# Patient Record
Sex: FEMALE | Race: BLACK OR AFRICAN AMERICAN | Employment: UNEMPLOYED | URBAN - METROPOLITAN AREA
[De-identification: names, ages, dates, MRNs, and addresses within clinical notes are randomized per-mention and may not be internally consistent; named-entity substitution may affect disease eponyms.]

---

## 2017-02-13 ENCOUNTER — HOSPITAL ENCOUNTER (OUTPATIENT)
Dept: PAIN MANAGEMENT | Age: 82
Discharge: HOME | End: 2017-02-13
Attending: NURSE PRACTITIONER | Admitting: NURSE PRACTITIONER
Payer: MEDICARE

## 2017-02-13 DIAGNOSIS — M25.552 PAIN IN JOINT, PELVIC REGION AND THIGH, LEFT: ICD-10-CM

## 2017-02-13 DIAGNOSIS — M25.559 PAIN IN JOINT, PELVIC REGION AND THIGH, UNSPECIFIED LATERALITY: ICD-10-CM

## 2017-02-13 DIAGNOSIS — M51.36 LUMBAR DEGENERATIVE DISC DISEASE: ICD-10-CM

## 2017-02-13 DIAGNOSIS — E66.9 GENERALIZED OBESITY: ICD-10-CM

## 2017-02-13 DIAGNOSIS — G89.29 HIP PAIN, CHRONIC, LEFT: ICD-10-CM

## 2017-02-13 DIAGNOSIS — M48.061 SPINAL STENOSIS, LUMBAR REGION, WITHOUT NEUROGENIC CLAUDICATION: ICD-10-CM

## 2017-02-13 DIAGNOSIS — G89.29 HIP PAIN, CHRONIC, UNSPECIFIED LATERALITY: ICD-10-CM

## 2017-02-13 DIAGNOSIS — Z79.899 ENCOUNTER FOR MEDICATION MANAGEMENT: ICD-10-CM

## 2017-02-13 DIAGNOSIS — G89.29 HIP PAIN, CHRONIC, RIGHT: ICD-10-CM

## 2017-02-13 DIAGNOSIS — M25.552 HIP PAIN, CHRONIC, LEFT: ICD-10-CM

## 2017-02-13 DIAGNOSIS — M17.11 ARTHRITIS OF KNEE, RIGHT: Primary | ICD-10-CM

## 2017-02-13 DIAGNOSIS — M25.559 HIP PAIN, CHRONIC, UNSPECIFIED LATERALITY: ICD-10-CM

## 2017-02-13 DIAGNOSIS — M25.551 HIP PAIN, CHRONIC, RIGHT: ICD-10-CM

## 2017-02-13 DIAGNOSIS — M51.37 DEGENERATION OF LUMBAR OR LUMBOSACRAL INTERVERTEBRAL DISC: ICD-10-CM

## 2017-02-13 DIAGNOSIS — M25.551 PAIN IN JOINT, PELVIC REGION AND THIGH, RIGHT: ICD-10-CM

## 2017-02-13 PROCEDURE — 99213 OFFICE O/P EST LOW 20 MIN: CPT | Performed by: NURSE PRACTITIONER

## 2017-02-13 PROCEDURE — 99213 OFFICE O/P EST LOW 20 MIN: CPT

## 2017-02-13 RX ORDER — OXYCODONE HYDROCHLORIDE AND ACETAMINOPHEN 5; 325 MG/1; MG/1
1 TABLET ORAL EVERY 6 HOURS PRN
Qty: 130 TABLET | Refills: 0 | Status: SHIPPED | OUTPATIENT
Start: 2017-02-13 | End: 2017-03-13 | Stop reason: SDUPTHER

## 2017-03-13 RX ORDER — OXYCODONE HYDROCHLORIDE AND ACETAMINOPHEN 5; 325 MG/1; MG/1
1 TABLET ORAL EVERY 6 HOURS PRN
Qty: 130 TABLET | Refills: 0 | Status: SHIPPED | OUTPATIENT
Start: 2017-03-15 | End: 2017-04-03 | Stop reason: SDUPTHER

## 2017-03-14 RX ORDER — LIDOCAINE 50 MG/G
OINTMENT TOPICAL
Qty: 1 TUBE | Refills: 2 | Status: SHIPPED | OUTPATIENT
Start: 2017-03-14 | End: 2017-06-14 | Stop reason: SDUPTHER

## 2017-03-21 ENCOUNTER — HOSPITAL ENCOUNTER (OUTPATIENT)
Dept: PAIN MANAGEMENT | Age: 82
Discharge: HOME | End: 2017-03-21
Payer: MEDICARE

## 2017-03-23 ENCOUNTER — TELEPHONE (OUTPATIENT)
Dept: PAIN MANAGEMENT | Age: 82
End: 2017-03-23

## 2017-03-30 ENCOUNTER — TELEPHONE (OUTPATIENT)
Dept: PAIN MANAGEMENT | Age: 82
End: 2017-03-30

## 2017-04-03 RX ORDER — OXYCODONE HYDROCHLORIDE AND ACETAMINOPHEN 5; 325 MG/1; MG/1
1 TABLET ORAL EVERY 6 HOURS PRN
Qty: 130 TABLET | Refills: 0 | Status: SHIPPED | OUTPATIENT
Start: 2017-04-14 | End: 2017-04-25 | Stop reason: SDUPTHER

## 2017-04-25 ENCOUNTER — HOSPITAL ENCOUNTER (OUTPATIENT)
Dept: PAIN MANAGEMENT | Age: 82
Discharge: HOME | End: 2017-04-25
Payer: MEDICARE

## 2017-04-25 DIAGNOSIS — Z79.899 ENCOUNTER FOR MEDICATION MANAGEMENT: ICD-10-CM

## 2017-04-25 DIAGNOSIS — M51.36 LUMBAR DEGENERATIVE DISC DISEASE: ICD-10-CM

## 2017-04-25 DIAGNOSIS — M25.559 HIP PAIN, CHRONIC, UNSPECIFIED LATERALITY: ICD-10-CM

## 2017-04-25 DIAGNOSIS — M48.061 SPINAL STENOSIS, LUMBAR REGION, WITHOUT NEUROGENIC CLAUDICATION: ICD-10-CM

## 2017-04-25 DIAGNOSIS — E66.9 GENERALIZED OBESITY: ICD-10-CM

## 2017-04-25 DIAGNOSIS — M25.551 PAIN IN JOINT, PELVIC REGION AND THIGH, RIGHT: ICD-10-CM

## 2017-04-25 DIAGNOSIS — M17.11 ARTHRITIS OF KNEE, RIGHT: Primary | ICD-10-CM

## 2017-04-25 DIAGNOSIS — M25.552 HIP PAIN, CHRONIC, LEFT: ICD-10-CM

## 2017-04-25 DIAGNOSIS — M25.551 HIP PAIN, CHRONIC, RIGHT: ICD-10-CM

## 2017-04-25 DIAGNOSIS — G89.29 HIP PAIN, CHRONIC, RIGHT: ICD-10-CM

## 2017-04-25 DIAGNOSIS — G89.29 HIP PAIN, CHRONIC, UNSPECIFIED LATERALITY: ICD-10-CM

## 2017-04-25 DIAGNOSIS — G89.29 HIP PAIN, CHRONIC, LEFT: ICD-10-CM

## 2017-04-25 DIAGNOSIS — M25.552 PAIN IN JOINT, PELVIC REGION AND THIGH, LEFT: ICD-10-CM

## 2017-04-25 PROCEDURE — 99213 OFFICE O/P EST LOW 20 MIN: CPT | Performed by: NURSE PRACTITIONER

## 2017-04-25 PROCEDURE — 99213 OFFICE O/P EST LOW 20 MIN: CPT

## 2017-04-25 RX ORDER — OXYCODONE HYDROCHLORIDE AND ACETAMINOPHEN 5; 325 MG/1; MG/1
1 TABLET ORAL EVERY 6 HOURS PRN
Qty: 130 TABLET | Refills: 0 | Status: SHIPPED | OUTPATIENT
Start: 2017-05-15 | End: 2017-06-14 | Stop reason: SDUPTHER

## 2017-06-14 ENCOUNTER — HOSPITAL ENCOUNTER (OUTPATIENT)
Dept: PAIN MANAGEMENT | Age: 82
Discharge: HOME | End: 2017-06-14
Payer: MEDICARE

## 2017-06-14 DIAGNOSIS — G89.29 HIP PAIN, CHRONIC, LEFT: ICD-10-CM

## 2017-06-14 DIAGNOSIS — M17.11 ARTHRITIS OF KNEE, RIGHT: Primary | ICD-10-CM

## 2017-06-14 DIAGNOSIS — M25.551 HIP PAIN, CHRONIC, RIGHT: ICD-10-CM

## 2017-06-14 DIAGNOSIS — M25.559 HIP PAIN, CHRONIC, UNSPECIFIED LATERALITY: ICD-10-CM

## 2017-06-14 DIAGNOSIS — M25.552 PAIN IN JOINT, PELVIC REGION AND THIGH, LEFT: ICD-10-CM

## 2017-06-14 DIAGNOSIS — G89.29 HIP PAIN, CHRONIC, UNSPECIFIED LATERALITY: ICD-10-CM

## 2017-06-14 DIAGNOSIS — M48.061 SPINAL STENOSIS, LUMBAR REGION, WITHOUT NEUROGENIC CLAUDICATION: ICD-10-CM

## 2017-06-14 DIAGNOSIS — G89.29 HIP PAIN, CHRONIC, RIGHT: ICD-10-CM

## 2017-06-14 DIAGNOSIS — M25.551 PAIN IN JOINT, PELVIC REGION AND THIGH, RIGHT: ICD-10-CM

## 2017-06-14 DIAGNOSIS — M51.36 LUMBAR DEGENERATIVE DISC DISEASE: ICD-10-CM

## 2017-06-14 DIAGNOSIS — E66.9 GENERALIZED OBESITY: ICD-10-CM

## 2017-06-14 DIAGNOSIS — Z79.899 ENCOUNTER FOR MEDICATION MANAGEMENT: ICD-10-CM

## 2017-06-14 DIAGNOSIS — M25.552 HIP PAIN, CHRONIC, LEFT: ICD-10-CM

## 2017-06-14 PROCEDURE — 99213 OFFICE O/P EST LOW 20 MIN: CPT

## 2017-06-14 PROCEDURE — G0463 HOSPITAL OUTPT CLINIC VISIT: HCPCS

## 2017-06-14 PROCEDURE — 99213 OFFICE O/P EST LOW 20 MIN: CPT | Performed by: NURSE PRACTITIONER

## 2017-06-14 RX ORDER — ESCITALOPRAM OXALATE 20 MG/1
TABLET ORAL
COMMUNITY
Start: 2017-06-08 | End: 2018-04-27 | Stop reason: ALTCHOICE

## 2017-06-14 RX ORDER — OXYCODONE HYDROCHLORIDE AND ACETAMINOPHEN 5; 325 MG/1; MG/1
1 TABLET ORAL EVERY 6 HOURS PRN
Qty: 130 TABLET | Refills: 0 | Status: SHIPPED | OUTPATIENT
Start: 2017-06-14 | End: 2017-07-13 | Stop reason: SDUPTHER

## 2017-06-14 RX ORDER — LIDOCAINE 50 MG/G
OINTMENT TOPICAL
Qty: 1 TUBE | Refills: 0 | Status: SHIPPED | OUTPATIENT
Start: 2017-06-14 | End: 2017-09-12 | Stop reason: RX

## 2017-07-13 ENCOUNTER — HOSPITAL ENCOUNTER (OUTPATIENT)
Dept: PAIN MANAGEMENT | Age: 82
Discharge: HOME | End: 2017-07-13
Payer: MEDICARE

## 2017-07-13 VITALS
RESPIRATION RATE: 16 BRPM | OXYGEN SATURATION: 98 % | HEIGHT: 68 IN | HEART RATE: 62 BPM | WEIGHT: 250 LBS | BODY MASS INDEX: 37.89 KG/M2 | DIASTOLIC BLOOD PRESSURE: 57 MMHG | TEMPERATURE: 98.8 F | SYSTOLIC BLOOD PRESSURE: 114 MMHG

## 2017-07-13 DIAGNOSIS — M51.36 LUMBAR DEGENERATIVE DISC DISEASE: ICD-10-CM

## 2017-07-13 DIAGNOSIS — M25.551 HIP PAIN, CHRONIC, RIGHT: ICD-10-CM

## 2017-07-13 DIAGNOSIS — M25.552 HIP PAIN, CHRONIC, LEFT: ICD-10-CM

## 2017-07-13 DIAGNOSIS — M25.559 HIP PAIN, CHRONIC, UNSPECIFIED LATERALITY: ICD-10-CM

## 2017-07-13 DIAGNOSIS — M17.11 ARTHRITIS OF KNEE, RIGHT: Primary | ICD-10-CM

## 2017-07-13 DIAGNOSIS — E66.9 GENERALIZED OBESITY: ICD-10-CM

## 2017-07-13 DIAGNOSIS — M25.552 PAIN IN JOINT, PELVIC REGION AND THIGH, LEFT: ICD-10-CM

## 2017-07-13 DIAGNOSIS — Z79.899 ENCOUNTER FOR MEDICATION MANAGEMENT: ICD-10-CM

## 2017-07-13 DIAGNOSIS — G89.29 HIP PAIN, CHRONIC, LEFT: ICD-10-CM

## 2017-07-13 DIAGNOSIS — M48.061 SPINAL STENOSIS, LUMBAR REGION, WITHOUT NEUROGENIC CLAUDICATION: ICD-10-CM

## 2017-07-13 DIAGNOSIS — G89.29 HIP PAIN, CHRONIC, RIGHT: ICD-10-CM

## 2017-07-13 DIAGNOSIS — G89.29 HIP PAIN, CHRONIC, UNSPECIFIED LATERALITY: ICD-10-CM

## 2017-07-13 DIAGNOSIS — M25.551 PAIN IN JOINT, PELVIC REGION AND THIGH, RIGHT: ICD-10-CM

## 2017-07-13 PROCEDURE — 99205 OFFICE O/P NEW HI 60 MIN: CPT

## 2017-07-13 PROCEDURE — G0463 HOSPITAL OUTPT CLINIC VISIT: HCPCS

## 2017-07-13 PROCEDURE — 99214 OFFICE O/P EST MOD 30 MIN: CPT | Performed by: PAIN MEDICINE

## 2017-07-13 PROCEDURE — 80307 DRUG TEST PRSMV CHEM ANLYZR: CPT

## 2017-07-13 PROCEDURE — 99213 OFFICE O/P EST LOW 20 MIN: CPT

## 2017-07-13 RX ORDER — OXYCODONE HYDROCHLORIDE AND ACETAMINOPHEN 5; 325 MG/1; MG/1
1 TABLET ORAL EVERY 6 HOURS PRN
Qty: 130 TABLET | Refills: 0 | Status: SHIPPED | OUTPATIENT
Start: 2017-07-15 | End: 2017-08-14 | Stop reason: SDUPTHER

## 2017-07-13 ASSESSMENT — ENCOUNTER SYMPTOMS
CONSTIPATION: 0
SHORTNESS OF BREATH: 1
BLURRED VISION: 0
COUGH: 1
VOMITING: 0
PHOTOPHOBIA: 0
NAUSEA: 0
GASTROINTESTINAL NEGATIVE: 1
EYES NEGATIVE: 1
HEARTBURN: 0
BACK PAIN: 1
ABDOMINAL PAIN: 0

## 2017-07-13 ASSESSMENT — PAIN DESCRIPTION - PROGRESSION: CLINICAL_PROGRESSION: NOT CHANGED

## 2017-07-13 ASSESSMENT — PAIN DESCRIPTION - ORIENTATION: ORIENTATION: RIGHT;LEFT;LOWER;UPPER

## 2017-07-13 ASSESSMENT — PAIN DESCRIPTION - DIRECTION: RADIATING_TOWARDS: BILATERAL HIPS AND LEGS

## 2017-07-13 ASSESSMENT — PAIN DESCRIPTION - FREQUENCY: FREQUENCY: CONTINUOUS

## 2017-07-13 ASSESSMENT — PAIN SCALES - GENERAL: PAINLEVEL_OUTOF10: 6

## 2017-07-13 ASSESSMENT — PAIN DESCRIPTION - PAIN TYPE: TYPE: CHRONIC PAIN

## 2017-07-13 ASSESSMENT — PAIN DESCRIPTION - ONSET: ONSET: ON-GOING

## 2017-07-14 ENCOUNTER — HOSPITAL ENCOUNTER (OUTPATIENT)
Age: 82
Setting detail: SPECIMEN
Discharge: HOME | End: 2017-07-14
Payer: MEDICARE

## 2017-07-14 LAB
ALBUMIN SERPL-MCNC: 4.1 G/DL (ref 3.5–5.2)
ALBUMIN/GLOBULIN RATIO: 1.2 (ref 1–2.5)
ALP BLD-CCNC: 81 U/L (ref 35–104)
ALT SERPL-CCNC: 7 U/L (ref 5–33)
ANION GAP SERPL CALCULATED.3IONS-SCNC: 19 MMOL/L (ref 9–17)
AST SERPL-CCNC: 13 U/L
BILIRUB SERPL-MCNC: 0.58 MG/DL (ref 0.3–1.2)
BILIRUBIN DIRECT: 0.14 MG/DL
BILIRUBIN, INDIRECT: 0.44 MG/DL (ref 0–1)
BUN BLDV-MCNC: 15 MG/DL (ref 8–23)
CALCIUM SERPL-MCNC: 9 MG/DL (ref 8.6–10.4)
CHLORIDE BLD-SCNC: 97 MMOL/L (ref 98–107)
CHOLESTEROL/HDL RATIO: 2
CHOLESTEROL: 144 MG/DL
CO2: 26 MMOL/L (ref 20–31)
CREAT SERPL-MCNC: 0.77 MG/DL (ref 0.5–0.9)
GFR AFRICAN AMERICAN: >60 ML/MIN
GFR NON-AFRICAN AMERICAN: >60 ML/MIN
GFR SERPL CREATININE-BSD FRML MDRD: NORMAL ML/MIN/{1.73_M2}
GFR SERPL CREATININE-BSD FRML MDRD: NORMAL ML/MIN/{1.73_M2}
GLOBULIN: NORMAL G/DL (ref 1.5–3.8)
GLUCOSE BLD-MCNC: 96 MG/DL (ref 70–99)
HCT VFR BLD CALC: 33.7 % (ref 36–46)
HDLC SERPL-MCNC: 73 MG/DL
HEMOGLOBIN: 10.6 G/DL (ref 12–16)
IRON: 64 UG/DL (ref 37–145)
LDL CHOLESTEROL: 55 MG/DL (ref 0–130)
MCH RBC QN AUTO: 26.2 PG (ref 26–34)
MCHC RBC AUTO-ENTMCNC: 31.4 G/DL (ref 31–37)
MCV RBC AUTO: 83.5 FL (ref 80–100)
PDW BLD-RTO: 16.9 % (ref 12.5–15.4)
PLATELET # BLD: 273 K/UL (ref 140–450)
PMV BLD AUTO: 9.9 FL (ref 6–12)
POTASSIUM SERPL-SCNC: 4.6 MMOL/L (ref 3.7–5.3)
RBC # BLD: 4.03 M/UL (ref 4–5.2)
SODIUM BLD-SCNC: 142 MMOL/L (ref 135–144)
T3 FREE: 2.36 PG/ML (ref 2.02–4.43)
THYROXINE, FREE: 1.38 NG/DL (ref 0.93–1.7)
TOTAL PROTEIN: 7.4 G/DL (ref 6.4–8.3)
TRIGL SERPL-MCNC: 79 MG/DL
TSH SERPL DL<=0.05 MIU/L-ACNC: 1.72 MIU/L (ref 0.3–5)
VITAMIN B-12: 404 PG/ML (ref 211–946)
VITAMIN D 25-HYDROXY: 9.7 NG/ML (ref 30–100)
VLDLC SERPL CALC-MCNC: NORMAL MG/DL (ref 1–30)
WBC # BLD: 8.1 K/UL (ref 3.5–11)

## 2017-07-16 LAB
6-ACETYLMORPHINE, UR: NOT DETECTED
7-AMINOCLONAZEPAM, URINE: NOT DETECTED
ALPHA-OH-ALPRAZ, URINE: NOT DETECTED
ALPRAZOLAM, URINE: NOT DETECTED
AMPHETAMINES, URINE: NOT DETECTED
BARBITURATES, URINE: NOT DETECTED
BENZOYLECGONINE, UR: NOT DETECTED
BUPRENORPHINE URINE: NOT DETECTED
CARISOPRODOL, UR: NOT DETECTED
CLONAZEPAM, URINE: NOT DETECTED
CODEINE, URINE: NOT DETECTED
CREATININE URINE: 184.6 MG/DL (ref 20–400)
DIAZEPAM, URINE: NOT DETECTED
DRUGS EXPECTED, UR: NORMAL
EER HI RES INTERP UR: NORMAL
ESTIMATED AVERAGE GLUCOSE: 128 MG/DL
ETHYL GLUCURONIDE UR: NOT DETECTED
FENTANYL URINE: NOT DETECTED
HBA1C MFR BLD: 6.1 % (ref 4–6)
HYDROCODONE, URINE: NOT DETECTED
HYDROMORPHONE, URINE: NOT DETECTED
LORAZEPAM, URINE: NOT DETECTED
MARIJUANA METAB, UR: NOT DETECTED
MDA, UR: NOT DETECTED
MDEA, EVE, UR: NOT DETECTED
MDMA URINE: NOT DETECTED
MEPERIDINE METAB, UR: NOT DETECTED
METHADONE, URINE: NOT DETECTED
METHAMPHETAMINE, URINE: NOT DETECTED
METHYLPHENIDATE: NOT DETECTED
MIDAZOLAM, URINE: NOT DETECTED
MORPHINE URINE: NOT DETECTED
NORBUPRENORPHINE, URINE: NOT DETECTED
NORDIAZEPAM, URINE: NOT DETECTED
NORFENTANYL, URINE: NOT DETECTED
NORHYDROCODONE, URINE: NOT DETECTED
NOROXYCODONE, URINE: NOT DETECTED
NOROXYMORPHONE, URINE: NOT DETECTED
OXAZEPAM, URINE: NOT DETECTED
OXYCODONE URINE: NOT DETECTED
OXYMORPHONE, URINE: NOT DETECTED
PAIN MANAGEMENT DRUG PANEL INTERP, URINE: NORMAL
PAIN MGT DRUG PANEL, HI RES, UR: NORMAL
PCP,URINE: NOT DETECTED
PHENTERMINE, UR: NOT DETECTED
PROPOXYPHENE, URINE: NOT DETECTED
TAPENTADOL, URINE: NOT DETECTED
TAPENTADOL-O-SULFATE, URINE: NOT DETECTED
TEMAZEPAM, URINE: NOT DETECTED
TRAMADOL, URINE: NOT DETECTED
ZOLPIDEM, URINE: NOT DETECTED

## 2017-08-14 ENCOUNTER — HOSPITAL ENCOUNTER (OUTPATIENT)
Dept: PAIN MANAGEMENT | Age: 82
Discharge: HOME | End: 2017-08-14
Payer: MEDICARE

## 2017-08-14 VITALS
OXYGEN SATURATION: 97 % | BODY MASS INDEX: 37.89 KG/M2 | DIASTOLIC BLOOD PRESSURE: 78 MMHG | HEART RATE: 62 BPM | HEIGHT: 68 IN | SYSTOLIC BLOOD PRESSURE: 152 MMHG | RESPIRATION RATE: 16 BRPM | TEMPERATURE: 97.6 F | WEIGHT: 250 LBS

## 2017-08-14 DIAGNOSIS — M17.11 ARTHRITIS OF KNEE, RIGHT: Primary | ICD-10-CM

## 2017-08-14 DIAGNOSIS — M48.061 SPINAL STENOSIS, LUMBAR REGION, WITHOUT NEUROGENIC CLAUDICATION: ICD-10-CM

## 2017-08-14 DIAGNOSIS — M25.559 PAIN IN JOINT, PELVIC REGION AND THIGH, UNSPECIFIED LATERALITY: ICD-10-CM

## 2017-08-14 DIAGNOSIS — M25.552 PAIN IN JOINT, PELVIC REGION AND THIGH, LEFT: ICD-10-CM

## 2017-08-14 DIAGNOSIS — Z79.899 ENCOUNTER FOR MEDICATION MANAGEMENT: ICD-10-CM

## 2017-08-14 DIAGNOSIS — M25.559 HIP PAIN, CHRONIC, UNSPECIFIED LATERALITY: ICD-10-CM

## 2017-08-14 DIAGNOSIS — M25.551 PAIN IN JOINT, PELVIC REGION AND THIGH, RIGHT: ICD-10-CM

## 2017-08-14 DIAGNOSIS — G89.29 HIP PAIN, CHRONIC, UNSPECIFIED LATERALITY: ICD-10-CM

## 2017-08-14 DIAGNOSIS — E66.9 GENERALIZED OBESITY: ICD-10-CM

## 2017-08-14 PROCEDURE — 99213 OFFICE O/P EST LOW 20 MIN: CPT

## 2017-08-14 PROCEDURE — 99213 OFFICE O/P EST LOW 20 MIN: CPT | Performed by: NURSE PRACTITIONER

## 2017-08-14 RX ORDER — OXYCODONE HYDROCHLORIDE AND ACETAMINOPHEN 5; 325 MG/1; MG/1
1 TABLET ORAL EVERY 6 HOURS PRN
Qty: 130 TABLET | Refills: 0 | Status: SHIPPED | OUTPATIENT
Start: 2017-08-14 | End: 2017-09-12 | Stop reason: SDUPTHER

## 2017-08-14 ASSESSMENT — ENCOUNTER SYMPTOMS
GASTROINTESTINAL NEGATIVE: 1
EYES NEGATIVE: 1
BACK PAIN: 1
SHORTNESS OF BREATH: 1

## 2017-09-12 ENCOUNTER — HOSPITAL ENCOUNTER (OUTPATIENT)
Dept: PAIN MANAGEMENT | Age: 82
Discharge: HOME | End: 2017-09-12
Payer: MEDICARE

## 2017-09-12 VITALS
DIASTOLIC BLOOD PRESSURE: 58 MMHG | HEART RATE: 61 BPM | SYSTOLIC BLOOD PRESSURE: 131 MMHG | BODY MASS INDEX: 37.89 KG/M2 | RESPIRATION RATE: 18 BRPM | WEIGHT: 250 LBS | HEIGHT: 68 IN

## 2017-09-12 DIAGNOSIS — M51.36 LUMBAR DEGENERATIVE DISC DISEASE: ICD-10-CM

## 2017-09-12 DIAGNOSIS — Z79.899 ENCOUNTER FOR MEDICATION MANAGEMENT: ICD-10-CM

## 2017-09-12 DIAGNOSIS — M48.061 SPINAL STENOSIS, LUMBAR REGION, WITHOUT NEUROGENIC CLAUDICATION: Primary | ICD-10-CM

## 2017-09-12 PROCEDURE — 99213 OFFICE O/P EST LOW 20 MIN: CPT

## 2017-09-12 PROCEDURE — 99213 OFFICE O/P EST LOW 20 MIN: CPT | Performed by: NURSE PRACTITIONER

## 2017-09-12 RX ORDER — OXYCODONE HYDROCHLORIDE AND ACETAMINOPHEN 5; 325 MG/1; MG/1
1 TABLET ORAL EVERY 6 HOURS PRN
Qty: 130 TABLET | Refills: 0 | Status: SHIPPED | OUTPATIENT
Start: 2017-09-13 | End: 2017-10-11 | Stop reason: SDUPTHER

## 2017-09-12 RX ORDER — MULTIVIT-MIN/IRON/FOLIC ACID/K 18-600-40
CAPSULE ORAL
COMMUNITY
End: 2020-10-09

## 2017-09-12 ASSESSMENT — PAIN SCALES - GENERAL: PAINLEVEL_OUTOF10: 7

## 2017-09-12 ASSESSMENT — ENCOUNTER SYMPTOMS
COUGH: 0
BACK PAIN: 1
CONSTIPATION: 0
SHORTNESS OF BREATH: 0

## 2017-09-29 ENCOUNTER — TELEPHONE (OUTPATIENT)
Dept: PAIN MANAGEMENT | Age: 82
End: 2017-09-29

## 2017-10-11 RX ORDER — OXYCODONE HYDROCHLORIDE AND ACETAMINOPHEN 5; 325 MG/1; MG/1
1 TABLET ORAL EVERY 6 HOURS PRN
Qty: 130 TABLET | Refills: 0 | Status: SHIPPED | OUTPATIENT
Start: 2017-10-13 | End: 2017-10-16 | Stop reason: SDUPTHER

## 2017-10-16 ENCOUNTER — HOSPITAL ENCOUNTER (OUTPATIENT)
Dept: PAIN MANAGEMENT | Age: 82
Discharge: HOME | End: 2017-10-16
Payer: MEDICARE

## 2017-10-16 DIAGNOSIS — M51.36 LUMBAR DEGENERATIVE DISC DISEASE: ICD-10-CM

## 2017-10-16 DIAGNOSIS — M48.061 SPINAL STENOSIS, LUMBAR REGION, WITHOUT NEUROGENIC CLAUDICATION: Primary | ICD-10-CM

## 2017-10-16 PROCEDURE — 99214 OFFICE O/P EST MOD 30 MIN: CPT | Performed by: NURSE PRACTITIONER

## 2017-10-16 PROCEDURE — 99213 OFFICE O/P EST LOW 20 MIN: CPT

## 2017-10-16 PROCEDURE — 80307 DRUG TEST PRSMV CHEM ANLYZR: CPT

## 2017-10-16 RX ORDER — OXYCODONE HYDROCHLORIDE AND ACETAMINOPHEN 5; 325 MG/1; MG/1
1 TABLET ORAL EVERY 6 HOURS PRN
Qty: 130 TABLET | Refills: 0 | Status: SHIPPED | OUTPATIENT
Start: 2017-10-16 | End: 2017-11-14 | Stop reason: SDUPTHER

## 2017-10-16 ASSESSMENT — ENCOUNTER SYMPTOMS: BACK PAIN: 1

## 2017-10-16 NOTE — PROGRESS NOTES
obstructive pulmonary disease) ([facility]     Diabetes mellitus ([facility]     Hyperlipidemia     Hypertension     Mitral regurgitation     Myalgia     Pulmonary hypertension     Sleep apnea        Past Surgical History:   Procedure Laterality Date    ABDOMEN SURGERY       SECTION      COLECTOMY      COLONOSCOPY      HIP ARTHROPLASTY      3 on the left and 1 on the right    HYSTERECTOMY      JOINT REPLACEMENT Right     TOTAL KNEE    JOINT REPLACEMENT Bilateral     right x2, left x3 hips    KNEE SURGERY         No Known Allergies      Current Outpatient Prescriptions:     oxyCODONE-acetaminophen (PERCOCET) 5-325 MG per tablet, Take 1 tablet by mouth every 6 hours as needed for Pain  30 day supply May occasionally take an extra tablet for severe pain. Earliest Fill Date: 10/13/17, Disp: 130 tablet, Rfl: 0    Cholecalciferol (VITAMIN D) 2000 units CAPS capsule, Take by mouth Pt taking 1 time a week, unsure of dose, Disp: , Rfl:     ASPERCREME LIDOCAINE EX, Apply topically, Disp: , Rfl:     Menthol, Topical Analgesic, (PERFORM PAIN RELIEVING ROLL-ON EX), Apply topically, Disp: , Rfl:     VENTOLIN  (90 Base) MCG/ACT inhaler, , Disp: , Rfl:     escitalopram (LEXAPRO) 20 MG tablet, , Disp: , Rfl:     omeprazole (PRILOSEC) 20 MG delayed release capsule, , Disp: , Rfl:     amLODIPine (NORVASC) 5 MG tablet, TAKE ONE TABLET BY MOUTH DAILY, Disp: 30 tablet, Rfl: 9    losartan (COZAAR) 50 MG tablet, TAKE ONE TABLET BY MOUTH DAILY, Disp: 30 tablet, Rfl: 2    hydrocortisone 2.5 % cream, Apply topically 2 times daily. , Disp: 30 g, Rfl: 2    levothyroxine (LEVOTHROID) 50 MCG tablet, Take 1 tablet by mouth daily, Disp: 30 tablet, Rfl: 3    glucose monitoring kit (FREESTYLE) monitoring kit, 1 kit by Does not apply route daily as needed, Disp: 1 kit, Rfl: 0    glucose blood VI test strips (EXACTECH TEST) strip, 1 each by In Vitro route daily Type 2 diabetes qd testing, Disp: 100 each, Rfl: 3   Accu-Chek Multiclix Lancets MISC, Test qd;type 2 diabetes, Disp: 100 each, Rfl: 3    Scooter MISC, by Does not apply route Use daily dx arthritis obesity pulmonary htn,copd, Disp: 1 each, Rfl: 0    metFORMIN (GLUCOPHAGE) 500 MG tablet, TAKE ONE TABLET BY MOUTH TWICE A DAY, Disp: 180 tablet, Rfl: 2    simvastatin (ZOCOR) 40 MG tablet, Take 1 tablet by mouth nightly, Disp: 90 tablet, Rfl: 3    metoprolol (LOPRESSOR) 25 MG tablet, Take 1 tablet by mouth 2 times daily, Disp: 60 tablet, Rfl: 6    saline nasal gel (AYR) GEL, by Nasal route as needed for Congestion. , Disp: , Rfl:     aspirin 325 MG tablet, Take 325 mg by mouth daily. , Disp: , Rfl:     Family History   Problem Relation Age of Onset    Cancer Mother     Hypertension Maternal Aunt     Cancer Daughter        Social History     Social History    Marital status: Single     Spouse name: N/A    Number of children: 3    Years of education: N/A     Occupational History    RETIRED      Social History Main Topics    Smoking status: Former Smoker     Years: 5.00     Quit date: 11/23/1985    Smokeless tobacco: Never Used    Alcohol use No    Drug use: No    Sexual activity: Not on file     Other Topics Concern    Not on file     Social History Narrative    No narrative on file       Review of Systems:  Review of Systems   Constitution: Negative for chills and fever. Cardiovascular: Negative for chest pain and irregular heartbeat. Respiratory: Negative for cough and shortness of breath. Musculoskeletal: Positive for back pain. Gastrointestinal: Negative for constipation. Neurological: Negative for disturbances in coordination and loss of balance. Physical Exam:  T 97.4  /69  R 18  P 61  SpO2 97    Physical Exam   Constitutional: She is oriented to person, place, and time and well-developed, well-nourished, and in no distress. HENT:   Head: Normocephalic. Eyes: EOM are normal.   Neck: Normal range of motion.

## 2017-10-17 ASSESSMENT — ENCOUNTER SYMPTOMS
CONSTIPATION: 0
COUGH: 0
SHORTNESS OF BREATH: 0

## 2017-10-21 LAB
6-ACETYLMORPHINE, UR: NOT DETECTED
7-AMINOCLONAZEPAM, URINE: NOT DETECTED
ALPHA-OH-ALPRAZ, URINE: NOT DETECTED
ALPRAZOLAM, URINE: NOT DETECTED
AMPHETAMINES, URINE: NOT DETECTED
BARBITURATES, URINE: NOT DETECTED
BENZOYLECGONINE, UR: NOT DETECTED
BUPRENORPHINE URINE: NOT DETECTED
CARISOPRODOL, UR: NOT DETECTED
CLONAZEPAM, URINE: NOT DETECTED
CODEINE, URINE: NOT DETECTED
CREATININE URINE: >400 MG/DL (ref 20–400)
DIAZEPAM, URINE: NOT DETECTED
DRUGS EXPECTED, UR: ABNORMAL
EER HI RES INTERP UR: ABNORMAL
ETHYL GLUCURONIDE UR: NOT DETECTED
FENTANYL URINE: NOT DETECTED
HYDROCODONE, URINE: NOT DETECTED
HYDROMORPHONE, URINE: NOT DETECTED
LORAZEPAM, URINE: NOT DETECTED
MARIJUANA METAB, UR: NOT DETECTED
MDA, UR: NOT DETECTED
MDEA, EVE, UR: NOT DETECTED
MDMA URINE: NOT DETECTED
MEPERIDINE METAB, UR: NOT DETECTED
METHADONE, URINE: NOT DETECTED
METHAMPHETAMINE, URINE: NOT DETECTED
METHYLPHENIDATE: NOT DETECTED
MIDAZOLAM, URINE: NOT DETECTED
MORPHINE URINE: NOT DETECTED
NORBUPRENORPHINE, URINE: NOT DETECTED
NORDIAZEPAM, URINE: NOT DETECTED
NORFENTANYL, URINE: NOT DETECTED
NORHYDROCODONE, URINE: NOT DETECTED
NOROXYCODONE, URINE: PRESENT
NOROXYMORPHONE, URINE: PRESENT
OXAZEPAM, URINE: NOT DETECTED
OXYCODONE URINE: PRESENT
OXYMORPHONE, URINE: PRESENT
PAIN MANAGEMENT DRUG PANEL INTERP, URINE: ABNORMAL
PAIN MGT DRUG PANEL, HI RES, UR: ABNORMAL
PCP,URINE: NOT DETECTED
PHENTERMINE, UR: NOT DETECTED
PROPOXYPHENE, URINE: NOT DETECTED
TAPENTADOL, URINE: NOT DETECTED
TAPENTADOL-O-SULFATE, URINE: NOT DETECTED
TEMAZEPAM, URINE: NOT DETECTED
TRAMADOL, URINE: NOT DETECTED
ZOLPIDEM, URINE: NOT DETECTED

## 2017-11-14 ENCOUNTER — HOSPITAL ENCOUNTER (OUTPATIENT)
Dept: PAIN MANAGEMENT | Age: 82
Discharge: HOME | End: 2017-11-14
Payer: MEDICARE

## 2017-11-14 VITALS
HEIGHT: 64 IN | RESPIRATION RATE: 16 BRPM | OXYGEN SATURATION: 98 % | TEMPERATURE: 98.2 F | HEART RATE: 78 BPM | BODY MASS INDEX: 42.68 KG/M2 | WEIGHT: 250 LBS

## 2017-11-14 DIAGNOSIS — M17.11 ARTHRITIS OF KNEE, RIGHT: ICD-10-CM

## 2017-11-14 DIAGNOSIS — E66.9 GENERALIZED OBESITY: ICD-10-CM

## 2017-11-14 DIAGNOSIS — G89.29 HIP PAIN, CHRONIC, UNSPECIFIED LATERALITY: ICD-10-CM

## 2017-11-14 DIAGNOSIS — M25.559 HIP PAIN, CHRONIC, UNSPECIFIED LATERALITY: ICD-10-CM

## 2017-11-14 DIAGNOSIS — M25.552 PAIN IN JOINT, PELVIC REGION AND THIGH, LEFT: ICD-10-CM

## 2017-11-14 DIAGNOSIS — Z79.899 ENCOUNTER FOR MEDICATION MANAGEMENT: ICD-10-CM

## 2017-11-14 DIAGNOSIS — G89.29 HIP PAIN, CHRONIC, RIGHT: ICD-10-CM

## 2017-11-14 DIAGNOSIS — M48.061 SPINAL STENOSIS, LUMBAR REGION, WITHOUT NEUROGENIC CLAUDICATION: ICD-10-CM

## 2017-11-14 DIAGNOSIS — M25.551 HIP PAIN, CHRONIC, RIGHT: ICD-10-CM

## 2017-11-14 DIAGNOSIS — M25.551 PAIN IN JOINT, PELVIC REGION AND THIGH, RIGHT: Primary | ICD-10-CM

## 2017-11-14 DIAGNOSIS — M51.36 LUMBAR DEGENERATIVE DISC DISEASE: ICD-10-CM

## 2017-11-14 DIAGNOSIS — M25.552 HIP PAIN, CHRONIC, LEFT: ICD-10-CM

## 2017-11-14 DIAGNOSIS — G89.29 HIP PAIN, CHRONIC, LEFT: ICD-10-CM

## 2017-11-14 PROCEDURE — 99213 OFFICE O/P EST LOW 20 MIN: CPT | Performed by: NURSE PRACTITIONER

## 2017-11-14 PROCEDURE — 99213 OFFICE O/P EST LOW 20 MIN: CPT

## 2017-11-14 RX ORDER — OXYCODONE HYDROCHLORIDE AND ACETAMINOPHEN 5; 325 MG/1; MG/1
1 TABLET ORAL EVERY 6 HOURS PRN
Qty: 130 TABLET | Refills: 0 | Status: SHIPPED | OUTPATIENT
Start: 2017-11-15 | End: 2017-12-13 | Stop reason: SDUPTHER

## 2017-11-14 ASSESSMENT — ENCOUNTER SYMPTOMS
SHORTNESS OF BREATH: 1
BACK PAIN: 1
CONSTIPATION: 1

## 2017-11-14 NOTE — PROGRESS NOTES
[facility]  Progress  Note    Patient is here today to review medication contract. Chief Complaint: back pain          HPI: C/O low back pain, which has progressed. The pain radiates down in to hips. No history of lumbar surgery, Has done aquatics which helped back pain. No Ed visits, sleeps good once she gets too sleep. She has cough and upper respiratory symptoms. Back Pain   This is a chronic problem. The current episode started more than 1 year ago. The problem occurs constantly. The problem has been gradually worsening since onset. The pain is present in the lumbar spine. Quality: throbbing. Radiates to: down hips. The pain is at a severity of 10/10. The pain is severe. The pain is the same all the time. The symptoms are aggravated by standing (walking). Stiffness is present all day. Associated symptoms include numbness. (Legs) Risk factors include sedentary lifestyle, menopause and obesity. She has tried analgesics for the symptoms. The treatment provided mild relief. *Possible side effects, risk of tolerance and or dependence and alternative treatments discussed    Obtaining appropriate analgesic effect of treatment   No signs of potential drug abuse or diversion identified    Treatment goals:  Functional status: stop the pain      Aberrancy  None   Analgesia pain 10,   Adverse  Effects :  Constipation, magnesium citrate, BM every other day  ADL;s :  Not as active, has been sick, stretching    Patient denies any new neurological symptoms. No bowel or bladder incontinence, no weakness, and no falling. Pill count: appropriate  Controlled Substances Monitoring:     Attestation: The Prescription Monitoring Report for this patient was reviewed today. [facility], APRN)  Documentation: Obtaining appropriate analgesic effect of treatment., No signs of potential drug abuse or diversion identified., Existing medication contract.  [facility], APRN)  :  Review of OARRS does not Positive for constipation. Genitourinary: Positive for nocturia. Neurological: Positive for loss of balance and numbness. Uses a walker   Psychiatric/Behavioral: The patient is nervous/anxious. Managing         Physical Exam:  Pulse 78   Temp 98.2 °F (36.8 °C) (Oral)   Resp 16   Ht 5' 4\" (1.626 m)   Wt 250 lb (113.4 kg)   SpO2 98%   BMI 42.91 kg/m²     Physical Exam   Constitutional: She is oriented to person, place, and time. obese   HENT:   Head: Normocephalic. Neck: Normal range of motion. Neck supple. Pulmonary/Chest: Effort normal.   Coughing,    Musculoskeletal:        Right shoulder: She exhibits decreased range of motion and tenderness. Left shoulder: She exhibits decreased range of motion and tenderness. Right knee: She exhibits decreased range of motion. Tenderness found. Lumbar back: She exhibits decreased range of motion. Neurological: She is alert and oriented to person, place, and time. Gait abnormal.   Reflex Scores:       Patellar reflexes are 0 on the right side and 0 on the left side. Achilles reflexes are 1+ on the right side and 1+ on the left side. Skin: Skin is warm, dry and intact.    Psychiatric: Affect and judgment normal.       Record/Diagnostics Review:    As above, I did review the imaging  10/21/2017 11:02 AM - Radha Grant Incoming Lab Results From [facility]     Component Results     Component Value Ref Range & Units Status Collected Lab   Pain Management Drug Panel Interp, Urine Consistent   Final 10/16/2017 11:45 AM [facility]   (NOTE)   ________________________________________________________________   DRUGS EXPECTED:   PERCOCET (OXYCODONE) [10/16/17]   ________________________________________________________________   CONSISTENT with medications provided:   PERCOCET (OXYCODONE) : based on oxycodone, noroxycodone,   noroxymorphone, oxymorphone   ________________________________________________________________   Drugs Not Included in this Assay:   Acetaminophen   ________________________________________________________________   INTERPRETIVE INFORMATION: Pain Mgt Neil, [place] Spec/EMIT, Ur,                            Interp   Interpretation depends on accuracy and completeness of patient   medication information submitted by client. 6-Acetylmorphine, Ur Not Detected   Final 10/16/2017 11:45 AM [facility]   7-Aminoclonazepam, Urine Not Detected   Final 10/16/2017 11:45 AM [facility]   Alpha-OH-Alpraz, Urine Not Detected   Final 10/16/2017 11:45 AM [facility]   Alprazolam, Urine Not Detected   Final 10/16/2017 11:45 AM [facility]   Amphetamines, urine Not Detected   Final 10/16/2017 11:45 AM [facility]   Barbiturates, Ur Not Detected   Final 10/16/2017 11:45 AM [facility]   Benzoylecgonine, Ur Not Detected   Final 10/16/2017 11:45 AM [facility]   Buprenorphine Urine Not Detected   Final 10/16/2017 11:45 AM [facility]   Carisoprodol, Ur Not Detected   Final 10/16/2017 11:45 AM [facility]   (NOTE)   The carisoprodol immunoassay has cross-reactivity to carisoprodol   and meprobamate.     Clonazepam, Urine Not Detected   Final 10/16/2017 11:45 AM [facility]   Codeine, Urine Not Detected   Final 10/16/2017 11:45 AM [facility]   MDA, Ur Not Detected   Final 10/16/2017 11:45 AM [facility]   Diazepam, Urine Not Detected   Final 10/16/2017 11:45 AM [facility]   Ethyl Glucuronide Ur Not Detected   Final 10/16/2017 11:45 AM [facility]   Fentanyl, Ur Not Detected   Final 10/16/2017 11:45 AM [facility]   Hydrocodone, Urine Not Detected   Final 10/16/2017 11:45 AM [facility]   Hydromorphone, Urine Not Detected   Final 10/16/2017 11:45 AM [facility]   Lorazepam, Urine Not Detected   Final 10/16/2017 11:45 AM [facility]   Marijuana Metab, Ur Not Detected   Final 10/16/2017 11:45 AM [facility]   MDEA, JYOTHI, Ur Not Detected   Final 10/16/2017 11:45 AM [facility]   MDMA URINE Not Detected   Final 10/16/2017 11:45 AM [facility]   Meperidine Metab, Ur Not Detected   Final 10/16/2017 11:45 AM [facility]   Methadone, Urine Not Detected   Final 10/16/2017 11:45 AM [facility]   Methamphetamine, Urine Not administration, poor drug absorption,   diluted/adulterated urine, or limitations of testing. The   concentration must be greater than or equal to the cutoff to be   reported as present.  If specific drug concentrations are   required, contact the laboratory within two weeks of specimen   collection to request quantification by a second analytical   technique. Interpretive questions should be directed to the   laboratory. Results based on immunoassay detection that do not match clinical   expectations should be   interpreted with caution. Confirmatory testing by mass   spectrometry for immunoassay-based results is available, if   ordered within two weeks of specimen collection. Additional   charges apply. For medical purposes only; not valid for forensic use. This test was developed and its performance characteristics   determined by [facility]. The U.S. Food and Drug   Administration has not approved or cleared this test; however, FDA   clearance or approval is not currently required for clinical use. The results are not intended to be used as the sole means for   clinical diagnosis or patient management decisions. EER Hi Res Interp Ur See Note   Final 10/16/2017 11:45 AM [facility]   (NOTE)   Access [facility] Enhanced Report using either link below:   -Direct access: [email or web address]   -Enter Username, Password: [email or web address]   Username: [email or web address]   Password: [email or web address]   Performed by Kiko Long Jr. , [id number] [facility] [phone]        Assessment:  Problem List Items Addressed This Visit     Pain in joint, pelvic region and thigh, left    Spinal stenosis, lumbar region, without neurogenic claudication    Generalized obesity    Arthritis of knee, right    Relevant Medications    oxyCODONE-acetaminophen (PERCOCET) 5-325 MG per tablet (Start on 11/15/2017)    Hip pain, chronic, unspecified laterality    Relevant Medications    oxyCODONE-acetaminophen

## 2017-12-13 RX ORDER — OXYCODONE HYDROCHLORIDE AND ACETAMINOPHEN 5; 325 MG/1; MG/1
1 TABLET ORAL EVERY 6 HOURS PRN
Qty: 130 TABLET | Refills: 0 | Status: SHIPPED | OUTPATIENT
Start: 2017-12-15 | End: 2018-01-22 | Stop reason: SDUPTHER

## 2017-12-15 ENCOUNTER — HOSPITAL ENCOUNTER (OUTPATIENT)
Dept: PAIN MANAGEMENT | Age: 82
Discharge: HOME | End: 2017-12-15
Payer: MEDICARE

## 2017-12-15 VITALS
HEIGHT: 64 IN | OXYGEN SATURATION: 98 % | WEIGHT: 260 LBS | TEMPERATURE: 97.8 F | RESPIRATION RATE: 18 BRPM | SYSTOLIC BLOOD PRESSURE: 114 MMHG | HEART RATE: 59 BPM | DIASTOLIC BLOOD PRESSURE: 56 MMHG | BODY MASS INDEX: 44.39 KG/M2

## 2017-12-15 DIAGNOSIS — M54.42 CHRONIC BILATERAL LOW BACK PAIN WITH BILATERAL SCIATICA: Primary | Chronic | ICD-10-CM

## 2017-12-15 DIAGNOSIS — E11.42 DIABETIC POLYNEUROPATHY ASSOCIATED WITH TYPE 2 DIABETES MELLITUS (HCC): ICD-10-CM

## 2017-12-15 DIAGNOSIS — G89.29 CHRONIC BILATERAL LOW BACK PAIN WITH BILATERAL SCIATICA: Primary | Chronic | ICD-10-CM

## 2017-12-15 DIAGNOSIS — M54.41 CHRONIC BILATERAL LOW BACK PAIN WITH BILATERAL SCIATICA: Primary | Chronic | ICD-10-CM

## 2017-12-15 PROCEDURE — 99214 OFFICE O/P EST MOD 30 MIN: CPT | Performed by: ANESTHESIOLOGY

## 2017-12-15 PROCEDURE — 99213 OFFICE O/P EST LOW 20 MIN: CPT

## 2017-12-15 RX ORDER — OXYCODONE HYDROCHLORIDE AND ACETAMINOPHEN 5; 325 MG/1; MG/1
1 TABLET ORAL EVERY 6 HOURS PRN
Qty: 130 TABLET | Refills: 0 | Status: CANCELLED | OUTPATIENT
Start: 2017-12-17

## 2017-12-15 ASSESSMENT — PAIN DESCRIPTION - FREQUENCY: FREQUENCY: CONTINUOUS

## 2017-12-15 ASSESSMENT — ENCOUNTER SYMPTOMS
BLURRED VISION: 1
SORE THROAT: 1
CONSTIPATION: 0
GASTROINTESTINAL NEGATIVE: 1
SHORTNESS OF BREATH: 1
COUGH: 1
BACK PAIN: 1
SPUTUM PRODUCTION: 1
WHEEZING: 1
NAUSEA: 0
HEARTBURN: 0
VOMITING: 0

## 2017-12-15 ASSESSMENT — PAIN DESCRIPTION - DESCRIPTORS: DESCRIPTORS: CONSTANT;ACHING;THROBBING

## 2017-12-15 ASSESSMENT — PAIN DESCRIPTION - DIRECTION: RADIATING_TOWARDS: BILAT. HIPS

## 2017-12-15 ASSESSMENT — PAIN DESCRIPTION - PROGRESSION: CLINICAL_PROGRESSION: NOT CHANGED

## 2017-12-15 ASSESSMENT — PAIN DESCRIPTION - ONSET: ONSET: ON-GOING

## 2017-12-15 ASSESSMENT — PAIN DESCRIPTION - ORIENTATION: ORIENTATION: RIGHT;LEFT;LOWER

## 2017-12-15 ASSESSMENT — PAIN SCALES - GENERAL: PAINLEVEL_OUTOF10: 9

## 2017-12-15 ASSESSMENT — PAIN DESCRIPTION - PAIN TYPE: TYPE: CHRONIC PAIN

## 2017-12-15 NOTE — PROGRESS NOTES
[facility] Pain [facility]  Patient Pain Assessment  Consultation - No att. providers found    Primary Care Physician: Henok Magana PA-C    Chief complaint:   Chief Complaint   Patient presents with    Back Pain     hips   . HISTORY OF PRESENT ILLNESS:  Sarah Beth Roman is [de-identified] y.o. female with    HPI  This is a pleasant 53-year-old woman with a history of chronic lower back pain. Her pain radiates down from back into both legs and in the groin area. She also had long-standing history of diabetes. She is complaining of bilateral foot numbness and tingling. Her back pain has been progressively worsening over last several years. No diagnostic workup or imaging is available for review. She describes this pain as a constant aching throbbing pain sensation that aggravates with daily physical activity. She is able to ambulate for short distance     His prescribed Percocet 5 mg 4 times a day no sign symptoms of aberrancy she finds the medication helpful and denies any side effect   OARRS compliant?  yes  Concern for prescription abuse?no    Current Pain Assessment  Pain Assessment  Pain Assessment: 0-10  Pain Level: 9  Pain Type: Chronic pain  Pain Location: Back, Groin, Hip, Neck, Shoulder  Pain Orientation: Right, Left, Lower  Pain Radiating Towards: bilat. hips  Pain Descriptors: Constant, Aching, Throbbing  Pain Frequency: Continuous  Pain Onset: On-going  Clinical Progression: Not changed  Effect of Pain on Daily Activities: Can't walk more than a few feet w/walker only a few feet  Patient's Stated Pain Goal: 2 (To be able to walk more w/less pain, & less anxiety)  Pain Intervention(s): Medication (see eMar), Rest, Repositioned, Elevation                    ADVERSE MEDICATION EFFECTS:   Constipation: no  Bowel Regimen: Yes  Diet: low carbs, low salt  Appetite:  ok  Sedation:  no  Urinary Retention: no    FOCUSED PAIN SCALE:  Highest : 10  Lowest :5  Average: Range-7  When and What  was your last procedure:    n/a  Was your procedure effective:  not applicable    ACTIVITY/SOCIAL/EMOTIONAL:  Sleep Pattern: 8 hours per night. nightime awakenings  Energy Level:  Tired/Fatigued  Currently attending Physical Therapy:  No  Home Exercises: daily arm wts  Mobility: walking increases the pain, walks w/walker   Currently seeing a Psychiatrist or Psychologist:  No  Emotional Issues: anxiety/ nervousness   Mood: frustrated     ABERRANT BEHAVIORS SINCE LAST VISIT:  Have you ever been treated in another Pain Clinic no  Refills for prescriptions appropriate: yes  Lost rx/pills: no  Taking more medication than prescribed:  no  Are you receiving PAIN medications from  other doctors: no  Last Urine/Serum Drug Screen :10/16  Was Serum/UDS as anticipated? yes  Brought pill bottles in :yes   Was Pill count appropriate? :yes   Are currently pregnant? not applicable  Recent ER visits: Yes, x2 for anxiety [facility],xray chest             Past Medical History      Diagnosis Date    Anxiety     Arthritis     Bronchitis     CAD (coronary artery disease)     [facility]otid [facility] [address] [facility]     COPD (chronic obstructive pulmonary disease) ([facility] [facility]     Diabetes mellitus ([facility] [facility]     Hyperlipidemia     Hypertension     Mitral regurgitation     Myalgia     Pulmonary hypertension     Sleep apnea        Surgical History  Past Surgical History:   Procedure Laterality Date    ABDOMEN SURGERY       SECTION      COLECTOMY      COLONOSCOPY      HIP ARTHROPLASTY      3 on the left and 1 on the right    HYSTERECTOMY      JOINT REPLACEMENT Right     TOTAL KNEE    JOINT REPLACEMENT Bilateral     right x2, left x3 hips    KNEE SURGERY         Medications  Current Outpatient Prescriptions   Medication Sig Dispense Refill    oxyCODONE-acetaminophen (PERCOCET) 5-325 MG per tablet Take 1 tablet by mouth every 6 hours as needed for Pain  30 day supply  May occasionally take an extra tablet for severe pain.  Earliest Fill Date: reflexes, normal muscle tone and normal coordination. Pupils:  Pupils are equal, round, and reactive to light. Skin:  Warm, dry and pale. No rash, ecchymosis, cyanosis or ulceration. Musculoskeletal exam: Sitting in a wheelchair but able to ambulate, moves all 4 extremity, diminished range of motion in lumbar spine  Neurological exam reveals alert, oriented, normal speech, no focal findings or movement disorder noted, cranial nerves II through XII intact, motor and sensory grossly normal bilaterally    Assessment & Plan   Worsening back pain with radiation down both legs over last several years. There has not been any diagnostic workup for last several years also complaining of worsening bilateral foot numbness and tingling. I will start with a CT scan of lumbar spine as she is unable to light and MRI for longer period of time. We'll obtain an EMG nerve conduction testing for evaluation of diabetic neuropathy or radiculopathy    1. Chronic bilateral low back pain with bilateral sciatica    2. Diabetic polyneuropathy associated with type 2 diabetes mellitus ([facility]      Orders Placed This Encounter   Procedures    CT Lumbar Spine WO Contrast     Standing Status:   Future     Standing Expiration Date:   12/16/2018     Order Specific Question:   Reason for exam:     Answer:   Back and leg pain    EMG     Standing Status:   Future     Standing Expiration Date:   2/13/2018     Order Specific Question:   Which body part? Answer:   both legs     Orders Placed This Encounter   Medications    oxyCODONE-acetaminophen (PERCOCET) 5-325 MG per tablet     Sig: Take 1 tablet by mouth every 6 hours as needed for Pain  30 day supply  May occasionally take an extra tablet for severe pain. Earliest Fill Date: 12/15/17     Dispense:  130 tablet     Refill:  0       Controlled Substances Monitoring:     Attestation: The Prescription Monitoring Report for this patient was reviewed today.  (Rj Simmons, MD)  Documentation: No signs of potential drug abuse or diversion identified., Existing medication contract. Earlene Barr MD)  Chronic Pain: Functional status reviewed - continues with improved or maintaining ADL's. Earlene Barr MD)    This note was created using voice recognition software. There may be inaccuracies of transcription  that are inadvertently overlooked prior to the signature. There is any questions about the transcription please contact me.     Electronically signed by Earlene Barr MD on 12/15/2017 at 11:54 AM

## 2018-01-09 ENCOUNTER — HOSPITAL ENCOUNTER (OUTPATIENT)
Age: 83
Discharge: HOME | End: 2018-01-09
Payer: MEDICARE

## 2018-01-09 DIAGNOSIS — R06.02 SOB (SHORTNESS OF BREATH): ICD-10-CM

## 2018-01-09 DIAGNOSIS — I10 ESSENTIAL HYPERTENSION: ICD-10-CM

## 2018-01-09 DIAGNOSIS — R60.9 EDEMA, UNSPECIFIED TYPE: ICD-10-CM

## 2018-01-09 PROBLEM — I35.0 NONRHEUMATIC AORTIC VALVE STENOSIS: Status: ACTIVE | Noted: 2018-01-09

## 2018-01-09 PROBLEM — R93.1 ABNORMAL ECHOCARDIOGRAM: Status: ACTIVE | Noted: 2018-01-09

## 2018-01-09 PROBLEM — I65.23 BILATERAL CAROTID ARTERY STENOSIS: Status: ACTIVE | Noted: 2018-01-09

## 2018-01-09 LAB
ANION GAP SERPL CALCULATED.3IONS-SCNC: 15 MMOL/L (ref 9–17)
BNP INTERPRETATION: ABNORMAL
BUN BLDV-MCNC: 17 MG/DL (ref 8–23)
BUN/CREAT BLD: ABNORMAL (ref 9–20)
CALCIUM SERPL-MCNC: 9 MG/DL (ref 8.6–10.4)
CHLORIDE BLD-SCNC: 102 MMOL/L (ref 98–107)
CO2: 26 MMOL/L (ref 20–31)
CREAT SERPL-MCNC: 1.02 MG/DL (ref 0.5–0.9)
GFR AFRICAN AMERICAN: >60 ML/MIN
GFR NON-AFRICAN AMERICAN: 52 ML/MIN
GFR SERPL CREATININE-BSD FRML MDRD: ABNORMAL ML/MIN/{1.73_M2}
GFR SERPL CREATININE-BSD FRML MDRD: ABNORMAL ML/MIN/{1.73_M2}
GLUCOSE BLD-MCNC: 100 MG/DL (ref 70–99)
POTASSIUM SERPL-SCNC: 4.4 MMOL/L (ref 3.7–5.3)
PRO-BNP: 399 PG/ML
SODIUM BLD-SCNC: 143 MMOL/L (ref 135–144)

## 2018-01-09 PROCEDURE — 83880 ASSAY OF NATRIURETIC PEPTIDE: CPT

## 2018-01-09 PROCEDURE — 36415 COLL VENOUS BLD VENIPUNCTURE: CPT

## 2018-01-09 PROCEDURE — 80048 BASIC METABOLIC PNL TOTAL CA: CPT

## 2018-01-22 ENCOUNTER — HOSPITAL ENCOUNTER (OUTPATIENT)
Dept: PAIN MANAGEMENT | Age: 83
Discharge: HOME | End: 2018-01-22
Payer: MEDICARE

## 2018-01-22 VITALS
HEIGHT: 64 IN | SYSTOLIC BLOOD PRESSURE: 132 MMHG | OXYGEN SATURATION: 95 % | WEIGHT: 257 LBS | BODY MASS INDEX: 43.87 KG/M2 | DIASTOLIC BLOOD PRESSURE: 65 MMHG | HEART RATE: 61 BPM | RESPIRATION RATE: 16 BRPM

## 2018-01-22 DIAGNOSIS — M25.552 PAIN IN JOINT, PELVIC REGION AND THIGH, LEFT: ICD-10-CM

## 2018-01-22 DIAGNOSIS — M25.559 HIP PAIN, CHRONIC, UNSPECIFIED LATERALITY: ICD-10-CM

## 2018-01-22 DIAGNOSIS — M17.11 ARTHRITIS OF KNEE, RIGHT: ICD-10-CM

## 2018-01-22 DIAGNOSIS — M54.41 CHRONIC BILATERAL LOW BACK PAIN WITH BILATERAL SCIATICA: ICD-10-CM

## 2018-01-22 DIAGNOSIS — M25.559 PAIN IN JOINT, PELVIC REGION AND THIGH, UNSPECIFIED LATERALITY: ICD-10-CM

## 2018-01-22 DIAGNOSIS — M25.551 HIP PAIN, CHRONIC, RIGHT: ICD-10-CM

## 2018-01-22 DIAGNOSIS — I35.0 NONRHEUMATIC AORTIC VALVE STENOSIS: ICD-10-CM

## 2018-01-22 DIAGNOSIS — M25.552 HIP PAIN, CHRONIC, LEFT: ICD-10-CM

## 2018-01-22 DIAGNOSIS — M51.36 LUMBAR DEGENERATIVE DISC DISEASE: ICD-10-CM

## 2018-01-22 DIAGNOSIS — I65.23 BILATERAL CAROTID ARTERY STENOSIS: ICD-10-CM

## 2018-01-22 DIAGNOSIS — M54.42 CHRONIC BILATERAL LOW BACK PAIN WITH BILATERAL SCIATICA: ICD-10-CM

## 2018-01-22 DIAGNOSIS — G89.29 HIP PAIN, CHRONIC, LEFT: ICD-10-CM

## 2018-01-22 DIAGNOSIS — M51.36 DDD (DEGENERATIVE DISC DISEASE), LUMBAR: Primary | ICD-10-CM

## 2018-01-22 DIAGNOSIS — M48.061 SPINAL STENOSIS OF LUMBAR REGION WITHOUT NEUROGENIC CLAUDICATION: ICD-10-CM

## 2018-01-22 DIAGNOSIS — M48.061 SPINAL STENOSIS, LUMBAR REGION, WITHOUT NEUROGENIC CLAUDICATION: ICD-10-CM

## 2018-01-22 DIAGNOSIS — Z79.899 ENCOUNTER FOR MEDICATION MANAGEMENT: ICD-10-CM

## 2018-01-22 DIAGNOSIS — E66.9 GENERALIZED OBESITY: ICD-10-CM

## 2018-01-22 DIAGNOSIS — G89.29 HIP PAIN, CHRONIC, UNSPECIFIED LATERALITY: ICD-10-CM

## 2018-01-22 DIAGNOSIS — G89.29 HIP PAIN, CHRONIC, RIGHT: ICD-10-CM

## 2018-01-22 DIAGNOSIS — G89.29 CHRONIC BILATERAL LOW BACK PAIN WITH BILATERAL SCIATICA: ICD-10-CM

## 2018-01-22 DIAGNOSIS — E11.42 DIABETIC POLYNEUROPATHY ASSOCIATED WITH TYPE 2 DIABETES MELLITUS (HCC): ICD-10-CM

## 2018-01-22 DIAGNOSIS — M25.551 PAIN IN JOINT, PELVIC REGION AND THIGH, RIGHT: ICD-10-CM

## 2018-01-22 PROCEDURE — 99213 OFFICE O/P EST LOW 20 MIN: CPT | Performed by: NURSE PRACTITIONER

## 2018-01-22 PROCEDURE — 99213 OFFICE O/P EST LOW 20 MIN: CPT

## 2018-01-22 RX ORDER — OXYCODONE HYDROCHLORIDE AND ACETAMINOPHEN 5; 325 MG/1; MG/1
1 TABLET ORAL EVERY 6 HOURS PRN
Qty: 130 TABLET | Refills: 0 | Status: SHIPPED | OUTPATIENT
Start: 2018-01-22 | End: 2018-02-21 | Stop reason: SDUPTHER

## 2018-01-22 ASSESSMENT — ENCOUNTER SYMPTOMS
BACK PAIN: 1
GASTROINTESTINAL NEGATIVE: 1
SHORTNESS OF BREATH: 1
COUGH: 1

## 2018-01-22 NOTE — PROGRESS NOTES
APRN)  Morphine equivalent dose as reported on OARRS:  32.50. Review of OARRS does not show any aberrant prescription behavior. Medication is helping the patient stay active. Patient denies any side effects and reports adequate analgesia. No sign of misuse/abuse.     Past Medical History:   Diagnosis Date    Anxiety     Arthritis     Bronchitis     CAD (coronary artery disease)     Carotid arterial disease ([address]     COPD (chronic obstructive pulmonary disease) ([address]     Diabetes mellitus ([address]     Hyperlipidemia     Hypertension     Mitral regurgitation     Myalgia     Pulmonary hypertension     Sleep apnea        Past Surgical History:   Procedure Laterality Date    ABDOMEN SURGERY       SECTION      COLECTOMY      COLONOSCOPY      HIP ARTHROPLASTY      3 on the left and 1 on the right    HYSTERECTOMY      JOINT REPLACEMENT Right     TOTAL KNEE    JOINT REPLACEMENT Bilateral     right x2, left x3 hips    KNEE SURGERY         No Known Allergies      Current Outpatient Prescriptions:     oxyCODONE-acetaminophen (PERCOCET) 5-325 MG per tablet, Take 1 tablet by mouth every 6 hours as needed for Pain for up to 30 days 30 day supply May occasionally take an extra tablet for severe pain., Disp: 130 tablet, Rfl: 0    amLODIPine (NORVASC) 5 MG tablet, TAKE ONE TABLET BY MOUTH DAILY, Disp: 30 tablet, Rfl: 8    Cholecalciferol (VITAMIN D) 2000 units CAPS capsule, Take by mouth Pt taking 1 time a week, unsure of dose, Disp: , Rfl:     ASPERCREME LIDOCAINE EX, Apply topically, Disp: , Rfl:     Menthol, Topical Analgesic, (PERFORM PAIN RELIEVING ROLL-ON EX), Apply topically, Disp: , Rfl:     VENTOLIN  (90 Base) MCG/ACT inhaler, , Disp: , Rfl:     escitalopram (LEXAPRO) 20 MG tablet, , Disp: , Rfl:     omeprazole (PRILOSEC) 20 MG delayed release capsule, , Disp: , Rfl:     losartan (COZAAR) 50 MG tablet, TAKE ONE TABLET BY MOUTH DAILY, Disp: 30 tablet, Rfl: 2    levothyroxine Negative. Eyes: Positive for visual disturbance. Glasses   Respiratory: Positive for cough and shortness of breath. Skin: Negative. Musculoskeletal: Positive for back pain, joint pain and neck pain. Gastrointestinal: Negative. Genitourinary: Positive for nocturia. Neurological: Positive for dizziness. Uses a walker   Psychiatric/Behavioral: The patient is nervous/anxious. Physical Exam:  /65   Pulse 61   Resp 16   Ht 5' 4\" (1.626 m)   Wt 257 lb (116.6 kg)   SpO2 95%   BMI 44.11 kg/m²     Physical Exam  Physical Exam   Constitutional: She is oriented to person, place, and time. obese   HENT:   Head: Normocephalic. Neck: Normal range of motion. Neck supple. Pulmonary/Chest: Effort normal.   Coughing,    Musculoskeletal:        Right shoulder: She exhibits decreased range of motion and tenderness. Left shoulder: She exhibits decreased range of motion and tenderness. Left knee: She exhibits decreased range of motion. Tenderness found. Lumbar back: She exhibits decreased range of motion and tenderness  Neurological: She is alert and oriented to person, place, and time. Gait abnormal.   Reflex Scores:       Patellar reflexes are 0 on the right side and 0 on the left side. Achilles reflexes are 1+ on the right side and 1+ on the left side. Skin: Skin is warm, dry and intact.    Psychiatric: Affect and judgment normal.      Record/Diagnostics Review:    As above, I did review the imaging    Ass10/21/2017 11:02 AM - Radha Grant Incoming Lab Results From [facility]     Component Results     Component Value Ref Range & Units Status Collected Lab   Pain Management Drug Panel Interp, Urine Consistent   Final 10/16/2017 11:45 AM [facility]   (NOTE)   ________________________________________________________________   DRUGS EXPECTED:   PERCOCET (OXYCODONE) [10/16/17]   ________________________________________________________________   CONSISTENT with medications provided:   PERCOCET (OXYCODONE) : based on oxycodone, noroxycodone,   noroxymorphone, oxymorphone   ________________________________________________________________   Drugs Not Included in this Assay:   Acetaminophen   ________________________________________________________________   INTERPRETIVE INFORMATION: Pain Mgt Neil, Mass Spec/EMIT, Ur,                            Interp   Interpretation depends on accuracy and completeness of patient   medication information submitted by client. 6-Acetylmorphine, Ur Not Detected   Final 10/16/2017 11:45 AM [facility]   7-Aminoclonazepam, Urine Not Detected   Final 10/16/2017 11:45 AM [facility]   Alpha-OH-Alpraz, Urine Not Detected   Final 10/16/2017 11:45 AM [facility]   Alprazolam, Urine Not Detected   Final 10/16/2017 11:45 AM [facility]   Amphetamines, urine Not Detected   Final 10/16/2017 11:45 AM [facility]   Barbiturates, Ur Not Detected   Final 10/16/2017 11:45 AM [facility]   Benzoylecgonine, Ur Not Detected   Final 10/16/2017 11:45 AM [facility]   Buprenorphine Urine Not Detected   Final 10/16/2017 11:45 AM [facility]   Carisoprodol, Ur Not Detected   Final 10/16/2017 11:45 AM [facility]   (NOTE)   The carisoprodol immunoassay has cross-reactivity to carisoprodol   and meprobamate.     Clonazepam, Urine Not Detected   Final 10/16/2017 11:45 AM [facility]   Codeine, Urine Not Detected   Final 10/16/2017 11:45 AM [facility]   MDA, Ur Not Detected   Final 10/16/2017 11:45 AM [facility]   Diazepam, Urine Not Detected   Final 10/16/2017 11:45 AM [facility]   Ethyl Glucuronide Ur Not Detected   Final 10/16/2017 11:45 AM [facility]   Fentanyl, Ur Not Detected   Final 10/16/2017 11:45 AM [facility]   Hydrocodone, Urine Not Detected   Final 10/16/2017 11:45 AM [facility]   Hydromorphone, Urine Not Detected   Final 10/16/2017 11:45 AM [facility]   Lorazepam, Urine Not Detected   Final 10/16/2017 11:45 AM [facility]   Marijuana Metab, Ur Not Detected   Final 10/16/2017 11:45 AM [facility]   MDEA, JYOTHI, Ur Not Detected   Final 10/16/2017 11:45 AM [facility]   MDMA URINE Not

## 2018-02-21 ENCOUNTER — HOSPITAL ENCOUNTER (OUTPATIENT)
Dept: PAIN MANAGEMENT | Age: 83
Discharge: HOME | End: 2018-02-21
Payer: MEDICARE

## 2018-02-21 VITALS
OXYGEN SATURATION: 97 % | DIASTOLIC BLOOD PRESSURE: 51 MMHG | RESPIRATION RATE: 20 BRPM | TEMPERATURE: 98.1 F | SYSTOLIC BLOOD PRESSURE: 112 MMHG | BODY MASS INDEX: 43.36 KG/M2 | HEIGHT: 64 IN | WEIGHT: 254 LBS | HEART RATE: 55 BPM

## 2018-02-21 DIAGNOSIS — G89.29 CHRONIC BILATERAL LOW BACK PAIN WITH BILATERAL SCIATICA: ICD-10-CM

## 2018-02-21 DIAGNOSIS — E66.9 GENERALIZED OBESITY: ICD-10-CM

## 2018-02-21 DIAGNOSIS — M25.551 PAIN IN JOINT, PELVIC REGION AND THIGH, RIGHT: ICD-10-CM

## 2018-02-21 DIAGNOSIS — M25.552 HIP PAIN, CHRONIC, LEFT: ICD-10-CM

## 2018-02-21 DIAGNOSIS — M51.36 LUMBAR DEGENERATIVE DISC DISEASE: ICD-10-CM

## 2018-02-21 DIAGNOSIS — M51.36 DDD (DEGENERATIVE DISC DISEASE), LUMBAR: ICD-10-CM

## 2018-02-21 DIAGNOSIS — I65.23 BILATERAL CAROTID ARTERY STENOSIS: ICD-10-CM

## 2018-02-21 DIAGNOSIS — M25.551 HIP PAIN, CHRONIC, RIGHT: ICD-10-CM

## 2018-02-21 DIAGNOSIS — M54.42 CHRONIC BILATERAL LOW BACK PAIN WITH BILATERAL SCIATICA: ICD-10-CM

## 2018-02-21 DIAGNOSIS — M25.552 PAIN IN JOINT, PELVIC REGION AND THIGH, LEFT: ICD-10-CM

## 2018-02-21 DIAGNOSIS — G89.29 HIP PAIN, CHRONIC, LEFT: ICD-10-CM

## 2018-02-21 DIAGNOSIS — G89.29 HIP PAIN, CHRONIC, RIGHT: ICD-10-CM

## 2018-02-21 DIAGNOSIS — M48.061 SPINAL STENOSIS, LUMBAR REGION, WITHOUT NEUROGENIC CLAUDICATION: ICD-10-CM

## 2018-02-21 DIAGNOSIS — M25.559 HIP PAIN, CHRONIC, UNSPECIFIED LATERALITY: ICD-10-CM

## 2018-02-21 DIAGNOSIS — M54.41 CHRONIC BILATERAL LOW BACK PAIN WITH BILATERAL SCIATICA: ICD-10-CM

## 2018-02-21 DIAGNOSIS — G89.29 HIP PAIN, CHRONIC, UNSPECIFIED LATERALITY: ICD-10-CM

## 2018-02-21 DIAGNOSIS — M48.061 SPINAL STENOSIS OF LUMBAR REGION WITHOUT NEUROGENIC CLAUDICATION: ICD-10-CM

## 2018-02-21 DIAGNOSIS — E11.42 DIABETIC POLYNEUROPATHY ASSOCIATED WITH TYPE 2 DIABETES MELLITUS (HCC): ICD-10-CM

## 2018-02-21 DIAGNOSIS — Z79.899 ENCOUNTER FOR MEDICATION MANAGEMENT: Primary | ICD-10-CM

## 2018-02-21 PROCEDURE — 99213 OFFICE O/P EST LOW 20 MIN: CPT | Performed by: NURSE PRACTITIONER

## 2018-02-21 PROCEDURE — 80307 DRUG TEST PRSMV CHEM ANLYZR: CPT

## 2018-02-21 PROCEDURE — 99213 OFFICE O/P EST LOW 20 MIN: CPT

## 2018-02-21 RX ORDER — PREDNISONE 10 MG/1
TABLET ORAL
COMMUNITY
Start: 2018-02-02 | End: 2018-02-21 | Stop reason: ALTCHOICE

## 2018-02-21 RX ORDER — OXYCODONE HYDROCHLORIDE AND ACETAMINOPHEN 5; 325 MG/1; MG/1
1 TABLET ORAL EVERY 6 HOURS PRN
Qty: 130 TABLET | Refills: 0 | Status: SHIPPED | OUTPATIENT
Start: 2018-02-21 | End: 2018-03-22 | Stop reason: SDUPTHER

## 2018-02-21 ASSESSMENT — ENCOUNTER SYMPTOMS
COUGH: 1
BACK PAIN: 1
GASTROINTESTINAL NEGATIVE: 1

## 2018-02-21 NOTE — PROGRESS NOTES
10  Adverse  Effects :  BM QOD  ADL;s :  ADL's, dishes, cooks, stretches    Patient denies any new neurological symptoms. No bowel or bladder incontinence, no weakness, and no falling. Pill count: appropriate     Controlled Substances Monitoring: The Prescription Monitoring Report for this patient was reviewed today. (STACIA Denny)    Obtaining appropriate analgesic effect of treatment., No signs of potential drug abuse or diversion identified. STACIA Denny)              Existing medication contract. STACIA Denny)    The Prescription Monitoring Report for this patient was reviewed today. (STACIA Denny)    Obtaining appropriate analgesic effect of treatment., No signs of potential drug abuse or diversion identified. STACIA Denny)              Existing medication contract. (STACIA Denny)    Morphine equivalent dose as reported on OARRS: 32.50  Review of OARRS does not show any aberrant prescription behavior. Medication is helping the patient stay active. Patient denies any side effects and reports adequate analgesia. No sign of misuse/abuse.     Past Medical History:   Diagnosis Date    Anxiety     Arthritis     Bronchitis     CAD (coronary artery disease)     Carotid arterial disease ([address]     COPD (chronic obstructive pulmonary disease) ([address]     Diabetes mellitus ([address]     Hyperlipidemia     Hypertension     Mitral regurgitation     Myalgia     Pulmonary hypertension     Sleep apnea        Past Surgical History:   Procedure Laterality Date    ABDOMEN SURGERY       SECTION      COLECTOMY      COLONOSCOPY      HIP ARTHROPLASTY      3 on the left and 1 on the right    HYSTERECTOMY      JOINT REPLACEMENT Right     TOTAL KNEE    JOINT REPLACEMENT Bilateral     right x2, left x3 hips    KNEE SURGERY         No Known Allergies      Current Outpatient Prescriptions:     oxyCODONE-acetaminophen (PERCOCET) 5-325 MG per tablet, Take 1 tablet by mouth use of pain medications unless patient cannot get through daily activities due to pain.   Follow up appointment made for 4 weeks    *

## 2018-03-21 ENCOUNTER — HOSPITAL ENCOUNTER (OUTPATIENT)
Dept: PAIN MANAGEMENT | Age: 83
Discharge: HOME | End: 2018-03-21
Payer: MEDICARE

## 2018-03-21 VITALS
BODY MASS INDEX: 36.37 KG/M2 | RESPIRATION RATE: 16 BRPM | HEART RATE: 50 BPM | WEIGHT: 240 LBS | OXYGEN SATURATION: 96 % | TEMPERATURE: 97.5 F | HEIGHT: 68 IN

## 2018-03-21 DIAGNOSIS — M51.36 DDD (DEGENERATIVE DISC DISEASE), LUMBAR: Primary | ICD-10-CM

## 2018-03-21 DIAGNOSIS — G89.29 HIP PAIN, CHRONIC, UNSPECIFIED LATERALITY: ICD-10-CM

## 2018-03-21 DIAGNOSIS — G89.29 HIP PAIN, CHRONIC, LEFT: ICD-10-CM

## 2018-03-21 DIAGNOSIS — G89.29 HIP PAIN, CHRONIC, RIGHT: ICD-10-CM

## 2018-03-21 DIAGNOSIS — M54.42 CHRONIC BILATERAL LOW BACK PAIN WITH BILATERAL SCIATICA: ICD-10-CM

## 2018-03-21 DIAGNOSIS — I65.23 BILATERAL CAROTID ARTERY STENOSIS: ICD-10-CM

## 2018-03-21 DIAGNOSIS — M51.36 LUMBAR DEGENERATIVE DISC DISEASE: ICD-10-CM

## 2018-03-21 DIAGNOSIS — M17.11 ARTHRITIS OF KNEE, RIGHT: ICD-10-CM

## 2018-03-21 DIAGNOSIS — M25.559 HIP PAIN, CHRONIC, UNSPECIFIED LATERALITY: ICD-10-CM

## 2018-03-21 DIAGNOSIS — M25.551 HIP PAIN, CHRONIC, RIGHT: ICD-10-CM

## 2018-03-21 DIAGNOSIS — E66.9 GENERALIZED OBESITY: ICD-10-CM

## 2018-03-21 DIAGNOSIS — M48.061 SPINAL STENOSIS OF LUMBAR REGION WITHOUT NEUROGENIC CLAUDICATION: ICD-10-CM

## 2018-03-21 DIAGNOSIS — M54.41 CHRONIC BILATERAL LOW BACK PAIN WITH BILATERAL SCIATICA: ICD-10-CM

## 2018-03-21 DIAGNOSIS — Z79.899 ENCOUNTER FOR MEDICATION MANAGEMENT: ICD-10-CM

## 2018-03-21 DIAGNOSIS — G89.29 CHRONIC BILATERAL LOW BACK PAIN WITH BILATERAL SCIATICA: ICD-10-CM

## 2018-03-21 DIAGNOSIS — E11.42 DIABETIC POLYNEUROPATHY ASSOCIATED WITH TYPE 2 DIABETES MELLITUS (HCC): ICD-10-CM

## 2018-03-21 DIAGNOSIS — M25.552 HIP PAIN, CHRONIC, LEFT: ICD-10-CM

## 2018-03-21 DIAGNOSIS — M25.551 PAIN IN JOINT, PELVIC REGION AND THIGH, RIGHT: ICD-10-CM

## 2018-03-21 DIAGNOSIS — M48.061 SPINAL STENOSIS, LUMBAR REGION, WITHOUT NEUROGENIC CLAUDICATION: ICD-10-CM

## 2018-03-21 DIAGNOSIS — M25.552 PAIN IN JOINT, PELVIC REGION AND THIGH, LEFT: ICD-10-CM

## 2018-03-21 PROCEDURE — 99213 OFFICE O/P EST LOW 20 MIN: CPT

## 2018-03-21 PROCEDURE — 99213 OFFICE O/P EST LOW 20 MIN: CPT | Performed by: NURSE PRACTITIONER

## 2018-03-21 ASSESSMENT — ENCOUNTER SYMPTOMS
BACK PAIN: 1
COUGH: 1
CONSTIPATION: 1
SHORTNESS OF BREATH: 1

## 2018-03-21 NOTE — PROGRESS NOTES
[facility] PROGRESS NOTE      Patient here today to review MEDICATION CONTRACT    Chief Complaint: low back pain    HPI: She c/o low back pain which has increased. No history of lumbar surgery. She did PT which helped for a short time. She has not had CT lumbar and EMG done yet. No ed visits. Sleep is not good, due to pain. She still has cough and will be seeing her pulmonologist. She c/o left hip pain. History of left hip replacement.     Back Pain   This is a chronic problem. The current episode started more than 1 year ago. The problem occurs constantly. The quality of the pain is described as aching. The pain does not radiate. The pain is at a severity of 810. The pain is severe. The pain is worse during the  day. . The symptoms are aggravated by  Sitting,Stiffness is present all day. Risk factors include obesity, menopause and lack of exercise. She has tried analgesics and bed rest for the symptoms. The treatment provided mild relief.        Possible side effects, risk of tolerance and or dependence and alternative treatments discussed    Obtaining appropriate analgesic effect of treatment   No signs of potential drug abuse or diversion identified    Treatment goals:  Functional status: decrease pain to a 4      Aberrancy  None   Analgesia   Pain 8  Adverse  Effects :constipation, BM QOD, uses milk of magnesia prn  ADL;s :fold clothes, stretching, weights for arms,     Patient denies any new neurological symptoms. No bowel or bladder incontinence, no weakness, and no falling. Pill count: appropriate forgot pill bottle    Morphine equivalent dose as reported on OARRS: 32.5  Attestation: The Prescription Monitoring Report for this patient was reviewed today. STACIA Lees)  Documentation: Obtaining appropriate analgesic effect of treatment., No signs of potential drug abuse or diversion identified. STACIA Lees)  Medication Contracts: Existing medication contract.  Chantell Dior AM [facility]   Tapentadol, Urine Not Detected   Final 02/21/2018 11:25 AM [facility]   Temazepam, Urine Not Detected   Final 02/21/2018 11:25 AM [facility]   Tramadol, Urine Not Detected   Final 02/21/2018 11:25 AM [facility]   Zolpidem, Urine Not Detected   Final 02/21/2018 11:25 AM [facility]   Drugs Expected, Ur   Final 02/21/2018 11:25  [address] [facility]   OXYCODONE ON 2/20/18 AT 2100    Creatinine, Ur >400.0   20.0 - 400.0 mg/dL Final 02/21/2018 11:25 AM [facility]   Pain Mgt Drug Panel, Hi Res, Ur See Below   Final 02/21/2018 11:25 AM [facility]   (NOTE)   Methodology: Qualitative Enzyme Immunoassay and Qualitative Liquid   Chromatography-Time of Flight-Mass Spectrometry or Tandem Mass   Spectrometry, Quantitative Spectrophotometry   The absence of expected drug(s) and/or drug metabolite(s) may   indicate non-compliance, inappropriate timing of specimen   collection relative to drug administration, poor drug absorption,   diluted/adulterated urine, or limitations of testing. The   concentration must be greater than or equal to the cutoff to be   reported as present.  If specific drug concentrations are   required, contact the laboratory within two weeks of specimen   collection to request quantification by a second analytical   technique. Interpretive questions should be directed to the   laboratory. Results based on immunoassay detection that do not match clinical   expectations should be   interpreted with caution. Confirmatory testing by mass   spectrometry for immunoassay-based results is available, if   ordered within two weeks of specimen collection. Additional   charges apply. For medical purposes only; not valid for forensic use. This test was developed and its performance characteristics   determined by [employer]. The U.[place]. Food and Drug   Administration has not approved or cleared this test; however, FDA   clearance or approval is not currently required for clinical use.    The results are not intended to be used as therapy. Script written for  Patient is stable on current regimen of meds and medication is effectively managing pain, we will continue current medications without changes. As always, we encourage daily stretching and strengthening exercises, and recommend minimizing use of pain medications unless patient cannot get through daily activities due to pain.   Follow up appointment made for 4 weeks

## 2018-03-22 DIAGNOSIS — M51.36 DDD (DEGENERATIVE DISC DISEASE), LUMBAR: ICD-10-CM

## 2018-03-22 DIAGNOSIS — M48.061 SPINAL STENOSIS OF LUMBAR REGION WITHOUT NEUROGENIC CLAUDICATION: ICD-10-CM

## 2018-03-22 RX ORDER — OXYCODONE HYDROCHLORIDE AND ACETAMINOPHEN 5; 325 MG/1; MG/1
1 TABLET ORAL EVERY 6 HOURS PRN
Qty: 130 TABLET | Refills: 0 | Status: SHIPPED | OUTPATIENT
Start: 2018-03-23 | End: 2018-04-19 | Stop reason: SDUPTHER

## 2018-04-19 ENCOUNTER — HOSPITAL ENCOUNTER (OUTPATIENT)
Dept: PAIN MANAGEMENT | Age: 83
Discharge: HOME | End: 2018-04-19
Payer: MEDICARE

## 2018-04-19 VITALS
WEIGHT: 252 LBS | OXYGEN SATURATION: 100 % | HEIGHT: 68 IN | HEART RATE: 56 BPM | BODY MASS INDEX: 38.19 KG/M2 | SYSTOLIC BLOOD PRESSURE: 130 MMHG | TEMPERATURE: 97.8 F | DIASTOLIC BLOOD PRESSURE: 71 MMHG | RESPIRATION RATE: 16 BRPM

## 2018-04-19 DIAGNOSIS — M48.061 SPINAL STENOSIS, LUMBAR REGION, WITHOUT NEUROGENIC CLAUDICATION: ICD-10-CM

## 2018-04-19 DIAGNOSIS — M51.36 DDD (DEGENERATIVE DISC DISEASE), LUMBAR: Primary | ICD-10-CM

## 2018-04-19 DIAGNOSIS — E66.9 GENERALIZED OBESITY: ICD-10-CM

## 2018-04-19 DIAGNOSIS — M54.41 CHRONIC BILATERAL LOW BACK PAIN WITH BILATERAL SCIATICA: ICD-10-CM

## 2018-04-19 DIAGNOSIS — G89.29 HIP PAIN, CHRONIC, LEFT: ICD-10-CM

## 2018-04-19 DIAGNOSIS — M48.061 SPINAL STENOSIS OF LUMBAR REGION WITHOUT NEUROGENIC CLAUDICATION: ICD-10-CM

## 2018-04-19 DIAGNOSIS — Z79.899 ENCOUNTER FOR MEDICATION MANAGEMENT: ICD-10-CM

## 2018-04-19 DIAGNOSIS — M25.559 PAIN IN JOINT, PELVIC REGION AND THIGH, UNSPECIFIED LATERALITY: ICD-10-CM

## 2018-04-19 DIAGNOSIS — M25.552 PAIN IN JOINT, PELVIC REGION AND THIGH, LEFT: ICD-10-CM

## 2018-04-19 DIAGNOSIS — M25.552 HIP PAIN, CHRONIC, LEFT: ICD-10-CM

## 2018-04-19 DIAGNOSIS — G89.29 HIP PAIN, CHRONIC, RIGHT: ICD-10-CM

## 2018-04-19 DIAGNOSIS — M17.11 ARTHRITIS OF KNEE, RIGHT: ICD-10-CM

## 2018-04-19 DIAGNOSIS — M51.36 LUMBAR DEGENERATIVE DISC DISEASE: ICD-10-CM

## 2018-04-19 DIAGNOSIS — I65.23 BILATERAL CAROTID ARTERY STENOSIS: ICD-10-CM

## 2018-04-19 DIAGNOSIS — M25.551 PAIN IN JOINT, PELVIC REGION AND THIGH, RIGHT: ICD-10-CM

## 2018-04-19 DIAGNOSIS — M25.551 HIP PAIN, CHRONIC, RIGHT: ICD-10-CM

## 2018-04-19 DIAGNOSIS — M25.559 HIP PAIN, CHRONIC, UNSPECIFIED LATERALITY: ICD-10-CM

## 2018-04-19 DIAGNOSIS — E11.42 DIABETIC POLYNEUROPATHY ASSOCIATED WITH TYPE 2 DIABETES MELLITUS (HCC): ICD-10-CM

## 2018-04-19 DIAGNOSIS — M54.42 CHRONIC BILATERAL LOW BACK PAIN WITH BILATERAL SCIATICA: ICD-10-CM

## 2018-04-19 DIAGNOSIS — G89.29 HIP PAIN, CHRONIC, UNSPECIFIED LATERALITY: ICD-10-CM

## 2018-04-19 DIAGNOSIS — G89.29 CHRONIC BILATERAL LOW BACK PAIN WITH BILATERAL SCIATICA: ICD-10-CM

## 2018-04-19 PROCEDURE — 99213 OFFICE O/P EST LOW 20 MIN: CPT | Performed by: NURSE PRACTITIONER

## 2018-04-19 PROCEDURE — 99213 OFFICE O/P EST LOW 20 MIN: CPT

## 2018-04-19 RX ORDER — OXYCODONE HYDROCHLORIDE AND ACETAMINOPHEN 5; 325 MG/1; MG/1
1 TABLET ORAL EVERY 6 HOURS PRN
Qty: 130 TABLET | Refills: 0 | Status: SHIPPED | OUTPATIENT
Start: 2018-04-22 | End: 2018-05-17 | Stop reason: SDUPTHER

## 2018-04-19 ASSESSMENT — ENCOUNTER SYMPTOMS
BACK PAIN: 1
RESPIRATORY NEGATIVE: 1
GASTROINTESTINAL NEGATIVE: 1

## 2018-04-26 ENCOUNTER — HOSPITAL ENCOUNTER (OUTPATIENT)
Age: 83
Setting detail: SPECIMEN
Discharge: HOME | End: 2018-04-26
Payer: MEDICARE

## 2018-04-26 LAB
ANION GAP SERPL CALCULATED.3IONS-SCNC: 15 MMOL/L (ref 9–17)
BUN BLDV-MCNC: 18 MG/DL (ref 8–23)
CALCIUM SERPL-MCNC: 9.3 MG/DL (ref 8.6–10.4)
CHLORIDE BLD-SCNC: 101 MMOL/L (ref 98–107)
CHOLESTEROL/HDL RATIO: 1.9
CHOLESTEROL: 142 MG/DL
CO2: 29 MMOL/L (ref 20–31)
CREAT SERPL-MCNC: 0.94 MG/DL (ref 0.5–0.9)
ESTIMATED AVERAGE GLUCOSE: 128 MG/DL
GFR AFRICAN AMERICAN: >60 ML/MIN
GFR NON-AFRICAN AMERICAN: 57 ML/MIN
GFR SERPL CREATININE-BSD FRML MDRD: ABNORMAL ML/MIN/{1.73_M2}
GFR SERPL CREATININE-BSD FRML MDRD: ABNORMAL ML/MIN/{1.73_M2}
GLUCOSE BLD-MCNC: 115 MG/DL (ref 70–99)
HBA1C MFR BLD: 6.1 % (ref 4–6)
HCT VFR BLD CALC: 33 % (ref 36.3–47.1)
HDLC SERPL-MCNC: 75 MG/DL
HEMOGLOBIN: 9.9 G/DL (ref 11.9–15.1)
IRON: 66 UG/DL (ref 37–145)
LDL CHOLESTEROL: 51 MG/DL (ref 0–130)
MAGNESIUM: 1.5 MG/DL (ref 1.6–2.6)
MCH RBC QN AUTO: 26 PG (ref 25.2–33.5)
MCHC RBC AUTO-ENTMCNC: 30 G/DL (ref 28.4–34.8)
MCV RBC AUTO: 86.6 FL (ref 82.6–102.9)
NRBC AUTOMATED: 0 PER 100 WBC
PDW BLD-RTO: 15.8 % (ref 11.8–14.4)
PLATELET # BLD: 264 K/UL (ref 138–453)
PMV BLD AUTO: 11.9 FL (ref 8.1–13.5)
POTASSIUM SERPL-SCNC: 4.3 MMOL/L (ref 3.7–5.3)
RBC # BLD: 3.81 M/UL (ref 3.95–5.11)
SODIUM BLD-SCNC: 145 MMOL/L (ref 135–144)
T3 FREE: 2.13 PG/ML (ref 2.02–4.43)
THYROXINE, FREE: 1.4 NG/DL (ref 0.93–1.7)
TRIGL SERPL-MCNC: 80 MG/DL
TSH SERPL DL<=0.05 MIU/L-ACNC: 3.56 MIU/L (ref 0.3–5)
VITAMIN B-12: 370 PG/ML (ref 232–1245)
VLDLC SERPL CALC-MCNC: NORMAL MG/DL (ref 1–30)
WBC # BLD: 9.2 K/UL (ref 3.5–11.3)

## 2018-04-27 PROBLEM — I51.7 ATRIAL ENLARGEMENT, BILATERAL: Status: ACTIVE | Noted: 2018-04-27

## 2018-04-27 PROBLEM — R53.83 FATIGUE: Status: ACTIVE | Noted: 2018-04-27

## 2018-04-27 PROBLEM — R06.02 SOB (SHORTNESS OF BREATH): Status: ACTIVE | Noted: 2018-04-27

## 2018-04-27 PROBLEM — I45.10 RBBB: Status: ACTIVE | Noted: 2018-04-27

## 2018-04-27 PROBLEM — R55 SYNCOPE: Status: ACTIVE | Noted: 2018-04-27

## 2018-05-17 ENCOUNTER — HOSPITAL ENCOUNTER (OUTPATIENT)
Dept: PAIN MANAGEMENT | Age: 83
Discharge: HOME | End: 2018-05-17
Payer: MEDICARE

## 2018-05-17 VITALS
OXYGEN SATURATION: 96 % | BODY MASS INDEX: 43.19 KG/M2 | HEART RATE: 55 BPM | SYSTOLIC BLOOD PRESSURE: 115 MMHG | DIASTOLIC BLOOD PRESSURE: 57 MMHG | RESPIRATION RATE: 16 BRPM | TEMPERATURE: 97.4 F | WEIGHT: 253 LBS | HEIGHT: 64 IN

## 2018-05-17 DIAGNOSIS — M48.061 SPINAL STENOSIS OF LUMBAR REGION WITHOUT NEUROGENIC CLAUDICATION: ICD-10-CM

## 2018-05-17 DIAGNOSIS — M25.551 PAIN IN JOINT, PELVIC REGION AND THIGH, RIGHT: ICD-10-CM

## 2018-05-17 DIAGNOSIS — Z79.899 ENCOUNTER FOR MEDICATION MANAGEMENT: ICD-10-CM

## 2018-05-17 DIAGNOSIS — G89.29 HIP PAIN, CHRONIC, RIGHT: ICD-10-CM

## 2018-05-17 DIAGNOSIS — M17.11 ARTHRITIS OF KNEE, RIGHT: ICD-10-CM

## 2018-05-17 DIAGNOSIS — M54.41 CHRONIC BILATERAL LOW BACK PAIN WITH BILATERAL SCIATICA: ICD-10-CM

## 2018-05-17 DIAGNOSIS — M51.36 DDD (DEGENERATIVE DISC DISEASE), LUMBAR: Primary | ICD-10-CM

## 2018-05-17 DIAGNOSIS — G89.29 CHRONIC BILATERAL LOW BACK PAIN WITH BILATERAL SCIATICA: ICD-10-CM

## 2018-05-17 DIAGNOSIS — M54.42 CHRONIC BILATERAL LOW BACK PAIN WITH BILATERAL SCIATICA: ICD-10-CM

## 2018-05-17 DIAGNOSIS — M51.36 LUMBAR DEGENERATIVE DISC DISEASE: ICD-10-CM

## 2018-05-17 DIAGNOSIS — M25.551 HIP PAIN, CHRONIC, RIGHT: ICD-10-CM

## 2018-05-17 DIAGNOSIS — E11.42 DIABETIC POLYNEUROPATHY ASSOCIATED WITH TYPE 2 DIABETES MELLITUS (HCC): ICD-10-CM

## 2018-05-17 DIAGNOSIS — M25.559 PAIN IN JOINT, PELVIC REGION AND THIGH, UNSPECIFIED LATERALITY: ICD-10-CM

## 2018-05-17 PROCEDURE — 99213 OFFICE O/P EST LOW 20 MIN: CPT | Performed by: NURSE PRACTITIONER

## 2018-05-17 PROCEDURE — 99213 OFFICE O/P EST LOW 20 MIN: CPT

## 2018-05-17 RX ORDER — OXYCODONE HYDROCHLORIDE AND ACETAMINOPHEN 5; 325 MG/1; MG/1
1 TABLET ORAL SEE ADMIN INSTRUCTIONS
Qty: 130 TABLET | Refills: 0 | Status: SHIPPED | OUTPATIENT
Start: 2018-05-22 | End: 2018-06-19 | Stop reason: SDUPTHER

## 2018-05-17 ASSESSMENT — ENCOUNTER SYMPTOMS
BACK PAIN: 1
SHORTNESS OF BREATH: 1
GASTROINTESTINAL NEGATIVE: 1

## 2018-06-19 ENCOUNTER — HOSPITAL ENCOUNTER (OUTPATIENT)
Dept: PAIN MANAGEMENT | Age: 83
Discharge: HOME | End: 2018-06-19
Payer: MEDICARE

## 2018-06-19 VITALS
HEART RATE: 60 BPM | OXYGEN SATURATION: 97 % | RESPIRATION RATE: 20 BRPM | HEIGHT: 64 IN | SYSTOLIC BLOOD PRESSURE: 147 MMHG | DIASTOLIC BLOOD PRESSURE: 66 MMHG | BODY MASS INDEX: 43.19 KG/M2 | WEIGHT: 253 LBS

## 2018-06-19 DIAGNOSIS — M48.061 SPINAL STENOSIS, LUMBAR REGION, WITHOUT NEUROGENIC CLAUDICATION: ICD-10-CM

## 2018-06-19 DIAGNOSIS — M48.061 SPINAL STENOSIS OF LUMBAR REGION WITHOUT NEUROGENIC CLAUDICATION: ICD-10-CM

## 2018-06-19 DIAGNOSIS — M25.551 HIP PAIN, CHRONIC, RIGHT: ICD-10-CM

## 2018-06-19 DIAGNOSIS — M51.36 LUMBAR DEGENERATIVE DISC DISEASE: ICD-10-CM

## 2018-06-19 DIAGNOSIS — M51.36 DDD (DEGENERATIVE DISC DISEASE), LUMBAR: Primary | ICD-10-CM

## 2018-06-19 DIAGNOSIS — M25.559 HIP PAIN, CHRONIC, UNSPECIFIED LATERALITY: ICD-10-CM

## 2018-06-19 DIAGNOSIS — M54.41 CHRONIC BILATERAL LOW BACK PAIN WITH BILATERAL SCIATICA: ICD-10-CM

## 2018-06-19 DIAGNOSIS — M54.42 CHRONIC BILATERAL LOW BACK PAIN WITH BILATERAL SCIATICA: ICD-10-CM

## 2018-06-19 DIAGNOSIS — G89.29 CHRONIC BILATERAL LOW BACK PAIN WITH BILATERAL SCIATICA: ICD-10-CM

## 2018-06-19 DIAGNOSIS — M25.559 PAIN IN JOINT, PELVIC REGION AND THIGH, UNSPECIFIED LATERALITY: ICD-10-CM

## 2018-06-19 DIAGNOSIS — Z79.899 ENCOUNTER FOR MEDICATION MANAGEMENT: ICD-10-CM

## 2018-06-19 DIAGNOSIS — G89.29 HIP PAIN, CHRONIC, UNSPECIFIED LATERALITY: ICD-10-CM

## 2018-06-19 DIAGNOSIS — M25.551 PAIN IN JOINT, PELVIC REGION AND THIGH, RIGHT: ICD-10-CM

## 2018-06-19 DIAGNOSIS — G89.29 HIP PAIN, CHRONIC, RIGHT: ICD-10-CM

## 2018-06-19 DIAGNOSIS — M17.11 ARTHRITIS OF KNEE, RIGHT: ICD-10-CM

## 2018-06-19 DIAGNOSIS — E11.42 DIABETIC POLYNEUROPATHY ASSOCIATED WITH TYPE 2 DIABETES MELLITUS (HCC): ICD-10-CM

## 2018-06-19 PROCEDURE — 99213 OFFICE O/P EST LOW 20 MIN: CPT | Performed by: NURSE PRACTITIONER

## 2018-06-19 PROCEDURE — 99213 OFFICE O/P EST LOW 20 MIN: CPT

## 2018-06-19 RX ORDER — OXYCODONE HYDROCHLORIDE AND ACETAMINOPHEN 5; 325 MG/1; MG/1
1 TABLET ORAL SEE ADMIN INSTRUCTIONS
Qty: 130 TABLET | Refills: 0 | Status: SHIPPED | OUTPATIENT
Start: 2018-06-21 | End: 2018-07-19 | Stop reason: SDUPTHER

## 2018-06-19 RX ORDER — ESCITALOPRAM OXALATE 20 MG/1
TABLET ORAL
COMMUNITY
Start: 2018-06-15

## 2018-06-19 ASSESSMENT — ENCOUNTER SYMPTOMS
GASTROINTESTINAL NEGATIVE: 1
SHORTNESS OF BREATH: 1
BACK PAIN: 1

## 2018-06-20 ENCOUNTER — TELEPHONE (OUTPATIENT)
Dept: PAIN MANAGEMENT | Age: 83
End: 2018-06-20

## 2018-07-19 ENCOUNTER — HOSPITAL ENCOUNTER (OUTPATIENT)
Dept: PAIN MANAGEMENT | Age: 83
Discharge: HOME | End: 2018-07-19
Payer: MEDICARE

## 2018-07-19 VITALS
TEMPERATURE: 98 F | HEART RATE: 58 BPM | BODY MASS INDEX: 43.19 KG/M2 | HEIGHT: 64 IN | SYSTOLIC BLOOD PRESSURE: 124 MMHG | DIASTOLIC BLOOD PRESSURE: 62 MMHG | RESPIRATION RATE: 16 BRPM | OXYGEN SATURATION: 98 % | WEIGHT: 253 LBS

## 2018-07-19 DIAGNOSIS — M25.559 HIP PAIN, CHRONIC, UNSPECIFIED LATERALITY: ICD-10-CM

## 2018-07-19 DIAGNOSIS — E11.42 DIABETIC POLYNEUROPATHY ASSOCIATED WITH TYPE 2 DIABETES MELLITUS (HCC): ICD-10-CM

## 2018-07-19 DIAGNOSIS — Z79.899 ENCOUNTER FOR MEDICATION MANAGEMENT: ICD-10-CM

## 2018-07-19 DIAGNOSIS — M51.36 DDD (DEGENERATIVE DISC DISEASE), LUMBAR: ICD-10-CM

## 2018-07-19 DIAGNOSIS — M17.11 ARTHRITIS OF KNEE, RIGHT: ICD-10-CM

## 2018-07-19 DIAGNOSIS — M51.36 LUMBAR DEGENERATIVE DISC DISEASE: Primary | ICD-10-CM

## 2018-07-19 DIAGNOSIS — M48.061 SPINAL STENOSIS OF LUMBAR REGION WITHOUT NEUROGENIC CLAUDICATION: ICD-10-CM

## 2018-07-19 DIAGNOSIS — M48.061 SPINAL STENOSIS, LUMBAR REGION, WITHOUT NEUROGENIC CLAUDICATION: ICD-10-CM

## 2018-07-19 DIAGNOSIS — E66.9 GENERALIZED OBESITY: ICD-10-CM

## 2018-07-19 DIAGNOSIS — G89.29 HIP PAIN, CHRONIC, UNSPECIFIED LATERALITY: ICD-10-CM

## 2018-07-19 PROCEDURE — 99214 OFFICE O/P EST MOD 30 MIN: CPT | Performed by: PAIN MEDICINE

## 2018-07-19 PROCEDURE — 99213 OFFICE O/P EST LOW 20 MIN: CPT

## 2018-07-19 RX ORDER — OXYCODONE HYDROCHLORIDE AND ACETAMINOPHEN 5; 325 MG/1; MG/1
1 TABLET ORAL SEE ADMIN INSTRUCTIONS
Qty: 130 TABLET | Refills: 0 | Status: SHIPPED | OUTPATIENT
Start: 2018-07-23 | End: 2018-08-23 | Stop reason: SDUPTHER

## 2018-07-19 ASSESSMENT — ENCOUNTER SYMPTOMS
BOWEL INCONTINENCE: 0
DIARRHEA: 0
CONSTIPATION: 0
BLURRED VISION: 1
SHORTNESS OF BREATH: 1
NAUSEA: 0
GASTROINTESTINAL NEGATIVE: 1
BACK PAIN: 1

## 2018-07-19 ASSESSMENT — PAIN DESCRIPTION - PAIN TYPE: TYPE: CHRONIC PAIN

## 2018-07-19 ASSESSMENT — PAIN DESCRIPTION - PROGRESSION: CLINICAL_PROGRESSION: NOT CHANGED

## 2018-07-19 ASSESSMENT — PAIN DESCRIPTION - DIRECTION: RADIATING_TOWARDS: BILATERAL HIPS

## 2018-07-19 ASSESSMENT — PAIN DESCRIPTION - DESCRIPTORS: DESCRIPTORS: ACHING;CONSTANT;THROBBING

## 2018-07-19 ASSESSMENT — PAIN SCALES - GENERAL: PAINLEVEL_OUTOF10: 7

## 2018-07-19 ASSESSMENT — PAIN DESCRIPTION - ONSET: ONSET: ON-GOING

## 2018-07-19 ASSESSMENT — PAIN DESCRIPTION - FREQUENCY: FREQUENCY: CONTINUOUS

## 2018-07-19 ASSESSMENT — PAIN DESCRIPTION - LOCATION: LOCATION: BACK;HIP;NECK;SHOULDER

## 2018-07-19 ASSESSMENT — PAIN DESCRIPTION - ORIENTATION: ORIENTATION: RIGHT;LEFT;LOWER

## 2018-07-19 NOTE — PROGRESS NOTES
[facility] Pain Management  Patient Pain Assessment  RECHECK - Dr. Shawn Preston    Primary Care Physician: Sylvester Escalona PA-C    Chief complaint:   Chief Complaint   Patient presents with    Lower Back Pain   . HISTORY OF PRESENT ILLNESS:  Myra Pinto is [de-identified] y.o. female with chronic low back pain. The patient reports no change in her pain since her last visit. She has low back pain, achy 5/10 in intensity that radiates into the gluteal region and hips bilaterally. She has had R total knee replacement and bilateral hip replacement. The patient uses cane or walker. She has numbness in her feet bilaterally due to diabetes. Home PT 3x per week, helping but blood pressure has been too low recently, with light headedness occasionally. Following up with cardiology next week. Back Pain   This is a chronic problem. The current episode started more than 1 year ago. The problem occurs constantly. The problem is unchanged. The pain is present in the gluteal and lumbar spine. The quality of the pain is described as aching. The pain radiates to the right thigh and left thigh. The pain is at a severity of 5/10. The pain is moderate. The pain is worse during the night. The symptoms are aggravated by bending and stress (walking). Stiffness is present in the morning. Associated symptoms include leg pain, numbness and weakness. Pertinent negatives include no bowel incontinence, chest pain or fever. Risk factors include obesity and menopause. She has tried heat, analgesics, home exercises and ice for the symptoms. The treatment provided moderate relief. OARRS compliant?  yes  Concern for prescription abuse?no    Current Pain Assessment  Pain Assessment  Pain Assessment: 0-10  Pain Level: 7  Pain Type: Chronic pain  Pain Location: Back, Hip, Neck, Shoulder  Pain Orientation: Right, Left, Lower  Pain Radiating Towards: bilateral hips  Pain Descriptors: Aching, Constant, Throbbing  Pain us.  At each of patient's future visits we will try to taper pain medications, while adjusting the adjunct medications, and re-evaluating for Physical Therapy to improve spinal and joint strength. We will continue to have discussions to decrease pain medications as tolerated. I also discussed with the patient regarding the dangers of combining narcotic pain medication with tranquilizers, alcohol or illegal drugs or taking the medication any other than prescribed. The dangers including the respiratory depression and death. Patient was told to tell  to all  physicians regarding the medications he is getting from pain clinic. Patient is warned not to take any unprescribed medications over-the-counter medications that can depress breathing . Patient is advised to talk to the pharmacist or physicians if planning to take any over-the-counter medications before  takeing them. Patient is strongly advised to avoid tranquilizers or  Relaxants for any medications that can depress breathing or recreational drugs. Patient is also advised to tell us if there is any changes in his medications from other physicians. We discussed the same at today's visit and have been to implement it, as the patient's pain is under control with current medications. Decision Making Process : Patient's health history and referral records thoroughly reviewed before focused physical examination and discussion with patient. Over 50% of today's visit is spent on examining the patient and counseling. Level of complexity of date to be reviewed is Moderate. The chart date reviewed include the following: Imaging Reports. Summary of Care. Time spent reviewing with patient the below reports:   Medication safety, Treatment options.     Level of diagnosis and management options of this case is multiple: involving the following management options: Interventions as needed, medication management as appropriate, future visits, activity modification, heat/ice as needed, Urine drug screen as required. [x]The patient's questions were answered to the best of my abilities. This note was created using voice recognition software. There may be inaccuracies of transcription  that are inadvertently overlooked prior to the signature. There is any questions about the transcription please contact me. Return in  4 weeks  with  Simon Harrison CNP  for further plan of treatment. Electronically signed by Joce Pearson MD on 7/19/2018 at 3:31 PM    NAME:  Maira Burnett  MRN: [id number]   YOB: 1935   Date: 7/19/2018   Age: [de-identified] y.o. Gender: female       Maira Burnett is [de-identified] y.o. ,female, referred because of Lower Back Pain        I Performed a history and physical examination of the patient and discussed management with the resident/ Physician Assistant/ Nurse Practitioner. I reviewed the Resident/ Physician Assistant/ Nurse Practitioner note and agree with the documented findings and plan of care. Any areas of disagreement are noted on the chart. I was present for any key portions of any procedure. I have documented in the chart those procedures where I was not present during key Portions. I agree with the chief complaint, past medical history, past surgical history, Social & family history, Allergies, medications as documented unless noted below. I have personally evaluated this patient and have completed at least one if not all key elements of the (History, physical exam and plan of care). Additional findings are added to the note above    Diagnosis:   1. Lumbar degenerative disc disease    2. DDD (degenerative disc disease), lumbar    3. Spinal stenosis of lumbar region without neurogenic claudication    4. Spinal stenosis, lumbar region, without neurogenic claudication    5. Diabetic polyneuropathy associated with type 2 diabetes mellitus ([facility]    6. Encounter for medication management    7. Arthritis of knee, right    8. Generalized obesity    9. Hip pain, chronic, unspecified laterality      Plan of Care:   As indicated above. I did discuss with her the possible due to lumbar epidural steroid injection. Patient would like to try conservative measures initially including home exercises and then depending on the response we will decide whether to proceed with the injections are not. Sally Cárdenas MD on 7/19/2018 at 4:37 PM

## 2018-08-17 ENCOUNTER — HOSPITAL ENCOUNTER (OUTPATIENT)
Dept: PAIN MANAGEMENT | Age: 83
Discharge: HOME | End: 2018-08-17
Payer: MEDICARE

## 2018-08-23 ENCOUNTER — HOSPITAL ENCOUNTER (OUTPATIENT)
Dept: PAIN MANAGEMENT | Age: 83
Discharge: HOME | End: 2018-08-23
Payer: MEDICARE

## 2018-08-23 VITALS
BODY MASS INDEX: 42.17 KG/M2 | OXYGEN SATURATION: 96 % | HEIGHT: 64 IN | WEIGHT: 247 LBS | RESPIRATION RATE: 16 BRPM | HEART RATE: 88 BPM | SYSTOLIC BLOOD PRESSURE: 137 MMHG | TEMPERATURE: 98.6 F | DIASTOLIC BLOOD PRESSURE: 54 MMHG

## 2018-08-23 DIAGNOSIS — M48.061 SPINAL STENOSIS OF LUMBAR REGION WITHOUT NEUROGENIC CLAUDICATION: ICD-10-CM

## 2018-08-23 DIAGNOSIS — M25.552 PAIN OF BOTH HIP JOINTS: ICD-10-CM

## 2018-08-23 DIAGNOSIS — G89.28 CHRONIC PAIN FOLLOWING SURGERY OR PROCEDURE: Primary | ICD-10-CM

## 2018-08-23 DIAGNOSIS — G89.29 CHRONIC PAIN OF BOTH KNEES: ICD-10-CM

## 2018-08-23 DIAGNOSIS — M54.42 CHRONIC BILATERAL LOW BACK PAIN WITH BILATERAL SCIATICA: ICD-10-CM

## 2018-08-23 DIAGNOSIS — G89.29 CHRONIC BILATERAL LOW BACK PAIN WITH BILATERAL SCIATICA: ICD-10-CM

## 2018-08-23 DIAGNOSIS — M51.36 LUMBAR DEGENERATIVE DISC DISEASE: ICD-10-CM

## 2018-08-23 DIAGNOSIS — M51.36 DDD (DEGENERATIVE DISC DISEASE), LUMBAR: ICD-10-CM

## 2018-08-23 DIAGNOSIS — M25.551 PAIN OF BOTH HIP JOINTS: ICD-10-CM

## 2018-08-23 DIAGNOSIS — M25.561 CHRONIC PAIN OF BOTH KNEES: ICD-10-CM

## 2018-08-23 DIAGNOSIS — M54.41 CHRONIC BILATERAL LOW BACK PAIN WITH BILATERAL SCIATICA: ICD-10-CM

## 2018-08-23 DIAGNOSIS — M25.562 CHRONIC PAIN OF BOTH KNEES: ICD-10-CM

## 2018-08-23 PROCEDURE — 99214 OFFICE O/P EST MOD 30 MIN: CPT | Performed by: ANESTHESIOLOGY

## 2018-08-23 PROCEDURE — 99213 OFFICE O/P EST LOW 20 MIN: CPT

## 2018-08-23 RX ORDER — OXYCODONE HYDROCHLORIDE AND ACETAMINOPHEN 5; 325 MG/1; MG/1
1 TABLET ORAL SEE ADMIN INSTRUCTIONS
Qty: 130 TABLET | Refills: 0 | Status: SHIPPED | OUTPATIENT
Start: 2018-08-24 | End: 2018-09-21 | Stop reason: SDUPTHER

## 2018-08-23 RX ORDER — OXYCODONE HYDROCHLORIDE AND ACETAMINOPHEN 5; 325 MG/1; MG/1
1 TABLET ORAL EVERY 6 HOURS PRN
Qty: 130 TABLET | Refills: 0 | Status: SHIPPED | OUTPATIENT
Start: 2018-08-24 | End: 2018-10-09 | Stop reason: SDUPTHER

## 2018-08-23 ASSESSMENT — ENCOUNTER SYMPTOMS
SORE THROAT: 0
SHORTNESS OF BREATH: 0
GASTROINTESTINAL NEGATIVE: 1
EYES NEGATIVE: 1
BACK PAIN: 1

## 2018-08-23 ASSESSMENT — PAIN DESCRIPTION - LOCATION: LOCATION: BACK

## 2018-08-23 ASSESSMENT — PAIN SCALES - GENERAL: PAINLEVEL_OUTOF10: 7

## 2018-08-23 ASSESSMENT — PAIN DESCRIPTION - FREQUENCY: FREQUENCY: CONTINUOUS

## 2018-08-23 ASSESSMENT — PAIN DESCRIPTION - DIRECTION: RADIATING_TOWARDS: BILATERAL HIP

## 2018-08-23 ASSESSMENT — PAIN DESCRIPTION - DESCRIPTORS: DESCRIPTORS: ACHING;THROBBING

## 2018-08-23 ASSESSMENT — ACTIVITIES OF DAILY LIVING (ADL): EFFECT OF PAIN ON DAILY ACTIVITIES: DIFFICULTY WALKING

## 2018-08-23 ASSESSMENT — PAIN DESCRIPTION - PAIN TYPE: TYPE: CHRONIC PAIN

## 2018-08-23 ASSESSMENT — PAIN DESCRIPTION - ONSET: ONSET: ON-GOING

## 2018-08-23 ASSESSMENT — PAIN DESCRIPTION - PROGRESSION: CLINICAL_PROGRESSION: NOT CHANGED

## 2018-08-23 ASSESSMENT — PAIN DESCRIPTION - ORIENTATION: ORIENTATION: LOWER;RIGHT;LEFT

## 2018-08-23 NOTE — PROGRESS NOTES
[facility]  Patient Pain Assessment  RECHECK - Dr. Ozzie Boas    Primary Care Physician: Micaela Bhagat PA-C    Chief complaint:   Chief Complaint   Patient presents with    Lower Back Pain   . HISTORY OF PRESENT ILLNESS:  Bre Corbett is [de-identified] y.o. female with multisite pain, and she is here for a follow up. HPI   Patient returns to us for a routine prescription refill visit. The patient is on the following opiates along with appropriate adjuvants: percocet 5-32 mg po q6h prn pain, for refractory hip and knee pain bilaterally. She has had bilateral hip replacements and has persistent pain. Current pain is stable and overall improved since establishing care at this clinic. There are no new pain symptoms or worsening of pain. Patient reports that the medications continue to provide good pain relief and allow for increased functionality. Denies side effects including constipation, nausea, vomiting, sedation, loss of appetite. Patient denies any aberrant behavior and reports taking medications appropriately. Patient is not seeking dose escalation at this time. No complaints, and express satisfaction with current multimodal analgesic plan. Pain: 3-9/10 depending on day, activity and medications. Pain is in hips, knees s/p surgery as well as gluteal region and back which is now referred. Function: wheelchair bound, does not ambulate much   Mood: stable, normal. Her daughter is suffering from stroke and is admitted so she is a little sad today. Sleep:  Normal.     She was supposed to obtain an L spine CT scan as well as an EMG but has not yet. OARRS compliant?  YES  Concern for prescription abuse?no    Current Pain Assessment  Pain Assessment  Pain Assessment: 0-10  Pain Level: 7  Pain Type: Chronic pain  Pain Location: Back  Pain Orientation: Lower, Right, Left  Pain Radiating Towards: bilateral hip  Pain Descriptors: Aching, Throbbing  Pain Frequency: History  Past Surgical History:   Procedure Laterality Date    ABDOMEN SURGERY       SECTION      COLECTOMY      COLONOSCOPY      HIP ARTHROPLASTY      3 on the left and 1 on the right    HYSTERECTOMY      INSERTABLE CARDIAC MONITOR  2018    Medtronic    JOINT REPLACEMENT Right     TOTAL KNEE    JOINT REPLACEMENT Bilateral     right x2, left x3 hips    KNEE SURGERY         Medications  Current Outpatient Prescriptions   Medication Sig Dispense Refill    oxyCODONE-acetaminophen (PERCOCET) 5-325 MG per tablet Take 1 tablet by mouth See Admin Instructions for 30 days. MAY TAKE AN EXTRA 1 ON OCCASION FOR SEVERE PAIN. Earliest Fill Date: 18 130 tablet 0    metoprolol tartrate (LOPRESSOR) 25 MG tablet       escitalopram (LEXAPRO) 20 MG tablet       amLODIPine (NORVASC) 5 MG tablet TAKE ONE TABLET BY MOUTH DAILY 90 tablet 3    furosemide (LASIX) 20 MG tablet Take 1 tablet by mouth daily as needed (for 2 lb weight gain/increased swelling/increased shortness of breath) 60 tablet 3    Cholecalciferol (VITAMIN D) 2000 units CAPS capsule Take by mouth Pt taking 1 time a week, unsure of dose      ASPERCREME LIDOCAINE EX Apply topically      VENTOLIN  (90 Base) MCG/ACT inhaler       omeprazole (PRILOSEC) 20 MG delayed release capsule       losartan (COZAAR) 50 MG tablet TAKE ONE TABLET BY MOUTH DAILY 30 tablet 2    levothyroxine (LEVOTHROID) 50 MCG tablet Take 1 tablet by mouth daily 30 tablet 3    metFORMIN (GLUCOPHAGE) 500 MG tablet TAKE ONE TABLET BY MOUTH TWICE A  tablet 2    simvastatin (ZOCOR) 40 MG tablet Take 1 tablet by mouth nightly 90 tablet 3    aspirin 325 MG tablet Take 325 mg by mouth daily.  Menthol, Topical Analgesic, (PERFORM PAIN RELIEVING ROLL-ON EX) Apply topically      hydrocortisone 2.5 % cream Apply topically 2 times daily.  30 g 2    glucose monitoring kit (FREESTYLE) monitoring kit 1 kit by Does not apply route daily as needed 1 kit 0    glucose blood VI test strips (EXACTECH TEST) strip 1 each by In Vitro route daily Type 2 diabetes qd testing 100 each 3    Accu-Chek Multiclix Lancets MISC Test qd;type 2 diabetes 100 each 3    Scooter MISC by Does not apply route Use daily dx arthritis obesity pulmonary htn,copd 1 each 0     No current facility-administered medications for this encounter. Allergies  Patient has no known allergies. Family History  family history includes Cancer in her daughter and mother; Hypertension in her maternal aunt. Social History  Social History     Social History    Marital status: Single     Spouse name: N/A    Number of children: 3    Years of education: N/A     Occupational History    RETIRED      Social History Main Topics    Smoking status: Former Smoker     Years: 5.00     Quit date: 11/23/1985    Smokeless tobacco: Never Used    Alcohol use No    Drug use: No    Sexual activity: Not Asked     Other Topics Concern    None     Social History Narrative    None      reports that she does not use drugs. REVIEW OF SYSTEMS:  Review of Systems   Constitutional: Negative for fever. HENT: Negative for ear pain and sore throat. Eyes: Negative. Respiratory: Negative for shortness of breath. Cardiovascular: Negative for chest pain. Gastrointestinal: Negative. Genitourinary: Negative for dysuria, frequency and hematuria. Incontinence intermittent- PCP aware   Musculoskeletal: Positive for back pain. Negative for falls. Skin: Negative for rash. Neurological: Negative for tremors, sensory change, speech change, focal weakness and seizures. Endo/Heme/Allergies: Does not bruise/bleed easily. Psychiatric/Behavioral: Positive for depression. Negative for suicidal ideas. The patient is nervous/anxious.         Vitals:    08/23/18 1512   BP: (!) 137/54   Pulse: 88   Resp: 16   Temp: 98.6 °F (37 °C)   SpO2: 96%     Objective:  General Appearance:  Comfortable and in no acute

## 2018-09-20 ENCOUNTER — HOSPITAL ENCOUNTER (OUTPATIENT)
Dept: PAIN MANAGEMENT | Age: 83
Discharge: HOME | End: 2018-09-20
Payer: MEDICARE

## 2018-09-21 DIAGNOSIS — M51.36 DDD (DEGENERATIVE DISC DISEASE), LUMBAR: ICD-10-CM

## 2018-09-21 DIAGNOSIS — M48.061 SPINAL STENOSIS OF LUMBAR REGION WITHOUT NEUROGENIC CLAUDICATION: ICD-10-CM

## 2018-09-21 RX ORDER — OXYCODONE HYDROCHLORIDE AND ACETAMINOPHEN 5; 325 MG/1; MG/1
1 TABLET ORAL SEE ADMIN INSTRUCTIONS
Qty: 130 TABLET | Refills: 0 | Status: SHIPPED | OUTPATIENT
Start: 2018-09-25 | End: 2018-10-09 | Stop reason: SDUPTHER

## 2018-10-09 ENCOUNTER — HOSPITAL ENCOUNTER (OUTPATIENT)
Dept: PAIN MANAGEMENT | Age: 83
Discharge: HOME | End: 2018-10-09
Payer: MEDICARE

## 2018-10-09 VITALS
SYSTOLIC BLOOD PRESSURE: 152 MMHG | WEIGHT: 247 LBS | TEMPERATURE: 98.2 F | RESPIRATION RATE: 16 BRPM | DIASTOLIC BLOOD PRESSURE: 79 MMHG | HEART RATE: 75 BPM | OXYGEN SATURATION: 98 % | BODY MASS INDEX: 42.17 KG/M2 | HEIGHT: 64 IN

## 2018-10-09 DIAGNOSIS — M25.559 HIP PAIN, CHRONIC, UNSPECIFIED LATERALITY: ICD-10-CM

## 2018-10-09 DIAGNOSIS — M48.061 SPINAL STENOSIS, LUMBAR REGION, WITHOUT NEUROGENIC CLAUDICATION: ICD-10-CM

## 2018-10-09 DIAGNOSIS — M25.559 PAIN IN JOINT, PELVIC REGION AND THIGH, UNSPECIFIED LATERALITY: ICD-10-CM

## 2018-10-09 DIAGNOSIS — M17.11 ARTHRITIS OF KNEE, RIGHT: ICD-10-CM

## 2018-10-09 DIAGNOSIS — M51.36 DDD (DEGENERATIVE DISC DISEASE), LUMBAR: Primary | ICD-10-CM

## 2018-10-09 DIAGNOSIS — G89.29 HIP PAIN, CHRONIC, UNSPECIFIED LATERALITY: ICD-10-CM

## 2018-10-09 DIAGNOSIS — G89.29 CHRONIC PAIN OF BOTH KNEES: ICD-10-CM

## 2018-10-09 DIAGNOSIS — M54.42 CHRONIC BILATERAL LOW BACK PAIN WITH BILATERAL SCIATICA: ICD-10-CM

## 2018-10-09 DIAGNOSIS — M25.561 CHRONIC PAIN OF BOTH KNEES: ICD-10-CM

## 2018-10-09 DIAGNOSIS — G89.29 CHRONIC BILATERAL LOW BACK PAIN WITH BILATERAL SCIATICA: ICD-10-CM

## 2018-10-09 DIAGNOSIS — M25.562 CHRONIC PAIN OF BOTH KNEES: ICD-10-CM

## 2018-10-09 DIAGNOSIS — M51.36 LUMBAR DEGENERATIVE DISC DISEASE: ICD-10-CM

## 2018-10-09 DIAGNOSIS — Z79.899 ENCOUNTER FOR MEDICATION MANAGEMENT: ICD-10-CM

## 2018-10-09 DIAGNOSIS — M48.061 SPINAL STENOSIS OF LUMBAR REGION WITHOUT NEUROGENIC CLAUDICATION: ICD-10-CM

## 2018-10-09 DIAGNOSIS — M25.552 PAIN OF BOTH HIP JOINTS: ICD-10-CM

## 2018-10-09 DIAGNOSIS — M25.551 PAIN OF BOTH HIP JOINTS: ICD-10-CM

## 2018-10-09 DIAGNOSIS — E11.42 DIABETIC POLYNEUROPATHY ASSOCIATED WITH TYPE 2 DIABETES MELLITUS (HCC): ICD-10-CM

## 2018-10-09 DIAGNOSIS — M54.41 CHRONIC BILATERAL LOW BACK PAIN WITH BILATERAL SCIATICA: ICD-10-CM

## 2018-10-09 DIAGNOSIS — G89.28 CHRONIC PAIN FOLLOWING SURGERY OR PROCEDURE: ICD-10-CM

## 2018-10-09 PROCEDURE — 99214 OFFICE O/P EST MOD 30 MIN: CPT

## 2018-10-09 PROCEDURE — 99213 OFFICE O/P EST LOW 20 MIN: CPT | Performed by: NURSE PRACTITIONER

## 2018-10-09 PROCEDURE — 99213 OFFICE O/P EST LOW 20 MIN: CPT

## 2018-10-09 RX ORDER — OXYCODONE HYDROCHLORIDE AND ACETAMINOPHEN 5; 325 MG/1; MG/1
1 TABLET ORAL SEE ADMIN INSTRUCTIONS
Qty: 130 TABLET | Refills: 0 | Status: SHIPPED | OUTPATIENT
Start: 2018-10-25 | End: 2018-11-16 | Stop reason: SDUPTHER

## 2018-10-09 RX ORDER — ACYCLOVIR 200 MG/1
200 CAPSULE ORAL 2 TIMES DAILY
COMMUNITY
End: 2020-08-10

## 2018-10-09 ASSESSMENT — ENCOUNTER SYMPTOMS
GASTROINTESTINAL NEGATIVE: 1
RESPIRATORY NEGATIVE: 1

## 2018-10-09 NOTE — PROGRESS NOTES
[facility] Hi Res Interp Ur See Note   Final 2018 11:25 AM [facility]   (NOTE)   Access [facility] Enhanced Report using either link below:   -Direct access: [email or web address]   -Enter Username, Password: [email or web address]   Username: [email or web address]   Password: [email or web address]   Performed by [address] [phone]   www. Leander Ramos MD, Lab. Director   Performed at [address] [place] [address] Dr Lorena Cerna[address] [phone]    Testing Performed By           Past Medical History:   Diagnosis Date    Anxiety     Arthritis     Bronchitis     CAD (coronary artery disease)     Carotid arterial disease (HCC)     [facility]     Diabetes mellitus [facility]     Hyperlipidemia     Hypertension     Mitral regurgitation     Myalgia     Pulmonary hypertension ([facility]     Shingles 2018    left facial area and involved eye    Sleep apnea     Syncope and collapse        Past Surgical History:   Procedure Laterality Date    ABDOMEN SURGERY       SECTION      COLECTOMY      COLONOSCOPY      HIP ARTHROPLASTY      3 on the left and 1 on the right    HYSTERECTOMY      INSERTABLE CARDIAC MONITOR  2018    [employer]tr[employer]    JOINT REPLACEMENT Right     TOTAL KNEE    JOINT REPLACEMENT Bilateral     right x2, left x3 hips    KNEE SURGERY         No Known Allergies      Current Outpatient Prescriptions:     acyclovir (ZOVIRAX) 200 MG capsule, Take 200 mg by mouth 2 times daily, Disp: , Rfl:     [START ON 10/25/2018] oxyCODONE-acetaminophen (PERCOCET) 5-325 MG per tablet, Take 1 tablet by mouth See Admin Instructions for 30 days.  MAY TAKE AN EXTRA 1 ON OCCASION FOR SEVERE PAIN., Disp: 130 tablet, Rfl: 0    metoprolol tartrate (LOPRESSOR) 25 MG tablet, , Disp: , Rfl:     escitalopram (LEXAPRO) 20 MG tablet, , Disp: , Rfl:     amLODIPine (NORVASC) 5 MG tablet, TAKE ONE TABLET BY MOUTH DAILY, Disp: 90 tablet, Rfl: 3    Cholecalciferol (VITAMIN D) 2000 units CAPS capsule, Take by mouth Pt taking 1 time a week, unsure of dose, Disp: , Rfl:     ASPERCREME LIDOCAINE EX, Apply topically, Disp: , Rfl:     omeprazole (PRILOSEC) 20 MG delayed release capsule, , Disp: , Rfl:     losartan (COZAAR) 50 MG tablet, TAKE ONE TABLET BY MOUTH DAILY, Disp: 30 tablet, Rfl: 2    levothyroxine (LEVOTHROID) 50 MCG tablet, Take 1 tablet by mouth daily, Disp: 30 tablet, Rfl: 3    metFORMIN (GLUCOPHAGE) 500 MG tablet, TAKE ONE TABLET BY MOUTH TWICE A DAY, Disp: 180 tablet, Rfl: 2    simvastatin (ZOCOR) 40 MG tablet, Take 1 tablet by mouth nightly, Disp: 90 tablet, Rfl: 3    aspirin 325 MG tablet, Take 325 mg by mouth daily. , Disp: , Rfl:     furosemide (LASIX) 20 MG tablet, Take 1 tablet by mouth daily as needed (for 2 lb weight gain/increased swelling/increased shortness of breath), Disp: 60 tablet, Rfl: 3    VENTOLIN  (90 Base) MCG/ACT inhaler, , Disp: , Rfl:     hydrocortisone 2.5 % cream, Apply topically 2 times daily. , Disp: 30 g, Rfl: 2    glucose monitoring kit (FREESTYLE) monitoring kit, 1 kit by Does not apply route daily as needed, Disp: 1 kit, Rfl: 0    glucose blood VI test strips (EXACTECH TEST) strip, 1 each by In Vitro route daily Type 2 diabetes qd testing, Disp: 100 each, Rfl: 3    Accu-Chek Multiclix Lancets MISC, Test qd;type 2 diabetes, Disp: 100 each, Rfl: 3    Scooter MISC, by Does not apply route Use daily dx arthritis obesity pulmonary htn,copd, Disp: 1 each, Rfl: 0    Family History   Problem Relation Age of Onset    Cancer Mother     Hypertension Maternal Aunt     Cancer Daughter        Social History     Social History    Marital status: Single     Spouse name: N/A    Number of children: 3    Years of education: N/A     Occupational History    RETIRED      Social History Main Topics    Smoking status: Former Smoker     Years: 5.00     Quit date: 11/23/1985    Smokeless tobacco: Never Used    Alcohol use No    Drug use: No    Sexual activity: Not on file     Other Topics Concern    Not on file     Social History Narrative    No narrative on file       Review of Systems:  Review of Systems   Constitution: Negative. HENT: Negative. Eyes:        Glasses   Cardiovascular: Negative. Respiratory: Negative. Endocrine:        Blood sugars under control   Hematologic/Lymphatic: Negative. Skin:        Healed lesions right side of face and scabs   Musculoskeletal: Positive for joint pain. Gastrointestinal: Negative. Genitourinary: Positive for nocturia. Neurological: Positive for loss of balance. Psychiatric/Behavioral: Positive for depression. Managing         Physical Exam:  BP (!) 152/79   Pulse 75   Temp 98.2 °F (36.8 °C) (Oral)   Resp 16   Ht 5' 4\" (1.626 m)   Wt 247 lb (112 kg)   SpO2 98%   BMI 42.40 kg/m²     Physical Exam   Constitutional: She is oriented to person, place, and time and well-developed, well-nourished, and in no distress. overweight   HENT:   Head: Normocephalic. Healed lesions from shingles. Neck: Normal range of motion. Neck supple. Pulmonary/Chest: Effort normal.   Musculoskeletal:        Left knee: She exhibits decreased range of motion and deformity. Tenderness found. Medial joint line and lateral joint line tenderness noted. Lumbar back: She exhibits decreased range of motion and tenderness. Neurological: She is alert and oriented to person, place, and time. Gait abnormal.   Reflex Scores:       Patellar reflexes are 0 on the right side and 1+ on the left side. Achilles reflexes are 1+ on the right side and 1+ on the left side. In wheelchair   Skin: Skin is dry and intact. Lesion noted.    Healed lesions right forehead   Psychiatric: Affect and judgment normal.         Assessment:    Problem List Items Addressed This Visit     Chronic bilateral low back pain with bilateral sciatica taking opioids along with alcohol,  were discussed with patient. I had asked the patient to minimize medication use and utilize pain medications only for uncontrolled rest pain or pain with exertional activities. I advised patient not to self-escalate pain medications without consulting with us. At each of patient's future visits we will try to taper pain medications, while adjusting the adjunct medications, and re-evaluating for Physical Therapy to improve spinal and joint strength. We will continue to have discussions to decrease pain medications as tolerated. Counseled patient on effects their pain medication and /or their medical condition may have on their  ability to drive or operate machinery. Instructed not to drive or operate machinery if drowsy     I also discussed with the patient regarding the dangers of combining narcotic pain medication with tranquilizers, alcohol or illegal drugs or taking the medication any way other than prescribed. The dangers were discussed  including respiratory depression and death. Patient was told to tell  all  physicians regarding the medications he is getting from pain clinic. Patient is warned not to take any unprescribed medications over-the-counter medications that can depress breathing . Patient is advised to talk to the pharmacist or physicians if planning to take any over-the-counter medications before  takeing them. Patient is strongly advised to avoid tranquilizers or  relaxants, illegal drugs  or any medications that can depress breathing  Patient is also advised to tell us if there is any changes in their medications from other physicians.     TREATMENT OPTIONS:     Return in 4 weeks  Medication Agreement Requirements Met  Continue Opioid therapy  Script written for percocet  Follow up appointment made

## 2018-11-14 ENCOUNTER — HOSPITAL ENCOUNTER (OUTPATIENT)
Age: 83
Setting detail: SPECIMEN
Discharge: HOME | End: 2018-11-14
Payer: MEDICARE

## 2018-11-14 LAB
ALBUMIN SERPL-MCNC: 3.8 G/DL (ref 3.5–5.2)
ALBUMIN/GLOBULIN RATIO: 1.2 (ref 1–2.5)
ALP BLD-CCNC: 73 U/L (ref 35–104)
ALT SERPL-CCNC: 6 U/L (ref 5–33)
ANION GAP SERPL CALCULATED.3IONS-SCNC: 10 MMOL/L (ref 9–17)
AST SERPL-CCNC: 13 U/L
BILIRUB SERPL-MCNC: 0.51 MG/DL (ref 0.3–1.2)
BILIRUBIN DIRECT: 0.13 MG/DL
BILIRUBIN, INDIRECT: 0.38 MG/DL (ref 0–1)
BUN BLDV-MCNC: 21 MG/DL (ref 8–23)
CALCIUM SERPL-MCNC: 9 MG/DL (ref 8.6–10.4)
CHLORIDE BLD-SCNC: 100 MMOL/L (ref 98–107)
CHOLESTEROL/HDL RATIO: 2.3
CHOLESTEROL: 157 MG/DL
CO2: 32 MMOL/L (ref 20–31)
CREAT SERPL-MCNC: 1.02 MG/DL (ref 0.5–0.9)
ESTIMATED AVERAGE GLUCOSE: 117 MG/DL
GFR AFRICAN AMERICAN: >60 ML/MIN
GFR NON-AFRICAN AMERICAN: 52 ML/MIN
GFR SERPL CREATININE-BSD FRML MDRD: ABNORMAL ML/MIN/{1.73_M2}
GFR SERPL CREATININE-BSD FRML MDRD: ABNORMAL ML/MIN/{1.73_M2}
GLOBULIN: NORMAL G/DL (ref 1.5–3.8)
GLUCOSE BLD-MCNC: 106 MG/DL (ref 70–99)
HBA1C MFR BLD: 5.7 % (ref 4–6)
HCT VFR BLD CALC: 33.2 % (ref 36.3–47.1)
HDLC SERPL-MCNC: 68 MG/DL
HEMOGLOBIN: 9.8 G/DL (ref 11.9–15.1)
LDL CHOLESTEROL: 76 MG/DL (ref 0–130)
MCH RBC QN AUTO: 26.8 PG (ref 25.2–33.5)
MCHC RBC AUTO-ENTMCNC: 29.5 G/DL (ref 28.4–34.8)
MCV RBC AUTO: 91 FL (ref 82.6–102.9)
NRBC AUTOMATED: 0 PER 100 WBC
PDW BLD-RTO: 16 % (ref 11.8–14.4)
PLATELET # BLD: 264 K/UL (ref 138–453)
PMV BLD AUTO: 11.8 FL (ref 8.1–13.5)
POTASSIUM SERPL-SCNC: 4.3 MMOL/L (ref 3.7–5.3)
RBC # BLD: 3.65 M/UL (ref 3.95–5.11)
SODIUM BLD-SCNC: 142 MMOL/L (ref 135–144)
T3 FREE: 2.14 PG/ML (ref 2.02–4.43)
THYROXINE, FREE: 1.53 NG/DL (ref 0.93–1.7)
TOTAL PROTEIN: 7.1 G/DL (ref 6.4–8.3)
TRIGL SERPL-MCNC: 67 MG/DL
TSH SERPL DL<=0.05 MIU/L-ACNC: 2.23 MIU/L (ref 0.3–5)
VITAMIN B-12: 385 PG/ML (ref 232–1245)
VITAMIN D 25-HYDROXY: 22.9 NG/ML (ref 30–100)
VLDLC SERPL CALC-MCNC: NORMAL MG/DL (ref 1–30)
WBC # BLD: 7.3 K/UL (ref 3.5–11.3)

## 2018-11-15 LAB — IRON: 50 UG/DL (ref 37–145)

## 2018-11-16 ENCOUNTER — HOSPITAL ENCOUNTER (OUTPATIENT)
Dept: PAIN MANAGEMENT | Age: 83
Discharge: HOME | End: 2018-11-16
Payer: MEDICARE

## 2018-11-16 VITALS
HEART RATE: 64 BPM | DIASTOLIC BLOOD PRESSURE: 69 MMHG | TEMPERATURE: 97.9 F | RESPIRATION RATE: 16 BRPM | WEIGHT: 247 LBS | SYSTOLIC BLOOD PRESSURE: 136 MMHG | HEIGHT: 64 IN | BODY MASS INDEX: 42.17 KG/M2

## 2018-11-16 DIAGNOSIS — M51.36 DDD (DEGENERATIVE DISC DISEASE), LUMBAR: ICD-10-CM

## 2018-11-16 DIAGNOSIS — M54.41 CHRONIC BILATERAL LOW BACK PAIN WITH BILATERAL SCIATICA: ICD-10-CM

## 2018-11-16 DIAGNOSIS — G89.29 CHRONIC PAIN OF BOTH KNEES: ICD-10-CM

## 2018-11-16 DIAGNOSIS — Z79.899 ENCOUNTER FOR MEDICATION MANAGEMENT: ICD-10-CM

## 2018-11-16 DIAGNOSIS — M25.562 CHRONIC PAIN OF BOTH KNEES: ICD-10-CM

## 2018-11-16 DIAGNOSIS — G89.29 HIP PAIN, CHRONIC, RIGHT: ICD-10-CM

## 2018-11-16 DIAGNOSIS — M48.061 SPINAL STENOSIS OF LUMBAR REGION WITHOUT NEUROGENIC CLAUDICATION: Primary | ICD-10-CM

## 2018-11-16 DIAGNOSIS — M25.561 CHRONIC PAIN OF BOTH KNEES: ICD-10-CM

## 2018-11-16 DIAGNOSIS — E66.9 GENERALIZED OBESITY: ICD-10-CM

## 2018-11-16 DIAGNOSIS — M17.11 ARTHRITIS OF KNEE, RIGHT: ICD-10-CM

## 2018-11-16 DIAGNOSIS — M25.552 PAIN OF BOTH HIP JOINTS: ICD-10-CM

## 2018-11-16 DIAGNOSIS — E11.42 DIABETIC POLYNEUROPATHY ASSOCIATED WITH TYPE 2 DIABETES MELLITUS (HCC): ICD-10-CM

## 2018-11-16 DIAGNOSIS — G89.28 CHRONIC PAIN FOLLOWING SURGERY OR PROCEDURE: ICD-10-CM

## 2018-11-16 DIAGNOSIS — M25.559 PAIN IN JOINT, PELVIC REGION AND THIGH, UNSPECIFIED LATERALITY: ICD-10-CM

## 2018-11-16 DIAGNOSIS — M25.551 PAIN OF BOTH HIP JOINTS: ICD-10-CM

## 2018-11-16 DIAGNOSIS — M51.36 LUMBAR DEGENERATIVE DISC DISEASE: ICD-10-CM

## 2018-11-16 DIAGNOSIS — M25.551 HIP PAIN, CHRONIC, RIGHT: ICD-10-CM

## 2018-11-16 DIAGNOSIS — G89.29 CHRONIC BILATERAL LOW BACK PAIN WITH BILATERAL SCIATICA: ICD-10-CM

## 2018-11-16 DIAGNOSIS — M54.42 CHRONIC BILATERAL LOW BACK PAIN WITH BILATERAL SCIATICA: ICD-10-CM

## 2018-11-16 PROCEDURE — 99213 OFFICE O/P EST LOW 20 MIN: CPT

## 2018-11-16 PROCEDURE — 99213 OFFICE O/P EST LOW 20 MIN: CPT | Performed by: NURSE PRACTITIONER

## 2018-11-16 RX ORDER — OXYCODONE HYDROCHLORIDE AND ACETAMINOPHEN 5; 325 MG/1; MG/1
1 TABLET ORAL EVERY 6 HOURS PRN
Qty: 130 TABLET | Refills: 0 | Status: SHIPPED | OUTPATIENT
Start: 2018-11-24 | End: 2019-01-11 | Stop reason: SDUPTHER

## 2018-11-16 ASSESSMENT — ENCOUNTER SYMPTOMS
NAUSEA: 1
SHORTNESS OF BREATH: 1
COUGH: 1
BACK PAIN: 1

## 2018-11-16 NOTE — PROGRESS NOTES
Alpha-OH-Alpraz, Urine Not Detected   Final 02/21/2018 11:25 AM [facility]   Alprazolam, Urine Not Detected   Final 02/21/2018 11:25 AM [facility]   Amphetamines, urine Not Detected   Final 02/21/2018 11:25 AM [facility]   Barbiturates, Ur Not Detected   Final 02/21/2018 11:25 AM [facility]   Benzoylecgonine, Ur Not Detected   Final 02/21/2018 11:25 AM [facility]   Buprenorphine Urine Not Detected   Final 02/21/2018 11:25 AM [facility]   Carisoprodol, Ur Not Detected   Final 02/21/2018 11:25 AM [facility]   (NOTE)   The carisoprodol immunoassay has cross-reactivity to carisoprodol   and meprobamate.     Clonazepam, Urine Not Detected   Final 02/21/2018 11:25 AM [facility]   Codeine, Urine Not Detected   Final 02/21/2018 11:25 AM [facility]   MDA, Ur Not Detected   Final 02/21/2018 11:25 AM [facility]   Diazepam, Urine Not Detected   Final 02/21/2018 11:25 AM [facility]   Ethyl Glucuronide Ur Not Detected   Final 02/21/2018 11:25 AM [facility]   Fentanyl, Ur Not Detected   Final 02/21/2018 11:25 AM [facility]   Hydrocodone, Urine Not Detected   Final 02/21/2018 11:25 AM [facility]   Hydromorphone, Urine Not Detected   Final 02/21/2018 11:25 AM [facility]   Lorazepam, Urine Not Detected   Final 02/21/2018 11:25 AM [facility]   Marijuana Metab, Ur Not Detected   Final 02/21/2018 11:25 AM [facility]   MDEA, JYOTHI, Ur Not Detected   Final 02/21/2018 11:25 AM [facility]   MDMA, Urine Not Detected   Final 02/21/2018 11:25 AM [facility]   Meperidine Metab, Ur Not Detected   Final 02/21/2018 11:25 AM [facility]   Methadone, Urine Not Detected   Final 02/21/2018 11:25 AM [facility]   Methamphetamine, Urine Not Detected   Final 02/21/2018 11:25 AM [facility]   Methylphenidate Not Detected   Final 02/21/2018 11:25 AM [facility]   Midazolam, Urine Not Detected   Final 02/21/2018 11:25 AM [facility]   Morphine Urine Not Detected   Final 02/21/2018 11:25 AM [facility]   Norbuprenorphine, Urine Not Detected   Final 02/21/2018 11:25 AM [facility]   Nordiazepam, Urine Not Detected   Final 02/21/2018 11:25 AM [facility]   Norfentanyl, Urine Not Detected   Final 02/21/2018 11:25 AM [facility] 05/2018    Medtronic    JOINT REPLACEMENT Right     TOTAL KNEE    JOINT REPLACEMENT Bilateral     right x2, left x3 hips    KNEE SURGERY         No Known Allergies      Current Outpatient Prescriptions:     [START ON 11/24/2018] oxyCODONE-acetaminophen (PERCOCET) 5-325 MG per tablet, Take 1 tablet by mouth every 6 hours as needed for Pain for up to 30 days. MAY TAKE AN EXTRA 1 ON OCCASION FOR SEVERE PAIN., Disp: 130 tablet, Rfl: 0    acyclovir (ZOVIRAX) 200 MG capsule, Take 200 mg by mouth 2 times daily, Disp: , Rfl:     metoprolol tartrate (LOPRESSOR) 25 MG tablet, , Disp: , Rfl:     escitalopram (LEXAPRO) 20 MG tablet, , Disp: , Rfl:     amLODIPine (NORVASC) 5 MG tablet, TAKE ONE TABLET BY MOUTH DAILY, Disp: 90 tablet, Rfl: 3    Cholecalciferol (VITAMIN D) 2000 units CAPS capsule, Take by mouth Pt taking 1 time a week, unsure of dose, Disp: , Rfl:     ASPERCREME LIDOCAINE EX, Apply topically, Disp: , Rfl:     omeprazole (PRILOSEC) 20 MG delayed release capsule, , Disp: , Rfl:     losartan (COZAAR) 50 MG tablet, TAKE ONE TABLET BY MOUTH DAILY, Disp: 30 tablet, Rfl: 2    levothyroxine (LEVOTHROID) 50 MCG tablet, Take 1 tablet by mouth daily, Disp: 30 tablet, Rfl: 3    metFORMIN (GLUCOPHAGE) 500 MG tablet, TAKE ONE TABLET BY MOUTH TWICE A DAY, Disp: 180 tablet, Rfl: 2    simvastatin (ZOCOR) 40 MG tablet, Take 1 tablet by mouth nightly, Disp: 90 tablet, Rfl: 3    aspirin 325 MG tablet, Take 325 mg by mouth daily. , Disp: , Rfl:     furosemide (LASIX) 20 MG tablet, Take 1 tablet by mouth daily as needed (for 2 lb weight gain/increased swelling/increased shortness of breath), Disp: 60 tablet, Rfl: 3    VENTOLIN  (90 Base) MCG/ACT inhaler, , Disp: , Rfl:     hydrocortisone 2.5 % cream, Apply topically 2 times daily. , Disp: 30 g, Rfl: 2    glucose monitoring kit (FREESTYLE) monitoring kit, 1 kit by Does not apply route daily as needed, Disp: 1 kit, Rfl: 0    glucose blood VI test strips shoulder: She exhibits tenderness. Left knee: She exhibits decreased range of motion and deformity. Tenderness found. Medial joint line and lateral joint line tenderness noted. Cervical back: She exhibits tenderness. Lumbar back: She exhibits decreased range of motion and tenderness. Neurological: She is alert. Gait abnormal.   Reflex Scores:       Patellar reflexes are 0 on the right side and 1+ on the left side. Achilles reflexes are 1+ on the right side and 1+ on the left side. Skin: Skin is warm, dry and intact. Psychiatric: Her speech is normal and behavior is normal. Judgment and thought content normal. Cognition and memory are normal. She exhibits a depressed mood.          Assessment:      Problem List Items Addressed This Visit     Chronic bilateral low back pain with bilateral sciatica (Chronic)    Relevant Medications    oxyCODONE-acetaminophen (PERCOCET) 5-325 MG per tablet (Start on 11/24/2018)    Pain in joint, pelvic region and thigh, unspecified laterality    Spinal stenosis of lumbar region without neurogenic claudication - Primary    Relevant Medications    oxyCODONE-acetaminophen (PERCOCET) 5-325 MG per tablet (Start on 11/24/2018)    Generalized obesity    Arthritis of knee, right    Relevant Medications    oxyCODONE-acetaminophen (PERCOCET) 5-325 MG per tablet (Start on 11/24/2018)    Hip pain, chronic, right    Relevant Medications    oxyCODONE-acetaminophen (PERCOCET) 5-325 MG per tablet (Start on 11/24/2018)    Encounter for medication management    DDD (degenerative disc disease), lumbar    Relevant Medications    oxyCODONE-acetaminophen (PERCOCET) 5-325 MG per tablet (Start on 11/24/2018)    Diabetic polyneuropathy associated with type 2 diabetes mellitus ([facility] [address]    Lumbar degenerative disc disease    Relevant Medications    oxyCODONE-acetaminophen (PERCOCET) 5-325 MG per tablet (Start on 11/24/2018)    Chronic pain following surgery or procedure

## 2018-12-21 ENCOUNTER — HOSPITAL ENCOUNTER (OUTPATIENT)
Dept: PAIN MANAGEMENT | Age: 83
Discharge: HOME | End: 2018-12-21
Payer: MEDICARE

## 2019-01-11 ENCOUNTER — HOSPITAL ENCOUNTER (OUTPATIENT)
Dept: PAIN MANAGEMENT | Age: 84
Discharge: HOME | End: 2019-01-11
Payer: MEDICARE

## 2019-01-11 VITALS
HEART RATE: 70 BPM | DIASTOLIC BLOOD PRESSURE: 80 MMHG | BODY MASS INDEX: 42.17 KG/M2 | WEIGHT: 247 LBS | HEIGHT: 64 IN | SYSTOLIC BLOOD PRESSURE: 167 MMHG | RESPIRATION RATE: 16 BRPM

## 2019-01-11 DIAGNOSIS — M25.551 PAIN OF BOTH HIP JOINTS: ICD-10-CM

## 2019-01-11 DIAGNOSIS — M51.36 DDD (DEGENERATIVE DISC DISEASE), LUMBAR: ICD-10-CM

## 2019-01-11 DIAGNOSIS — M25.552 PAIN OF BOTH HIP JOINTS: ICD-10-CM

## 2019-01-11 DIAGNOSIS — M25.561 CHRONIC PAIN OF BOTH KNEES: ICD-10-CM

## 2019-01-11 DIAGNOSIS — G89.29 CHRONIC PAIN OF BOTH KNEES: ICD-10-CM

## 2019-01-11 DIAGNOSIS — M25.562 CHRONIC PAIN OF BOTH KNEES: ICD-10-CM

## 2019-01-11 DIAGNOSIS — M54.41 CHRONIC BILATERAL LOW BACK PAIN WITH BILATERAL SCIATICA: ICD-10-CM

## 2019-01-11 DIAGNOSIS — M54.42 CHRONIC BILATERAL LOW BACK PAIN WITH BILATERAL SCIATICA: ICD-10-CM

## 2019-01-11 DIAGNOSIS — Z79.899 ENCOUNTER FOR MEDICATION MANAGEMENT: Primary | ICD-10-CM

## 2019-01-11 DIAGNOSIS — M48.061 SPINAL STENOSIS OF LUMBAR REGION WITHOUT NEUROGENIC CLAUDICATION: ICD-10-CM

## 2019-01-11 DIAGNOSIS — G89.29 CHRONIC BILATERAL LOW BACK PAIN WITH BILATERAL SCIATICA: ICD-10-CM

## 2019-01-11 PROCEDURE — 99213 OFFICE O/P EST LOW 20 MIN: CPT | Performed by: NURSE PRACTITIONER

## 2019-01-11 PROCEDURE — 99213 OFFICE O/P EST LOW 20 MIN: CPT

## 2019-01-11 RX ORDER — OXYCODONE HYDROCHLORIDE AND ACETAMINOPHEN 5; 325 MG/1; MG/1
1 TABLET ORAL EVERY 6 HOURS PRN
Qty: 130 TABLET | Refills: 0 | Status: SHIPPED | OUTPATIENT
Start: 2019-01-11 | End: 2019-02-08 | Stop reason: SDUPTHER

## 2019-01-11 ASSESSMENT — ENCOUNTER SYMPTOMS
SHORTNESS OF BREATH: 0
COUGH: 0
CONSTIPATION: 0
BACK PAIN: 1

## 2019-02-08 ENCOUNTER — HOSPITAL ENCOUNTER (OUTPATIENT)
Dept: PAIN MANAGEMENT | Age: 84
Discharge: HOME | End: 2019-02-08
Payer: MEDICARE

## 2019-02-08 VITALS
HEIGHT: 68 IN | WEIGHT: 247 LBS | DIASTOLIC BLOOD PRESSURE: 83 MMHG | BODY MASS INDEX: 37.44 KG/M2 | HEART RATE: 73 BPM | TEMPERATURE: 98 F | OXYGEN SATURATION: 99 % | SYSTOLIC BLOOD PRESSURE: 155 MMHG | RESPIRATION RATE: 17 BRPM

## 2019-02-08 DIAGNOSIS — M54.42 CHRONIC BILATERAL LOW BACK PAIN WITH BILATERAL SCIATICA: ICD-10-CM

## 2019-02-08 DIAGNOSIS — M48.061 SPINAL STENOSIS, LUMBAR REGION, WITHOUT NEUROGENIC CLAUDICATION: ICD-10-CM

## 2019-02-08 DIAGNOSIS — M51.36 LUMBAR DEGENERATIVE DISC DISEASE: ICD-10-CM

## 2019-02-08 DIAGNOSIS — Z79.899 ENCOUNTER FOR MEDICATION MANAGEMENT: ICD-10-CM

## 2019-02-08 DIAGNOSIS — M25.551 PAIN OF BOTH HIP JOINTS: Primary | ICD-10-CM

## 2019-02-08 DIAGNOSIS — M25.552 PAIN OF BOTH HIP JOINTS: Primary | ICD-10-CM

## 2019-02-08 DIAGNOSIS — M54.41 CHRONIC BILATERAL LOW BACK PAIN WITH BILATERAL SCIATICA: ICD-10-CM

## 2019-02-08 DIAGNOSIS — M51.36 DDD (DEGENERATIVE DISC DISEASE), LUMBAR: ICD-10-CM

## 2019-02-08 DIAGNOSIS — E11.42 DIABETIC POLYNEUROPATHY ASSOCIATED WITH TYPE 2 DIABETES MELLITUS (HCC): ICD-10-CM

## 2019-02-08 DIAGNOSIS — M48.061 SPINAL STENOSIS OF LUMBAR REGION WITHOUT NEUROGENIC CLAUDICATION: ICD-10-CM

## 2019-02-08 DIAGNOSIS — G89.29 CHRONIC BILATERAL LOW BACK PAIN WITH BILATERAL SCIATICA: ICD-10-CM

## 2019-02-08 DIAGNOSIS — E66.9 GENERALIZED OBESITY: ICD-10-CM

## 2019-02-08 PROCEDURE — 99214 OFFICE O/P EST MOD 30 MIN: CPT | Performed by: PAIN MEDICINE

## 2019-02-08 PROCEDURE — 80307 DRUG TEST PRSMV CHEM ANLYZR: CPT

## 2019-02-08 PROCEDURE — 99213 OFFICE O/P EST LOW 20 MIN: CPT

## 2019-02-08 RX ORDER — ACYCLOVIR 800 MG/1
TABLET ORAL
COMMUNITY
Start: 2018-12-26 | End: 2019-03-01 | Stop reason: ALTCHOICE

## 2019-02-08 RX ORDER — OXYCODONE HYDROCHLORIDE AND ACETAMINOPHEN 5; 325 MG/1; MG/1
1 TABLET ORAL EVERY 6 HOURS PRN
Qty: 130 TABLET | Refills: 0 | Status: SHIPPED | OUTPATIENT
Start: 2019-02-10 | End: 2019-03-01 | Stop reason: SDUPTHER

## 2019-02-08 RX ORDER — SIMVASTATIN 40 MG
TABLET ORAL
COMMUNITY
Start: 2019-01-06 | End: 2021-11-22

## 2019-02-08 ASSESSMENT — ENCOUNTER SYMPTOMS
SINUS PAIN: 0
DIARRHEA: 1
EYE PAIN: 1
BACK PAIN: 1
ABDOMINAL PAIN: 0
TROUBLE SWALLOWING: 0
SHORTNESS OF BREATH: 1

## 2019-02-08 ASSESSMENT — PAIN DESCRIPTION - FREQUENCY: FREQUENCY: CONTINUOUS

## 2019-02-08 ASSESSMENT — PAIN DESCRIPTION - DESCRIPTORS: DESCRIPTORS: CONSTANT;ACHING;THROBBING

## 2019-02-08 ASSESSMENT — ACTIVITIES OF DAILY LIVING (ADL): EFFECT OF PAIN ON DAILY ACTIVITIES: DIFFICULTY WALKING

## 2019-02-08 ASSESSMENT — PAIN SCALES - GENERAL: PAINLEVEL_OUTOF10: 8

## 2019-02-08 ASSESSMENT — PAIN DESCRIPTION - LOCATION: LOCATION: BACK;GROIN

## 2019-02-08 ASSESSMENT — PAIN DESCRIPTION - PROGRESSION: CLINICAL_PROGRESSION: NOT CHANGED

## 2019-02-08 ASSESSMENT — PAIN - FUNCTIONAL ASSESSMENT: PAIN_FUNCTIONAL_ASSESSMENT: PREVENTS OR INTERFERES WITH ALL ACTIVE AND SOME PASSIVE ACTIVITIES

## 2019-02-08 ASSESSMENT — PAIN DESCRIPTION - ORIENTATION: ORIENTATION: RIGHT;LEFT;LOWER

## 2019-02-10 ASSESSMENT — ENCOUNTER SYMPTOMS
COUGH: 0
PHOTOPHOBIA: 0

## 2019-02-13 LAB
6-ACETYLMORPHINE, UR: NOT DETECTED
7-AMINOCLONAZEPAM, URINE: NOT DETECTED
ALPHA-OH-ALPRAZ, URINE: NOT DETECTED
ALPRAZOLAM, URINE: NOT DETECTED
AMPHETAMINES, URINE: NOT DETECTED
BARBITURATES, URINE: NOT DETECTED
BENZOYLECGONINE, UR: NOT DETECTED
BUPRENORPHINE URINE: NOT DETECTED
CARISOPRODOL, UR: NOT DETECTED
CLONAZEPAM, URINE: NOT DETECTED
CODEINE, URINE: NOT DETECTED
CREATININE URINE: 97.3 MG/DL (ref 20–400)
DIAZEPAM, URINE: NOT DETECTED
DRUGS EXPECTED, UR: NORMAL
EER HI RES INTERP UR: NORMAL
ETHYL GLUCURONIDE UR: NOT DETECTED
FENTANYL URINE: NOT DETECTED
HYDROCODONE, URINE: NOT DETECTED
HYDROMORPHONE, URINE: NOT DETECTED
LORAZEPAM, URINE: NOT DETECTED
MARIJUANA METAB, UR: NOT DETECTED
MDA, UR: NOT DETECTED
MDEA, EVE, UR: NOT DETECTED
MDMA URINE: NOT DETECTED
MEPERIDINE METAB, UR: NOT DETECTED
METHADONE, URINE: NOT DETECTED
METHAMPHETAMINE, URINE: NOT DETECTED
METHYLPHENIDATE: NOT DETECTED
MIDAZOLAM, URINE: NOT DETECTED
MORPHINE URINE: NOT DETECTED
NORBUPRENORPHINE, URINE: NOT DETECTED
NORDIAZEPAM, URINE: NOT DETECTED
NORFENTANYL, URINE: NOT DETECTED
NORHYDROCODONE, URINE: NOT DETECTED
NOROXYCODONE, URINE: NOT DETECTED
NOROXYMORPHONE, URINE: NOT DETECTED
OXAZEPAM, URINE: NOT DETECTED
OXYCODONE URINE: NOT DETECTED
OXYMORPHONE, URINE: NOT DETECTED
PAIN MANAGEMENT DRUG PANEL INTERP, URINE: NORMAL
PAIN MGT DRUG PANEL, HI RES, UR: NORMAL
PCP,URINE: NOT DETECTED
PHENTERMINE, UR: NOT DETECTED
PROPOXYPHENE, URINE: NOT DETECTED
TAPENTADOL, URINE: NOT DETECTED
TAPENTADOL-O-SULFATE, URINE: NOT DETECTED
TEMAZEPAM, URINE: NOT DETECTED
TRAMADOL, URINE: NOT DETECTED
ZOLPIDEM, URINE: NOT DETECTED

## 2019-03-01 ENCOUNTER — HOSPITAL ENCOUNTER (OUTPATIENT)
Dept: PAIN MANAGEMENT | Age: 84
Discharge: HOME | End: 2019-03-01
Payer: MEDICARE

## 2019-03-01 VITALS
WEIGHT: 247 LBS | SYSTOLIC BLOOD PRESSURE: 161 MMHG | OXYGEN SATURATION: 99 % | BODY MASS INDEX: 37.44 KG/M2 | RESPIRATION RATE: 16 BRPM | TEMPERATURE: 97.5 F | HEART RATE: 74 BPM | HEIGHT: 68 IN | DIASTOLIC BLOOD PRESSURE: 87 MMHG

## 2019-03-01 DIAGNOSIS — G89.29 HIP PAIN, CHRONIC, RIGHT: ICD-10-CM

## 2019-03-01 DIAGNOSIS — M54.42 CHRONIC BILATERAL LOW BACK PAIN WITH BILATERAL SCIATICA: ICD-10-CM

## 2019-03-01 DIAGNOSIS — M51.36 LUMBAR DEGENERATIVE DISC DISEASE: ICD-10-CM

## 2019-03-01 DIAGNOSIS — M17.11 ARTHRITIS OF KNEE, RIGHT: ICD-10-CM

## 2019-03-01 DIAGNOSIS — M48.061 SPINAL STENOSIS, LUMBAR REGION, WITHOUT NEUROGENIC CLAUDICATION: ICD-10-CM

## 2019-03-01 DIAGNOSIS — G89.28 CHRONIC PAIN FOLLOWING SURGERY OR PROCEDURE: ICD-10-CM

## 2019-03-01 DIAGNOSIS — Z79.899 ENCOUNTER FOR MEDICATION MANAGEMENT: ICD-10-CM

## 2019-03-01 DIAGNOSIS — M51.36 DDD (DEGENERATIVE DISC DISEASE), LUMBAR: Primary | ICD-10-CM

## 2019-03-01 DIAGNOSIS — M54.41 CHRONIC BILATERAL LOW BACK PAIN WITH BILATERAL SCIATICA: ICD-10-CM

## 2019-03-01 DIAGNOSIS — M25.551 PAIN OF BOTH HIP JOINTS: ICD-10-CM

## 2019-03-01 DIAGNOSIS — M25.552 PAIN OF BOTH HIP JOINTS: ICD-10-CM

## 2019-03-01 DIAGNOSIS — M25.551 HIP PAIN, CHRONIC, RIGHT: ICD-10-CM

## 2019-03-01 DIAGNOSIS — M48.061 SPINAL STENOSIS OF LUMBAR REGION WITHOUT NEUROGENIC CLAUDICATION: ICD-10-CM

## 2019-03-01 DIAGNOSIS — G89.29 CHRONIC BILATERAL LOW BACK PAIN WITH BILATERAL SCIATICA: ICD-10-CM

## 2019-03-01 DIAGNOSIS — E11.42 DIABETIC POLYNEUROPATHY ASSOCIATED WITH TYPE 2 DIABETES MELLITUS (HCC): ICD-10-CM

## 2019-03-01 PROCEDURE — 99213 OFFICE O/P EST LOW 20 MIN: CPT | Performed by: NURSE PRACTITIONER

## 2019-03-01 PROCEDURE — 99213 OFFICE O/P EST LOW 20 MIN: CPT

## 2019-03-01 RX ORDER — OXYCODONE HYDROCHLORIDE AND ACETAMINOPHEN 5; 325 MG/1; MG/1
1 TABLET ORAL EVERY 6 HOURS PRN
Qty: 130 TABLET | Refills: 0 | Status: SHIPPED | OUTPATIENT
Start: 2019-03-12 | End: 2019-04-08 | Stop reason: SDUPTHER

## 2019-03-01 ASSESSMENT — ENCOUNTER SYMPTOMS
BACK PAIN: 1
SHORTNESS OF BREATH: 1
NAUSEA: 1

## 2019-04-05 ENCOUNTER — HOSPITAL ENCOUNTER (OUTPATIENT)
Dept: PAIN MANAGEMENT | Age: 84
Discharge: HOME | End: 2019-04-05
Payer: MEDICARE

## 2019-04-05 VITALS
HEIGHT: 68 IN | OXYGEN SATURATION: 97 % | BODY MASS INDEX: 37.44 KG/M2 | HEART RATE: 62 BPM | WEIGHT: 247 LBS | RESPIRATION RATE: 16 BRPM | DIASTOLIC BLOOD PRESSURE: 69 MMHG | SYSTOLIC BLOOD PRESSURE: 139 MMHG

## 2019-04-05 DIAGNOSIS — G89.29 CHRONIC PAIN OF BOTH KNEES: ICD-10-CM

## 2019-04-05 DIAGNOSIS — M17.11 ARTHRITIS OF KNEE, RIGHT: ICD-10-CM

## 2019-04-05 DIAGNOSIS — M51.36 LUMBAR DEGENERATIVE DISC DISEASE: ICD-10-CM

## 2019-04-05 DIAGNOSIS — M54.41 CHRONIC BILATERAL LOW BACK PAIN WITH BILATERAL SCIATICA: ICD-10-CM

## 2019-04-05 DIAGNOSIS — M25.551 HIP PAIN, CHRONIC, RIGHT: ICD-10-CM

## 2019-04-05 DIAGNOSIS — G89.28 CHRONIC PAIN FOLLOWING SURGERY OR PROCEDURE: ICD-10-CM

## 2019-04-05 DIAGNOSIS — M25.562 CHRONIC PAIN OF BOTH KNEES: ICD-10-CM

## 2019-04-05 DIAGNOSIS — E11.42 DIABETIC POLYNEUROPATHY ASSOCIATED WITH TYPE 2 DIABETES MELLITUS (HCC): ICD-10-CM

## 2019-04-05 DIAGNOSIS — Z79.899 ENCOUNTER FOR MEDICATION MANAGEMENT: ICD-10-CM

## 2019-04-05 DIAGNOSIS — M25.551 PAIN OF BOTH HIP JOINTS: ICD-10-CM

## 2019-04-05 DIAGNOSIS — G89.29 HIP PAIN, CHRONIC, RIGHT: ICD-10-CM

## 2019-04-05 DIAGNOSIS — G89.29 OTHER CHRONIC PAIN: ICD-10-CM

## 2019-04-05 DIAGNOSIS — M48.061 SPINAL STENOSIS, LUMBAR REGION, WITHOUT NEUROGENIC CLAUDICATION: Primary | ICD-10-CM

## 2019-04-05 DIAGNOSIS — M25.559 PAIN IN JOINT, PELVIC REGION AND THIGH, UNSPECIFIED LATERALITY: ICD-10-CM

## 2019-04-05 DIAGNOSIS — M25.552 PAIN OF BOTH HIP JOINTS: ICD-10-CM

## 2019-04-05 DIAGNOSIS — M48.061 SPINAL STENOSIS OF LUMBAR REGION WITHOUT NEUROGENIC CLAUDICATION: ICD-10-CM

## 2019-04-05 DIAGNOSIS — G89.29 CHRONIC BILATERAL LOW BACK PAIN WITH BILATERAL SCIATICA: ICD-10-CM

## 2019-04-05 DIAGNOSIS — M25.561 CHRONIC PAIN OF BOTH KNEES: ICD-10-CM

## 2019-04-05 DIAGNOSIS — M54.42 CHRONIC BILATERAL LOW BACK PAIN WITH BILATERAL SCIATICA: ICD-10-CM

## 2019-04-05 DIAGNOSIS — M51.36 DDD (DEGENERATIVE DISC DISEASE), LUMBAR: ICD-10-CM

## 2019-04-05 PROCEDURE — 99213 OFFICE O/P EST LOW 20 MIN: CPT

## 2019-04-05 PROCEDURE — 99213 OFFICE O/P EST LOW 20 MIN: CPT | Performed by: NURSE PRACTITIONER

## 2019-04-05 RX ORDER — HYDROXYZINE HYDROCHLORIDE 25 MG/1
25 TABLET, FILM COATED ORAL EVERY 8 HOURS PRN
COMMUNITY

## 2019-04-05 ASSESSMENT — ENCOUNTER SYMPTOMS
SHORTNESS OF BREATH: 1
CONSTIPATION: 1
BACK PAIN: 1

## 2019-04-05 NOTE — PROGRESS NOTES
Leonardo Mims PROGRESS NOTE      Patient here today to review Medication Agreement    Chief Complaint:  Back and joint pain      HPI: She c/o pain in low back and multiple joints. No history of lumbar surgery. Her pain has increased. She has pain in multiple joints. She is getting home PT 2 times a week,  She states is not sleeping well. She sleeps about 10 hours. She has a lot of stiffness in her joints. She had both hips and right knee replaced. She has had no ED visits. She states was seen in ED and was admitted for anxiety and she fell. Her grandson caught her before she fell to the floor. She was at [facility], she was admitted overnight, and had diagnostics done and will see her PCP. Back Pain   This is a chronic problem. The current episode started more than 1 year ago. The problem occurs constantly. The problem has been gradually worsening since onset. The pain is present in the lumbar spine. The quality of the pain is described as aching. Radiates to: down legs. The pain is at a severity of 9/10. The pain is severe. The symptoms are aggravated by standing (walking). Stiffness is present all day. Associated symptoms include numbness and weakness. Risk factors include menopause, obesity and sedentary lifestyle. She has tried analgesics (topical ointment ) for the symptoms. The treatment provided mild relief. Patient denies any new neurological symptoms. No bowel or bladder incontinence, no weakness, and no falling. Treatment goals:  Functional status: reduce pain 5      Aberrancy:   Any alcoholic beverages  no     Any illegal drugs   no      Analgesia: pain 5      Adverse  Effects : BM qod     ADL;s :  Home PT, cooking        Pill count: NOT appropriate short #11 percocet    Morphine equivalent dose as reported on OARRS:32.50  Attestation: The Prescription Monitoring Report for this patient was reviewed today.  Fabiola Song, STACIA - CNP)  Chronic Pain Routine Monitoring: No signs of potential drug abuse or diversion identified: otherwise, see note documentation, Obtaining appropriate analgesic effect of treatment. Xavier Molina, APRN - CNP)  Review ofOARRS does not show any aberrant prescription behavior. Medication is helping the patient stay active. Patient denies any side effects and reports adequate analgesia. No sign of misuse/abuse. When was thelast UDS:     2-8-19        Was the UDS appropriate:DRUGS EXPECTED:   PERCOCET (OXYCODONE) [2/6/19]   ________________________________________________________________   CONSISTENT with medications provided:   PERCOCET (OXYCODONE) : based on the absence of oxycodone and   metabolites   ___________________        Record/Diagnostics Review:       As above, I did review the imaging /13/2019  7:23 PM - Radha Grant Incoming Lab Results From [facility]      Component Results      Component Value Ref Range & Units Status Collected Lab   Pain Management Drug Panel Interp, Urine Consistent    Final 02/08/2019  2:45 PM [facility]   (NOTE)   ________________________________________________________________   DRUGS EXPECTED:   PERCOCET (OXYCODONE) [2/6/19]   ________________________________________________________________   CONSISTENT with medications provided:   PERCOCET (OXYCODONE) : based on the absence of oxycodone and   metabolites   ________________________________________________________________   Drugs Not Included in this Assay:   Acetaminophen   ________________________________________________________________   INTERPRETIVE INFORMATION: Pain Mgt Neil, Mass Spec/EMIT, Ur,                            Interp   Interpretation depends on accuracy and completeness of patient   medication information submitted by client.     6-Acetylmorphine, Ur Not Detected    Final 02/08/2019  2:45 PM [facility]   7-Aminoclonazepam, Urine Not Detected    Final 02/08/2019  2:45 PM [facility]   Alpha-OH-Alpraz, Urine Not Detected    Final 02/08/2019  2:45 PM [facility]   Alprazolam, Urine Not Detected    Final 02/08/2019  2:45 PM [facility]   Amphetamines, urine Not Detected    Final 02/08/2019  2:45 PM [facility]   Barbiturates, Ur Not Detected    Final 02/08/2019  2:45 PM [facility]   Benzoylecgonine, Ur Not Detected    Final 02/08/2019  2:45 PM [facility]   Buprenorphine Urine Not Detected    Final 02/08/2019  2:45 PM [facility]   Carisoprodol, Ur Not Detected    Final 02/08/2019  2:45 PM [facility]   (NOTE)   The carisoprodol immunoassay has cross-reactivity to carisoprodol   and meprobamate.     Clonazepam, Urine Not Detected    Final 02/08/2019  2:45 PM [facility]   Codeine, Urine Not Detected    Final 02/08/2019  2:45 PM [facility]   MDA, Ur Not Detected    Final 02/08/2019  2:45 PM [facility]   Diazepam, Urine Not Detected    Final 02/08/2019  2:45 PM [facility]   Ethyl Glucuronide Ur Not Detected    Final 02/08/2019  2:45 PM [facility]   Fentanyl, Ur Not Detected    Final 02/08/2019  2:45 PM [facility]   Hydrocodone, Urine Not Detected    Final 02/08/2019  2:45 PM [facility]   Hydromorphone, Urine Not Detected    Final 02/08/2019  2:45 PM [facility]   Lorazepam, Urine Not Detected    Final 02/08/2019  2:45 PM [facility]   Marijuana Metab, Ur Not Detected    Final 02/08/2019  2:45 PM [facility]   MDEA, JYOTHI, Ur Not Detected    Final 02/08/2019  2:45 PM [facility]   MDMA, Urine Not Detected    Final 02/08/2019  2:45 PM [facility]   Meperidine Metab, Ur Not Detected    Final 02/08/2019  2:45 PM [facility]   Methadone, Urine Not Detected    Final 02/08/2019  2:45 PM [facility]   Methamphetamine, Urine Not Detected    Final 02/08/2019  2:45 PM [facility]   Methylphenidate Not Detected    Final 02/08/2019  2:45 PM [facility]   Midazolam, Urine Not Detected    Final 02/08/2019  2:45 PM [facility]   Morphine Urine Not Detected    Final 02/08/2019  2:45 PM [facility]   Norbuprenorphine, Urine Not Detected    Final 02/08/2019  2:45 PM [facility]   Nordiazepam, Urine Not Detected    Final 02/08/2019  2:45 PM [facility]   Norfentanyl, Urine Not Detected    Final 02/08/2019  2:45 PM [facility]   NORHYDROCODONE, URINE Not Detected    Final 02/08/2019  2:45 PM [facility]   Noroxycodone, Urine Not Detected    Final 02/08/2019  2:45 PM [facility]   NOROXYMORPHONE, URINE Not Detected    Final 02/08/2019  2:45 PM [facility]   Oxazepam, Urine Not Detected    Final 02/08/2019  2:45 PM [facility]   Oxycodone Urine Not Detected    Final 02/08/2019  2:45 PM [facility]   Oxymorphone, Urine Not Detected    Final 02/08/2019  2:45 PM [facility]   PCP, Urine Not Detected    Final 02/08/2019  2:45 PM [facility]   Phentermine, Ur Not Detected    Final 02/08/2019  2:45 PM [facility]   Propoxyphene, Urine Not Detected    Final 02/08/2019  2:45 PM [facility]   Tapentadol-O-Sulfate, Urine Not Detected    Final 02/08/2019  2:45 PM [facility]   Tapentadol, Urine Not Detected    Final 02/08/2019  2:45 PM [facility]   Temazepam, Urine Not Detected    Final 02/08/2019  2:45 PM [facility]   Tramadol, Urine Not Detected    Final 02/08/2019  2:45 PM [facility]   Zolpidem, Urine Not Detected    Final 02/08/2019  2:45 PM [facility]   Drugs Expected, Ur PERCOCET ON 2/6/19    Final 02/08/2019  2:45  [address] [facility]   Creatinine, Ur 97.3  20.0 - 400.0 mg/dL Final 02/08/2019  2:45 PM [facility]   Pain Mgt Drug Panel, Hi Res, Ur See Below    Final 02/08/2019  2:45 PM [facility]   (NOTE)   Methodology: Qualitative Enzyme Immunoassay and Qualitative Liquid   Chromatography-Time of Flight-Mass Spectrometry or Tandem Mass   Spectrometry, Quantitative Spectrophotometry   The absence of expected drug(s) and/or drug metabolite(s) may   indicate non-compliance, inappropriate timing of specimen   collection relative to drug administration, poor drug absorption,   diluted/adulterated urine, or limitations of testing. The   concentration must be greater than or equal to the cutoff to be   reported as present.  If specific drug concentrations are   required, contact the laboratory within two weeks of specimen   collection to request quantification by a second analytical   technique. Interpretive questions should be directed to the   laboratory.    Results based on immunoassay detection that do not match clinical   expectations should be   interpreted with caution. Confirmatory testing by mass   spectrometry for immunoassay-based results is available, if   ordered within two weeks of specimen collection. Additional   charges apply. For medical purposes only; not valid for forensic use. This test was developed and its performance characteristics   determined by [facility]. The U.S. Food and Drug   Administration has not approved or cleared this test; however, FDA   clearance or approval is not currently required for clinical use. The results are not intended to be used as the sole means for   clinical diagnosis or patient management decisions. EER Hi Res Interp Ur See Note    Final 2019  2:45 PM [facility]   (NOTE)   Access [facility] Enhanced Report using either link below:   -Direct access: [email or web address]   -Enter Username, Password: [email or web address]   Username: [email or web address]   Password: [email or web address]   Performed by [id number] [place] [address] [phone]   www. Sherlean Brittle, MD, Lab.  Director                    Past Medical History:   Diagnosis Date    Anxiety     Arthritis     Bronchitis     CAD (coronary artery disease)     [facility]     COPD (chronic obstructive pulmonary disease) ([address]     Diabetes mellitus ([address]     Hyperlipidemia     Hypertension     Mitral regurgitation     Myalgia     Pulmonary hypertension ([address]     Shingles 2018    left facial area and involved eye    Sleep apnea     Syncope and collapse        Past Surgical History:   Procedure Laterality Date    ABDOMEN SURGERY      CATARACT REMOVAL WITH IMPLANT Right 1-209     SECTION      COLECTOMY      COLONOSCOPY      HIP ARTHROPLASTY      3 on the left and 1 on the right    HYSTERECTOMY      INSERTABLE CARDIAC MONITOR  2018    [employer]    JOINT REPLACEMENT Right     TOTAL KNEE    JOINT REPLACEMENT Bilateral right x2, left x3 hips    KNEE SURGERY         No Known Allergies      Current Outpatient Medications:     hydrOXYzine (ATARAX) 25 MG tablet, Take 25 mg by mouth every 8 hours as needed for Anxiety, Disp: , Rfl:     oxyCODONE-acetaminophen (PERCOCET) 5-325 MG per tablet, Take 1 tablet by mouth every 6 hours as needed for Pain for up to 30 days. MAY TAKE AN EXTRA 1 ON OCCASION FOR SEVERE PAIN., Disp: 130 tablet, Rfl: 0    simvastatin (ZOCOR) 40 MG tablet, , Disp: , Rfl:     acyclovir (ZOVIRAX) 200 MG capsule, Take 200 mg by mouth 2 times daily, Disp: , Rfl:     metoprolol tartrate (LOPRESSOR) 25 MG tablet, , Disp: , Rfl:     escitalopram (LEXAPRO) 20 MG tablet, , Disp: , Rfl:     amLODIPine (NORVASC) 5 MG tablet, TAKE ONE TABLET BY MOUTH DAILY, Disp: 90 tablet, Rfl: 3    Cholecalciferol (VITAMIN D) 2000 units CAPS capsule, Take by mouth Pt taking 1 time a week, unsure of dose, Disp: , Rfl:     ASPERCREME LIDOCAINE EX, Apply topically, Disp: , Rfl:     omeprazole (PRILOSEC) 20 MG delayed release capsule, , Disp: , Rfl:     levothyroxine (LEVOTHROID) 50 MCG tablet, Take 1 tablet by mouth daily, Disp: 30 tablet, Rfl: 3    metFORMIN (GLUCOPHAGE) 500 MG tablet, TAKE ONE TABLET BY MOUTH TWICE A DAY, Disp: 180 tablet, Rfl: 2    aspirin 325 MG tablet, Take 325 mg by mouth daily. , Disp: , Rfl:     furosemide (LASIX) 20 MG tablet, Take 1 tablet by mouth daily as needed (for 2 lb weight gain/increased swelling/increased shortness of breath), Disp: 60 tablet, Rfl: 3    VENTOLIN  (90 Base) MCG/ACT inhaler, , Disp: , Rfl:     hydrocortisone 2.5 % cream, Apply topically 2 times daily. , Disp: 30 g, Rfl: 2    glucose monitoring kit (FREESTYLE) monitoring kit, 1 kit by Does not apply route daily as needed, Disp: 1 kit, Rfl: 0    glucose blood VI test strips (EXACTECH TEST) strip, 1 each by In Vitro route daily Type 2 diabetes qd testing, Disp: 100 each, Rfl: 3    Accu-Chek Multiclix Lancets MISC, Test breath. Endocrine:        Blood sugars under control   Hematologic/Lymphatic: Negative. Skin: Negative. Scalp lesions from shingles   Musculoskeletal: Positive for back pain, falls and joint pain. Gastrointestinal: Positive for constipation. Genitourinary: Positive for nocturia. Neurological: Positive for dizziness, loss of balance, numbness and weakness. Uses cane   Psychiatric/Behavioral: The patient is nervous/anxious. On hydroxyzine         Physical Exam:  /69   Pulse 62   Resp 16   Ht 5' 8\" (1.727 m)   Wt 247 lb (112 kg)   SpO2 97%   BMI 37.56 kg/m²     Physical Exam   Constitutional: She appears well-developed. obese   Neck: Normal range of motion. Neck supple. Pulmonary/Chest: Effort normal.   Musculoskeletal:        Right shoulder: She exhibits decreased range of motion and tenderness. Left shoulder: She exhibits decreased range of motion and tenderness. Right hip: She exhibits tenderness. Left hip: She exhibits tenderness. Right knee: She exhibits decreased range of motion. Tenderness found. Left knee: She exhibits decreased range of motion. Tenderness found. Lumbar back: She exhibits decreased range of motion and tenderness. Neurological: She is alert. Gait abnormal.   Reflex Scores:       Patellar reflexes are 0 on the right side and 0 on the left side. Achilles reflexes are 1+ on the right side. Skin: Skin is warm, dry and intact. Psychiatric: Her speech is normal and behavior is normal. Judgment and thought content normal. Her mood appears anxious.  Cognition and memory are normal.         Assessment:      Problem List Items Addressed This Visit     Spinal stenosis, lumbar region, without neurogenic claudication - Primary    Pain of both hip joints    Pain in joint, pelvic region and thigh, unspecified laterality    Other chronic pain    Lumbar degenerative disc disease    Hip pain, chronic, right Encounter for medication management    Diabetic polyneuropathy associated with type 2 diabetes mellitus (HCC)    Relevant Medications    hydrOXYzine (ATARAX) 25 MG tablet    DDD (degenerative disc disease), lumbar    Chronic pain of both knees    Chronic pain following surgery or procedure    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 Possible side effects, risk of tolerance and or dependence and alternative treatments discussed    Obtaining appropriate analgesic effect of treatment   No signs of potential drug abuse or diversion identified    [x] Ill effects of being on chronic pain medications such as sleep disturbances, respiratory depression, hormonal changes, withdrawal symptoms, chronic opioid dependence and tolerance as well as risk of taking opioids with Benzodiazepines and taking opioids along with alcohol,  werediscussed with patient. I had asked the patient to minimize medication use and utilize pain medications only for uncontrolled rest pain or pain with exertional activities. I advised patient not to self-escalate painmedications without consulting with us. At each of patient's future visits we will try to taper pain medications, while adjusting the adjunct medications, and re-evaluating for Physical Therapy to improve spinal andjoint strength. We will continue to have discussions to decrease pain medications as tolerated. Counseled patient on effects their pain medication and /or their medical condition mayhave on their  ability to drive or operate machinery.  Instructed not to drive or operate machinery if drowsy     I also discussed with the patient regarding the dangers of combining narcotic pain medication with tranquilizers, alcohol or illegal drugs or taking the medication any way other than prescribed. The dangers were discussed  including respiratory depression and death. Patient was told to tell  all  physicians regarding the medications he is getting from pain clinic. Patient is warned not to take any unprescribed medications over-the-countermedications that can depress breathing . Patient is advised to talk to the pharmacist or physicians if planning to take any over-the-counter medications before  takeing them. Patient is strongly advised to avoid tranquilizers or  relaxants, illegal drugs  or any medications that can depress breathing  Patient is also advised to tell us if there is any changes in their medications from other physicians.     Patient was given a verbal warning not to self escalate pain medications, risks of respiratory script not dispensed, instructed to check at home and come in Monday, April 8, with her pills, she is short #11 percocet, was short last visitdepression or death discussed, another issue may result in discharge from the pain clinic    Med agreeement updated, copy to patient    TREATMENT OPTIONS:     Return in 4 weeks  Medication Agreement Requirements Met  Continue Opioid therapy  Follow up appointment made

## 2019-04-08 DIAGNOSIS — M48.061 SPINAL STENOSIS OF LUMBAR REGION WITHOUT NEUROGENIC CLAUDICATION: ICD-10-CM

## 2019-04-08 DIAGNOSIS — M51.36 DDD (DEGENERATIVE DISC DISEASE), LUMBAR: ICD-10-CM

## 2019-04-08 RX ORDER — OXYCODONE HYDROCHLORIDE AND ACETAMINOPHEN 5; 325 MG/1; MG/1
1 TABLET ORAL EVERY 6 HOURS PRN
Qty: 130 TABLET | Refills: 0 | Status: SHIPPED | OUTPATIENT
Start: 2019-04-13 | End: 2019-05-06 | Stop reason: SDUPTHER

## 2019-04-15 PROBLEM — Z98.890 HISTORY OF LOOP RECORDER: Status: ACTIVE | Noted: 2019-04-15

## 2019-04-15 PROBLEM — R60.0 BILATERAL LEG EDEMA: Status: ACTIVE | Noted: 2019-04-15

## 2019-05-06 ENCOUNTER — HOSPITAL ENCOUNTER (OUTPATIENT)
Dept: PAIN MANAGEMENT | Age: 84
Discharge: HOME | End: 2019-05-06
Payer: MEDICARE

## 2019-05-06 VITALS
WEIGHT: 247 LBS | HEIGHT: 68 IN | HEART RATE: 80 BPM | TEMPERATURE: 98.1 F | BODY MASS INDEX: 37.44 KG/M2 | OXYGEN SATURATION: 95 % | RESPIRATION RATE: 16 BRPM

## 2019-05-06 DIAGNOSIS — G89.29 HIP PAIN, CHRONIC, RIGHT: ICD-10-CM

## 2019-05-06 DIAGNOSIS — G89.28 CHRONIC PAIN FOLLOWING SURGERY OR PROCEDURE: ICD-10-CM

## 2019-05-06 DIAGNOSIS — Z79.899 ENCOUNTER FOR MEDICATION MANAGEMENT: ICD-10-CM

## 2019-05-06 DIAGNOSIS — M54.42 CHRONIC BILATERAL LOW BACK PAIN WITH BILATERAL SCIATICA: ICD-10-CM

## 2019-05-06 DIAGNOSIS — G89.29 CHRONIC PAIN OF BOTH KNEES: ICD-10-CM

## 2019-05-06 DIAGNOSIS — M25.559 PAIN IN JOINT, PELVIC REGION AND THIGH, UNSPECIFIED LATERALITY: ICD-10-CM

## 2019-05-06 DIAGNOSIS — M25.551 PAIN IN JOINT, PELVIC REGION AND THIGH, RIGHT: ICD-10-CM

## 2019-05-06 DIAGNOSIS — G89.29 OTHER CHRONIC PAIN: ICD-10-CM

## 2019-05-06 DIAGNOSIS — M25.559 HIP PAIN, CHRONIC, UNSPECIFIED LATERALITY: ICD-10-CM

## 2019-05-06 DIAGNOSIS — M25.562 CHRONIC PAIN OF BOTH KNEES: ICD-10-CM

## 2019-05-06 DIAGNOSIS — M51.36 DDD (DEGENERATIVE DISC DISEASE), LUMBAR: ICD-10-CM

## 2019-05-06 DIAGNOSIS — M25.561 CHRONIC PAIN OF BOTH KNEES: ICD-10-CM

## 2019-05-06 DIAGNOSIS — M25.551 HIP PAIN, CHRONIC, RIGHT: ICD-10-CM

## 2019-05-06 DIAGNOSIS — M17.11 ARTHRITIS OF KNEE, RIGHT: ICD-10-CM

## 2019-05-06 DIAGNOSIS — M25.552 PAIN OF BOTH HIP JOINTS: ICD-10-CM

## 2019-05-06 DIAGNOSIS — G89.29 CHRONIC BILATERAL LOW BACK PAIN WITH BILATERAL SCIATICA: ICD-10-CM

## 2019-05-06 DIAGNOSIS — M54.41 CHRONIC BILATERAL LOW BACK PAIN WITH BILATERAL SCIATICA: ICD-10-CM

## 2019-05-06 DIAGNOSIS — E11.42 DIABETIC POLYNEUROPATHY ASSOCIATED WITH TYPE 2 DIABETES MELLITUS (HCC): ICD-10-CM

## 2019-05-06 DIAGNOSIS — M25.551 PAIN OF BOTH HIP JOINTS: ICD-10-CM

## 2019-05-06 DIAGNOSIS — M48.061 SPINAL STENOSIS, LUMBAR REGION, WITHOUT NEUROGENIC CLAUDICATION: ICD-10-CM

## 2019-05-06 DIAGNOSIS — M48.061 SPINAL STENOSIS OF LUMBAR REGION WITHOUT NEUROGENIC CLAUDICATION: Primary | ICD-10-CM

## 2019-05-06 DIAGNOSIS — G89.29 HIP PAIN, CHRONIC, UNSPECIFIED LATERALITY: ICD-10-CM

## 2019-05-06 DIAGNOSIS — E66.9 GENERALIZED OBESITY: ICD-10-CM

## 2019-05-06 DIAGNOSIS — M51.36 LUMBAR DEGENERATIVE DISC DISEASE: ICD-10-CM

## 2019-05-06 PROCEDURE — 99213 OFFICE O/P EST LOW 20 MIN: CPT

## 2019-05-06 PROCEDURE — 99213 OFFICE O/P EST LOW 20 MIN: CPT | Performed by: NURSE PRACTITIONER

## 2019-05-06 RX ORDER — OXYCODONE HYDROCHLORIDE AND ACETAMINOPHEN 5; 325 MG/1; MG/1
1 TABLET ORAL EVERY 6 HOURS PRN
Qty: 130 TABLET | Refills: 0 | Status: SHIPPED | OUTPATIENT
Start: 2019-05-14 | End: 2019-06-10 | Stop reason: SDUPTHER

## 2019-05-06 ASSESSMENT — ENCOUNTER SYMPTOMS
GASTROINTESTINAL NEGATIVE: 1
BACK PAIN: 1
SHORTNESS OF BREATH: 1

## 2019-05-06 NOTE — PROGRESS NOTES
Leonardo Mims PROGRESS NOTE      Patient here today to review Medication Agreement    Chief Complaint:  Joint pain      HPI: She c/o joint pain all over. Pain has increased. She has history of bilateral hip and right knee replacement. She was getting home PT but it was stopped as it was not helping. Sleep is up and down. She has had no ED visits. She gets anxiety attacks on occasion and takes atarax. She had 1 fall in the bathroom.       Back Pain   This is a chronic problem. The current episode started more than 1 year ago. The problem occurs constantly. The problem has been gradually worsening since onset. The pain is present in the lumbar spine. The quality of the pain is described as aching. Radiates to: down legs. The pain is at a severity of 10/10. The pain is severe. The symptoms are aggravated by standing (walking). Stiffness is present all day. Associated symptoms include numbness and weakness. Risk factors include menopause, obesity and sedentary lifestyle. She has tried analgesics (topical ointment ) , massaging area for the symptoms. The treatment provided mild relief. Joint Pain:    The pain is constant in multiple joints. The pain has increased. She has stiffness in her joints. Pain is a 10. The pain is achey. Cold weather increased the pain. Heat helps some. Patient denies any new neurological symptoms. No bowel or bladder incontinence, no weakness, and no falling. Treatment goals:  Functional status: reduce pain 5      Aberrancy:   Any alcoholic beverages no      Any illegal drugs   no      Analgesia:pain 10      Adverse  Effects :  bm daily    ADL;s :able to wash a few dishes, chair exercises        Pill count: appropriate  Daughter states her mother picked script up for percocet on 4-14-19 and pill bottle label is 4-14-19    Morphine equivalent dose as reported on [place]: 32.50  Attestation: The Prescription Monitoring Report for this patient was reviewed today. STACIA Knight CNP)  Chronic Pain Routine Monitoring: No signs of potential drug abuse or diversion identified: otherwise, see note documentation, Obtaining appropriate analgesic effect of treatment. (STACIA Knight CNP)  Chronic Pain > 80 MEDD: Obtained or confirmed a written medication contract was on file. STACIA Knight CNP)  Review of[place] does not show any aberrant prescription behavior. Medication is helping the patient stay active. Patient denies any side effects and reports adequate analgesia. No sign of misuse/abuse.             When was thelast UDS:     2-8-19        Was the UDS appropriate:DRUGS EXPECTED:   PERCOCET (OXYCODONE) [2/6/19]   ________________________________________________________________   CONSISTENT with medications provided:   PERCOCET (OXYCODONE) : based on the absence of oxycodone and   metabolites   ___________________        Record/Diagnostics Review:       As above, I did review the imaging /13/2019  7:23 PM - Rudy, Radha Incoming Lab Results From [facility]      Component Results      Component Value Ref Range & Units Status Collected Lab   Pain Management Drug Panel Interp, Urine Consistent    Final 02/08/2019  2:45 PM [facility]   (NOTE)   ________________________________________________________________   DRUGS EXPECTED:   PERCOCET (OXYCODONE) [2/6/19]   ________________________________________________________________   CONSISTENT with medications provided:   PERCOCET (OXYCODONE) : based on the absence of oxycodone and   metabolites   ________________________________________________________________   Drugs Not Included in this Assay:   Acetaminophen   ________________________________________________________________   INTERPRETIVE INFORMATION: Pain Mgt Neil, Mass Spec/EMIT, Ur,                            Interp   Interpretation depends on accuracy and completeness of patient   medication information submitted by client.     6-Acetylmorphine, Ur Not Detected    Final 02/08/2019  2:45 PM [facility]   7-Aminoclonazepam, Urine Not Detected    Final 02/08/2019  2:45 PM [facility]   Alpha-OH-Alpraz, Urine Not Detected    Final 02/08/2019  2:45 PM [facility]   Alprazolam, Urine Not Detected    Final 02/08/2019  2:45 PM [facility]   Amphetamines, urine Not Detected    Final 02/08/2019  2:45 PM [facility]   Barbiturates, Ur Not Detected    Final 02/08/2019  2:45 PM [facility]   Benzoylecgonine, Ur Not Detected    Final 02/08/2019  2:45 PM [facility]   Buprenorphine Urine Not Detected    Final 02/08/2019  2:45 PM [facility]   Carisoprodol, Ur Not Detected    Final 02/08/2019  2:45 PM [facility]   (NOTE)   The carisoprodol immunoassay has cross-reactivity to carisoprodol   and meprobamate.     Clonazepam, Urine Not Detected    Final 02/08/2019  2:45 PM [facility]   Codeine, Urine Not Detected    Final 02/08/2019  2:45 PM [facility]   MDA, Ur Not Detected    Final 02/08/2019  2:45 PM [facility]   Diazepam, Urine Not Detected    Final 02/08/2019  2:45 PM [facility]   Ethyl Glucuronide Ur Not Detected    Final 02/08/2019  2:45 PM [facility]   Fentanyl, Ur Not Detected    Final 02/08/2019  2:45 PM [facility]   Hydrocodone, Urine Not Detected    Final 02/08/2019  2:45 PM [facility]   Hydromorphone, Urine Not Detected    Final 02/08/2019  2:45 PM [facility]   Lorazepam, Urine Not Detected    Final 02/08/2019  2:45 PM [facility]   Marijuana Metab, Ur Not Detected    Final 02/08/2019  2:45 PM [facility]   MDEA, JYOTHI, Ur Not Detected    Final 02/08/2019  2:45 PM [facility]   MDMA, Urine Not Detected    Final 02/08/2019  2:45 PM [facility]   Meperidine Metab, Ur Not Detected    Final 02/08/2019  2:45 PM [facility]   Methadone, Urine Not Detected    Final 02/08/2019  2:45 PM [facility]   Methamphetamine, Urine Not Detected    Final 02/08/2019  2:45 PM [facility]   Methylphenidate Not Detected    Final 02/08/2019  2:45 PM [facility]   Midazolam, Urine Not Detected    Final 02/08/2019  2:45 PM [facility]   Morphine Urine Not Detected    Final 02/08/2019  2:45 PM [facility]   Norbuprenorphine, Urine Not Detected    Final 02/08/2019  2:45 PM [facility]   Nordiazepam, Urine Not Detected    Final 02/08/2019  2:45 PM [facility]   Norfentanyl, Urine Not Detected    Final 02/08/2019  2:45 PM [facility]   NORHYDROCODONE, URINE Not Detected    Final 02/08/2019  2:45 PM [facility]   Noroxycodone, Urine Not Detected    Final 02/08/2019  2:45 PM [facility]   NOROXYMORPHONE, URINE Not Detected    Final 02/08/2019  2:45 PM [facility]   Oxazepam, Urine Not Detected    Final 02/08/2019  2:45 PM [facility]   Oxycodone Urine Not Detected    Final 02/08/2019  2:45 PM [facility]   Oxymorphone, Urine Not Detected    Final 02/08/2019  2:45 PM [facility]   PCP, Urine Not Detected    Final 02/08/2019  2:45 PM [facility]   Phentermine, Ur Not Detected    Final 02/08/2019  2:45 PM [facility]   Propoxyphene, Urine Not Detected    Final 02/08/2019  2:45 PM [facility]   Tapentadol-O-Sulfate, Urine Not Detected    Final 02/08/2019  2:45 PM [facility]   Tapentadol, Urine Not Detected    Final 02/08/2019  2:45 PM [facility]   Temazepam, Urine Not Detected    Final 02/08/2019  2:45 PM [facility]   Tramadol, Urine Not Detected    Final 02/08/2019  2:45 PM [facility]   Zolpidem, Urine Not Detected    Final 02/08/2019  2:45 PM [facility]   Drugs Expected, Ur PERCOCET ON 2/6/19    Final 02/08/2019  2:45  [address] [facility]   Creatinine, Ur 97.3  20.0 - 400.0 mg/dL Final 02/08/2019  2:45 PM [facility]   Pain Mgt Drug Panel, Hi Res, Ur See Below    Final 02/08/2019  2:45 PM [facility]   (NOTE)   Methodology: Qualitative Enzyme Immunoassay and Qualitative Liquid   Chromatography-Time of Flight-Mass Spectrometry or Tandem Mass   Spectrometry, Quantitative Spectrophotometry   The absence of expected drug(s) and/or drug metabolite(s) may   indicate non-compliance, inappropriate timing of specimen   collection relative to drug administration, poor drug absorption,   diluted/adulterated urine, or limitations of testing.  The   concentration must be greater than or equal to the cutoff to be   reported as present.  If specific drug concentrations are   required, contact the laboratory within two weeks of specimen collection to request quantification by a second analytical   technique. Interpretive questions should be directed to the   laboratory. Results based on immunoassay detection that do not match clinical   expectations should be   interpreted with caution. Confirmatory testing by mass   spectrometry for immunoassay-based results is available, if   ordered within two weeks of specimen collection. Additional   charges apply. For medical purposes only; not valid for forensic use. This test was developed and its performance characteristics   determined by [facility]. The U.S. Food and Drug   Administration has not approved or cleared this test; however, FDA   clearance or approval is not currently required for clinical use. The results are not intended to be used as the sole means for   clinical diagnosis or patient management decisions. EER Hi Res Interp Ur See Note    Final 2019  2:45 PM [facility]   (NOTE)   Access [facility] Enhanced Report using either link below:   -Direct access: [email or web address]   -Enter Username, Password: [email or web address]   Username: [email or web address]   Password: [email or web address]   Performed by [place] [address], [id number] [facility] [phone]   www. Caden Gamble MD, Lab.  Director                 Past Medical History:   Diagnosis Date    Anxiety     Arthritis     Bronchitis     CAD (coronary artery disease)     [facility]     COPD (chronic obstructive pulmonary disease) (HCC)     Diabetes mellitus ([facility]     Hyperlipidemia     Hypertension     Mitral regurgitation     Myalgia     Pulmonary hypertension ([facility]     Shingles 2018    left facial area and involved eye    Sleep apnea     Syncope and collapse        Past Surgical History:   Procedure Laterality Date    ABDOMEN SURGERY      CATARACT REMOVAL WITH IMPLANT Right      SECTION      COLECTOMY      COLONOSCOPY      HIP ARTHROPLASTY      3 on the left and 1 on the right    HYSTERECTOMY      INSERTABLE CARDIAC MONITOR  05/2018    [employer]    JOINT REPLACEMENT Right     TOTAL KNEE    JOINT REPLACEMENT Bilateral     right x2, left x3 hips    KNEE SURGERY         No Known Allergies      Current Outpatient Medications:     [START ON 5/14/2019] oxyCODONE-acetaminophen (PERCOCET) 5-325 MG per tablet, Take 1 tablet by mouth every 6 hours as needed for Pain for up to 30 days. MAY TAKE AN EXTRA 1 ON OCCASION FOR SEVERE PAIN, Disp: 130 tablet, Rfl: 0    simvastatin (ZOCOR) 40 MG tablet, , Disp: , Rfl:     acyclovir (ZOVIRAX) 200 MG capsule, Take 200 mg by mouth 2 times daily, Disp: , Rfl:     escitalopram (LEXAPRO) 20 MG tablet, , Disp: , Rfl:     amLODIPine (NORVASC) 5 MG tablet, TAKE ONE TABLET BY MOUTH DAILY, Disp: 90 tablet, Rfl: 3    Cholecalciferol (VITAMIN D) 2000 units CAPS capsule, Take by mouth Pt taking 1 time a week, unsure of dose, Disp: , Rfl:     ASPERCREME LIDOCAINE EX, Apply topically, Disp: , Rfl:     omeprazole (PRILOSEC) 20 MG delayed release capsule, , Disp: , Rfl:     levothyroxine (LEVOTHROID) 50 MCG tablet, Take 1 tablet by mouth daily, Disp: 30 tablet, Rfl: 3    metFORMIN (GLUCOPHAGE) 500 MG tablet, TAKE ONE TABLET BY MOUTH TWICE A DAY, Disp: 180 tablet, Rfl: 2    aspirin 325 MG tablet, Take 325 mg by mouth daily. , Disp: , Rfl:     hydrOXYzine (ATARAX) 25 MG tablet, Take 25 mg by mouth every 8 hours as needed for Anxiety, Disp: , Rfl:     metoprolol tartrate (LOPRESSOR) 25 MG tablet, , Disp: , Rfl:     furosemide (LASIX) 20 MG tablet, Take 1 tablet by mouth daily as needed (for 2 lb weight gain/increased swelling/increased shortness of breath), Disp: 60 tablet, Rfl: 3    VENTOLIN  (90 Base) MCG/ACT inhaler, , Disp: , Rfl:     hydrocortisone 2.5 % cream, Apply topically 2 times daily. , Disp: 30 g, Rfl: 2    glucose monitoring kit (FREESTYLE) monitoring kit, 1 kit by Does not apply route daily as needed, Disp: 1 kit, Rfl: 0    glucose blood VI test strips (EXACTECH TEST) strip, 1 each by In Vitro route daily Type 2 diabetes qd testing, Disp: 100 each, Rfl: 3    Accu-Chek Multiclix Lancets MISC, Test qd;type 2 diabetes, Disp: 100 each, Rfl: 3    Scooter MISC, by Does not apply route Use daily dx arthritis obesity pulmonary htn,copd, Disp: 1 each, Rfl: 0    Family History   Problem Relation Age of Onset    Cancer Mother     Hypertension Maternal Aunt     Cancer Daughter        Social History     Socioeconomic History    Marital status: Single     Spouse name: Not on file    Number of children: 3    Years of education: Not on file    Highest education level: Not on file   Occupational History    Occupation: RETIRED   Social Needs    Financial resource strain: Not on file    Food insecurity:     Worry: Not on file     Inability: Not on file    Transportation needs:     Medical: Not on file     Non-medical: Not on file   Tobacco Use    Smoking status: Former Smoker     Years: 5.00     Last attempt to quit: 1985     Years since quittin.4    Smokeless tobacco: Never Used   Substance and Sexual Activity    Alcohol use: No     Alcohol/week: 0.0 oz    Drug use: No    Sexual activity: Not on file   Lifestyle    Physical activity:     Days per week: Not on file     Minutes per session: Not on file    Stress: Not on file   Relationships    Social connections:     Talks on phone: Not on file     Gets together: Not on file     Attends Druze service: Not on file     Active member of club or organization: Not on file     Attends meetings of clubs or organizations: Not on file     Relationship status: Not on file    Intimate partner violence:     Fear of current or ex partner: Not on file     Emotionally abused: Not on file     Physically abused: Not on file     Forced sexual activity: Not on file   Other Topics Concern    Not on file   Social History Narrative    Not on file Review of Systems:  Review of Systems   Constitution: Positive for malaise/fatigue. HENT: Negative. Eyes: Positive for visual disturbance. Glasses   Cardiovascular: Positive for leg swelling. Respiratory: Positive for shortness of breath. Endocrine:        Blood sugars under control   Hematologic/Lymphatic: Negative. Skin: Negative. Musculoskeletal: Positive for back pain, falls and joint pain. Gastrointestinal: Negative. Genitourinary: Positive for nocturia. Neurological: Positive for headaches and loss of balance. Walker   Psychiatric/Behavioral: The patient is nervous/anxious. Physical Exam:  Pulse 80   Temp 98.1 °F (36.7 °C) (Oral)   Resp 16   Ht 5' 8\" (1.727 m)   Wt 247 lb (112 kg)   SpO2 95%   BMI 37.56 kg/m²     Physical Exam   Constitutional: She appears well-developed. HENT:   Head:       Neck: Neck supple. Pulmonary/Chest: Effort normal.   Musculoskeletal:        Right shoulder: She exhibits tenderness. Left shoulder: She exhibits tenderness. Left knee: Tenderness found. Cervical back: She exhibits tenderness. Lumbar back: She exhibits tenderness. Neurological: She is alert. Reflex Scores:       Patellar reflexes are 0 on the right side and 1+ on the left side. Achilles reflexes are 1+ on the right side. Skin: Skin is warm, dry and intact. Psychiatric: Her speech is normal and behavior is normal. Judgment normal. Her mood appears anxious.  Cognition and memory are normal.         Assessment:    Problem List Items Addressed This Visit     Spinal stenosis, lumbar region, without neurogenic claudication - Primary    Relevant Medications    oxyCODONE-acetaminophen (PERCOCET) 5-325 MG per tablet (Start on 5/14/2019)    Pain of both hip joints    Pain in joint, pelvic region and thigh, right    Other chronic pain    Relevant Medications    oxyCODONE-acetaminophen (PERCOCET) 5-325 MG per tablet (Start on with alcohol,  werediscussed with patient. I had asked the patient to minimize medication use and utilize pain medications only for uncontrolled rest pain or pain with exertional activities. I advised patient not to self-escalate painmedications without consulting with us. At each of patient's future visits we will try to taper pain medications, while adjusting the adjunct medications, and re-evaluating for Physical Therapy to improve spinal andjoint strength. We will continue to have discussions to decrease pain medications as tolerated. Counseled patient on effects their pain medication and /or their medical condition mayhave on their  ability to drive or operate machinery. Instructed not to drive or operate machinery if drowsy     I also discussed with the patient regarding the dangers of combining narcotic pain medication with tranquilizers, alcohol or illegal drugs or taking the medication any way other than prescribed. The dangers were discussed  including respiratory depression and death. Patient was told to tell  all  physicians regarding the medications he is getting from pain clinic. Patient is warned not to take any unprescribed medications over-the-countermedications that can depress breathing . Patient is advised to talk to the pharmacist or physicians if planning to take any over-the-counter medications before  takeing them. Patient is strongly advised to avoid tranquilizers or  relaxants, illegal drugs  or any medications that can depress breathing  Patient is also advised to tell us if there is any changes in their medications from other physicians.         TREATMENT OPTIONS:     Return in 4 weeks  Medication Agreement Requirements Met  Continue Opioid therapy  Script written for percocet  Follow up appointment made

## 2019-06-10 ENCOUNTER — HOSPITAL ENCOUNTER (OUTPATIENT)
Dept: PAIN MANAGEMENT | Age: 84
Discharge: HOME | End: 2019-06-10
Payer: MEDICARE

## 2019-06-10 VITALS
SYSTOLIC BLOOD PRESSURE: 148 MMHG | HEART RATE: 72 BPM | DIASTOLIC BLOOD PRESSURE: 92 MMHG | OXYGEN SATURATION: 96 % | RESPIRATION RATE: 16 BRPM

## 2019-06-10 DIAGNOSIS — M54.41 CHRONIC BILATERAL LOW BACK PAIN WITH BILATERAL SCIATICA: ICD-10-CM

## 2019-06-10 DIAGNOSIS — M54.42 CHRONIC BILATERAL LOW BACK PAIN WITH BILATERAL SCIATICA: ICD-10-CM

## 2019-06-10 DIAGNOSIS — M25.562 CHRONIC PAIN OF BOTH KNEES: ICD-10-CM

## 2019-06-10 DIAGNOSIS — M25.551 PAIN IN JOINT, PELVIC REGION AND THIGH, RIGHT: Primary | ICD-10-CM

## 2019-06-10 DIAGNOSIS — M25.561 CHRONIC PAIN OF BOTH KNEES: ICD-10-CM

## 2019-06-10 DIAGNOSIS — E66.9 GENERALIZED OBESITY: ICD-10-CM

## 2019-06-10 DIAGNOSIS — M48.061 SPINAL STENOSIS OF LUMBAR REGION WITHOUT NEUROGENIC CLAUDICATION: ICD-10-CM

## 2019-06-10 DIAGNOSIS — G89.29 CHRONIC PAIN OF BOTH KNEES: ICD-10-CM

## 2019-06-10 DIAGNOSIS — G89.29 CHRONIC BILATERAL LOW BACK PAIN WITH BILATERAL SCIATICA: ICD-10-CM

## 2019-06-10 DIAGNOSIS — M51.36 DDD (DEGENERATIVE DISC DISEASE), LUMBAR: ICD-10-CM

## 2019-06-10 DIAGNOSIS — M51.36 LUMBAR DEGENERATIVE DISC DISEASE: ICD-10-CM

## 2019-06-10 DIAGNOSIS — G89.28 CHRONIC PAIN FOLLOWING SURGERY OR PROCEDURE: ICD-10-CM

## 2019-06-10 DIAGNOSIS — Z79.899 ENCOUNTER FOR MEDICATION MANAGEMENT: ICD-10-CM

## 2019-06-10 DIAGNOSIS — M48.061 SPINAL STENOSIS, LUMBAR REGION, WITHOUT NEUROGENIC CLAUDICATION: ICD-10-CM

## 2019-06-10 DIAGNOSIS — G89.29 OTHER CHRONIC PAIN: ICD-10-CM

## 2019-06-10 PROCEDURE — 99213 OFFICE O/P EST LOW 20 MIN: CPT | Performed by: NURSE PRACTITIONER

## 2019-06-10 PROCEDURE — 99213 OFFICE O/P EST LOW 20 MIN: CPT

## 2019-06-10 RX ORDER — OXYCODONE HYDROCHLORIDE AND ACETAMINOPHEN 5; 325 MG/1; MG/1
1 TABLET ORAL EVERY 6 HOURS PRN
Qty: 130 TABLET | Refills: 0 | Status: SHIPPED | OUTPATIENT
Start: 2019-06-15 | End: 2019-07-12 | Stop reason: SDUPTHER

## 2019-06-10 ASSESSMENT — ENCOUNTER SYMPTOMS
BACK PAIN: 1
COUGH: 0
SHORTNESS OF BREATH: 0
CONSTIPATION: 0

## 2019-06-10 ASSESSMENT — PAIN SCALES - GENERAL: PAINLEVEL_OUTOF10: 8

## 2019-06-10 NOTE — PROGRESS NOTES
Patient is here today to review medication contract. Chief Complaint: back pain    PMH: Pt reports low back pain for many years. The pain does radiate down her legs at times with numbness to left hip thigh area. She uses a cane for ambulation due to occasional weakness in legs. She has not had surgery to the area. She has had aquatic PT in the past that has helped but not interested in starting right now. She lives at home and is able to care for self with the help of her family.        Back Pain   This is a chronic problem. The current episode started more than 1 year ago. The problem occurs constantly. The problem is unchanged. The pain is present in the lumbar spine. The quality of the pain is described as aching. Radiates to: down both legs to knees. The pain is at a severity of 8/10. The pain is moderate. The symptoms are aggravated by position and standing. Pertinent negatives include no chest pain or fever. She has tried analgesics, bed rest and heat for the symptoms. The treatment provided mild relief. Knee Pain    The incident occurred more than 1 week ago. The pain is present in the left knee. The quality of the pain is described as aching. The pain is at a severity of 9/10. The pain is moderate. The pain has been constant since onset. The symptoms are aggravated by movement and weight bearing. She has tried heat, non-weight bearing and rest for the symptoms. The treatment provided mild relief. Patient denies any new neurological symptoms. No bowel or bladder incontinence, no weakness, and no falling. Pill count: appropriate    Morphine equivalent: 32.5    Periodic Controlled Substance Monitoring: Possible medication side effects, risk of tolerance/dependence & alternative treatments discussed., No signs of potential drug abuse or diversion identified. , Assessed functional status., Obtaining appropriate analgesic effect of treatment.  Ayden Badillo, APRN - CNP)      Past Medical History:   Diagnosis Date    Anxiety     Arthritis     Bronchitis     CAD (coronary artery disease)     Carotid arterial disease (HCC)     COPD (chronic obstructive pulmonary disease) (HCC)     Diabetes mellitus ([facility] [address]     Hyperlipidemia     Hypertension     Mitral regurgitation     Myalgia     Pulmonary hypertension ([facility] [address]     Shingles 2018    left facial area and involved eye    Sleep apnea     Syncope and collapse        Past Surgical History:   Procedure Laterality Date    ABDOMEN SURGERY      CATARACT REMOVAL WITH IMPLANT Right      SECTION      COLECTOMY      COLONOSCOPY      HIP ARTHROPLASTY      3 on the left and 1 on the right    HYSTERECTOMY      INSERTABLE CARDIAC MONITOR  2018    [employer]    JOINT REPLACEMENT Right     TOTAL KNEE    JOINT REPLACEMENT Bilateral     right x2, left x3 hips    KNEE SURGERY         No Known Allergies      Current Outpatient Medications:     oxyCODONE-acetaminophen (PERCOCET) 5-325 MG per tablet, Take 1 tablet by mouth every 6 hours as needed for Pain for up to 30 days.  MAY TAKE AN EXTRA 1 ON OCCASION FOR SEVERE PAIN, Disp: 130 tablet, Rfl: 0    hydrOXYzine (ATARAX) 25 MG tablet, Take 25 mg by mouth every 8 hours as needed for Anxiety, Disp: , Rfl:     simvastatin (ZOCOR) 40 MG tablet, , Disp: , Rfl:     acyclovir (ZOVIRAX) 200 MG capsule, Take 200 mg by mouth 2 times daily, Disp: , Rfl:     metoprolol tartrate (LOPRESSOR) 25 MG tablet, , Disp: , Rfl:     escitalopram (LEXAPRO) 20 MG tablet, , Disp: , Rfl:     amLODIPine (NORVASC) 5 MG tablet, TAKE ONE TABLET BY MOUTH DAILY, Disp: 90 tablet, Rfl: 3    furosemide (LASIX) 20 MG tablet, Take 1 tablet by mouth daily as needed (for 2 lb weight gain/increased swelling/increased shortness of breath), Disp: 60 tablet, Rfl: 3    Cholecalciferol (VITAMIN D) 2000 units CAPS capsule, Take by mouth Pt taking 1 time a week, unsure of dose, Disp: , Rfl:     ASPERCREME LIDOCAINE EX, Apply topically, Disp: , Rfl:     VENTOLIN  (90 Base) MCG/ACT inhaler, , Disp: , Rfl:     omeprazole (PRILOSEC) 20 MG delayed release capsule, , Disp: , Rfl:     hydrocortisone 2.5 % cream, Apply topically 2 times daily. , Disp: 30 g, Rfl: 2    levothyroxine (LEVOTHROID) 50 MCG tablet, Take 1 tablet by mouth daily, Disp: 30 tablet, Rfl: 3    glucose monitoring kit (FREESTYLE) monitoring kit, 1 kit by Does not apply route daily as needed, Disp: 1 kit, Rfl: 0    glucose blood VI test strips (EXACTECH TEST) strip, 1 each by In Vitro route daily Type 2 diabetes qd testing, Disp: 100 each, Rfl: 3    Accu-Chek Multiclix Lancets MISC, Test qd;type 2 diabetes, Disp: 100 each, Rfl: 3    Scooter MISC, by Does not apply route Use daily dx arthritis obesity pulmonary htn,copd, Disp: 1 each, Rfl: 0    metFORMIN (GLUCOPHAGE) 500 MG tablet, TAKE ONE TABLET BY MOUTH TWICE A DAY, Disp: 180 tablet, Rfl: 2    aspirin 325 MG tablet, Take 325 mg by mouth daily. , Disp: , Rfl:     Family History   Problem Relation Age of Onset    Cancer Mother     Hypertension Maternal Aunt     Cancer Daughter        Social History     Socioeconomic History    Marital status: Single     Spouse name: Not on file    Number of children: 3    Years of education: Not on file    Highest education level: Not on file   Occupational History    Occupation: RETIRED   Social Needs    Financial resource strain: Not on file    Food insecurity:     Worry: Not on file     Inability: Not on file    Transportation needs:     Medical: Not on file     Non-medical: Not on file   Tobacco Use    Smoking status: Former Smoker     Years: 5.00     Last attempt to quit: 1985     Years since quittin.5    Smokeless tobacco: Never Used   Substance and Sexual Activity    Alcohol use: No     Alcohol/week: 0.0 oz    Drug use: No    Sexual activity: Not on file   Lifestyle    Physical activity:     Days per week: Not on file     Minutes per session: Not on file    Stress: Not on file   Relationships    Social connections:     Talks on phone: Not on file     Gets together: Not on file     Attends Holiness service: Not on file     Active member of club or organization: Not on file     Attends meetings of clubs or organizations: Not on file     Relationship status: Not on file    Intimate partner violence:     Fear of current or ex partner: Not on file     Emotionally abused: Not on file     Physically abused: Not on file     Forced sexual activity: Not on file   Other Topics Concern    Not on file   Social History Narrative    Not on file       Review of Systems:  Review of Systems   Constitution: Negative for chills and fever. Cardiovascular: Negative for chest pain and irregular heartbeat. Respiratory: Negative for cough and shortness of breath. Musculoskeletal: Positive for back pain and joint pain. Gastrointestinal: Negative for constipation. Neurological: Negative for disturbances in coordination and loss of balance. Physical Exam:  BP (!) 148/92   Pulse 72   Resp 16   SpO2 96%     Physical Exam   Constitutional: She is oriented to person, place, and time. HENT:   Head: Normocephalic. Eyes: EOM are normal.   Neck: Normal range of motion. Pulmonary/Chest: Effort normal.   Musculoskeletal: Normal range of motion. Left knee: Tenderness found. Lumbar back: She exhibits tenderness and pain. Neurological: She is alert and oriented to person, place, and time. Skin: Skin is warm and dry.        Record/Diagnostics Review:    Last melva 2/2019  and was appropriate     Assessment:  Problem List Items Addressed This Visit     Chronic bilateral low back pain with bilateral sciatica (Chronic)    Pain in joint, pelvic region and thigh, right - Primary    Spinal stenosis, lumbar region, without neurogenic claudication    Generalized obesity    Other chronic pain    Encounter for medication management    DDD (degenerative disc disease), lumbar    Lumbar degenerative disc disease    Chronic pain following surgery or procedure    Chronic pain of both knees             Treatment Plan:  Patient relates current medications are helping the pain. Patient reports taking pain medications as prescribed, denies obtaining medications from different sources and denies use of illegal drugs. Patient denies side effects from medications like nausea, vomiting, constipation or drowsiness. Patient reports current activities of daily living are possible due to medications and would like to continue them. As always, we encourage daily stretching and strengthening exercises, and recommend minimizing use of pain medications unless patient cannot get through daily activities due to pain. Contract requirements met. Continue opioid therapy.  Script written for percocet  Follow up appointment made for 4 weeks

## 2019-06-21 ENCOUNTER — HOSPITAL ENCOUNTER (OUTPATIENT)
Age: 84
Setting detail: SPECIMEN
Discharge: HOME | End: 2019-06-21
Payer: MEDICARE

## 2019-06-21 ENCOUNTER — HOSPITAL ENCOUNTER (OUTPATIENT)
Dept: GENERAL RADIOLOGY | Facility: CLINIC | Age: 84
Discharge: HOME | End: 2019-06-23
Payer: MEDICARE

## 2019-06-21 ENCOUNTER — HOSPITAL ENCOUNTER (OUTPATIENT)
Facility: CLINIC | Age: 84
Discharge: HOME | End: 2019-06-23
Payer: MEDICARE

## 2019-06-21 DIAGNOSIS — M79.672 LEFT FOOT PAIN: ICD-10-CM

## 2019-06-21 LAB
ALBUMIN SERPL-MCNC: 4.1 G/DL (ref 3.5–5.2)
ALBUMIN/GLOBULIN RATIO: 1.2 (ref 1–2.5)
ALP BLD-CCNC: 74 U/L (ref 35–104)
ALT SERPL-CCNC: 7 U/L (ref 5–33)
ANION GAP SERPL CALCULATED.3IONS-SCNC: 15 MMOL/L (ref 9–17)
AST SERPL-CCNC: 14 U/L
BILIRUB SERPL-MCNC: 0.68 MG/DL (ref 0.3–1.2)
BILIRUBIN DIRECT: 0.19 MG/DL
BILIRUBIN, INDIRECT: 0.49 MG/DL (ref 0–1)
BUN BLDV-MCNC: 15 MG/DL (ref 8–23)
CALCIUM SERPL-MCNC: 9.5 MG/DL (ref 8.6–10.4)
CHLORIDE BLD-SCNC: 103 MMOL/L (ref 98–107)
CHOLESTEROL/HDL RATIO: 2.1
CHOLESTEROL: 153 MG/DL
CO2: 27 MMOL/L (ref 20–31)
CREAT SERPL-MCNC: 0.9 MG/DL (ref 0.5–0.9)
CREATININE URINE: 276.9 MG/DL (ref 28–217)
GFR AFRICAN AMERICAN: >60 ML/MIN
GFR NON-AFRICAN AMERICAN: >60 ML/MIN
GFR SERPL CREATININE-BSD FRML MDRD: NORMAL ML/MIN/{1.73_M2}
GFR SERPL CREATININE-BSD FRML MDRD: NORMAL ML/MIN/{1.73_M2}
GLOBULIN: NORMAL G/DL (ref 1.5–3.8)
GLUCOSE BLD-MCNC: 88 MG/DL (ref 70–99)
HCT VFR BLD CALC: 34.8 % (ref 36.3–47.1)
HDLC SERPL-MCNC: 72 MG/DL
HEMOGLOBIN: 10.2 G/DL (ref 11.9–15.1)
IRON: 58 UG/DL (ref 37–145)
LDL CHOLESTEROL: 70 MG/DL (ref 0–130)
MCH RBC QN AUTO: 27.1 PG (ref 25.2–33.5)
MCHC RBC AUTO-ENTMCNC: 29.3 G/DL (ref 28.4–34.8)
MCV RBC AUTO: 92.3 FL (ref 82.6–102.9)
MICROALBUMIN/CREAT 24H UR: 18 MG/L
MICROALBUMIN/CREAT UR-RTO: 7 MCG/MG CREAT
NRBC AUTOMATED: 0 PER 100 WBC
PDW BLD-RTO: 17 % (ref 11.8–14.4)
PLATELET # BLD: 302 K/UL (ref 138–453)
PMV BLD AUTO: 11.3 FL (ref 8.1–13.5)
POTASSIUM SERPL-SCNC: 4.6 MMOL/L (ref 3.7–5.3)
RBC # BLD: 3.77 M/UL (ref 3.95–5.11)
SODIUM BLD-SCNC: 145 MMOL/L (ref 135–144)
TOTAL PROTEIN: 7.6 G/DL (ref 6.4–8.3)
TRIGL SERPL-MCNC: 54 MG/DL
VITAMIN B-12: 440 PG/ML (ref 232–1245)
VITAMIN D 25-HYDROXY: 23.3 NG/ML (ref 30–100)
VLDLC SERPL CALC-MCNC: NORMAL MG/DL (ref 1–30)
WBC # BLD: 7.7 K/UL (ref 3.5–11.3)

## 2019-06-21 PROCEDURE — 83036 HEMOGLOBIN GLYCOSYLATED A1C: CPT

## 2019-06-21 PROCEDURE — 82565 ASSAY OF CREATININE: CPT

## 2019-06-21 PROCEDURE — 73630 X-RAY EXAM OF FOOT: CPT

## 2019-06-21 PROCEDURE — 82570 ASSAY OF URINE CREATININE: CPT

## 2019-06-21 PROCEDURE — 83540 ASSAY OF IRON: CPT

## 2019-06-21 PROCEDURE — 80061 LIPID PANEL: CPT

## 2019-06-21 PROCEDURE — 82306 VITAMIN D 25 HYDROXY: CPT

## 2019-06-21 PROCEDURE — 82607 VITAMIN B-12: CPT

## 2019-06-21 PROCEDURE — 80051 ELECTROLYTE PANEL: CPT

## 2019-06-21 PROCEDURE — 85027 COMPLETE CBC AUTOMATED: CPT

## 2019-06-21 PROCEDURE — 36415 COLL VENOUS BLD VENIPUNCTURE: CPT

## 2019-06-21 PROCEDURE — 82947 ASSAY GLUCOSE BLOOD QUANT: CPT

## 2019-06-21 PROCEDURE — 84520 ASSAY OF UREA NITROGEN: CPT

## 2019-06-21 PROCEDURE — 80076 HEPATIC FUNCTION PANEL: CPT

## 2019-06-21 PROCEDURE — 82310 ASSAY OF CALCIUM: CPT

## 2019-06-21 PROCEDURE — 82043 UR ALBUMIN QUANTITATIVE: CPT

## 2019-06-23 LAB
ESTIMATED AVERAGE GLUCOSE: 126 MG/DL
HBA1C MFR BLD: 6 % (ref 4–6)

## 2019-07-11 ENCOUNTER — HOSPITAL ENCOUNTER (OUTPATIENT)
Dept: PAIN MANAGEMENT | Age: 84
Discharge: HOME | End: 2019-07-11
Payer: MEDICARE

## 2019-07-11 DIAGNOSIS — M51.36 DDD (DEGENERATIVE DISC DISEASE), LUMBAR: ICD-10-CM

## 2019-07-11 DIAGNOSIS — M48.061 SPINAL STENOSIS OF LUMBAR REGION WITHOUT NEUROGENIC CLAUDICATION: ICD-10-CM

## 2019-07-12 DIAGNOSIS — M48.061 SPINAL STENOSIS OF LUMBAR REGION WITHOUT NEUROGENIC CLAUDICATION: ICD-10-CM

## 2019-07-12 DIAGNOSIS — M51.36 DDD (DEGENERATIVE DISC DISEASE), LUMBAR: ICD-10-CM

## 2019-07-12 RX ORDER — OXYCODONE HYDROCHLORIDE AND ACETAMINOPHEN 5; 325 MG/1; MG/1
1 TABLET ORAL EVERY 6 HOURS PRN
Qty: 130 TABLET | Refills: 0 | Status: SHIPPED | OUTPATIENT
Start: 2019-08-15 | End: 2019-07-15 | Stop reason: SDUPTHER

## 2019-07-15 DIAGNOSIS — M51.36 DDD (DEGENERATIVE DISC DISEASE), LUMBAR: ICD-10-CM

## 2019-07-15 DIAGNOSIS — M48.061 SPINAL STENOSIS OF LUMBAR REGION WITHOUT NEUROGENIC CLAUDICATION: ICD-10-CM

## 2019-07-15 RX ORDER — OXYCODONE HYDROCHLORIDE AND ACETAMINOPHEN 5; 325 MG/1; MG/1
1 TABLET ORAL EVERY 6 HOURS PRN
Qty: 130 TABLET | Refills: 0 | Status: SHIPPED | OUTPATIENT
Start: 2019-07-15 | End: 2019-08-13 | Stop reason: SDUPTHER

## 2019-08-13 ENCOUNTER — HOSPITAL ENCOUNTER (OUTPATIENT)
Dept: PAIN MANAGEMENT | Age: 84
Discharge: HOME | End: 2019-08-13
Payer: MEDICARE

## 2019-08-13 VITALS — DIASTOLIC BLOOD PRESSURE: 81 MMHG | HEART RATE: 72 BPM | SYSTOLIC BLOOD PRESSURE: 151 MMHG | RESPIRATION RATE: 16 BRPM

## 2019-08-13 DIAGNOSIS — M48.061 SPINAL STENOSIS OF LUMBAR REGION WITHOUT NEUROGENIC CLAUDICATION: ICD-10-CM

## 2019-08-13 DIAGNOSIS — Z79.899 ENCOUNTER FOR MEDICATION MANAGEMENT: ICD-10-CM

## 2019-08-13 DIAGNOSIS — M51.36 LUMBAR DEGENERATIVE DISC DISEASE: Primary | ICD-10-CM

## 2019-08-13 DIAGNOSIS — M51.36 DDD (DEGENERATIVE DISC DISEASE), LUMBAR: ICD-10-CM

## 2019-08-13 PROCEDURE — 99213 OFFICE O/P EST LOW 20 MIN: CPT | Performed by: NURSE PRACTITIONER

## 2019-08-13 PROCEDURE — 99213 OFFICE O/P EST LOW 20 MIN: CPT

## 2019-08-13 RX ORDER — OXYCODONE HYDROCHLORIDE AND ACETAMINOPHEN 5; 325 MG/1; MG/1
1 TABLET ORAL EVERY 6 HOURS PRN
Qty: 130 TABLET | Refills: 0 | Status: SHIPPED | OUTPATIENT
Start: 2019-08-16 | End: 2019-09-12 | Stop reason: SDUPTHER

## 2019-08-13 ASSESSMENT — ENCOUNTER SYMPTOMS
SHORTNESS OF BREATH: 0
CONSTIPATION: 0
BACK PAIN: 1
COUGH: 0

## 2019-08-13 NOTE — PROGRESS NOTES
Iveth Owen, APRN - CNP)  Acute Pain Prescriptions: Severe pain not adequately treated with lower dose. Iveth Owen, APRN - CNP)      Past Medical History:   Diagnosis Date    Anxiety     Arthritis     Bronchitis     CAD (coronary artery disease)     [facility]ot[facility] [address]     COPD (chronic obstructive pulmonary disease) ([facility] [address]     Diabetes mellitus ([facility] [address]     Hyperlipidemia     Hypertension     Mitral regurgitation     Myalgia     [facility]     Shingles 2018    left facial area and involved eye    Sleep apnea     Syncope and collapse        Past Surgical History:   Procedure Laterality Date    ABDOMEN SURGERY      CATARACT REMOVAL WITH IMPLANT Right      SECTION      COLECTOMY      COLONOSCOPY      HIP ARTHROPLASTY      3 on the left and 1 on the right    HYSTERECTOMY      INSERTABLE CARDIAC MONITOR  2018    [employer]    JOINT REPLACEMENT Right     TOTAL KNEE    JOINT REPLACEMENT Bilateral     right x2, left x3 hips    KNEE SURGERY         No Known Allergies      Current Outpatient Medications:     [START ON 2019] oxyCODONE-acetaminophen (PERCOCET) 5-325 MG per tablet, Take 1 tablet by mouth every 6 hours as needed for Pain for up to 30 days.  MAY TAKE AN EXTRA 1 ON OCCASION FOR SEVERE PAIN, Disp: 130 tablet, Rfl: 0    hydrOXYzine (ATARAX) 25 MG tablet, Take 25 mg by mouth every 8 hours as needed for Anxiety, Disp: , Rfl:     simvastatin (ZOCOR) 40 MG tablet, , Disp: , Rfl:     acyclovir (ZOVIRAX) 200 MG capsule, Take 200 mg by mouth 2 times daily, Disp: , Rfl:     metoprolol tartrate (LOPRESSOR) 25 MG tablet, , Disp: , Rfl:     escitalopram (LEXAPRO) 20 MG tablet, , Disp: , Rfl:     amLODIPine (NORVASC) 5 MG tablet, TAKE ONE TABLET BY MOUTH DAILY, Disp: 90 tablet, Rfl: 3    furosemide (LASIX) 20 MG tablet, Take 1 tablet by mouth daily as needed (for 2 lb weight gain/increased swelling/increased shortness of breath), Disp: 60 management    DDD (degenerative disc disease), lumbar    Relevant Medications    oxyCODONE-acetaminophen (PERCOCET) 5-325 MG per tablet (Start on 8/16/2019)    Lumbar degenerative disc disease - Primary    Relevant Medications    oxyCODONE-acetaminophen (PERCOCET) 5-325 MG per tablet (Start on 8/16/2019)             Treatment Plan:  Patient relates current medications are helping the pain. Patient reports taking pain medications as prescribed, denies obtaining medications from different sources and denies use of illegal drugs. Patient denies side effects from medications like nausea, vomiting, constipation or drowsiness. Patient reports current activities of daily living are possible due to medications and would like to continue them. As always, we encourage daily stretching and strengthening exercises, and recommend minimizing use of pain medications unless patient cannot get through daily activities due to pain. Contract requirements met. Continue opioid therapy.  Script written for percocet  Follow up appointment made for 4 weeks

## 2019-09-12 ENCOUNTER — HOSPITAL ENCOUNTER (OUTPATIENT)
Dept: PAIN MANAGEMENT | Age: 84
Discharge: HOME | End: 2019-09-12
Payer: MEDICARE

## 2019-09-12 VITALS
WEIGHT: 245 LBS | SYSTOLIC BLOOD PRESSURE: 132 MMHG | HEIGHT: 68 IN | RESPIRATION RATE: 16 BRPM | DIASTOLIC BLOOD PRESSURE: 65 MMHG | BODY MASS INDEX: 37.13 KG/M2 | OXYGEN SATURATION: 99 % | HEART RATE: 67 BPM | TEMPERATURE: 97.6 F

## 2019-09-12 DIAGNOSIS — G89.29 HIP PAIN, CHRONIC, RIGHT: ICD-10-CM

## 2019-09-12 DIAGNOSIS — M51.36 DDD (DEGENERATIVE DISC DISEASE), LUMBAR: ICD-10-CM

## 2019-09-12 DIAGNOSIS — E66.9 GENERALIZED OBESITY: ICD-10-CM

## 2019-09-12 DIAGNOSIS — M25.551 HIP PAIN, CHRONIC, RIGHT: ICD-10-CM

## 2019-09-12 DIAGNOSIS — E11.42 DIABETIC POLYNEUROPATHY ASSOCIATED WITH TYPE 2 DIABETES MELLITUS (HCC): ICD-10-CM

## 2019-09-12 DIAGNOSIS — Z79.899 ENCOUNTER FOR LONG-TERM (CURRENT) USE OF OTHER MEDICATIONS: ICD-10-CM

## 2019-09-12 DIAGNOSIS — M25.551 PAIN IN JOINT, PELVIC REGION AND THIGH, RIGHT: Primary | ICD-10-CM

## 2019-09-12 DIAGNOSIS — M17.11 ARTHRITIS OF KNEE, RIGHT: ICD-10-CM

## 2019-09-12 DIAGNOSIS — Z79.899 ENCOUNTER FOR MEDICATION MANAGEMENT: ICD-10-CM

## 2019-09-12 DIAGNOSIS — G89.29 CHRONIC BILATERAL LOW BACK PAIN WITH BILATERAL SCIATICA: ICD-10-CM

## 2019-09-12 DIAGNOSIS — M48.061 SPINAL STENOSIS OF LUMBAR REGION WITHOUT NEUROGENIC CLAUDICATION: ICD-10-CM

## 2019-09-12 DIAGNOSIS — G89.29 OTHER CHRONIC PAIN: ICD-10-CM

## 2019-09-12 DIAGNOSIS — M54.41 CHRONIC BILATERAL LOW BACK PAIN WITH BILATERAL SCIATICA: ICD-10-CM

## 2019-09-12 DIAGNOSIS — M54.42 CHRONIC BILATERAL LOW BACK PAIN WITH BILATERAL SCIATICA: ICD-10-CM

## 2019-09-12 PROCEDURE — 99213 OFFICE O/P EST LOW 20 MIN: CPT | Performed by: NURSE PRACTITIONER

## 2019-09-12 PROCEDURE — 99213 OFFICE O/P EST LOW 20 MIN: CPT

## 2019-09-12 RX ORDER — OXYCODONE HYDROCHLORIDE AND ACETAMINOPHEN 5; 325 MG/1; MG/1
1 TABLET ORAL EVERY 6 HOURS PRN
Qty: 120 TABLET | Refills: 0 | Status: SHIPPED | OUTPATIENT
Start: 2019-09-30 | End: 2019-10-30 | Stop reason: SDUPTHER

## 2019-09-12 RX ORDER — OXYCODONE HYDROCHLORIDE AND ACETAMINOPHEN 5; 325 MG/1; MG/1
1 TABLET ORAL EVERY 6 HOURS PRN
Qty: 60 TABLET | Refills: 0 | Status: SHIPPED | OUTPATIENT
Start: 2019-09-15 | End: 2019-09-12 | Stop reason: SDUPTHER

## 2019-09-12 ASSESSMENT — ENCOUNTER SYMPTOMS
GASTROINTESTINAL NEGATIVE: 1
BACK PAIN: 1
COUGH: 1
SHORTNESS OF BREATH: 1

## 2019-09-12 NOTE — DISCHARGE INSTR - COC
.
that she requires {Admit to Appropriate Level of Care:03633} for {GREATER/LESS:191095301} 30 days.      Update Admission H&P: {CHP DME Changes in [facility]:852762214}    PHYSICIAN SIGNATURE:  {Esignature:[id number]}

## 2019-09-12 NOTE — PROGRESS NOTES
[facility] PROGRESS NOTE      Patient here today to review Medication Agreement    Chief Complaint: pain all over, back pain      HPI: She c/o pain all over and multiple joints. She has had bilateral hip and right knee replacement surgery. She has back pain  Which has increased. She ambulates with a walker at home. She has a VNS come out once a week. She states aquatics helped when she did it. She states not sleeping well due to the pain. Back Pain   This is a chronic problem. The current episode started more than 1 year ago. The problem occurs constantly. The problem has been gradually worsening since onset. The pain is present in the lumbar spine. The quality of the pain is described as aching. Radiates to: down legs. The pain is at a severity of 8/10. The pain is the same all the time. Exacerbated by: walking. Associated symptoms include chest pain and weakness. Risk factors include menopause and sedentary lifestyle. She has tried analgesics and heat for the symptoms. The treatment provided mild relief. Patient denies any new neurological symptoms. No bowel or bladder incontinence, no weakness, and no falling. Treatment goals:  Functional status: walk more      Aberrancy:   Any alcoholic beverages    no   Any illegal drugs  no       Analgesia:pain 8      Adverse  Effects :none     ADL;s :walk around the house with walker, can fix lunch        Pill count: appropriate    Morphine equivalent dose as reported on [place]:30  Periodic Controlled Substance Monitoring: Possible medication side effects, risk of tolerance/dependence & alternative treatments discussed., No signs of potential drug abuse or diversion identified. , Assessed functional status., Obtaining appropriate analgesic effect of treatment. Aiyana De La Cruz, APRN - CNP)  Review of[place]RS does not show any aberrant prescription behavior. Medication is helping the patient stay active.  Patient denies any side effects and PM [facility]   MDA, Ur Not Detected   Final 02/08/2019  2:45 PM [facility]   Diazepam, Urine Not Detected   Final 02/08/2019  2:45 PM [facility]   Ethyl Glucuronide Ur Not Detected   Final 02/08/2019  2:45 PM [facility]   Fentanyl, Ur Not Detected   Final 02/08/2019  2:45 PM [facility]   Hydrocodone, Urine Not Detected   Final 02/08/2019  2:45 PM [facility]   Hydromorphone, Urine Not Detected   Final 02/08/2019  2:45 PM [facility]   Lorazepam, Urine Not Detected   Final 02/08/2019  2:45 PM [facility]   Marijuana Metab, Ur Not Detected   Final 02/08/2019  2:45 PM [facility]   MDEA, JYOTHI, Ur Not Detected   Final 02/08/2019  2:45 PM [facility]   MDMA, Urine Not Detected   Final 02/08/2019  2:45 PM [facility]   Meperidine Metab, Ur Not Detected   Final 02/08/2019  2:45 PM [facility]   Methadone, Urine Not Detected   Final 02/08/2019  2:45 PM [facility]   Methamphetamine, Urine Not Detected   Final 02/08/2019  2:45 PM [facility]   Methylphenidate Not Detected   Final 02/08/2019  2:45 PM [facility]   Midazolam, Urine Not Detected   Final 02/08/2019  2:45 PM [facility]   Morphine Urine Not Detected   Final 02/08/2019  2:45 PM [facility]   Norbuprenorphine, Urine Not Detected   Final 02/08/2019  2:45 PM [facility]   Nordiazepam, Urine Not Detected   Final 02/08/2019  2:45 PM [facility]   Norfentanyl, Urine Not Detected   Final 02/08/2019  2:45 PM [facility]   NORHYDROCODONE, URINE Not Detected   Final 02/08/2019  2:45 PM [facility]   Noroxycodone, Urine Not Detected   Final 02/08/2019  2:45 PM [facility]   NOROXYMORPHONE, URINE Not Detected   Final 02/08/2019  2:45 PM [facility]   Oxazepam, Urine Not Detected   Final 02/08/2019  2:45 PM [facility]   Oxycodone Urine Not Detected   Final 02/08/2019  2:45 PM [facility]   Oxymorphone, Urine Not Detected   Final 02/08/2019  2:45 PM [facility]   PCP, Urine Not Detected   Final 02/08/2019  2:45 PM [facility]   Phentermine, Ur Not Detected   Final 02/08/2019  2:45 PM [facility]   Propoxyphene, Urine Not Detected   Final 02/08/2019  2:45 PM [facility]   Tapentadol-O-Sulfate, Urine Not Detected   Final 02/08/2019  2:45 PM [facility]   Tapentadol, Urine Not Detected   Final 02/08/2019  2:45 PM [facility]   Temazepam, Urine Not Detected   Final 02/08/2019  2:45 PM [facility]   Tramadol, Urine Not Detected   Final 02/08/2019  2:45 PM [facility]   Zolpidem, Urine Not Detected   Final 02/08/2019  2:45 PM [facility]   Drugs Expected, Ur PERCOCET ON 2/6/19   Final 02/08/2019  2:45  [address] [facility]   Creatinine, Ur 97.3  20.0 - 400.0 mg/dL Final 02/08/2019  2:45 PM [facility]   Pain Mgt Drug Panel, Hi Res, Ur See Below   Final 02/08/2019  2:45 PM [facility]   (NOTE)   Methodology: Qualitative Enzyme Immunoassay and Qualitative Liquid   Chromatography-Time of Flight-Mass Spectrometry or Tandem Mass   Spectrometry, Quantitative Spectrophotometry   The absence of expected drug(s) and/or drug metabolite(s) may   indicate non-compliance, inappropriate timing of specimen   collection relative to drug administration, poor drug absorption,   diluted/adulterated urine, or limitations of testing. The   concentration must be greater than or equal to the cutoff to be   reported as present.  If specific drug concentrations are   required, contact the laboratory within two weeks of specimen   collection to request quantification by a second analytical   technique. Interpretive questions should be directed to the   laboratory. Results based on immunoassay detection that do not match clinical   expectations should be   interpreted with caution. Confirmatory testing by mass   spectrometry for immunoassay-based results is available, if   ordered within two weeks of specimen collection. Additional   charges apply. For medical purposes only; not valid for forensic use. This test was developed and its performance characteristics   determined by [facility] [employer]. The U.S. Food and Drug   Administration has not approved or cleared this test; however, FDA   clearance or approval is not currently required for clinical use.    The results are not intended to be used as the sole means for   clinical diagnosis or Disp: , Rfl:     acyclovir (ZOVIRAX) 200 MG capsule, Take 200 mg by mouth 2 times daily, Disp: , Rfl:     metoprolol tartrate (LOPRESSOR) 25 MG tablet, , Disp: , Rfl:     escitalopram (LEXAPRO) 20 MG tablet, , Disp: , Rfl:     amLODIPine (NORVASC) 5 MG tablet, TAKE ONE TABLET BY MOUTH DAILY, Disp: 90 tablet, Rfl: 3    Cholecalciferol (VITAMIN D) 2000 units CAPS capsule, Take by mouth Pt taking 1 time a week, unsure of dose, Disp: , Rfl:     ASPERCREME LIDOCAINE EX, Apply topically, Disp: , Rfl:     omeprazole (PRILOSEC) 20 MG delayed release capsule, , Disp: , Rfl:     levothyroxine (LEVOTHROID) 50 MCG tablet, Take 1 tablet by mouth daily, Disp: 30 tablet, Rfl: 3    metFORMIN (GLUCOPHAGE) 500 MG tablet, TAKE ONE TABLET BY MOUTH TWICE A DAY, Disp: 180 tablet, Rfl: 2    aspirin 325 MG tablet, Take 325 mg by mouth daily. , Disp: , Rfl:     hydrOXYzine (ATARAX) 25 MG tablet, Take 25 mg by mouth every 8 hours as needed for Anxiety, Disp: , Rfl:     furosemide (LASIX) 20 MG tablet, Take 1 tablet by mouth daily as needed (for 2 lb weight gain/increased swelling/increased shortness of breath), Disp: 60 tablet, Rfl: 3    VENTOLIN  (90 Base) MCG/ACT inhaler, , Disp: , Rfl:     hydrocortisone 2.5 % cream, Apply topically 2 times daily. , Disp: 30 g, Rfl: 2    glucose monitoring kit (FREESTYLE) monitoring kit, 1 kit by Does not apply route daily as needed, Disp: 1 kit, Rfl: 0    glucose blood VI test strips (EXACTECH TEST) strip, 1 each by In Vitro route daily Type 2 diabetes qd testing, Disp: 100 each, Rfl: 3    Accu-Chek Multiclix Lancets MISC, Test qd;type 2 diabetes, Disp: 100 each, Rfl: 3    Scooter MISC, by Does not apply route Use daily dx arthritis obesity pulmonary htn,copd, Disp: 1 each, Rfl: 0    Family History   Problem Relation Age of Onset    Cancer Mother     Hypertension Maternal Aunt     Cancer Daughter        Social History     Socioeconomic History    Marital status: Single Walker , occasional dizzy spells   Psychiatric/Behavioral: The patient is nervous/anxious. Physical Exam:  /65   Pulse 67   Temp 97.6 °F (36.4 °C) (Oral)   Resp 16   Ht 5' 8\" (1.727 m)   Wt 245 lb (111.1 kg)   SpO2 99%   BMI 37.25 kg/m²     Physical Exam   Constitutional: She appears well-developed. Neck: Normal range of motion. Neck supple. Pulmonary/Chest: Effort normal.   Musculoskeletal:        Right hip: She exhibits tenderness. Left hip: She exhibits tenderness. Right knee: Tenderness found. Medial joint line and lateral joint line tenderness noted. Left knee: Tenderness found. Medial joint line and lateral joint line tenderness noted. Cervical back: She exhibits decreased range of motion and tenderness. Lumbar back: She exhibits decreased range of motion and tenderness. Neurological: She is alert. Gait abnormal.   Skin: Skin is warm, dry and intact. Psychiatric: She has a normal mood and affect.  Her speech is normal and behavior is normal. Judgment and thought content normal. Cognition and memory are normal.         Assessment:      Problem List Items Addressed This Visit     Spinal stenosis of lumbar region without neurogenic claudication    Relevant Medications    oxyCODONE-acetaminophen (PERCOCET) 5-325 MG per tablet (Start on 9/30/2019)    Pain in joint, pelvic region and thigh, right - Primary    Other chronic pain    Relevant Medications    oxyCODONE-acetaminophen (PERCOCET) 5-325 MG per tablet (Start on 9/30/2019)    Hip pain, chronic, right    Relevant Medications    oxyCODONE-acetaminophen (PERCOCET) 5-325 MG per tablet (Start on 9/30/2019)    Generalized obesity    Encounter for medication management    Encounter for long-term (current) use of other medications    Diabetic polyneuropathy associated with type 2 diabetes mellitus ([facility] [address]    DDD (degenerative disc disease), lumbar    Relevant Medications    oxyCODONE-acetaminophen

## 2019-10-30 DIAGNOSIS — M51.36 DDD (DEGENERATIVE DISC DISEASE), LUMBAR: ICD-10-CM

## 2019-10-30 DIAGNOSIS — M48.061 SPINAL STENOSIS OF LUMBAR REGION WITHOUT NEUROGENIC CLAUDICATION: ICD-10-CM

## 2019-10-30 RX ORDER — OXYCODONE HYDROCHLORIDE AND ACETAMINOPHEN 5; 325 MG/1; MG/1
1 TABLET ORAL EVERY 6 HOURS PRN
Qty: 120 TABLET | Refills: 0 | Status: SHIPPED | OUTPATIENT
Start: 2019-10-30 | End: 2020-06-08 | Stop reason: SDUPTHER

## 2019-12-12 ENCOUNTER — HOSPITAL ENCOUNTER (OUTPATIENT)
Dept: PAIN MANAGEMENT | Age: 84
Discharge: HOME | End: 2019-12-12
Payer: MEDICARE

## 2019-12-12 DIAGNOSIS — M48.061 SPINAL STENOSIS OF LUMBAR REGION WITHOUT NEUROGENIC CLAUDICATION: ICD-10-CM

## 2019-12-12 DIAGNOSIS — Z79.899 ENCOUNTER FOR MEDICATION MANAGEMENT: ICD-10-CM

## 2019-12-12 DIAGNOSIS — M48.061 SPINAL STENOSIS, LUMBAR REGION, WITHOUT NEUROGENIC CLAUDICATION: ICD-10-CM

## 2019-12-12 DIAGNOSIS — M51.36 DDD (DEGENERATIVE DISC DISEASE), LUMBAR: ICD-10-CM

## 2019-12-12 DIAGNOSIS — E11.42 DIABETIC POLYNEUROPATHY ASSOCIATED WITH TYPE 2 DIABETES MELLITUS (HCC): ICD-10-CM

## 2019-12-12 DIAGNOSIS — M25.551 HIP PAIN, BILATERAL: Primary | ICD-10-CM

## 2019-12-12 DIAGNOSIS — M25.552 HIP PAIN, BILATERAL: Primary | ICD-10-CM

## 2019-12-12 DIAGNOSIS — M25.551 HIP PAIN, CHRONIC, RIGHT: ICD-10-CM

## 2019-12-12 DIAGNOSIS — G89.29 HIP PAIN, CHRONIC, RIGHT: ICD-10-CM

## 2019-12-12 DIAGNOSIS — M51.36 LUMBAR DEGENERATIVE DISC DISEASE: ICD-10-CM

## 2019-12-12 PROCEDURE — 99213 OFFICE O/P EST LOW 20 MIN: CPT

## 2019-12-12 PROCEDURE — 99214 OFFICE O/P EST MOD 30 MIN: CPT | Performed by: PAIN MEDICINE

## 2019-12-12 RX ORDER — OXYCODONE HYDROCHLORIDE AND ACETAMINOPHEN 5; 325 MG/1; MG/1
1 TABLET ORAL EVERY 6 HOURS PRN
Qty: 120 TABLET | Refills: 0 | Status: SHIPPED | OUTPATIENT
Start: 2019-12-12 | End: 2020-01-09 | Stop reason: SDUPTHER

## 2019-12-12 ASSESSMENT — ENCOUNTER SYMPTOMS
SORE THROAT: 1
BACK PAIN: 1
EYE PAIN: 0
DIARRHEA: 1
EYES NEGATIVE: 1
CONSTIPATION: 1
SHORTNESS OF BREATH: 1
VOMITING: 0
NAUSEA: 0
COUGH: 1
PHOTOPHOBIA: 0
SINUS PRESSURE: 1
ABDOMINAL PAIN: 0
BOWEL INCONTINENCE: 0
ALLERGIC/IMMUNOLOGIC NEGATIVE: 1

## 2020-01-09 ENCOUNTER — HOSPITAL ENCOUNTER (OUTPATIENT)
Dept: GENERAL RADIOLOGY | Facility: CLINIC | Age: 85
Discharge: HOME | End: 2020-01-11
Payer: MEDICARE

## 2020-01-09 ENCOUNTER — HOSPITAL ENCOUNTER (OUTPATIENT)
Facility: CLINIC | Age: 85
Discharge: HOME | End: 2020-01-11
Payer: MEDICARE

## 2020-01-09 ENCOUNTER — HOSPITAL ENCOUNTER (OUTPATIENT)
Age: 85
Setting detail: SPECIMEN
Discharge: HOME | End: 2020-01-09
Payer: MEDICARE

## 2020-01-09 ENCOUNTER — HOSPITAL ENCOUNTER (OUTPATIENT)
Dept: PAIN MANAGEMENT | Age: 85
Discharge: HOME | End: 2020-01-09
Payer: MEDICARE

## 2020-01-09 VITALS
DIASTOLIC BLOOD PRESSURE: 88 MMHG | RESPIRATION RATE: 16 BRPM | HEART RATE: 78 BPM | BODY MASS INDEX: 39.08 KG/M2 | HEIGHT: 67 IN | WEIGHT: 249 LBS | SYSTOLIC BLOOD PRESSURE: 177 MMHG

## 2020-01-09 LAB
-: NORMAL
ALBUMIN SERPL-MCNC: 4.1 G/DL (ref 3.5–5.2)
ALBUMIN/GLOBULIN RATIO: 1.1 (ref 1–2.5)
ALP BLD-CCNC: 89 U/L (ref 35–104)
ALT SERPL-CCNC: 10 U/L (ref 5–33)
ANION GAP SERPL CALCULATED.3IONS-SCNC: 13 MMOL/L (ref 9–17)
AST SERPL-CCNC: 18 U/L
BILIRUB SERPL-MCNC: 0.43 MG/DL (ref 0.3–1.2)
BILIRUBIN DIRECT: 0.09 MG/DL
BILIRUBIN, INDIRECT: 0.34 MG/DL (ref 0–1)
BUN BLDV-MCNC: 14 MG/DL (ref 8–23)
CALCIUM SERPL-MCNC: 9.5 MG/DL (ref 8.6–10.4)
CHLORIDE BLD-SCNC: 105 MMOL/L (ref 98–107)
CHOLESTEROL/HDL RATIO: 2.6
CHOLESTEROL: 203 MG/DL
CO2: 26 MMOL/L (ref 20–31)
CREAT SERPL-MCNC: 0.85 MG/DL (ref 0.5–0.9)
ESTIMATED AVERAGE GLUCOSE: 126 MG/DL
GFR AFRICAN AMERICAN: >60 ML/MIN
GFR NON-AFRICAN AMERICAN: >60 ML/MIN
GFR SERPL CREATININE-BSD FRML MDRD: NORMAL ML/MIN/{1.73_M2}
GFR SERPL CREATININE-BSD FRML MDRD: NORMAL ML/MIN/{1.73_M2}
GLOBULIN: NORMAL G/DL (ref 1.5–3.8)
GLUCOSE BLD-MCNC: 110 MG/DL (ref 70–99)
HBA1C MFR BLD: 6 % (ref 4–6)
HCT VFR BLD CALC: 35.2 % (ref 36.3–47.1)
HDLC SERPL-MCNC: 77 MG/DL
HEMOGLOBIN: 10.3 G/DL (ref 11.9–15.1)
IRON: 100 UG/DL (ref 37–145)
LDL CHOLESTEROL: 105 MG/DL (ref 0–130)
MCH RBC QN AUTO: 26.8 PG (ref 25.2–33.5)
MCHC RBC AUTO-ENTMCNC: 29.3 G/DL (ref 28.4–34.8)
MCV RBC AUTO: 91.7 FL (ref 82.6–102.9)
NRBC AUTOMATED: 0 PER 100 WBC
PDW BLD-RTO: 14.7 % (ref 11.8–14.4)
PLATELET # BLD: 243 K/UL (ref 138–453)
PMV BLD AUTO: 11.4 FL (ref 8.1–13.5)
POTASSIUM SERPL-SCNC: 4.3 MMOL/L (ref 3.7–5.3)
RBC # BLD: 3.84 M/UL (ref 3.95–5.11)
REASON FOR REJECTION: NORMAL
SODIUM BLD-SCNC: 144 MMOL/L (ref 135–144)
T. PALLIDUM, IGG: NONREACTIVE
T3 TOTAL: 111 NG/DL (ref 80–200)
THYROXINE, FREE: 1.27 NG/DL (ref 0.93–1.7)
TOTAL PROTEIN: 7.7 G/DL (ref 6.4–8.3)
TRIGL SERPL-MCNC: 103 MG/DL
TSH SERPL DL<=0.05 MIU/L-ACNC: 5.16 MIU/L (ref 0.3–5)
VITAMIN B-12: 521 PG/ML (ref 232–1245)
VITAMIN D 25-HYDROXY: 22.9 NG/ML (ref 30–100)
VLDLC SERPL CALC-MCNC: ABNORMAL MG/DL (ref 1–30)
WBC # BLD: 8.4 K/UL (ref 3.5–11.3)
ZZ NTE CLEAN UP: ORDERED TEST: NORMAL
ZZ NTE WITH NAME CLEAN UP: SPECIMEN SOURCE: NORMAL

## 2020-01-09 PROCEDURE — 71046 X-RAY EXAM CHEST 2 VIEWS: CPT

## 2020-01-09 PROCEDURE — 99213 OFFICE O/P EST LOW 20 MIN: CPT

## 2020-01-09 PROCEDURE — 99213 OFFICE O/P EST LOW 20 MIN: CPT | Performed by: NURSE PRACTITIONER

## 2020-01-09 RX ORDER — OXYCODONE HYDROCHLORIDE AND ACETAMINOPHEN 5; 325 MG/1; MG/1
1 TABLET ORAL EVERY 6 HOURS PRN
Qty: 120 TABLET | Refills: 0 | Status: SHIPPED | OUTPATIENT
Start: 2020-01-11 | End: 2020-02-06 | Stop reason: SDUPTHER

## 2020-01-09 RX ORDER — LOSARTAN POTASSIUM 25 MG/1
TABLET ORAL
COMMUNITY
Start: 2020-01-03 | End: 2020-06-08

## 2020-01-09 ASSESSMENT — ENCOUNTER SYMPTOMS
BACK PAIN: 1
SHORTNESS OF BREATH: 0
COUGH: 0
CONSTIPATION: 0

## 2020-01-09 NOTE — PROGRESS NOTES
Patient is here today to review medication contract. Chief Complaint:  Back pain    [facility]     Pt reports low back pain for many years. The pain does radiate down her legs at times with numbness to left hip thigh area. She uses a cane for ambulation due to occasional weakness in legs. She has not had surgery to the area. She has had aquatic PT in the past that has helped but not interested in starting right now. She lives at home with family and is able to care for self with the help of her family.      HPI:     Back Pain   This is a chronic problem. The current episode started more than 1 year ago. The problem occurs constantly. The problem is unchanged. The pain is present in the lumbar spine. The quality of the pain is described as aching. The pain does not radiate. The pain is at a severity of 6/10. The pain is moderate. The pain is worse during the day. Associated symptoms include leg pain, numbness (legs and feet) and paresthesias. Pertinent negatives include no chest pain or fever. Risk factors include lack of exercise, menopause, sedentary lifestyle and obesity. She has tried analgesics and bed rest for the symptoms. The treatment provided mild relief. Patient denies any new neurological symptoms. No bowel or bladder incontinence, no weakness, and no falling. Pill count: appropriate    Morphine equivalent: 37.5    Periodic Controlled Substance Monitoring: Possible medication side effects, risk of tolerance/dependence & alternative treatments discussed., No signs of potential drug abuse or diversion identified. , Assessed functional status., Obtaining appropriate analgesic effect of treatment.  Rajani Alva, APRN - CNP)      Past Medical History:   Diagnosis Date    Anxiety     Arthritis     Bronchitis     CAD (coronary artery disease)     [facility] [address]     COPD [facility]chronic obstructive pulmonary disease) [facility] [facility]     Diabetes mellitus ([facility] [address] [facility]     Hyperlipidemia     Hypertension  Mitral regurgitation     Myalgia     [facility] ([facility] [address]     Shingles 2018    left facial area and involved eye    Sleep apnea     Syncope and collapse        Past Surgical History:   Procedure Laterality Date    ABDOMEN SURGERY      CATARACT REMOVAL WITH IMPLANT Right 1     SECTION      COLECTOMY      COLONOSCOPY      HIP ARTHROPLASTY      3 on the left and 1 on the right    HYSTERECTOMY      INSERTABLE CARDIAC MONITOR  2018    [employer]    JOINT REPLACEMENT Right     TOTAL KNEE    JOINT REPLACEMENT Bilateral     right x2, left x3 hips    KNEE SURGERY         No Known Allergies      Current Outpatient Medications:     [START ON 2020] oxyCODONE-acetaminophen (PERCOCET) 5-325 MG per tablet, Take 1 tablet by mouth every 6 hours as needed for Pain for up to 30 days. MAY TAKE AN EXTRA 1 ON OCCASION FOR SEVERE PAIN, Disp: 120 tablet, Rfl: 0    losartan (COZAAR) 25 MG tablet, , Disp: , Rfl:     oxyCODONE-acetaminophen (PERCOCET) 5-325 MG per tablet, Take 1 tablet by mouth every 6 hours as needed for Pain for up to 30 days.  MAY TAKE AN EXTRA 1 ON OCCASION FOR SEVERE PAIN, Disp: 120 tablet, Rfl: 0    hydrOXYzine (ATARAX) 25 MG tablet, Take 25 mg by mouth every 8 hours as needed for Anxiety, Disp: , Rfl:     simvastatin (ZOCOR) 40 MG tablet, , Disp: , Rfl:     acyclovir (ZOVIRAX) 200 MG capsule, Take 200 mg by mouth 2 times daily, Disp: , Rfl:     metoprolol tartrate (LOPRESSOR) 25 MG tablet, , Disp: , Rfl:     escitalopram (LEXAPRO) 20 MG tablet, , Disp: , Rfl:     amLODIPine (NORVASC) 5 MG tablet, TAKE ONE TABLET BY MOUTH DAILY, Disp: 90 tablet, Rfl: 3    furosemide (LASIX) 20 MG tablet, Take 1 tablet by mouth daily as needed (for 2 lb weight gain/increased swelling/increased shortness of breath), Disp: 60 tablet, Rfl: 3    Cholecalciferol (VITAMIN D) 2000 units CAPS capsule, Take by mouth Pt taking 1 time a week, unsure of dose, Disp: , Rfl:     ASPERCREME LIDOCAINE EX, Apply topically, Disp: , Rfl:     VENTOLIN  (90 Base) MCG/ACT inhaler, , Disp: , Rfl:     omeprazole (PRILOSEC) 20 MG delayed release capsule, , Disp: , Rfl:     hydrocortisone 2.5 % cream, Apply topically 2 times daily. , Disp: 30 g, Rfl: 2    levothyroxine (LEVOTHROID) 50 MCG tablet, Take 1 tablet by mouth daily, Disp: 30 tablet, Rfl: 3    glucose monitoring kit (FREESTYLE) monitoring kit, 1 kit by Does not apply route daily as needed, Disp: 1 kit, Rfl: 0    glucose blood VI test strips (EXACTECH TEST) strip, 1 each by In Vitro route daily Type 2 diabetes qd testing, Disp: 100 each, Rfl: 3    Accu-Chek Multiclix Lancets MISC, Test qd;type 2 diabetes, Disp: 100 each, Rfl: 3    Scooter MISC, by Does not apply route Use daily dx arthritis obesity pulmonary htn,copd, Disp: 1 each, Rfl: 0    metFORMIN (GLUCOPHAGE) 500 MG tablet, TAKE ONE TABLET BY MOUTH TWICE A DAY, Disp: 180 tablet, Rfl: 2    aspirin 325 MG tablet, Take 325 mg by mouth daily. , Disp: , Rfl:     Family History   Problem Relation Age of Onset    Cancer Mother     Hypertension Maternal Aunt     Cancer Daughter        Social History     Socioeconomic History    Marital status: Single     Spouse name: Not on file    Number of children: 3    Years of education: Not on file    Highest education level: Not on file   Occupational History    Occupation: RETIRED   Social Needs    Financial resource strain: Not on file    Food insecurity:     Worry: Not on file     Inability: Not on file    Transportation needs:     Medical: Not on file     Non-medical: Not on file   Tobacco Use    Smoking status: Former Smoker     Years: 5.00     Last attempt to quit: 1985     Years since quittin.1    Smokeless tobacco: Never Used   Substance and Sexual Activity    Alcohol use: No     Alcohol/week: 0.0 standard drinks    Drug use: No    Sexual activity: Not on file   Lifestyle    Physical activity:     Days per week: Not on file     Minutes per session: Not on file    Stress: Not on file   Relationships    Social connections:     Talks on phone: Not on file     Gets together: Not on file     Attends Taoist service: Not on file     Active member of club or organization: Not on file     Attends meetings of clubs or organizations: Not on file     Relationship status: Not on file    Intimate partner violence:     Fear of current or ex partner: Not on file     Emotionally abused: Not on file     Physically abused: Not on file     Forced sexual activity: Not on file   Other Topics Concern    Not on file   Social History Narrative    Not on file       Review of Systems:  Review of Systems   Constitution: Negative for chills and fever. Cardiovascular: Negative for chest pain. Respiratory: Negative for cough and shortness of breath. Musculoskeletal: Positive for back pain. Gastrointestinal: Negative for constipation. Neurological: Positive for numbness (legs and feet) and paresthesias. Physical Exam:  BP (!) 177/88   Pulse 78   Resp 16   Ht 5' 7\" (1.702 m)   Wt 249 lb (112.9 kg)   BMI 39.00 kg/m²     Physical Exam  Constitutional:       Comments: Obese BMI 39   Cardiovascular:      Rate and Rhythm: Normal rate. Pulmonary:      Effort: Pulmonary effort is normal.   Musculoskeletal:      Lumbar back: She exhibits decreased range of motion. Comments: In w/c for visit   Skin:     General: Skin is warm and dry. Neurological:      Mental Status: She is alert and oriented to person, place, and time.          Record/Diagnostics Review:    Last dau Feb and was appropriate     Assessment:  Problem List Items Addressed This Visit     Chronic bilateral low back pain with bilateral sciatica - Primary (Chronic)    Relevant Medications    oxyCODONE-acetaminophen (PERCOCET) 5-325 MG per tablet (Start on 1/11/2020)    Spinal stenosis of lumbar region without neurogenic claudication    Relevant Medications oxyCODONE-acetaminophen (PERCOCET) 5-325 MG per tablet (Start on 1/11/2020)    Encounter for medication management    DDD (degenerative disc disease), lumbar    Relevant Medications    oxyCODONE-acetaminophen (PERCOCET) 5-325 MG per tablet (Start on 1/11/2020)             Treatment Plan:  Patient relates current medications are helping the pain. Patient reports taking pain medications as prescribed, denies obtaining medications from different sources and denies use of illegal drugs. Patient denies side effects from medications like nausea, vomiting, constipation or drowsiness. Patient reports current activities of daily living are possible due to medications and would like to continue them. As always, we encourage daily stretching and strengthening exercises, and recommend minimizing use of pain medications unless patient cannot get through daily activities due to pain. Contract requirements met. Continue opioid therapy.  Script written for percocet  Follow up appointment made for 4 weeks

## 2020-01-10 LAB
CULTURE: ABNORMAL
Lab: ABNORMAL
SPECIMEN DESCRIPTION: ABNORMAL

## 2020-02-06 ENCOUNTER — HOSPITAL ENCOUNTER (OUTPATIENT)
Dept: PAIN MANAGEMENT | Age: 85
Discharge: HOME | End: 2020-02-06
Payer: MEDICARE

## 2020-02-06 VITALS
HEART RATE: 77 BPM | HEIGHT: 67 IN | BODY MASS INDEX: 39.08 KG/M2 | RESPIRATION RATE: 18 BRPM | DIASTOLIC BLOOD PRESSURE: 72 MMHG | WEIGHT: 249 LBS | SYSTOLIC BLOOD PRESSURE: 148 MMHG

## 2020-02-06 PROCEDURE — 80307 DRUG TEST PRSMV CHEM ANLYZR: CPT

## 2020-02-06 PROCEDURE — 99213 OFFICE O/P EST LOW 20 MIN: CPT

## 2020-02-06 PROCEDURE — 99214 OFFICE O/P EST MOD 30 MIN: CPT | Performed by: NURSE PRACTITIONER

## 2020-02-06 RX ORDER — OXYCODONE HYDROCHLORIDE AND ACETAMINOPHEN 5; 325 MG/1; MG/1
1 TABLET ORAL EVERY 6 HOURS PRN
Qty: 120 TABLET | Refills: 0 | Status: SHIPPED | OUTPATIENT
Start: 2020-02-10 | End: 2020-03-06 | Stop reason: SDUPTHER

## 2020-02-06 ASSESSMENT — ENCOUNTER SYMPTOMS
CONSTIPATION: 0
COUGH: 0
SHORTNESS OF BREATH: 0
BACK PAIN: 1

## 2020-02-06 NOTE — PROGRESS NOTES
Patient is here today to review medication contract. Chief Complaint:  Back pain     [facility]     Pt reports low back pain for many years. The pain does radiate down her legs at times with numbness to left hip thigh area. She uses a cane for ambulation due to occasional weakness in legs. She has not had surgery to the area. She has had aquatic PT in the past that has helped but not interested in starting right now. She lives at home with family and is able to care for self with the help of her family. She is asking for increase in pain medication d/t poorly controlled pain          HPI:     Back Pain   This is a chronic problem. The current episode started more than 1 year ago. The problem occurs constantly. The pain is present in the lumbar spine. The quality of the pain is described as aching. The pain radiates to the left foot and right foot. The pain is at a severity of 8/10. The pain is moderate. The pain is the same all the time. The symptoms are aggravated by bending, sitting, standing, twisting and position. Associated symptoms include leg pain, numbness and paresthesias. Pertinent negatives include no chest pain or fever. Risk factors include lack of exercise, menopause, obesity and sedentary lifestyle. She has tried analgesics for the symptoms. The treatment provided mild relief. Patient denies any new neurological symptoms. No bowel or bladder incontinence, no weakness, and no falling. Pill count: appropriate     Morphine equivalent: 30    Periodic Controlled Substance Monitoring: Possible medication side effects, risk of tolerance/dependence & alternative treatments discussed., No signs of potential drug abuse or diversion identified. , Assessed functional status., Obtaining appropriate analgesic effect of treatment.  Norberto Rosales, APRN - CNP)      Past Medical History:   Diagnosis Date    Anxiety     Arthritis     Bronchitis     CAD (coronary artery disease)     [facility] [address]     COPD (chronic obstructive pulmonary disease) [facility] [address] [facility]     Diabetes mellitus ([facility] [address]     Hyperlipidemia     Hypertension     Mitral regurgitation     Myalgia     Pulmonary hypertension ([facility] [address]     Shingles 2018    left facial area and involved eye    Sleep apnea     Syncope and collapse        Past Surgical History:   Procedure Laterality Date    ABDOMEN SURGERY      CATARACT REMOVAL WITH IMPLANT Right      SECTION      COLECTOMY      COLONOSCOPY      HIP ARTHROPLASTY      3 on the left and 1 on the right    HYSTERECTOMY      INSERTABLE CARDIAC MONITOR  2018    [employer]    JOINT REPLACEMENT Right     TOTAL KNEE    JOINT REPLACEMENT Bilateral     right x2, left x3 hips    KNEE SURGERY         No Known Allergies      Current Outpatient Medications:     [START ON 2/10/2020] oxyCODONE-acetaminophen (PERCOCET) 5-325 MG per tablet, Take 1 tablet by mouth every 6 hours as needed for Pain for up to 30 days. MAY TAKE AN EXTRA 1 ON OCCASION FOR SEVERE PAIN, Disp: 120 tablet, Rfl: 0    losartan (COZAAR) 25 MG tablet, , Disp: , Rfl:     oxyCODONE-acetaminophen (PERCOCET) 5-325 MG per tablet, Take 1 tablet by mouth every 6 hours as needed for Pain for up to 30 days.  MAY TAKE AN EXTRA 1 ON OCCASION FOR SEVERE PAIN, Disp: 120 tablet, Rfl: 0    hydrOXYzine (ATARAX) 25 MG tablet, Take 25 mg by mouth every 8 hours as needed for Anxiety, Disp: , Rfl:     simvastatin (ZOCOR) 40 MG tablet, , Disp: , Rfl:     acyclovir (ZOVIRAX) 200 MG capsule, Take 200 mg by mouth 2 times daily, Disp: , Rfl:     metoprolol tartrate (LOPRESSOR) 25 MG tablet, , Disp: , Rfl:     escitalopram (LEXAPRO) 20 MG tablet, , Disp: , Rfl:     amLODIPine (NORVASC) 5 MG tablet, TAKE ONE TABLET BY MOUTH DAILY, Disp: 90 tablet, Rfl: 3    furosemide (LASIX) 20 MG tablet, Take 1 tablet by mouth daily as needed (for 2 lb weight gain/increased swelling/increased shortness of breath), Disp: 60 tablet, Rfl: 3   Alcohol/week: 0.0 standard drinks    Drug use: No    Sexual activity: Not on file   Lifestyle    Physical activity:     Days per week: Not on file     Minutes per session: Not on file    Stress: Not on file   Relationships    Social connections:     Talks on phone: Not on file     Gets together: Not on file     Attends Anabaptism service: Not on file     Active member of club or organization: Not on file     Attends meetings of clubs or organizations: Not on file     Relationship status: Not on file    Intimate partner violence:     Fear of current or ex partner: Not on file     Emotionally abused: Not on file     Physically abused: Not on file     Forced sexual activity: Not on file   Other Topics Concern    Not on file   Social History Narrative    Not on file       Review of Systems:  Review of Systems   Constitution: Negative for chills and fever. Cardiovascular: Negative for chest pain. Respiratory: Negative for cough and shortness of breath. Musculoskeletal: Positive for back pain. Gastrointestinal: Negative for constipation. Neurological: Positive for numbness and paresthesias. Physical Exam:  BP (!) 148/72   Pulse 77   Resp 18   Ht 5' 7\" (1.702 m)   Wt 249 lb (112.9 kg)   BMI 39.00 kg/m²     Physical Exam  Cardiovascular:      Rate and Rhythm: Normal rate. Pulmonary:      Effort: Pulmonary effort is normal.   Musculoskeletal:      Lumbar back: She exhibits decreased range of motion and tenderness. Comments: Stooped posture - using w/c   Skin:     General: Skin is warm and dry. Neurological:      Mental Status: She is alert and oriented to person, place, and time.          Record/Diagnostics Review:    Last melva 2/2019  and was appropriate     Assessment:  Problem List Items Addressed This Visit     Chronic bilateral low back pain with bilateral sciatica - Primary (Chronic)    Relevant Medications    oxyCODONE-acetaminophen (PERCOCET) 5-325 MG per tablet (Start on 2/10/2020)    Other Relevant Orders    DRUG SCREEN, PAIN    Spinal stenosis of lumbar region without neurogenic claudication    Relevant Medications    oxyCODONE-acetaminophen (PERCOCET) 5-325 MG per tablet (Start on 2/10/2020)    Other Relevant Orders    DRUG SCREEN, PAIN    Encounter for medication management    Relevant Orders    DRUG SCREEN, PAIN    DDD (degenerative disc disease), lumbar    Relevant Medications    oxyCODONE-acetaminophen (PERCOCET) 5-325 MG per tablet (Start on 2/10/2020)    Other Relevant Orders    DRUG SCREEN, PAIN             Treatment Plan:  Patient relates current medications are helping the pain. Patient reports taking pain medications as prescribed, denies obtaining medications from different sources and denies use of illegal drugs. Patient denies side effects from medications like nausea, vomiting, constipation or drowsiness. Patient reports current activities of daily living are possible due to medications and would like to continue them. As always, we encourage daily stretching and strengthening exercises, and recommend minimizing use of pain medications unless patient cannot get through daily activities due to pain. Contract requirements met. Continue opioid therapy. Script written for percocet  PURVI obtained today for monitoring purposes.  Last dose of medication was  today  Follow up appointment made for 4 weeks with MD per pt request

## 2020-02-10 LAB
6-ACETYLMORPHINE, UR: NOT DETECTED
7-AMINOCLONAZEPAM, URINE: NOT DETECTED
ALPHA-OH-ALPRAZ, URINE: NOT DETECTED
ALPRAZOLAM, URINE: NOT DETECTED
AMPHETAMINES, URINE: NOT DETECTED
BARBITURATES, URINE: NOT DETECTED
BENZOYLECGONINE, UR: NOT DETECTED
BUPRENORPHINE URINE: NOT DETECTED
CARISOPRODOL, UR: NOT DETECTED
CLONAZEPAM, URINE: NOT DETECTED
CODEINE, URINE: NOT DETECTED
CREATININE URINE: 227.5 MG/DL (ref 20–400)
DIAZEPAM, URINE: NOT DETECTED
DRUGS EXPECTED, UR: NORMAL
EER HI RES INTERP UR: NORMAL
ETHYL GLUCURONIDE UR: NOT DETECTED
FENTANYL URINE: NOT DETECTED
HYDROCODONE, URINE: NOT DETECTED
HYDROMORPHONE, URINE: NOT DETECTED
LORAZEPAM, URINE: NOT DETECTED
MARIJUANA METAB, UR: NOT DETECTED
MDA, UR: NOT DETECTED
MDEA, EVE, UR: NOT DETECTED
MDMA URINE: NOT DETECTED
MEPERIDINE METAB, UR: NOT DETECTED
METHADONE, URINE: NOT DETECTED
METHAMPHETAMINE, URINE: NOT DETECTED
METHYLPHENIDATE: NOT DETECTED
MIDAZOLAM, URINE: NOT DETECTED
MORPHINE URINE: NOT DETECTED
NORBUPRENORPHINE, URINE: NOT DETECTED
NORDIAZEPAM, URINE: NOT DETECTED
NORFENTANYL, URINE: NOT DETECTED
NORHYDROCODONE, URINE: NOT DETECTED
NOROXYCODONE, URINE: NOT DETECTED
NOROXYMORPHONE, URINE: NOT DETECTED
OXAZEPAM, URINE: NOT DETECTED
OXYCODONE URINE: NOT DETECTED
OXYMORPHONE, URINE: NOT DETECTED
PAIN MANAGEMENT DRUG PANEL INTERP, URINE: NORMAL
PAIN MGT DRUG PANEL, HI RES, UR: NORMAL
PCP,URINE: NOT DETECTED
PHENTERMINE, UR: NOT DETECTED
PROPOXYPHENE, URINE: NOT DETECTED
TAPENTADOL, URINE: NOT DETECTED
TAPENTADOL-O-SULFATE, URINE: NOT DETECTED
TEMAZEPAM, URINE: NOT DETECTED
TRAMADOL, URINE: NOT DETECTED
ZOLPIDEM, URINE: NOT DETECTED

## 2020-02-21 ENCOUNTER — HOSPITAL ENCOUNTER (OUTPATIENT)
Age: 85
Setting detail: SPECIMEN
Discharge: HOME | End: 2020-02-21
Payer: MEDICARE

## 2020-02-22 LAB
-: NORMAL
AMORPHOUS: NORMAL
BACTERIA: NORMAL
BILIRUBIN URINE: NEGATIVE
CASTS UA: NORMAL /LPF (ref 0–8)
COLOR: YELLOW
COMMENT UA: ABNORMAL
CRYSTALS, UA: NORMAL /HPF
EPITHELIAL CELLS UA: NORMAL /HPF (ref 0–5)
GLUCOSE URINE: NEGATIVE
KETONES, URINE: NEGATIVE
LEUKOCYTE ESTERASE, URINE: ABNORMAL
MUCUS: NORMAL
NITRITE, URINE: NEGATIVE
OTHER OBSERVATIONS UA: NORMAL
PH UA: 8 (ref 5–8)
PROTEIN UA: NEGATIVE
RBC UA: NORMAL /HPF (ref 0–4)
RENAL EPITHELIAL, UA: NORMAL /HPF
SPECIFIC GRAVITY UA: 1.01 (ref 1–1.03)
TRICHOMONAS: NORMAL
TURBIDITY: CLEAR
URINE HGB: NEGATIVE
UROBILINOGEN, URINE: NORMAL
WBC UA: NORMAL /HPF (ref 0–5)
YEAST: NORMAL

## 2020-02-23 LAB
CULTURE: ABNORMAL
Lab: ABNORMAL
SPECIMEN DESCRIPTION: ABNORMAL

## 2020-03-06 ENCOUNTER — HOSPITAL ENCOUNTER (OUTPATIENT)
Dept: PAIN MANAGEMENT | Age: 85
Discharge: HOME | End: 2020-03-06
Payer: MEDICARE

## 2020-03-06 VITALS
DIASTOLIC BLOOD PRESSURE: 66 MMHG | HEART RATE: 53 BPM | OXYGEN SATURATION: 99 % | SYSTOLIC BLOOD PRESSURE: 119 MMHG | RESPIRATION RATE: 16 BRPM

## 2020-03-06 PROCEDURE — 99213 OFFICE O/P EST LOW 20 MIN: CPT

## 2020-03-06 PROCEDURE — 99213 OFFICE O/P EST LOW 20 MIN: CPT | Performed by: NURSE PRACTITIONER

## 2020-03-06 RX ORDER — OXYCODONE HYDROCHLORIDE AND ACETAMINOPHEN 5; 325 MG/1; MG/1
1 TABLET ORAL EVERY 6 HOURS PRN
Qty: 120 TABLET | Refills: 0 | Status: SHIPPED | OUTPATIENT
Start: 2020-03-11 | End: 2020-04-04 | Stop reason: SDUPTHER

## 2020-03-06 ASSESSMENT — ENCOUNTER SYMPTOMS
COUGH: 0
SHORTNESS OF BREATH: 0
BACK PAIN: 1
CONSTIPATION: 0

## 2020-03-06 NOTE — PROGRESS NOTES
regurgitation     Myalgia     Pulmonary hypertension [facility] [address]     Shingles 2018    left facial area and involved eye    Sleep apnea     Syncope and collapse        Past Surgical History:   Procedure Laterality Date    ABDOMEN SURGERY      CATARACT REMOVAL WITH IMPLANT Right 1-209     SECTION      COLECTOMY      COLONOSCOPY      HIP ARTHROPLASTY      3 on the left and 1 on the right    HYSTERECTOMY      INSERTABLE CARDIAC MONITOR  2018    [employer]    JOINT REPLACEMENT Right     TOTAL KNEE    JOINT REPLACEMENT Bilateral     right x2, left x3 hips    KNEE SURGERY         No Known Allergies      Current Outpatient Medications:     oxyCODONE-acetaminophen (PERCOCET) 5-325 MG per tablet, Take 1 tablet by mouth every 6 hours as needed for Pain for up to 30 days. MAY TAKE AN EXTRA 1 ON OCCASION FOR SEVERE PAIN, Disp: 120 tablet, Rfl: 0    losartan (COZAAR) 25 MG tablet, , Disp: , Rfl:     oxyCODONE-acetaminophen (PERCOCET) 5-325 MG per tablet, Take 1 tablet by mouth every 6 hours as needed for Pain for up to 30 days.  MAY TAKE AN EXTRA 1 ON OCCASION FOR SEVERE PAIN, Disp: 120 tablet, Rfl: 0    hydrOXYzine (ATARAX) 25 MG tablet, Take 25 mg by mouth every 8 hours as needed for Anxiety, Disp: , Rfl:     simvastatin (ZOCOR) 40 MG tablet, , Disp: , Rfl:     acyclovir (ZOVIRAX) 200 MG capsule, Take 200 mg by mouth 2 times daily, Disp: , Rfl:     metoprolol tartrate (LOPRESSOR) 25 MG tablet, , Disp: , Rfl:     escitalopram (LEXAPRO) 20 MG tablet, , Disp: , Rfl:     amLODIPine (NORVASC) 5 MG tablet, TAKE ONE TABLET BY MOUTH DAILY, Disp: 90 tablet, Rfl: 3    furosemide (LASIX) 20 MG tablet, Take 1 tablet by mouth daily as needed (for 2 lb weight gain/increased swelling/increased shortness of breath), Disp: 60 tablet, Rfl: 3    Cholecalciferol (VITAMIN D) 2000 units CAPS capsule, Take by mouth Pt taking 1 time a week, unsure of dose, Disp: , Rfl:     ASPERCREME LIDOCAINE EX, Apply topically, Disp: , Rfl:     VENTOLIN  (90 Base) MCG/ACT inhaler, , Disp: , Rfl:     omeprazole (PRILOSEC) 20 MG delayed release capsule, , Disp: , Rfl:     hydrocortisone 2.5 % cream, Apply topically 2 times daily. , Disp: 30 g, Rfl: 2    levothyroxine (LEVOTHROID) 50 MCG tablet, Take 1 tablet by mouth daily, Disp: 30 tablet, Rfl: 3    glucose monitoring kit (FREESTYLE) monitoring kit, 1 kit by Does not apply route daily as needed, Disp: 1 kit, Rfl: 0    glucose blood VI test strips (EXACTECH TEST) strip, 1 each by In Vitro route daily Type 2 diabetes qd testing, Disp: 100 each, Rfl: 3    Accu-Chek Multiclix Lancets MISC, Test qd;type 2 diabetes, Disp: 100 each, Rfl: 3    Scooter MISC, by Does not apply route Use daily dx arthritis obesity pulmonary htn,copd, Disp: 1 each, Rfl: 0    metFORMIN (GLUCOPHAGE) 500 MG tablet, TAKE ONE TABLET BY MOUTH TWICE A DAY, Disp: 180 tablet, Rfl: 2    aspirin 325 MG tablet, Take 325 mg by mouth daily. , Disp: , Rfl:     Family History   Problem Relation Age of Onset    Cancer Mother     Hypertension Maternal Aunt     Cancer Daughter        Social History     Socioeconomic History    Marital status: Single     Spouse name: Not on file    Number of children: 3    Years of education: Not on file    Highest education level: Not on file   Occupational History    Occupation: RETIRED   Social Needs    Financial resource strain: Not on file    Food insecurity:     Worry: Not on file     Inability: Not on file    Transportation needs:     Medical: Not on file     Non-medical: Not on file   Tobacco Use    Smoking status: Former Smoker     Years: 5.00     Last attempt to quit: 1985     Years since quittin.3    Smokeless tobacco: Never Used   Substance and Sexual Activity    Alcohol use: No     Alcohol/week: 0.0 standard drinks    Drug use: No    Sexual activity: Not on file   Lifestyle    Physical activity:     Days per week: Not on file     Minutes per session: tablet (Start on 3/11/2020)    Encounter for medication management    DDD (degenerative disc disease), lumbar    Relevant Medications    oxyCODONE-acetaminophen (PERCOCET) 5-325 MG per tablet (Start on 3/11/2020)    Lumbar degenerative disc disease    Relevant Medications    oxyCODONE-acetaminophen (PERCOCET) 5-325 MG per tablet (Start on 3/11/2020)    Chronic pain of both knees    Relevant Medications    oxyCODONE-acetaminophen (PERCOCET) 5-325 MG per tablet (Start on 3/11/2020)    Pain of both hip joints    Hip pain, bilateral             Treatment Plan:  Patient relates current medications are helping the pain. Patient reports taking pain medications as prescribed, denies obtaining medications from different sources and denies use of illegal drugs. Patient denies side effects from medications like nausea, vomiting, constipation or drowsiness. Patient reports current activities of daily living are possible due to medications and would like to continue them. As always, we encourage daily stretching and strengthening exercises, and recommend minimizing use of pain medications unless patient cannot get through daily activities due to pain. Contract requirements met. Continue opioid therapy.  Script written for percocet  Follow up appointment made for 4 weeks with MD per pt request

## 2020-04-04 RX ORDER — OXYCODONE HYDROCHLORIDE AND ACETAMINOPHEN 5; 325 MG/1; MG/1
1 TABLET ORAL EVERY 6 HOURS PRN
Qty: 120 TABLET | Refills: 0 | Status: SHIPPED | OUTPATIENT
Start: 2020-04-10 | End: 2020-05-07 | Stop reason: SDUPTHER

## 2020-05-06 ASSESSMENT — ENCOUNTER SYMPTOMS
SHORTNESS OF BREATH: 1
ABDOMINAL PAIN: 0
BOWEL INCONTINENCE: 0
SINUS PRESSURE: 1
ALLERGIC/IMMUNOLOGIC NEGATIVE: 1
EYES NEGATIVE: 1
PHOTOPHOBIA: 0
COUGH: 1
EYE PAIN: 0
BACK PAIN: 1
VOMITING: 0
SORE THROAT: 1
CONSTIPATION: 1
NAUSEA: 0
DIARRHEA: 1

## 2020-05-07 ENCOUNTER — HOSPITAL ENCOUNTER (OUTPATIENT)
Dept: PAIN MANAGEMENT | Age: 85
Discharge: HOME | End: 2020-05-07
Payer: MEDICARE

## 2020-05-07 PROCEDURE — 99443 PR PHYS/QHP TELEPHONE EVALUATION 21-30 MIN: CPT | Performed by: PAIN MEDICINE

## 2020-05-07 PROCEDURE — 99213 OFFICE O/P EST LOW 20 MIN: CPT

## 2020-05-07 RX ORDER — OXYCODONE HYDROCHLORIDE AND ACETAMINOPHEN 5; 325 MG/1; MG/1
1 TABLET ORAL EVERY 6 HOURS PRN
Qty: 120 TABLET | Refills: 0 | Status: SHIPPED
Start: 2020-05-10 | End: 2020-06-08 | Stop reason: SDUPTHER

## 2020-05-07 NOTE — PROGRESS NOTES
lifestyle. Hip Pain    The incident occurred more than 1 week ago (6 yrs ago). Injury mechanism: had bilat. hip replacement. The pain is present in the right hip, left hip and right leg. The quality of the pain is described as aching, shooting and stabbing (sharp). The pain is at a severity of 10/10. The pain is severe. The pain has been constant since onset. Associated symptoms include an inability to bear weight and numbness. Pertinent negatives include no tingling. The symptoms are aggravated by movement and weight bearing. Alleviating factors:heat, lidocaine patch and meds   Lifestyle changes experienced with pain: Wakes from sleep, Prevents or limits ADLs, Increases w/prolonged sitting/standing/walking  Mood changes,anxious and depression  Patient currently unemployed. Physical therapy did not help the pain. Are you under psychological counseling at present: No  Goals for treatment include:  Decrease in pain  Enjoy daily and recreational activities, return to previous status. Patient relates current medications are helping the pain. Patient reports taking pain medications as prescribed, denies obtaining medications from different sources and denies use of illegal drugs. Patient denies side effects from medications like nausea, vomiting, constipation or drowsiness. Patient reports current activities of daily living ar possible due to medications and would like to continue them. ADVERSE MEDICATION EFFECTS:   Nausea and vomiting: no   Constipation: no-Undercontrol-: not applicable  Dizziness/drowsy/sleepy--no  Urinary Retention: no    ACTIVITY/SOCIAL/EMOTIONAL:  Sleep Pattern: 7 hours per night. nightime awakenings  Home Exercises: rarely none  Activity:unchanged  Emotional Issues: depression.    Currently seeing a Psychiatrist or Psychologist:  No      ABERRANT BEHAVIORS SINCE LAST VISIT  Lost rx/pills:------------------------------------------ no  Taking  medication as prescribed: ----------- Results From [facility]     Component Value Ref Range & Units Status Collected Lab   Pain Management Drug Panel Interp, Urine Consistent   Final 02/06/2020  4:30 PM [facility]   (NOTE)   ________________________________________________________________   DRUGS EXPECTED:   PERCOCET (OXYCODONE) [2/6/20]   ________________________________________________________________   CONSISTENT with medications provided:   PERCOCET (OXYCODONE) : based on oxycodone and metabolites detected   below cutoff   ________________________________________________________________   Drugs Not Included in this Assay:   Acetaminophen   ________________________________________________________________       X-Ray reports: There is no lumbar compression fracture or subluxation. Diffuse lumbar    spondylosis is present. Severe degenerative disc space narrowing at the    levels of L3-L4 and L4-L5 as well as L5-S1. Facet joint degenerative    changes at the levels of L3-S1. There is a minimal grade 1    anterolisthesis of L3 over L4. Pedicles are unremarkable. There is no    scoliosis. There are surgical clips in the right side of the lower    abdomen. Evidence of a left hip total arthroplasty.               IMPRESSION:    Diffuse lumbar spondylosis and multi-level degenerative    disease.       Report Electronically signed by Monique Garg M.D. on 8/20/2013        Clinical  impression:  1. Spinal stenosis of lumbar region without neurogenic claudication    2. DDD (degenerative disc disease), lumbar    3. Diabetic polyneuropathy associated with type 2 diabetes mellitus ([facility] [address]    4. Lumbar degenerative disc disease    5. Encounter for long-term (current) use of other medications    6. Encounter for medication management    7. 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   Orders Placed This Encounter   Medications    oxyCODONE-acetaminophen (PERCOCET) 5-325 MG per tablet     Sig: Take 1 tablet by mouth every 6 hours as needed for Pain for up to 30 days. MAY TAKE AN EXTRA 1 ON OCCASION FOR SEVERE PAIN     Dispense:  120 tablet     Refill:  0     Reduce doses taken as pain becomes manageable     Urine drug screens have been appropriate. No aberrant activity noted. Analgesia is achieved. Activities of daily living are possible because of medications. Safe use of medications explained to patient. PDMP Monitoring:    Last PDMP Cher Gray as Reviewed [facility]):  Review User Review Instant Review Result   Angelina Da Silva 5/6/2020 10:00 PM Reviewed PDMP [1]     Counselling/Preventive measures for pain  Control:    [x]  Spine strengthening exercises are discussed with patient in detail. [x] Ill effects of being on chronic pain medications such as sleep disturbances, hormonal changes, withdrawal symptoms,  chronic opioid dependence and tolerance were discussed with patient. I had asked the patient to minimize medication use and utilize pain medications only for uncontrolled rest pain or pain with exertional activities. I advised patient not to self escalate pain medications without consulting with us. At each of patient's future visits we will try to taper pain medications, while adjusting the adjunct medications, and re-evaluating for Physical Therapy to improve spinal and joint strength. We will continue to have discussions to decrease pain medications as tolerated. I also discussed with the patient regarding the dangers of combining narcotic pain medication with tranquilizers, alcohol or illegal drugs or taking the medication any other than prescribed. The dangers including the respiratory depression and death. Patient was told to tell  to all  physicians regarding the medications he is getting from pain clinic.  Patient is warned not to take any unprescribed medications over-the-counter medications that can depress breathing . Patient is advised to talk to the pharmacist or physicians if planning to take any over-the-counter medications before  takeing them. Patient is strongly advised to avoid tranquilizers or  Relaxants for any medications that can depress breathing or recreational drugs. Patient is also advised to tell us if there is any changes in his medications from other physicians. We discussed the same at today's visit and have not been to implement it, as the patient's pain is not under control with current medications. Patient is having increased pain in her hips. Will obtain an x-ray of the hip to evaluate them better. She may need surgical evaluation. She is advised to talk to her primary care physician regarding this. Orders Placed This Encounter   Procedures    XR HIP BILATERAL W AP PELVIS (2 VIEWS)     Standing Status:   Future     Standing Expiration Date:   5/7/2021       Decision Making Process : Patient's health history and referral records thoroughly reviewed before focused physical examination and discussion with patient. I have spent 25 mins. Over 50% of today's visit is spent on examining the patient and counseling and coordinating the care. Level of complexity of date to be reviewed is Moderate. The chart date reviewed include the following: Imaging Reports. Summary of Care. Time spent reviewing with patient the below reports:   Medication safety, Treatment options. Level of diagnosis and management options of this case is multiple: involving the following management options: Interventions as needed, medication management as appropriate, future visits, activity modification, heat/ice as needed, Urine drug screen as required. [x]The patient's questions were answered to the best of my abilities. This note was created using voice recognition software. There may be inaccuracies of transcription  that are inadvertently overlooked prior to the signature.   There is any questions about the transcription please contact me. Return in  4 weeks  with physician / CNP  for further plan of treatment. Due to the COVID-19 pandemic and the appropriate interventions by [facility], our non-urgent pain management patients will not be seen in the office at this time for their protection and the protection of our staff. To offer continuity of care, their prescriptions will be escribed this month after a careful chart review and review of their OARRS report  Pursuant to the emergency declaration under the [employer] [facility], 1135 waiver authority and the [employer] Act, this Virtual Visit was conducted, with patient's consent, to reduce the patient's risk of exposure to COVID-19 and provide continuity of care for an established patient. Services were provided through a video synchronous discussion virtually to substitute for in-person appointment. \"  Documentation:  I communicated with the patient and/or health care decision maker about plan of care  Details of this discussion including any medical advice provided: Total Time: minutes: 21-30 minutes    I affirm this is a Patient Initiated Episode with an Established Patient who has not had a related appointment within my department in the past 7 days or scheduled within the next 24 hours.     Electronically signed by Aidan Mzaariegos MD on 5/7/2020 at 10:59 AM

## 2020-05-13 ENCOUNTER — HOSPITAL ENCOUNTER (OUTPATIENT)
Age: 85
Setting detail: SPECIMEN
Discharge: HOME | End: 2020-05-13
Payer: MEDICARE

## 2020-05-14 LAB
CULTURE: ABNORMAL
Lab: ABNORMAL
SPECIMEN DESCRIPTION: ABNORMAL

## 2020-06-08 ENCOUNTER — HOSPITAL ENCOUNTER (OUTPATIENT)
Dept: PAIN MANAGEMENT | Age: 85
Discharge: HOME | End: 2020-06-08
Payer: MEDICARE

## 2020-06-08 PROCEDURE — 99442 PR PHYS/QHP TELEPHONE EVALUATION 11-20 MIN: CPT | Performed by: NURSE PRACTITIONER

## 2020-06-08 PROCEDURE — 99213 OFFICE O/P EST LOW 20 MIN: CPT

## 2020-06-08 RX ORDER — OXYCODONE HYDROCHLORIDE AND ACETAMINOPHEN 5; 325 MG/1; MG/1
1 TABLET ORAL EVERY 6 HOURS PRN
Qty: 120 TABLET | Refills: 0 | Status: SHIPPED | OUTPATIENT
Start: 2020-06-11 | End: 2020-07-07 | Stop reason: SDUPTHER

## 2020-06-08 RX ORDER — FERROUS SULFATE 325(65) MG
325 TABLET ORAL
COMMUNITY
End: 2020-08-10 | Stop reason: ALTCHOICE

## 2020-06-08 ASSESSMENT — ENCOUNTER SYMPTOMS
CONSTIPATION: 1
SHORTNESS OF BREATH: 1

## 2020-06-08 NOTE — PROGRESS NOTES
Disp: , Rfl:     escitalopram (LEXAPRO) 20 MG tablet, , Disp: , Rfl:     amLODIPine (NORVASC) 5 MG tablet, TAKE ONE TABLET BY MOUTH DAILY, Disp: 90 tablet, Rfl: 3    Cholecalciferol (VITAMIN D) 2000 units CAPS capsule, Take by mouth Pt taking 1 time a week, unsure of dose, Disp: , Rfl:     ASPERCREME LIDOCAINE EX, Apply topically, Disp: , Rfl:     omeprazole (PRILOSEC) 20 MG delayed release capsule, , Disp: , Rfl:     levothyroxine (LEVOTHROID) 50 MCG tablet, Take 1 tablet by mouth daily, Disp: 30 tablet, Rfl: 3    metFORMIN (GLUCOPHAGE) 500 MG tablet, TAKE ONE TABLET BY MOUTH TWICE A DAY, Disp: 180 tablet, Rfl: 2    aspirin 325 MG tablet, Take 325 mg by mouth daily. , Disp: , Rfl:     hydrOXYzine (ATARAX) 25 MG tablet, Take 25 mg by mouth every 8 hours as needed for Anxiety, Disp: , Rfl:     furosemide (LASIX) 20 MG tablet, Take 1 tablet by mouth daily as needed (for 2 lb weight gain/increased swelling/increased shortness of breath), Disp: 60 tablet, Rfl: 3    VENTOLIN  (90 Base) MCG/ACT inhaler, , Disp: , Rfl:     hydrocortisone 2.5 % cream, Apply topically 2 times daily. , Disp: 30 g, Rfl: 2    glucose monitoring kit (FREESTYLE) monitoring kit, 1 kit by Does not apply route daily as needed, Disp: 1 kit, Rfl: 0    glucose blood VI test strips (EXACTECH TEST) strip, 1 each by In Vitro route daily Type 2 diabetes qd testing, Disp: 100 each, Rfl: 3    Accu-Chek Multiclix Lancets MISC, Test qd;type 2 diabetes, Disp: 100 each, Rfl: 3    Scooter MISC, by Does not apply route Use daily dx arthritis obesity pulmonary htn,copd, Disp: 1 each, Rfl: 0    Family History   Problem Relation Age of Onset    Cancer Mother     Hypertension Maternal Aunt     Cancer Daughter        Social History     Socioeconomic History    Marital status: Single     Spouse name: Not on file    Number of children: 3    Years of education: Not on file    Highest education level: Not on file   Occupational History    Psychiatric:         Mood and Affect: Mood normal.         Thought Content: Thought content normal.           Assessment:    Problem List Items Addressed This Visit     Spinal stenosis of lumbar region without neurogenic claudication    Relevant Medications    oxyCODONE-acetaminophen (PERCOCET) 5-325 MG per tablet (Start on 6/11/2020)    Lumbar degenerative disc disease    Relevant Medications    oxyCODONE-acetaminophen (PERCOCET) 5-325 MG per tablet (Start on 6/11/2020)    Hip pain, chronic, right    Relevant Medications    oxyCODONE-acetaminophen (PERCOCET) 5-325 MG per tablet (Start on 6/11/2020)    Hip pain, bilateral - Primary    DDD (degenerative disc disease), lumbar    Relevant Medications    oxyCODONE-acetaminophen (PERCOCET) 5-325 MG per tablet (Start on 6/11/2020)    Chronic pain of both knees    Relevant Medications    oxyCODONE-acetaminophen (PERCOCET) 5-325 MG per tablet (Start on 6/11/2020)    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 Possible side effects, risk of tolerance and or dependence and alternative treatments discussed    Obtaining appropriate analgesic effect of treatment   No signs of potential drug abuse or diversion identified    [x] Ill effects of being on chronic pain medications such as sleep disturbances, respiratory depression, hormonal changes, withdrawal symptoms, chronic opioid dependence and tolerance as well as risk of taking opioids with Benzodiazepines and taking opioids along with alcohol,  werediscussed with patient. I had asked the patient to minimize medication use and utilize pain medications only for uncontrolled rest pain or pain with exertional activities.  I advised patient not to self-escalate painmedications without consulting with us. At each of patient's future visits we will try to taper pain medications, while adjusting the adjunct medications, and re-evaluating for Physical Therapy to improve spinal andjoint strength. We will continue to have discussions to decrease pain medications as tolerated. Counseled patient on effects their pain medication and /or their medical condition mayhave on their  ability to drive or operate machinery. Instructed not to drive or operate machinery if drowsy     I also discussed with the patient regarding the dangers of combining narcotic pain medication with tranquilizers, alcohol or illegal drugs or taking the medication any way other than prescribed. The dangers were discussed  including respiratory depression and death. Patient was told to tell  all  physicians regarding the medications he is getting from pain clinic. Patient is warned not to take any unprescribed medications over-the-countermedications that can depress breathing . Patient is advised to talk to the pharmacist or physicians if planning to take any over-the-counter medications before  takeing them. Patient is strongly advised to avoid tranquilizers or  relaxants, illegal drugs  or any medications that can depress breathing  Patient is also advised to tell us if there is any changes in their medications from other physicians.     Location:  patient  at home      , provider working from home  Phone call: 15 minutes    TREATMENT OPTIONS:       Medication Agreement Requirements Met  Continue Opioid therapy  Script written for percocet  Follow up appointment made

## 2020-07-07 ENCOUNTER — HOSPITAL ENCOUNTER (OUTPATIENT)
Dept: PAIN MANAGEMENT | Age: 85
Discharge: HOME | End: 2020-07-07
Payer: MEDICARE

## 2020-07-07 PROCEDURE — 99213 OFFICE O/P EST LOW 20 MIN: CPT

## 2020-07-07 PROCEDURE — 99442 PR PHYS/QHP TELEPHONE EVALUATION 11-20 MIN: CPT | Performed by: NURSE PRACTITIONER

## 2020-07-07 RX ORDER — OXYCODONE HYDROCHLORIDE AND ACETAMINOPHEN 5; 325 MG/1; MG/1
1 TABLET ORAL EVERY 6 HOURS PRN
Qty: 120 TABLET | Refills: 0 | Status: SHIPPED | OUTPATIENT
Start: 2020-07-07 | End: 2020-08-10 | Stop reason: SDUPTHER

## 2020-07-07 ASSESSMENT — ENCOUNTER SYMPTOMS
GASTROINTESTINAL NEGATIVE: 1
SHORTNESS OF BREATH: 1

## 2020-07-07 NOTE — PROGRESS NOTES
[facility] PROGRESS NOTE      Patient  Phone call to  review Medication Agreement    Chief Complaint:knee pain      HPI: She c/o knee pain, She has history of bilateral knee replacement surgery. It is harder for her to walk even with her walker. She is not sleeping well. She states is not very active. She lives with her daughter. No Ed visits. Knee Pain    The incident occurred more than 1 week ago. There was no injury mechanism. The pain is present in the left knee and right knee. Quality: sharp. The pain is at a severity of 9/10. The pain is severe. The pain has been worsening since onset. Associated symptoms include muscle weakness. Foreign body present: knee replacement. Exacerbated by: walking. She has tried heat and ice for the symptoms. The treatment provided mild relief. Patient denies any new neurological symptoms. No bowel or bladder incontinence, no weakness, and no falling. Any new diagnostic workup: [x] no  [] yes [] Xray [] CT scan [] MRI [] DEXA scan     [] Other                    Treatment goals:  Functional status: reduce pain to a 5      Aberrancy:   Any alcoholic beverages no    Any illegal drugs  no       Analgesia:9    Adverse  Effects :none  ADL;s :not active      Pill count: appropriate    Morphine equivalent dose as reported on OARRS:30  Periodic Controlled Substance Monitoring: Possible medication side effects, risk of tolerance/dependence & alternative treatments discussed., No signs of potential drug abuse or diversion identified., Obtaining appropriate analgesic effect of treatment. , Random urine drug screen sent today. Jesus De Anda, APRN - CNP)  Review ofOARRS does not show any aberrant prescription behavior. Medication is helping the patient stay active. Patient denies any side effects and reports adequate analgesia. No sign of misuse/abuse.         When was thelast UDS:  2-6-2020           Was the UDS appropriate:yes      Record/Diagnostics Review: As above, I did review the imaging    2/10/2020 12:39 PM - Rudy, Radha Incoming Lab Results From [facility]     Component Value Ref Range & Units Status Collected Lab   Pain Management Drug Panel Interp, Urine Consistent   Final 2020  4:30 PM [facility]   (NOTE)   ________________________________________________________________   DRUGS EXPECTED:   PERCOCET (OXYCODONE) [20]   ________________________________________________________________   CONSISTENT with medications provided:   PERCOCET (OXYCODONE) : based on oxycodone and metabolites detected   below cutoff   ________________________________________________________________   Drugs Not Included in this Assay:   Acetaminophen   ___________________________________           Past Medical History:   Diagnosis Date    Anxiety     Arthritis     Bronchitis     CAD (coronary artery disease)     Carotid arterial disease ([address]     [facility]ch[facility] [address]     Diabetes mellitus ([facility] [address]     Hyperlipidemia     Hypertension     Mitral regurgitation     Myalgia     Pulmonary hypertension ([facility] [address]     Shingles 2018    left facial area and involved eye    Sleep apnea     Syncope and collapse        Past Surgical History:   Procedure Laterality Date    ABDOMEN SURGERY      CATARACT REMOVAL WITH IMPLANT Right 1-     SECTION      COLECTOMY      COLONOSCOPY      HIP ARTHROPLASTY      3 on the left and 1 on the right    HYSTERECTOMY      INSERTABLE CARDIAC MONITOR  2018    [employer]    JOINT REPLACEMENT Right     TOTAL KNEE    JOINT REPLACEMENT Bilateral     right x2, left x3 hips    KNEE SURGERY         No Known Allergies      Current Outpatient Medications:     ferrous sulfate (IRON 325) 325 (65 Fe) MG tablet, Take 325 mg by mouth daily (with breakfast), Disp: , Rfl:     oxyCODONE-acetaminophen (PERCOCET) 5-325 MG per tablet, Take 1 tablet by mouth every 6 hours as needed for Pain for up to 30 days.  MAY TAKE AN  Cancer Daughter        Social History     Socioeconomic History    Marital status: Single     Spouse name: Not on file    Number of children: 3    Years of education: Not on file    Highest education level: Not on file   Occupational History    Occupation: RETIRED   Social Needs    Financial resource strain: Not on file    Food insecurity     Worry: Not on file     Inability: Not on file   [employer] needs     Medical: Not on file     Non-medical: Not on file   Tobacco Use    Smoking status: Former Smoker     Years: 5.00     Last attempt to quit: 1985     Years since quittin.6    Smokeless tobacco: Never Used   Substance and Sexual Activity    Alcohol use: No     Alcohol/week: 0.0 standard drinks    Drug use: No    Sexual activity: Not on file   Lifestyle    Physical activity     Days per week: Not on file     Minutes per session: Not on file    Stress: Not on file   Relationships    Social connections     Talks on phone: Not on file     Gets together: Not on file     Attends Orthodoxy service: Not on file     Active member of club or organization: Not on file     Attends meetings of clubs or organizations: Not on file     Relationship status: Not on file    Intimate partner violence     Fear of current or ex partner: Not on file     Emotionally abused: Not on file     Physically abused: Not on file     Forced sexual activity: Not on file   Other Topics Concern    Not on file   Social History Narrative    Not on file         Review of Systems:  Review of Systems   Constitution: Positive for malaise/fatigue. HENT: Positive for hearing loss. Eyes: Positive for visual disturbance. Wears glasses   Cardiovascular: Positive for leg swelling. Respiratory: Positive for shortness of breath. Endocrine:        Diabetic   Hematologic/Lymphatic: Negative. Skin: Negative. Musculoskeletal: Positive for arthritis and joint pain. Gastrointestinal: Negative. Genitourinary: Positive for nocturia. Neurological:        Uses a walker   Psychiatric/Behavioral: Negative. Physical Exam:    Physical Exam  Skin:         Neurological:      Mental Status: She is alert. Psychiatric:         Mood and Affect: Mood normal.         Thought Content: Thought content normal.           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 Possible side effects, risk of tolerance and or dependence and alternative treatments discussed    Obtaining appropriate analgesic effect of treatment   No signs of potential drug abuse or diversion identified    [x] Ill effects of being on chronic pain medications such as sleep disturbances, respiratory depression, hormonal changes, withdrawal symptoms, chronic opioid dependence and tolerance as well as risk of taking opioids with Benzodiazepines and taking opioids along with alcohol,  werediscussed with patient. I had asked the patient to minimize medication use and utilize pain medications only for uncontrolled rest pain or pain with exertional activities. I advised patient not to self-escalate painmedications without consulting with us. At each of patient's future visits we will try to taper pain medications, while adjusting the adjunct medications, and re-evaluating for Physical Therapy to improve spinal andjoint strength. We will continue to have discussions to decrease pain medications as tolerated.       Counseled patient on effects their pain medication and /or their medical condition mayhave on their  ability to drive or operate machinery. Instructed not to drive or operate machinery if drowsy     I also discussed with the patient regarding the dangers of combining narcotic pain medication with tranquilizers, alcohol or illegal drugs or taking the medication any way other than prescribed. The dangers were discussed  including respiratory depression and death. Patient was told to tell  all  physicians regarding the medications he is getting from pain clinic. Patient is warned not to take any unprescribed medications over-the-countermedications that can depress breathing . Patient is advised to talk to the pharmacist or physicians if planning to take any over-the-counter medications before  takeing them. Patient is strongly advised to avoid tranquilizers or  relaxants, illegal drugs  or any medications that can depress breathing  Patient is also advised to tell us if there is any changes in their medications from other physicians.     Location:  patient  at    home   , provider working from home  Phone call:  15 minutes    TREATMENT OPTIONS:       Medication Agreement Requirements Met  Continue Opioid therapy  Script written for  percocet  Follow up appointment made

## 2020-08-10 ENCOUNTER — HOSPITAL ENCOUNTER (OUTPATIENT)
Dept: PAIN MANAGEMENT | Age: 85
Discharge: HOME | End: 2020-08-10
Payer: MEDICARE

## 2020-08-10 PROCEDURE — 99213 OFFICE O/P EST LOW 20 MIN: CPT

## 2020-08-10 PROCEDURE — 99441 PR PHYS/QHP TELEPHONE EVALUATION 5-10 MIN: CPT | Performed by: NURSE PRACTITIONER

## 2020-08-10 RX ORDER — OXYCODONE HYDROCHLORIDE AND ACETAMINOPHEN 5; 325 MG/1; MG/1
1 TABLET ORAL EVERY 6 HOURS PRN
Qty: 120 TABLET | Refills: 0 | Status: SHIPPED | OUTPATIENT
Start: 2020-08-13 | End: 2020-09-10

## 2020-08-10 RX ORDER — PREDNISOLONE ACETATE 10 MG/ML
SUSPENSION/ DROPS OPHTHALMIC
COMMUNITY
Start: 2020-06-25 | End: 2020-08-10

## 2020-08-10 RX ORDER — ACYCLOVIR 800 MG/1
TABLET ORAL
COMMUNITY
Start: 2020-06-25 | End: 2020-08-10

## 2020-08-10 ASSESSMENT — ENCOUNTER SYMPTOMS
RESPIRATORY NEGATIVE: 1
GASTROINTESTINAL NEGATIVE: 1

## 2020-08-10 NOTE — PROGRESS NOTES
Leonardo 89 PROGRESS NOTE      Patient  Phone call to  review Medication Agreement    Chief Complaint:joint pain  She c/o multiple joint pain hips and knees. She had bilateral hip replacement and right knee replacement surgery, She also has pain in her shoulder, The pain has increased, She uses aspercreme ointment. She reports PT helped in the past.She does not sleep well. She reports she has a lot of trouble walking, she uses a walker. She has had no ED visits. She is leaving for [place] next week,    Generalized Body Aches   This is a chronic problem. The problem occurs constantly. The problem has been gradually worsening. Associated symptoms comments: Pain is achey,Pain is an 8. Exacerbated by: weather changes, walking. She has tried lying down, oral narcotics and heat for the symptoms. Patient denies any new neurological symptoms. No bowel or bladder incontinence, no weakness, and no falling. Any new diagnostic workup: [x] no  [] yes [] Xray [] CT scan [] MRI [] DEXA scan     [] Other                    Treatment goals:  Functional status: reduce pain to  a 5      Aberrancy:   Any alcoholic beverages     no  Any illegal drugs     no    Analgesia:8      Adverse  Effects :no    ADL;s :  Not active          Pill count: appropriate  Has 12 percocet tabs 3 days worth so fill datee will be 8-  Morphine equivalent dose as reported on OARRS:30  Periodic Controlled Substance Monitoring: Possible medication side effects, risk of tolerance/dependence & alternative treatments discussed., No signs of potential drug abuse or diversion identified. , Assessed functional status., Obtaining appropriate analgesic effect of treatment. Kadi Leon, APRN - CNP)  Review ofOARRS does not show any aberrant prescription behavior. Medication is helping the patient stay active. Patient denies any side effects and reports adequate analgesia. No sign of misuse/abuse.         When was thelast UDS:2020             Was the UDS appropriate:      Record/Diagnostics Review:      As above, I did review the imaging    2/10/2020 12:39 PM - Rudy, Radha Incoming Lab Results From [employer]     Component  Value  Ref Range & Units  Status  Collected  Lab    Pain Management Drug Panel Interp, Urine  Consistent   Final  2020  4:30 PM  [facility]    (NOTE)   ________________________________________________________________   DRUGS EXPECTED:   PERCOCET (OXYCODONE) [20]   ________________________________________________________________   CONSISTENT with medications provided:   PERCOCET (OXYCODONE) : based on oxycodone and metabolites detected   below cutoff   ________________________________________________________________   Drugs Not Included in this Assay:   Acetaminophen   ________________________________________________________________   INTERPRETIVE INFORMATION: Pain Mgt Neil, Mass Spec/EMIT, Ur,                            Interp   Interpretation depends on accuracy and completeness of patient   medication information submitted by client.           Past Medical History:   Diagnosis Date    Anxiety     Arthritis     Bronchitis     CAD (coronary artery disease)     Carotid arterial disease (HCC)     COPD (chronic obstructive pulmonary disease) (HCC)     Diabetes mellitus ([address]     Hyperlipidemia     Hypertension     Mitral regurgitation     Myalgia     Pulmonary hypertension ([address]     Shingles 2018    left facial area and involved eye    Sleep apnea     Syncope and collapse        Past Surgical History:   Procedure Laterality Date    ABDOMEN SURGERY      CATARACT REMOVAL WITH IMPLANT Right      SECTION      COLECTOMY      COLONOSCOPY      HIP ARTHROPLASTY      3 on the left and 1 on the right    HYSTERECTOMY      INSERTABLE CARDIAC MONITOR  2018    [employer]    JOINT REPLACEMENT Right     TOTAL KNEE    JOINT REPLACEMENT Bilateral     right x2, left x3 hips    KNEE SURGERY No Known Allergies      Current Outpatient Medications:     oxyCODONE-acetaminophen (PERCOCET) 5-325 MG per tablet, Take 1 tablet by mouth every 6 hours as needed for Pain for up to 30 days. Fill 7-, Disp: 120 tablet, Rfl: 0    simvastatin (ZOCOR) 40 MG tablet, , Disp: , Rfl:     metoprolol tartrate (LOPRESSOR) 25 MG tablet, , Disp: , Rfl:     escitalopram (LEXAPRO) 20 MG tablet, , Disp: , Rfl:     amLODIPine (NORVASC) 5 MG tablet, TAKE ONE TABLET BY MOUTH DAILY, Disp: 90 tablet, Rfl: 3    ASPERCREME LIDOCAINE EX, Apply topically, Disp: , Rfl:     omeprazole (PRILOSEC) 20 MG delayed release capsule, , Disp: , Rfl:     levothyroxine (LEVOTHROID) 50 MCG tablet, Take 1 tablet by mouth daily, Disp: 30 tablet, Rfl: 3    metFORMIN (GLUCOPHAGE) 500 MG tablet, TAKE ONE TABLET BY MOUTH TWICE A DAY, Disp: 180 tablet, Rfl: 2    aspirin 325 MG tablet, Take 325 mg by mouth daily. , Disp: , Rfl:     hydrOXYzine (ATARAX) 25 MG tablet, Take 25 mg by mouth every 8 hours as needed for Anxiety, Disp: , Rfl:     furosemide (LASIX) 20 MG tablet, Take 1 tablet by mouth daily as needed (for 2 lb weight gain/increased swelling/increased shortness of breath), Disp: 60 tablet, Rfl: 3    Cholecalciferol (VITAMIN D) 2000 units CAPS capsule, Take by mouth Pt taking 1 time a week, unsure of dose, Disp: , Rfl:     VENTOLIN  (90 Base) MCG/ACT inhaler, , Disp: , Rfl:     hydrocortisone 2.5 % cream, Apply topically 2 times daily. , Disp: 30 g, Rfl: 2    glucose monitoring kit (FREESTYLE) monitoring kit, 1 kit by Does not apply route daily as needed, Disp: 1 kit, Rfl: 0    glucose blood VI test strips (EXACTECH TEST) strip, 1 each by In Vitro route daily Type 2 diabetes qd testing, Disp: 100 each, Rfl: 3    Accu-Chek Multiclix Lancets MISC, Test qd;type 2 diabetes, Disp: 100 each, Rfl: 3    Scooter MISC, by Does not apply route Use daily dx arthritis obesity pulmonary htn,copd, Disp: 1 each, Rfl: 0    Family Positive for joint pain. Gastrointestinal: Negative. Genitourinary: Positive for nocturia. Neurological: Positive for dizziness. Gait issue, uses walker   Psychiatric/Behavioral: Positive for depression. The patient is nervous/anxious. Managing         Physical Exam:    Physical Exam  Skin:         Neurological:      Mental Status: She is alert. Psychiatric:         Mood and Affect: Mood normal.         Thought Content: Thought content normal.           Assessment:      Problem List Items Addressed This Visit     Spinal stenosis of lumbar region without neurogenic claudication - Primary    Pain of both hip joints    Lumbar degenerative disc disease    Encounter for medication management    Encounter for long-term (current) use of other medications    Chronic pain following surgery or procedure          Treatment Plan:  DISCUSSION: Treatment options discussed withpatient and all questions answered to patient's satisfaction. Possible side effects, risk of tolerance and or dependence and alternative treatments discussed    Obtaining appropriate analgesic effect of treatment   No signs of potential drug abuse or diversion identified    [x] Ill effects of being on chronic pain medications such as sleep disturbances, respiratory depression, hormonal changes, withdrawal symptoms, chronic opioid dependence and tolerance as well as risk of taking opioids with Benzodiazepines and taking opioids along with alcohol,  werediscussed with patient. I had asked the patient to minimize medication use and utilize pain medications only for uncontrolled rest pain or pain with exertional activities. I advised patient not to self-escalate painmedications without consulting with us. At each of patient's future visits we will try to taper pain medications, while adjusting the adjunct medications, and re-evaluating for Physical Therapy to improve spinal andjoint strength.  We will continue to have discussions to decrease pain medications as tolerated. Counseled patient on effects their pain medication and /or their medical condition mayhave on their  ability to drive or operate machinery. Instructed not to drive or operate machinery if drowsy     I also discussed with the patient regarding the dangers of combining narcotic pain medication with tranquilizers, alcohol or illegal drugs or taking the medication any way other than prescribed. The dangers were discussed  including respiratory depression and death. Patient was told to tell  all  physicians regarding the medications he is getting from pain clinic. Patient is warned not to take any unprescribed medications over-the-countermedications that can depress breathing . Patient is advised to talk to the pharmacist or physicians if planning to take any over-the-counter medications before  takeing them. Patient is strongly advised to avoid tranquilizers or  relaxants, illegal drugs  or any medications that can depress breathing  Patient is also advised to tell us if there is any changes in their medications from other physicians.     Location:  patient  at  home   ,provider working from [facility]  Phone call: 12 minutes    TREATMENT OPTIONS:       Medication Agreement Requirements Met  Continue Opioid therapy  Script written for percocet  Follow up appointment made

## 2020-09-10 ENCOUNTER — HOSPITAL ENCOUNTER (OUTPATIENT)
Dept: PAIN MANAGEMENT | Age: 85
Discharge: HOME | End: 2020-09-10
Payer: MEDICARE

## 2020-09-10 PROCEDURE — 99213 OFFICE O/P EST LOW 20 MIN: CPT

## 2020-09-10 PROCEDURE — 99442 PR PHYS/QHP TELEPHONE EVALUATION 11-20 MIN: CPT | Performed by: NURSE PRACTITIONER

## 2020-09-10 RX ORDER — LOSARTAN POTASSIUM 25 MG/1
TABLET ORAL
COMMUNITY
Start: 2020-08-30

## 2020-09-10 RX ORDER — OXYCODONE AND ACETAMINOPHEN 7.5; 325 MG/1; MG/1
1 TABLET ORAL EVERY 6 HOURS PRN
Qty: 120 TABLET | Refills: 0 | Status: SHIPPED | OUTPATIENT
Start: 2020-09-10 | End: 2020-10-09 | Stop reason: SDUPTHER

## 2020-09-10 RX ORDER — PREDNISOLONE ACETATE 10 MG/ML
SUSPENSION/ DROPS OPHTHALMIC
COMMUNITY
Start: 2020-08-25 | End: 2020-12-09

## 2020-09-10 RX ORDER — POLYMYXIN B SULFATE AND TRIMETHOPRIM 1; 10000 MG/ML; [USP'U]/ML
SOLUTION OPHTHALMIC
COMMUNITY
Start: 2020-08-25 | End: 2020-12-09

## 2020-09-10 ASSESSMENT — ENCOUNTER SYMPTOMS
SHORTNESS OF BREATH: 1
CONSTIPATION: 1

## 2020-09-10 NOTE — PROGRESS NOTES
Leonardo 89 PROGRESS NOTE      Patient  Phone call to  review Medication Agreement    Chief Complaint:right hip pain    She /o right hip pain. She has history of right hip replacement. She states pain has increased, states can barely move. She has history of left hip replacement/. She ambulates with a walker. Her pain is not under control. She sleeps fairly well. No Ed visits. She states will be having eye surgery next week. She states will see her pCP and see about referral to Orthopaedic surgeon for hip pain. Hip Pain    The incident occurred more than 1 week ago. There was no injury mechanism. The pain is present in the right hip and right thigh (rigth groin). Quality: sharp. The pain is at a severity of 9/10. The pain is severe. The pain has been worsening since onset. Associated symptoms include muscle weakness. Foreign body present: bilateral hip replacement. The symptoms are aggravated by weight bearing. Treatments tried: pain medication. The treatment provided mild relief. Patient denies any new neurological symptoms. No bowel or bladder incontinence, no weakness, and no falling. Any new diagnostic workup: [x] no  [] yes [] Xray [] CT scan [] MRI [] DEXA scan     [] Other                    Treatment goals:  Functional status: get some relief      Aberrancy:   Any alcoholic beverages   no    Any illegal drugs   no      Analgesia:9      Adverse  Effects :none    ADL;s :limited      Pill count: appropriate fill date  9-  Morphine equivalent dose as reported on OARRS:30  Periodic Controlled Substance Monitoring: Possible medication side effects, risk of tolerance/dependence & alternative treatments discussed., No signs of potential drug abuse or diversion identified. , Assessed functional status., Obtaining appropriate analgesic effect of treatment. Harley Bond, APRN - CNP)  Review ofOARRS does not show any aberrant prescription behavior.  Medication is helping the patient stay active. Patient denies any side effects and reports adequate analgesia. No sign of misuse/abuse. When was thelast UDS:   2-          Was the UDS appropriate:  Below cutoff  Record/Diagnostics Review:      As above, I did review the imaging    2/10/2020 12:39 PM - Rudy, Mhpn Incoming Lab Results From [employer]     Component  Value  Ref Range & Units  Status  Collected  Lab    Pain Management Drug Panel Interp, Urine  Consistent   Final  02/06/2020  4:30 PM  [facility]    (NOTE)   ________________________________________________________________   DRUGS EXPECTED:   PERCOCET (OXYCODONE) [2/6/20]   ________________________________________________________________   CONSISTENT with medications provided:   PERCOCET (OXYCODONE) : based on oxycodone and metabolites detected   below cutoff   ________________________________________________________________   Drugs Not Included in this Assay:   Acetaminophen   ________________________________________________________________   INTERPRETIVE INFORMATION: Pain Mgt Neil, Mass Spec/EMIT, Ur,                            Interp   Interpretation depends on accuracy and completeness of patient   medication information submitted by client. 6-Acetylmorphine, Ur  Not Detected   Final  02/06/2020  4:30 PM  [facility]    7-Aminoclonazepam, Urine  Not Detected   Final  02/06/2020  4:30 PM  [facility]    Alpha-OH-Alpraz, Urine  Not Detected   Final  02/06/2020  4:30 PM  [facility]    Alprazolam, Urine  Not Detected   Final  02/06/2020  4:30 PM  [facility]    Amphetamines, urine  Not Detected   Final  02/06/2020  4:30 PM  [facility]    Barbiturates, Ur  Not Detected   Final  02/06/2020  4:30 PM  [facility]    Benzoylecgonine, Ur  Not Detected   Final  02/06/2020  4:30 PM  [facility]    Buprenorphine Urine  Not Detected   Final  02/06/2020  4:30 PM  [facility]    Carisoprodol, Ur  Not Detected   Final  02/06/2020  4:30 PM  [facility]    (NOTE)   The carisoprodol immunoassay has cross-reactivity to carisoprodol   and meprobamate. Clonazepam, Urine  Not Detected   Final  02/06/2020  4:30 PM  [facility]    Codeine, Urine  Not Detected   Final  02/06/2020  4:30 PM  [facility]    MDA, Ur  Not Detected   Final  02/06/2020  4:30 PM  [facility]    Diazepam, Urine  Not Detected   Final  02/06/2020  4:30 PM  [facility]    Ethyl Glucuronide Ur  Not Detected   Final  02/06/2020  4:30 PM  [facility]    Fentanyl, Ur  Not Detected   Final  02/06/2020  4:30 PM  [facility]    Hydrocodone, Urine  Not Detected   Final  02/06/2020  4:30 PM  [facility]    Hydromorphone, Urine  Not Detected   Final  02/06/2020  4:30 PM  [facility]    Lorazepam, Urine  Not Detected   Final  02/06/2020  4:30 PM  [facility]    Marijuana Metab, Ur  Not Detected   Final  02/06/2020  4:30 PM  [facility]    MDEA, JYOTHI, Ur  Not Detected   Final  02/06/2020  4:30 PM  [facility]    MDMA, Urine  Not Detected   Final  02/06/2020  4:30 PM  [facility]    Meperidine Metab, Ur  Not Detected   Final  02/06/2020  4:30 PM  [facility]    Methadone, Urine  Not Detected   Final  02/06/2020  4:30 PM  [facility]    Methamphetamine, Urine  Not Detected   Final  02/06/2020  4:30 PM  [facility]    Methylphenidate  Not Detected   Final  02/06/2020  4:30 PM  [facility]    Midazolam, Urine  Not Detected   Final  02/06/2020  4:30 PM  [facility]    Morphine Urine  Not Detected   Final  02/06/2020  4:30 PM  [facility]    Norbuprenorphine, Urine  Not Detected   Final  02/06/2020  4:30 PM  [facility]    Nordiazepam, Urine  Not Detected   Final  02/06/2020  4:30 PM  [facility]    Norfentanyl, Urine  Not Detected   Final  02/06/2020  4:30 PM  [facility]    NORHYDROCODONE, URINE  Not Detected   Final  02/06/2020  4:30 PM  [facility]    Noroxycodone, Urine  Not Detected   Final  02/06/2020  4:30 PM  [facility]    NOROXYMORPHONE, URINE  Not Detected   Final  02/06/2020  4:30 PM  [facility]    Oxazepam, Urine  Not Detected   Final  02/06/2020  4:30 PM  [facility]    Oxycodone Urine  Not Detected   Final  02/06/2020  4:30 PM  [facility]    Oxymorphone, Urine  Not Detected   Final  02/06/2020  4:30 PM  [facility]    PCP, Urine  Not Detected   Final  02/06/2020  4:30 PM  [facility] Phentermine, Ur  Not Detected   Final  02/06/2020  4:30 PM  [facility]    Propoxyphene, Urine  Not Detected   Final  02/06/2020  4:30 PM  [facility]    Tapentadol-O-Sulfate, Urine  Not Detected   Final  02/06/2020  4:30 PM  [facility]    Tapentadol, Urine  Not Detected   Final  02/06/2020  4:30 PM  [facility]    Temazepam, Urine  Not Detected   Final  02/06/2020  4:30 PM  [facility]    Tramadol, Urine  Not Detected   Final  02/06/2020  4:30 PM  [facility]    Zolpidem, Urine  Not Detected   Final  02/06/2020  4:30 PM  [facility]    Drugs Expected, Ur    Final  02/06/2020  4:30 PM  [address] [facility]    PERCOCET 02/06/20. Creatinine, Ur  227.5  20.0 - 400.0 mg/dL  Final  02/06/2020  4:30 PM  [facility]    Pain Mgt Drug Panel, Hi Res, Ur  See Below   Final  02/06/2020  4:30 PM  [facility]    (NOTE)   Methodology: Qualitative Enzyme Immunoassay and Qualitative Liquid   Chromatography-Time of Flight-Mass Spectrometry or Tandem Mass   Spectrometry, Quantitative Spectrophotometry   The absence of expected drug(s) and/or drug metabolite(s) may   indicate non-compliance, inappropriate timing of specimen   collection relative to drug administration, poor drug absorption,   diluted/adulterated urine, or limitations of testing. The   concentration must be greater than or equal to the cutoff to be   reported as present.  If specific drug concentrations are   required, contact the laboratory within two weeks of specimen   collection to request quantification by a second analytical   technique. Interpretive questions should be directed to the   laboratory. Results based on immunoassay detection that do not match clinical   expectations should be   interpreted with caution. Confirmatory testing by mass   spectrometry for immunoassay-based results is available, if   ordered within two weeks of specimen collection. Additional   charges apply. For medical purposes only; not valid for forensic use.    This test was developed and its performance characteristics determined by [facility]. The U.S. Food and Drug   Administration has not approved or cleared this test; however, FDA   clearance or approval is not currently required for clinical use. The results are not intended to be used as the sole means for   clinical diagnosis or patient management decisions. EER Hi Res Interp Ur  See Note   Final  2020  4:30 PM  [facility]    (NOTE)            Past Medical History:   Diagnosis Date    Anxiety     Arthritis     Bronchitis     CAD (coronary artery disease)     [facility]     COPD (chronic obstructive pulmonary disease) (HCC)     Diabetes mellitus ([facility]     Hyperlipidemia     Hypertension     Mitral regurgitation     Myalgia     Pulmonary hypertension ([facility]     Shingles 2018    left facial area and involved eye    Sleep apnea     Syncope and collapse        Past Surgical History:   Procedure Laterality Date    ABDOMEN SURGERY      CATARACT REMOVAL WITH IMPLANT Right      SECTION      COLECTOMY      COLONOSCOPY      HIP ARTHROPLASTY      3 on the left and 1 on the right    HYSTERECTOMY      INSERTABLE CARDIAC MONITOR  2018    [employer]    JOINT REPLACEMENT Right     TOTAL KNEE    JOINT REPLACEMENT Bilateral     right x2, left x3 hips    KNEE SURGERY         No Known Allergies      Current Outpatient Medications:     losartan (COZAAR) 25 MG tablet, , Disp: , Rfl:     oxyCODONE-acetaminophen (PERCOCET) 5-325 MG per tablet, Take 1 tablet by mouth every 6 hours as needed for Pain for up to 30 days.  Fill 2020, Disp: 120 tablet, Rfl: 0    simvastatin (ZOCOR) 40 MG tablet, , Disp: , Rfl:     metoprolol tartrate (LOPRESSOR) 25 MG tablet, , Disp: , Rfl:     escitalopram (LEXAPRO) 20 MG tablet, , Disp: , Rfl:     amLODIPine (NORVASC) 5 MG tablet, TAKE ONE TABLET BY MOUTH DAILY, Disp: 90 tablet, Rfl: 3    Cholecalciferol (VITAMIN D) 2000 units CAPS capsule, Take by mouth Pt taking 1 time a week, unsure of dose, Disp: , Rfl:     levothyroxine (LEVOTHROID) 50 MCG tablet, Take 1 tablet by mouth daily, Disp: 30 tablet, Rfl: 3    metFORMIN (GLUCOPHAGE) 500 MG tablet, TAKE ONE TABLET BY MOUTH TWICE A DAY, Disp: 180 tablet, Rfl: 2    aspirin 325 MG tablet, Take 325 mg by mouth daily. , Disp: , Rfl:     trimethoprim-polymyxin b (POLYTRIM) 85203-7.1 UNIT/ML-% ophthalmic solution, , Disp: , Rfl:     prednisoLONE acetate (PRED FORTE) 1 % ophthalmic suspension, , Disp: , Rfl:     hydrOXYzine (ATARAX) 25 MG tablet, Take 25 mg by mouth every 8 hours as needed for Anxiety, Disp: , Rfl:     furosemide (LASIX) 20 MG tablet, Take 1 tablet by mouth daily as needed (for 2 lb weight gain/increased swelling/increased shortness of breath), Disp: 60 tablet, Rfl: 3    ASPERCREME LIDOCAINE EX, Apply topically, Disp: , Rfl:     VENTOLIN  (90 Base) MCG/ACT inhaler, , Disp: , Rfl:     omeprazole (PRILOSEC) 20 MG delayed release capsule, , Disp: , Rfl:     hydrocortisone 2.5 % cream, Apply topically 2 times daily. , Disp: 30 g, Rfl: 2    glucose monitoring kit (FREESTYLE) monitoring kit, 1 kit by Does not apply route daily as needed, Disp: 1 kit, Rfl: 0    glucose blood VI test strips (EXACTECH TEST) strip, 1 each by In Vitro route daily Type 2 diabetes qd testing, Disp: 100 each, Rfl: 3    Accu-Chek Multiclix Lancets MISC, Test qd;type 2 diabetes, Disp: 100 each, Rfl: 3    Scooter MISC, by Does not apply route Use daily dx arthritis obesity pulmonary htn,copd, Disp: 1 each, Rfl: 0    Family History   Problem Relation Age of Onset    Cancer Mother     Hypertension Maternal Aunt     Cancer Daughter        Social History     Socioeconomic History    Marital status: Single     Spouse name: Not on file    Number of children: 3    Years of education: Not on file    Highest education level: Not on file   Occupational History    Occupation: RETIRED   Social Needs    Financial resource strain: Not on file   [facility]: Thought content normal.           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 Possible side effects, risk of tolerance and or dependence and alternative treatments discussed    Obtaining appropriate analgesic effect of treatment   No signs of potential drug abuse or diversion identified    [x] Ill effects of being on chronic pain medications such as sleep disturbances, respiratory depression, hormonal changes, withdrawal symptoms, chronic opioid dependence and tolerance as well as risk of taking opioids with Benzodiazepines and taking opioids along with alcohol,  werediscussed with patient. I had asked the patient to minimize medication use and utilize pain medications only for uncontrolled rest pain or pain with exertional activities. I advised patient not to self-escalate painmedications without consulting with us. At each of patient's future visits we will try to taper pain medications, while adjusting the adjunct medications, and re-evaluating for Physical Therapy to improve spinal andjoint strength. We will continue to have discussions to decrease pain medications as tolerated. Counseled patient on effects their pain medication and /or their medical condition mayhave on their  ability to drive or operate machinery. Instructed not to drive or operate machinery if drowsy     I also discussed with the patient regarding the dangers of combining narcotic pain medication with tranquilizers, alcohol or illegal drugs or taking the medication any way other than prescribed.  The dangers were discussed  including respiratory depression and death. Patient was told to tell  all  physicians regarding the medications he is getting from pain clinic. Patient is warned not to take any unprescribed medications over-the-countermedications that can depress breathing . Patient is advised to talk to the pharmacist or physicians if planning to take any over-the-counter medications before  takeing them. Patient is strongly advised to avoid tranquilizers or  relaxants, illegal drugs  or any medications that can depress breathing  Patient is also advised to tell us if there is any changes in their medications from other physicians. Location:  patient  at  home   ,provider working from home  Phone call: 16 minutes    1.  Will increase strength of percocet to 7.5/325 as pain not controlled and interfering with ADL'S    TREATMENT OPTIONS:       Medication Agreement Requirements Met  Continue Opioid therapy  Script written for percocet  Follow up appointment made

## 2020-10-09 ENCOUNTER — HOSPITAL ENCOUNTER (OUTPATIENT)
Dept: PAIN MANAGEMENT | Age: 85
Discharge: HOME | End: 2020-10-09
Payer: MEDICARE

## 2020-10-09 PROCEDURE — 99213 OFFICE O/P EST LOW 20 MIN: CPT

## 2020-10-09 PROCEDURE — 99442 PR PHYS/QHP TELEPHONE EVALUATION 11-20 MIN: CPT | Performed by: NURSE PRACTITIONER

## 2020-10-09 RX ORDER — OXYCODONE AND ACETAMINOPHEN 7.5; 325 MG/1; MG/1
1 TABLET ORAL EVERY 6 HOURS PRN
Qty: 120 TABLET | Refills: 0 | Status: SHIPPED | OUTPATIENT
Start: 2020-10-10 | End: 2020-11-11 | Stop reason: SDUPTHER

## 2020-10-09 ASSESSMENT — ENCOUNTER SYMPTOMS
GASTROINTESTINAL NEGATIVE: 1
BLURRED VISION: 1
SHORTNESS OF BREATH: 1

## 2020-10-09 NOTE — PROGRESS NOTES
Leon[facility] 89 PROGRESS NOTE      Patient phone call to  review Medication Agreement    Chief Complaint: pain all over    She c/o pain all over. She has pain in her right hip. She has history of right hip replacement. Her pain has increased. She has multiple joint pain. She ambulates with a walker at home. She states most of the time she sleeps well, She was hospitalized overnight for chest pain and will see her Cardiologist.    Hip Pain    The incident occurred more than 1 week ago. There was no injury mechanism. The pain is present in the right hip. The quality of the pain is described as aching. The pain is at a severity of 8/10. The pain is severe. The pain has been constant since onset. Associated symptoms include muscle weakness. Foreign body present: right hip replaced. Exacerbated by: standing, walking. She has tried rest (pain medication) for the symptoms. The treatment provided mild relief. Patient denies any new neurological symptoms. No bowel or bladder incontinence, no weakness, and no falling. Any new diagnostic workup: [x] no  [] yes [] Xray [] CT scan [] MRI [] DEXA scan     [] Other                    Treatment goals:  Functional status: reduce pain to a 5      Aberrancy:   Any alcoholic beverages    no   Any illegal drugs no        Analgesia:8    Adverse  Effects :none    ADL;s :activity limited        Pill count: appropriate fill date 10-    Morphine equivalent dose as reported on [place]:45  Periodic Controlled Substance Monitoring: Obtaining appropriate analgesic effect of treatment. , Possible medication side effects, risk of tolerance/dependence & alternative treatments discussed., No signs of potential drug abuse or diversion identified. , Assessed functional status. Jose Ramon Chauhan, APRN - CNP)  Review of[place] does not show any aberrant prescription behavior. Medication is helping the patient stay active.  Patient denies any side effects and reports adequate analgesia. No sign of misuse/abuse.         When was thelast UDS: 2020            Was the UDS appropriate:      Record/Diagnostics Review:      As above, I did review the imaging        2/10/2020 12:39 PM - Rudy, Radha Incoming Lab Results From [facility]     Component  Value  Ref Range & Units  Status  Collected  Lab    Pain Management Drug Panel Interp, Urine  Consistent   Final  2020  4:30 PM  [facility]    (NOTE)   ________________________________________________________________   DRUGS EXPECTED:   PERCOCET (OXYCODONE) [20]   ________________________________________________________________   CONSISTENT with medications provided:   PERCOCET (OXYCODONE) : based on oxycodone and metabolites detected   below cutoff   ________________________________________________________________   Drugs Not Included in this Assay:   Acetaminophen   ________________________________________________________________   INTERPRETIVE INFORMATION: Pain Mgt Neil, [place] Spec/EMIT, Ur,                            Interp   Interpretation depends on accuracy and completeness of patient   medication information submitted by       Past Medical History:   Diagnosis Date    Anxiety     Arthritis     Bronchitis     CAD (coronary artery disease)     Carotid arterial disease (HCC)     COPD (chronic obstructive pulmonary disease) ([address]     Diabetes mellitus ([address]     Hyperlipidemia     Hypertension     Mitral regurgitation     Myalgia     Pulmonary hypertension ([facility]     Shingles 2018    left facial area and involved eye    Sleep apnea     Syncope and collapse        Past Surgical History:   Procedure Laterality Date    ABDOMEN SURGERY      CATARACT REMOVAL WITH IMPLANT Right      SECTION      COLECTOMY      COLONOSCOPY      HIP ARTHROPLASTY      3 on the left and 1 on the right    HYSTERECTOMY      INSERTABLE CARDIAC MONITOR  2018    [employer]    JOINT REPLACEMENT Right     TOTAL KNEE    JOINT REPLACEMENT Bilateral     right x2, left x3 hips    KNEE SURGERY         No Known Allergies      Current Outpatient Medications:     losartan (COZAAR) 25 MG tablet, , Disp: , Rfl:     prednisoLONE acetate (PRED FORTE) 1 % ophthalmic suspension, , Disp: , Rfl:     oxyCODONE-acetaminophen (PERCOCET) 7.5-325 MG per tablet, Take 1 tablet by mouth every 6 hours as needed for Pain for up to 30 days. , Disp: 120 tablet, Rfl: 0    simvastatin (ZOCOR) 40 MG tablet, , Disp: , Rfl:     metoprolol tartrate (LOPRESSOR) 25 MG tablet, , Disp: , Rfl:     escitalopram (LEXAPRO) 20 MG tablet, , Disp: , Rfl:     amLODIPine (NORVASC) 5 MG tablet, TAKE ONE TABLET BY MOUTH DAILY, Disp: 90 tablet, Rfl: 3    omeprazole (PRILOSEC) 20 MG delayed release capsule, , Disp: , Rfl:     levothyroxine (LEVOTHROID) 50 MCG tablet, Take 1 tablet by mouth daily, Disp: 30 tablet, Rfl: 3    metFORMIN (GLUCOPHAGE) 500 MG tablet, TAKE ONE TABLET BY MOUTH TWICE A DAY, Disp: 180 tablet, Rfl: 2    aspirin 325 MG tablet, Take 325 mg by mouth daily. , Disp: , Rfl:     trimethoprim-polymyxin b (POLYTRIM) 52349-9.1 UNIT/ML-% ophthalmic solution, , Disp: , Rfl:     hydrOXYzine (ATARAX) 25 MG tablet, Take 25 mg by mouth every 8 hours as needed for Anxiety, Disp: , Rfl:     furosemide (LASIX) 20 MG tablet, Take 1 tablet by mouth daily as needed (for 2 lb weight gain/increased swelling/increased shortness of breath), Disp: 60 tablet, Rfl: 3    ASPERCREME LIDOCAINE EX, Apply topically, Disp: , Rfl:     VENTOLIN  (90 Base) MCG/ACT inhaler, , Disp: , Rfl:     hydrocortisone 2.5 % cream, Apply topically 2 times daily. , Disp: 30 g, Rfl: 2    glucose monitoring kit (FREESTYLE) monitoring kit, 1 kit by Does not apply route daily as needed, Disp: 1 kit, Rfl: 0    glucose blood VI test strips (EXACTECH TEST) strip, 1 each by In Vitro route daily Type 2 diabetes qd testing, Disp: 100 each, Rfl: 3    Accu-Chek Multiclix Lancets MISC, Test qd;type 2 diabetes, Disp: breath. Hematologic/Lymphatic: Negative. Skin: Negative. Musculoskeletal: Positive for joint pain. Gastrointestinal: Negative. Genitourinary: Negative. Neurological: Negative. Uses a walker   Psychiatric/Behavioral: The patient is nervous/anxious. Managing         Physical Exam:    Physical Exam  Skin:         Neurological:      Mental Status: She is alert. Psychiatric:         Mood and Affect: Mood normal.         Thought Content: Thought content normal.           Assessment:      Problem List Items Addressed This Visit     Spinal stenosis of lumbar region without neurogenic claudication - Primary    Relevant Medications    oxyCODONE-acetaminophen (PERCOCET) 7.5-325 MG per tablet (Start on 10/10/2020)    Pain of both hip joints    Relevant Medications    oxyCODONE-acetaminophen (PERCOCET) 7.5-325 MG per tablet (Start on 10/10/2020)    Lumbar degenerative disc disease    Relevant Medications    oxyCODONE-acetaminophen (PERCOCET) 7.5-325 MG per tablet (Start on 10/10/2020)    Hip pain, bilateral    Relevant Medications    oxyCODONE-acetaminophen (PERCOCET) 7.5-325 MG per tablet (Start on 10/10/2020)    Encounter for medication management    Relevant Medications    oxyCODONE-acetaminophen (PERCOCET) 7.5-325 MG per tablet (Start on 10/10/2020)    DDD (degenerative disc disease), lumbar    Relevant Medications    oxyCODONE-acetaminophen (PERCOCET) 7.5-325 MG per tablet (Start on 10/10/2020)    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      Possible side effects, risk of tolerance and or dependence and alternative treatments discussed    Obtaining appropriate analgesic effect of treatment   No signs of potential drug abuse or diversion identified    [x] Ill effects of being on chronic pain medications such as sleep disturbances, respiratory depression, hormonal changes, withdrawal symptoms, chronic opioid dependence and tolerance as well as risk of taking opioids with Benzodiazepines and taking opioids along with alcohol,  werediscussed with patient. I had asked the patient to minimize medication use and utilize pain medications only for uncontrolled rest pain or pain with exertional activities. I advised patient not to self-escalate painmedications without consulting with us. At each of patient's future visits we will try to taper pain medications, while adjusting the adjunct medications, and re-evaluating for Physical Therapy to improve spinal andjoint strength. We will continue to have discussions to decrease pain medications as tolerated. Counseled patient on effects their pain medication and /or their medical condition mayhave on their  ability to drive or operate machinery. Instructed not to drive or operate machinery if drowsy     I also discussed with the patient regarding the dangers of combining narcotic pain medication with tranquilizers, alcohol or illegal drugs or taking the medication any way other than prescribed. The dangers were discussed  including respiratory depression and death. Patient was told to tell  all  physicians regarding the medications he is getting from pain clinic. Patient is warned not to take any unprescribed medications over-the-countermedications that can depress breathing . Patient is advised to talk to the pharmacist or physicians if planning to take any over-the-counter medications before  takeing them. Patient is strongly advised to avoid tranquilizers or  relaxants, illegal drugs  or any medications that can depress breathing  Patient is also advised to tell us if there is any changes in their medications from other physicians.     Location:  patient  at    home ,provider working from home  Phone call; 11 minutes    TREATMENT OPTIONS:       Medication Agreement Requirements Met  Continue Opioid therapy  Script written for percocet  Follow up appointment made

## 2020-11-09 ENCOUNTER — HOSPITAL ENCOUNTER (OUTPATIENT)
Dept: PAIN MANAGEMENT | Age: 85
Discharge: HOME | End: 2020-11-09

## 2020-11-10 ENCOUNTER — TELEPHONE (OUTPATIENT)
Dept: PAIN MANAGEMENT | Age: 85
End: 2020-11-10

## 2020-11-11 ENCOUNTER — HOSPITAL ENCOUNTER (OUTPATIENT)
Dept: PAIN MANAGEMENT | Age: 85
Discharge: HOME | End: 2020-11-11
Payer: MEDICARE

## 2020-11-11 PROCEDURE — 99213 OFFICE O/P EST LOW 20 MIN: CPT

## 2020-11-11 PROCEDURE — 99443 PR PHYS/QHP TELEPHONE EVALUATION 21-30 MIN: CPT | Performed by: PAIN MEDICINE

## 2020-11-11 RX ORDER — OXYCODONE AND ACETAMINOPHEN 7.5; 325 MG/1; MG/1
1 TABLET ORAL EVERY 6 HOURS PRN
Qty: 120 TABLET | Refills: 0 | Status: SHIPPED | OUTPATIENT
Start: 2020-11-14 | End: 2020-12-09 | Stop reason: SDUPTHER

## 2020-11-11 ASSESSMENT — ENCOUNTER SYMPTOMS
EYE PAIN: 0
SHORTNESS OF BREATH: 1
SINUS PRESSURE: 1
ABDOMINAL PAIN: 0
ALLERGIC/IMMUNOLOGIC NEGATIVE: 1
EYES NEGATIVE: 1
PHOTOPHOBIA: 0
BACK PAIN: 1
DIARRHEA: 1
SORE THROAT: 1
NAUSEA: 0
COUGH: 1
BOWEL INCONTINENCE: 0
VOMITING: 0
CONSTIPATION: 1

## 2020-11-11 NOTE — PROGRESS NOTES
Charles Bates is a 82 y.o. female evaluated on 11/11/2020. Modality of virtual service provided -via  telephone   Consent:  Patient and/or health care decision maker is aware that that patient may receive a bill for this telephone service, depending on one's insurance coverage, and has provided verbal consent to proceed: Yes    Patient identification was verified at the start of the visit: Yes    Chief complaint: Charles Bates is 82 y.o.,  female, with  No chief complaint on file. .    Patient is complaining of pain involving the low back with pain radiating to both hips and to the groin. Patient had bilateral hip joint replacement as well as a right knee joint replacement. Patient reports her pain is severe and she is not able to move. She is using a wheelchair at a walker to help with ambulation. Patient reports she is is somewhat depressed because of her inability to function well. She has not seen the surgeon in her hip joint replacement for more than 9years old. Patient would like to refrain from surgery if at all possible. Back Pain   This is a chronic problem. The current episode started more than 1 year ago. The problem occurs constantly. The problem is unchanged. The pain is present in the gluteal, lumbar spine and sacro-iliac. The quality of the pain is described as aching and stabbing Margaux Wolf). Radiates to: Radiates towards the hips bilaterally. The pain is at a severity of 10/10 (4-9). The pain is severe. The pain is worse during the day. The symptoms are aggravated by bending, standing, position and twisting (Nimesh or lifting and activities of daily living). Stiffness is present in the morning. Associated symptoms include numbness and weakness. Pertinent negatives include no abdominal pain, bladder incontinence, bowel incontinence, chest pain, dysuria, fever or tingling.  (Numbness in the feet and weakness in the lower extremities) Risk factors include lack of exercise, obesity and sedentary lifestyle. Hip Pain    The incident occurred more than 1 week ago (6 yrs ago). Injury mechanism: had bilat. hip replacement. The pain is present in the right hip, left hip and right leg. The quality of the pain is described as aching, shooting and stabbing (sharp). The pain is at a severity of 10/10. The pain is severe. The pain has been constant since onset. Associated symptoms include an inability to bear weight and numbness. Pertinent negatives include no tingling. The symptoms are aggravated by movement and weight bearing. Alleviating factors:nothing   Lifestyle changes experienced with pain: Wakes from sleep, Prevents or limits ADLs, Increases w/activity. Increases w/prolonged sitting/standing/walking  Mood changes,Depressed  Patient currently unemployed. Physical therapy did not help the pain. Are you under psychological counseling at present: No  Goals for treatment include:  Decrease in pain  Enjoy daily and recreational activities, return to previous status. Patient relates current medications are helping the pain. Patient reports taking pain medications as prescribed, denies obtaining medications from different sources and denies use of illegal drugs. Patient denies side effects from medications like nausea, vomiting, constipation or drowsiness. Patient reports current activities of daily living ar possible due to medications and would like to continue them. ACTIVITY/SOCIAL/EMOTIONAL:  Sleep Pattern: 7 hours per night. difficulty falling asleep, nightime awakenings and difficulty falling back asleep if awakened  Home Exercises: daily some stretching  Activity:decreased  Emotional Issues: Somewhat depressed.    Currently seeing a Psychiatrist or Psychologist:  No     ADVERSE MEDICATION EFFECTS:   Nausea and vomiting: no   Constipation: no-Undercontrol-: yes  Dizziness/drowsy/sleepy--no  Urinary Retention: no    ABERRANT BEHAVIORS SINCE LAST VISIT  Lost rx/pills:------------------------------------------ no  Taking  medication as prescribed: ----------- yes  Urine Drug Screen ---------------------------------  yes  Recent ER visits: -------------------------------------No  Pill count is appropriate: ---------------------------yes   Refills for prescriptions appropriate:---------- yes      Past Medical History:   Diagnosis Date    Anxiety     Arthritis     Bronchitis     CAD (coronary artery disease)     Carotid arterial disease (HCC)     [facility]ch[facility]     Diabetes mellitus [facility] [facility]     Hyperlipidemia     Hypertension     Mitral regurgitation     Myalgia     Pulmonary hypertension ([facility] [facility]     Shingles 2018    left facial area and involved eye    Sleep apnea     Syncope and collapse        Past Surgical History:   Procedure Laterality Date    ABDOMEN SURGERY      CATARACT REMOVAL WITH IMPLANT Right      SECTION      COLECTOMY      COLONOSCOPY      HIP ARTHROPLASTY      3 on the left and 1 on the right    HYSTERECTOMY      INSERTABLE CARDIAC MONITOR  2018    [employer]    JOINT REPLACEMENT Right     TOTAL KNEE    JOINT REPLACEMENT Bilateral     right x2, left x3 hips    KNEE SURGERY         Family History   Problem Relation Age of Onset    Cancer Mother     Hypertension Maternal Aunt     Cancer Daughter        Social History     Socioeconomic History    Marital status: Single     Spouse name: Not on file    Number of children: 3    Years of education: Not on file    Highest education level: Not on file   Occupational History    Occupation: RETIRED   Social Needs    Financial resource strain: Not on file    Food insecurity     Worry: Not on file     Inability: Not on file   [employer] needs     Medical: Not on file     Non-medical: Not on file   Tobacco Use    Smoking status: Former Smoker     Years: 5.00     Last attempt to quit: 1985     Years since quittin.9     glucose monitoring kit (FREESTYLE) monitoring kit 1 kit by Does not apply route daily as needed 1 kit 0    glucose blood VI test strips (EXACTECH TEST) strip 1 each by In Vitro route daily Type 2 diabetes qd testing 100 each 3    Accu-Chek Multiclix Lancets MISC Test qd;type 2 diabetes 100 each 3    Scooter MISC by Does not apply route Use daily dx arthritis obesity pulmonary htn,copd 1 each 0    metFORMIN (GLUCOPHAGE) 500 MG tablet TAKE ONE TABLET BY MOUTH TWICE A  tablet 2    aspirin 325 MG tablet Take 325 mg by mouth daily. No current facility-administered medications on file prior to encounter. Review of Systems   Constitutional: Negative. Negative for activity change, chills, diaphoresis and fever. HENT: Positive for postnasal drip, sinus pressure and sore throat. Negative for congestion and hearing loss. Eyes: Negative. Negative for photophobia, pain and visual disturbance. Respiratory: Positive for cough and shortness of breath. Cardiovascular: Negative. Negative for chest pain and palpitations. Gastrointestinal: Positive for constipation and diarrhea. Negative for abdominal pain, bowel incontinence, nausea and vomiting. Endocrine: Negative for cold intolerance and polyuria. Genitourinary: Positive for frequency and urgency. Negative for bladder incontinence, difficulty urinating, dysuria and hematuria. Musculoskeletal: Positive for arthralgias and back pain. Skin: Negative for rash. Allergic/Immunologic: Negative. Neurological: Positive for weakness and numbness. Negative for tingling. Hematological: Bruises/bleeds easily. Psychiatric/Behavioral: Negative. Negative for sleep disturbance and suicidal ideas. The patient is not nervous/anxious. Physical Exam  Skin:         Neurological:      Mental Status: She is alert and oriented to person, place, and time. Psychiatric:         Mood and Affect: Mood is depressed. DATA:  LAB.:  2/10/2020 12:39 PM - Radha Grant Incoming Lab Results From [facility]     Component  Value  Ref Range & Units  Status  Collected  Lab    Pain Management Drug Panel Interp, Urine  Consistent   Final  02/06/2020  4:30 PM  [facility]    (NOTE)   ________________________________________________________________   DRUGS EXPECTED:   PERCOCET (OXYCODONE) [2/6/20]   ________________________________________________________________   CONSISTENT with medications provided:   PERCOCET (OXYCODONE) : based on oxycodone and metabolites detected   below cutoff        X-Ray reports:  Procedure:  LUMBAR AP LAT L5S1 [place]    CDX  08/20/2013     [id number]    Reason for Exam:  lumbar DDD         FULL RESULT:   Lumbar spine 3 views         There is no lumbar compression fracture or subluxation. Diffuse lumbar    spondylosis is present. Severe degenerative disc space narrowing at the    levels of L3-L4 and L4-L5 as well as L5-S1. Facet joint degenerative    changes at the levels of L3-S1. There is a minimal grade 1    anterolisthesis of L3 over L4. Pedicles are unremarkable. There is no    scoliosis. There are surgical clips in the right side of the lower    abdomen.  Evidence of a left hip total arthroplasty.                   IMPRESSION:    Diffuse lumbar spondylosis and multi-level degenerative    disease.         Report Electronically signed by Brandon El M.D. on 8/20/2013      Exams:  CR SPINE CERVICAL 3 VWS OR LESS              Examination: CR SPINE CERVICAL 3 VWS OR LESS              Clinical History: Reason for Study: RE NECK PAIN -                      Provided history: 47-year-old female with neck pain              COMPARISON: None              FINDINGS: 3 views of the cervical spine were obtained.         Mineralization is diffusely diminished.  Vertebral body height and        alignment is grossly within normal limits with significant        degenerative disc disease throughout the cervical spine.  Posterior        elements are intact with grossly normal alignment.  Multilevel facet        arthropathy is noted in addition to uncovertebral degeneration.         Craniocervical junction is normally aligned.  No prevertebral soft        tissue swelling.              IMPRESSION: Significant multilevel degenerative disc and facet        disease with no acute fracture or malalignment.              Workstation:[id number]              Finalized by Pastora Noyola MD on 11/24/2017       Clinical  impression:  1. Chronic bilateral low back pain with bilateral sciatica    2. Spinal stenosis of lumbar region without neurogenic claudication    3. Pain of both hip joints    4. Lumbar degenerative disc disease    5. Hip pain, bilateral    6. Encounter for medication management    7. DDD (degenerative disc disease), lumbar        Plan of care: We will continue current pain medications  Current medications are being tolerated without any Adverse side effects. Orders Placed This Encounter   Medications    oxyCODONE-acetaminophen (PERCOCET) 7.5-325 MG per tablet     Sig: Take 1 tablet by mouth every 6 hours as needed for Pain for up to 30 days. Dispense:  120 tablet     Refill:  0     Reduce doses taken as pain becomes manageable     Urine drug screens have been appropriate. No aberrant activity noted. Analgesia is achieved. Activities of daily living are possible because of medications. Safe use of medications explained to patient. PDMP Monitoring:    Last PDMP [facility] as Reviewed [facility]):  Review User Review Instant Review Result   Tres Rigginsjenny 11/11/2020 10:27 AM Reviewed PDMP [1]     Counselling/Preventive measures for pain  Control:    [x]  Spine strengthening exercises are discussed with patient in detail. [x] Ill effects of being on chronic pain medications such as sleep disturbances, hormonal changes, withdrawal symptoms,  chronic opioid dependence and tolerance were discussed with patient.  I had asked the patient to minimize medication use and utilize pain medications only for uncontrolled rest pain or pain with exertional activities. I advised patient not to self escalate pain medications without consulting with us. At each of patient's future visits we will try to taper pain medications, while adjusting the adjunct medications, and re-evaluating for Physical Therapy to improve spinal and joint strength. We will continue to have discussions to decrease pain medications as tolerated. I also discussed with the patient regarding the dangers of combining narcotic pain medication with tranquilizers, alcohol or illegal drugs or taking the medication any other than prescribed. The dangers including the respiratory depression and death. Patient was told to tell  to all  physicians regarding the medications he is getting from pain clinic. Patient is warned not to take any unprescribed medications over-the-counter medications that can depress breathing . Patient is advised to talk to the pharmacist or physicians if planning to take any over-the-counter medications before  takeing them. Patient is strongly advised to avoid tranquilizers or  Relaxants for any medications that can depress breathing or recreational drugs. Patient is also advised to tell us if there is any changes in his medications from other physicians. We discussed the same at today's visit and have not been to implement it, as the patient's pain is not under control with current medications. Patient pain in the hip is severe interfering with her activities of daily living. She had bilateral hip replacement done in the past.  Patient is advised to see the surgeon to get an opinion if something can be done to help her pain as well as mobility. Decision Making Process : Patient's health history and referral records thoroughly reviewed before focused physical examination and discussion with patient. I have spent 20 mins.   Over 50% of today's visit is spent on examining the patient and counseling and coordinating the care. Level of complexity of date to be reviewed is Moderate. The chart date reviewed include the following: Imaging Reports. Summary of Care. Time spent reviewing with patient the below reports:   Medication safety, Treatment options. Level of diagnosis and management options of this case is multiple: involving the following management options: Interventions as needed, medication management as appropriate, future visits, activity modification, heat/ice as needed, Urine drug screen as required. [x]The patient's questions were answered to the best of my abilities. This note was created using voice recognition software. There may be inaccuracies of transcription  that are inadvertently overlooked prior to the signature. There is any questions about the transcription please contact me. Return in  4 weeks  with physician / CNP  for further plan of treatment. Due to the COVID-19 pandemic and the appropriate interventions by [facility], our non-urgent pain management patients will not be seen in the office at this time for their protection and the protection of our staff. To offer continuity of care, their prescriptions will be escribed this month after a careful chart review and review of their OARRS report  Pursuant to the emergency declaration under the [employer] and [facility], 1135 waiver authority and the [employer] and [employer] Act, this Virtual Visit was conducted, with patient's consent, to reduce the patient's risk of exposure to COVID-19 and provide continuity of care for an established patient. Services were provided through a video synchronous discussion virtually to substitute for in-person appointment. \"  Documentation:  I communicated with the patient and/or health care decision maker about plan of care  Details of this discussion

## 2020-12-09 ENCOUNTER — HOSPITAL ENCOUNTER (OUTPATIENT)
Dept: PAIN MANAGEMENT | Age: 85
Discharge: HOME | End: 2020-12-09
Payer: MEDICARE

## 2020-12-09 PROCEDURE — 99213 OFFICE O/P EST LOW 20 MIN: CPT

## 2020-12-09 PROCEDURE — 99442 PR PHYS/QHP TELEPHONE EVALUATION 11-20 MIN: CPT | Performed by: NURSE PRACTITIONER

## 2020-12-09 RX ORDER — OXYCODONE AND ACETAMINOPHEN 7.5; 325 MG/1; MG/1
1 TABLET ORAL EVERY 6 HOURS PRN
Qty: 120 TABLET | Refills: 0 | Status: SHIPPED | OUTPATIENT
Start: 2020-12-17 | End: 2021-01-11 | Stop reason: SDUPTHER

## 2020-12-09 ASSESSMENT — ENCOUNTER SYMPTOMS
SHORTNESS OF BREATH: 1
CONSTIPATION: 1
COUGH: 1

## 2020-12-09 NOTE — PROGRESS NOTES
[facility] 89 PROGRESS NOTE      Patient phone call to   review Medication Agreement    Chief Complaint:  Hip pain    She c/o bilateral hip pain which has gotten worse. She has history of bilateral hip replacements, She has multiple joint pain,She ambulates with a walker. She states she sleeps okay,She states is not very active. She has had no Ed visit    Hip Pain    The incident occurred more than 1 week ago. There was no injury mechanism. The pain is present in the right hip, right thigh, left hip and left thigh. Quality: sharp. The pain is at a severity of 9/10. The pain is severe. The pain has been worsening since onset. Foreign body present: hip replacement. Exacerbated by: walking, hard to get up out of a chair. She has tried rest (pain medication) for the symptoms. Patient denies any new neurological symptoms. No bowel or bladder incontinence, no weakness, and no falling. Any new diagnostic workup: [x] no  [] yes [] Xray [] CT scan [] MRI [] DEXA scan     [] Other                    Treatment goals:  Functional status: reduce pain to a 4      Aberrancy:   Any alcoholic beverages  no     Any illegal drugs no        Analgesia:9      Adverse  Effects :none    ADL;s :not active        Pill count: appropriate fill date 12-    Morphine equivalent dose as reported on OARRS:45  Periodic Controlled Substance Monitoring: Possible medication side effects, risk of tolerance/dependence & alternative treatments discussed., No signs of potential drug abuse or diversion identified. , Assessed functional status., Obtaining appropriate analgesic effect of treatment. Ernie Linker, APRN - CNP)  Review ofOARRS does not show any aberrant prescription behavior. Medication is helping the patient stay active. Patient denies any side effects and reports adequate analgesia. No sign of misuse/abuse.         When was thelast UDS:     2-6-2020        Was the UDS appropriate:  Below cutoff    Record/Diagnostics Review:      As above, I did review the imaging    2/10/2020 12:39 PM - Rudy, Radha Incoming Lab Results From [facility]     Component  Value  Ref Range & Units  Status  Collected  Lab    Pain Management Drug Panel Interp, Urine  Consistent   Final  2020  4:30 PM  [facility]    (NOTE)   ________________________________________________________________   DRUGS EXPECTED:   PERCOCET (OXYCODONE) [20]   ________________________________________________________________   CONSISTENT with medications provided:   PERCOCET (OXYCODONE) : based on oxycodone and metabolites detected   below cutoff   ________________________________________________________________   Drugs Not Included in this Assay:   Acetaminophen   ________________________________________________________________   INTERPRETIVE INFORMATION: Pain Mgt Neil, [place] Spec/EMIT, Ur,                            Interp   Interpretation depends on accuracy and completeness of patient   medication information submitted by client.             Past Medical History:   Diagnosis Date    Anxiety     Arthritis     Bronchitis     CAD (coronary artery disease)     Carotid arterial disease (HCC)     COPD (chronic obstructive pulmonary disease) (HCC)     Diabetes mellitus ([address]     Hyperlipidemia     Hypertension     Mitral regurgitation     Myalgia     Pulmonary hypertension ([address]     Shingles 2018    left facial area and involved eye    Sleep apnea     Syncope and collapse        Past Surgical History:   Procedure Laterality Date    ABDOMEN SURGERY      CATARACT REMOVAL WITH IMPLANT Right 1-209     SECTION      COLECTOMY      COLONOSCOPY      HIP ARTHROPLASTY      3 on the left and 1 on the right    HYSTERECTOMY      INSERTABLE CARDIAC MONITOR  2018    [employer]tr[employer]    JOINT REPLACEMENT Right     TOTAL KNEE    JOINT REPLACEMENT Bilateral     right x2, left x3 hips    KNEE SURGERY         No Known Allergies      Current Outpatient Medications:     oxyCODONE-acetaminophen (PERCOCET) 7.5-325 MG per tablet, Take 1 tablet by mouth every 6 hours as needed for Pain for up to 30 days. , Disp: 120 tablet, Rfl: 0    losartan (COZAAR) 25 MG tablet, , Disp: , Rfl:     simvastatin (ZOCOR) 40 MG tablet, , Disp: , Rfl:     metoprolol tartrate (LOPRESSOR) 25 MG tablet, , Disp: , Rfl:     escitalopram (LEXAPRO) 20 MG tablet, , Disp: , Rfl:     amLODIPine (NORVASC) 5 MG tablet, TAKE ONE TABLET BY MOUTH DAILY, Disp: 90 tablet, Rfl: 3    omeprazole (PRILOSEC) 20 MG delayed release capsule, , Disp: , Rfl:     levothyroxine (LEVOTHROID) 50 MCG tablet, Take 1 tablet by mouth daily, Disp: 30 tablet, Rfl: 3    metFORMIN (GLUCOPHAGE) 500 MG tablet, TAKE ONE TABLET BY MOUTH TWICE A DAY, Disp: 180 tablet, Rfl: 2    aspirin 325 MG tablet, Take 325 mg by mouth daily. , Disp: , Rfl:     hydrOXYzine (ATARAX) 25 MG tablet, Take 25 mg by mouth every 8 hours as needed for Anxiety, Disp: , Rfl:     furosemide (LASIX) 20 MG tablet, Take 1 tablet by mouth daily as needed (for 2 lb weight gain/increased swelling/increased shortness of breath), Disp: 60 tablet, Rfl: 3    ASPERCREME LIDOCAINE EX, Apply topically, Disp: , Rfl:     VENTOLIN  (90 Base) MCG/ACT inhaler, , Disp: , Rfl:     hydrocortisone 2.5 % cream, Apply topically 2 times daily. , Disp: 30 g, Rfl: 2    glucose monitoring kit (FREESTYLE) monitoring kit, 1 kit by Does not apply route daily as needed, Disp: 1 kit, Rfl: 0    glucose blood VI test strips (EXACTECH TEST) strip, 1 each by In Vitro route daily Type 2 diabetes qd testing, Disp: 100 each, Rfl: 3    Accu-Chek Multiclix Lancets MISC, Test qd;type 2 diabetes, Disp: 100 each, Rfl: 3    Scooter MISC, by Does not apply route Use daily dx arthritis obesity pulmonary htn,copd, Disp: 1 each, Rfl: 0    Family History   Problem Relation Age of Onset    Cancer Mother     Hypertension Maternal Aunt     Cancer Daughter        Social History     Socioeconomic History    Marital status: Single     Spouse name: Not on file    Number of children: 3    Years of education: Not on file    Highest education level: Not on file   Occupational History    Occupation: RETIRED   Social Needs    Financial resource strain: Not on file    Food insecurity     Worry: Not on file     Inability: Not on file   [employer] needs     Medical: Not on file     Non-medical: Not on file   Tobacco Use    Smoking status: Former Smoker     Years: 5.00     Last attempt to quit: 1985     Years since quittin.0    Smokeless tobacco: Never Used   Substance and Sexual Activity    Alcohol use: No     Alcohol/week: 0.0 standard drinks    Drug use: No    Sexual activity: Not on file   Lifestyle    Physical activity     Days per week: Not on file     Minutes per session: Not on file    Stress: Not on file   Relationships    Social connections     Talks on phone: Not on file     Gets together: Not on file     Attends Roman [employer] service: Not on file     Active member of club or organization: Not on file     Attends meetings of clubs or organizations: Not on file     Relationship status: Not on file    Intimate partner violence     Fear of current or ex partner: Not on file     Emotionally abused: Not on file     Physically abused: Not on file     Forced sexual activity: Not on file   Other Topics Concern    Not on file   Social History Narrative    Not on file         Review of Systems:  Review of Systems   Constitution: Negative. HENT: Positive for hearing loss. Eyes: Positive for visual disturbance. Glasses   Cardiovascular: Positive for palpitations. Respiratory: Positive for cough and shortness of breath. Endocrine:        Diabetic:   Skin: Negative. Musculoskeletal: Positive for joint pain. Gastrointestinal: Positive for constipation. Genitourinary: Positive for nocturia.    Neurological: Positive for dizziness and loss of balance. Gait issue   Psychiatric/Behavioral: The patient is nervous/anxious. Physical Exam:    Physical Exam  Skin:         Neurological:      Mental Status: She is alert and oriented to person, place, and time. Psychiatric:         Mood and Affect: Mood normal.         Thought Content: Thought content normal.           Assessment:      Problem List Items Addressed This Visit     Spinal stenosis of lumbar region without neurogenic claudication - Primary    Relevant Medications    oxyCODONE-acetaminophen (PERCOCET) 7.5-325 MG per tablet (Start on 12/17/2020)    Pain of both hip joints    Relevant Medications    oxyCODONE-acetaminophen (PERCOCET) 7.5-325 MG per tablet (Start on 12/17/2020)    Lumbar degenerative disc disease    Relevant Medications    oxyCODONE-acetaminophen (PERCOCET) 7.5-325 MG per tablet (Start on 12/17/2020)    Hip pain, chronic, right    Relevant Medications    oxyCODONE-acetaminophen (PERCOCET) 7.5-325 MG per tablet (Start on 12/17/2020)    Hip pain, bilateral    Relevant Medications    oxyCODONE-acetaminophen (PERCOCET) 7.5-325 MG per tablet (Start on 12/17/2020)    Encounter for medication management    Relevant Medications    oxyCODONE-acetaminophen (PERCOCET) 7.5-325 MG per tablet (Start on 12/17/2020)    DDD (degenerative disc disease), lumbar    Relevant Medications    oxyCODONE-acetaminophen (PERCOCET) 7.5-325 MG per tablet (Start on 12/17/2020)    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      Possible side effects, risk of tolerance and or dependence and alternative treatments discussed    Obtaining appropriate analgesic effect of treatment   No signs of potential drug abuse or diversion identified    [x] Ill effects of being on chronic pain medications such as sleep disturbances, respiratory depression, hormonal changes, withdrawal symptoms, chronic opioid dependence and tolerance as well as risk of taking opioids with Benzodiazepines and taking opioids along with alcohol,  werediscussed with patient. I had asked the patient to minimize medication use and utilize pain medications only for uncontrolled rest pain or pain with exertional activities. I advised patient not to self-escalate painmedications without consulting with us. At each of patient's future visits we will try to taper pain medications, while adjusting the adjunct medications, and re-evaluating for Physical Therapy to improve spinal andjoint strength. We will continue to have discussions to decrease pain medications as tolerated. Counseled patient on effects their pain medication and /or their medical condition mayhave on their  ability to drive or operate machinery. Instructed not to drive or operate machinery if drowsy     I also discussed with the patient regarding the dangers of combining narcotic pain medication with tranquilizers, alcohol or illegal drugs or taking the medication any way other than prescribed. The dangers were discussed  including respiratory depression and death. Patient was told to tell  all  physicians regarding the medications he is getting from pain clinic. Patient is warned not to take any unprescribed medications over-the-countermedications that can depress breathing . Patient is advised to talk to the pharmacist or physicians if planning to take any over-the-counter medications before  takeing them.  Patient is strongly advised to avoid tranquilizers or  relaxants, illegal drugs  or any medications that can depress breathing  Patient is also advised to tell us if there is any changes in their medications from other physicians.     Location:  patient  at   home  ,provider working from home  Phone call: 12 minutes    TREATMENT OPTIONS:       Medication Agreement Requirements Met  Continue Opioid therapy  Script written for percocet  Follow up appointment made

## 2021-01-11 ENCOUNTER — HOSPITAL ENCOUNTER (OUTPATIENT)
Dept: PAIN MANAGEMENT | Age: 86
Discharge: HOME | End: 2021-01-11
Payer: COMMERCIAL

## 2021-01-11 DIAGNOSIS — M25.552 PAIN OF BOTH HIP JOINTS: ICD-10-CM

## 2021-01-11 DIAGNOSIS — Z79.899 ENCOUNTER FOR MEDICATION MANAGEMENT: ICD-10-CM

## 2021-01-11 DIAGNOSIS — M48.061 SPINAL STENOSIS OF LUMBAR REGION WITHOUT NEUROGENIC CLAUDICATION: Primary | ICD-10-CM

## 2021-01-11 DIAGNOSIS — M54.42 CHRONIC BILATERAL LOW BACK PAIN WITH BILATERAL SCIATICA: Chronic | ICD-10-CM

## 2021-01-11 DIAGNOSIS — M25.551 HIP PAIN, CHRONIC, RIGHT: ICD-10-CM

## 2021-01-11 DIAGNOSIS — G89.29 HIP PAIN, CHRONIC, RIGHT: ICD-10-CM

## 2021-01-11 DIAGNOSIS — M25.551 PAIN OF BOTH HIP JOINTS: ICD-10-CM

## 2021-01-11 DIAGNOSIS — M25.552 HIP PAIN, BILATERAL: ICD-10-CM

## 2021-01-11 DIAGNOSIS — M25.551 HIP PAIN, BILATERAL: ICD-10-CM

## 2021-01-11 DIAGNOSIS — M51.36 DDD (DEGENERATIVE DISC DISEASE), LUMBAR: ICD-10-CM

## 2021-01-11 DIAGNOSIS — G89.29 CHRONIC BILATERAL LOW BACK PAIN WITH BILATERAL SCIATICA: Chronic | ICD-10-CM

## 2021-01-11 DIAGNOSIS — M51.36 LUMBAR DEGENERATIVE DISC DISEASE: ICD-10-CM

## 2021-01-11 DIAGNOSIS — M54.41 CHRONIC BILATERAL LOW BACK PAIN WITH BILATERAL SCIATICA: Chronic | ICD-10-CM

## 2021-01-11 PROCEDURE — 99213 OFFICE O/P EST LOW 20 MIN: CPT

## 2021-01-11 PROCEDURE — 99442 PR PHYS/QHP TELEPHONE EVALUATION 11-20 MIN: CPT | Performed by: NURSE PRACTITIONER

## 2021-01-11 RX ORDER — OXYCODONE AND ACETAMINOPHEN 7.5; 325 MG/1; MG/1
1 TABLET ORAL EVERY 6 HOURS PRN
Qty: 120 TABLET | Refills: 0 | Status: SHIPPED | OUTPATIENT
Start: 2021-01-16 | End: 2021-02-15 | Stop reason: SDUPTHER

## 2021-01-11 ASSESSMENT — ENCOUNTER SYMPTOMS
COUGH: 1
GASTROINTESTINAL NEGATIVE: 1

## 2021-01-11 NOTE — PROGRESS NOTES
[address] [facility] PROGRESS NOTE      Patient  completed []  video visit   [x]   phone call:  11    Minutes :   to  review Medication Agreement    Location:  Provider:  working from    [x]    home    []   [facility] - MAIK CALL , patient at home   Chief Complaint:  Joint pain    She c/o multiple joint pain. Pain has not changed, She has pain in her shoulders, hips and knees, She has history of bilateral hip replacement surgery,She ambulates with a walker, She ambulates with a walker. She is not very active. She has had no Ed visits. Generalized Body Aches  This is a chronic problem. The problem occurs constantly. The problem has been unchanged. Associated symptoms include arthralgias, chest pain, coughing and myalgias. Exacerbated by: activity. She has tried rest and heat (pain medication, lidocaine ointment) for the symptoms.            Treatment goals:  Functional status: see an Orthopaedic Dr Grover Mendez:   Any alcoholic beverages no           Any illegal drugs   no      Analgesia:     8                Adverse  Effects :none      ADL;s :not active    Data:    When was thelast UDS:  2-1-2020          Was the UDS appropriate:yes      Record/Diagnostics Review:      As above, I did review the imaging       2/10/2020 12:39 PM - Rudy, Mhpn Incoming Lab Results From [facility]    Component Value Ref Range & Units Status Collected Lab   Pain Management Drug Panel Interp, Urine Consistent   Final 02/06/2020  4:30 PM [facility]   (NOTE)   ________________________________________________________________   DRUGS EXPECTED:   PERCOCET (OXYCODONE) [2/6/20]   ________________________________________________________________   CONSISTENT with medications provided:   PERCOCET (OXYCODONE) : based on oxycodone and metabolites detected   below cutoff   ________________________________________________________________   Drugs Not Included in this Assay:   Acetaminophen ________________________________________________________________   INTERPRETIVE INFORMATION: Pain Mgt Neil, [place] Spec/EMIT, Ur,                            Interp   Interpretation depends on accuracy and completeness of patient   medication information submitted by client. 6-Acetylmorphine, Ur Not Detected   Final                     Pill count: appropriate  fill date 2021    Morphine equivalent dose as reported on OARRS:45  Periodic Controlled Substance Monitoring: Obtaining appropriate analgesic effect of treatment. , Possible medication side effects, risk of tolerance/dependence & alternative treatments discussed., No signs of potential drug abuse or diversion identified. , Assessed functional status. Deidra Vang, APRN - CNP)  Review ofOARRS does not show any aberrant prescription behavior. Medication is helping the patient stay active. Patient denies any side effects and reports adequate analgesia. No sign of misuse/abuse.             Past Medical History:   Diagnosis Date    Anxiety     Arthritis     Bronchitis     CAD (coronary artery disease)     Carotid arterial disease (HCC)     COPD (chronic obstructive pulmonary disease) (HCC)     Diabetes mellitus ([address]     Hyperlipidemia     Hypertension     Mitral regurgitation     Myalgia     Pulmonary hypertension ([address]     Shingles 2018    left facial area and involved eye    Sleep apnea     Syncope and collapse        Past Surgical History:   Procedure Laterality Date    ABDOMEN SURGERY      CATARACT REMOVAL WITH IMPLANT Right      SECTION      COLECTOMY      COLONOSCOPY      HIP ARTHROPLASTY      3 on the left and 1 on the right    HYSTERECTOMY      INSERTABLE CARDIAC MONITOR  2018    [employer]    JOINT REPLACEMENT Right     TOTAL KNEE    JOINT REPLACEMENT Bilateral     right x2, left x3 hips    KNEE SURGERY         No Known Allergies      Current Outpatient Medications:   oxyCODONE-acetaminophen (PERCOCET) 7.5-325 MG per tablet, Take 1 tablet by mouth every 6 hours as needed for Pain for up to 30 days. , Disp: 120 tablet, Rfl: 0    losartan (COZAAR) 25 MG tablet, , Disp: , Rfl:     simvastatin (ZOCOR) 40 MG tablet, , Disp: , Rfl:     metoprolol tartrate (LOPRESSOR) 25 MG tablet, , Disp: , Rfl:     escitalopram (LEXAPRO) 20 MG tablet, , Disp: , Rfl:     amLODIPine (NORVASC) 5 MG tablet, TAKE ONE TABLET BY MOUTH DAILY, Disp: 90 tablet, Rfl: 3    levothyroxine (LEVOTHROID) 50 MCG tablet, Take 1 tablet by mouth daily, Disp: 30 tablet, Rfl: 3    metFORMIN (GLUCOPHAGE) 500 MG tablet, TAKE ONE TABLET BY MOUTH TWICE A DAY, Disp: 180 tablet, Rfl: 2    aspirin 325 MG tablet, Take 325 mg by mouth daily. , Disp: , Rfl:     hydrOXYzine (ATARAX) 25 MG tablet, Take 25 mg by mouth every 8 hours as needed for Anxiety, Disp: , Rfl:     furosemide (LASIX) 20 MG tablet, Take 1 tablet by mouth daily as needed (for 2 lb weight gain/increased swelling/increased shortness of breath), Disp: 60 tablet, Rfl: 3    ASPERCREME LIDOCAINE EX, Apply topically, Disp: , Rfl:     VENTOLIN  (90 Base) MCG/ACT inhaler, , Disp: , Rfl:     omeprazole (PRILOSEC) 20 MG delayed release capsule, , Disp: , Rfl:     hydrocortisone 2.5 % cream, Apply topically 2 times daily. , Disp: 30 g, Rfl: 2    glucose monitoring kit (FREESTYLE) monitoring kit, 1 kit by Does not apply route daily as needed, Disp: 1 kit, Rfl: 0    glucose blood VI test strips (EXACTECH TEST) strip, 1 each by In Vitro route daily Type 2 diabetes qd testing, Disp: 100 each, Rfl: 3    Accu-Chek Multiclix Lancets MISC, Test qd;type 2 diabetes, Disp: 100 each, Rfl: 3    Scooter MISC, by Does not apply route Use daily dx arthritis obesity pulmonary htn,copd, Disp: 1 each, Rfl: 0    Family History   Problem Relation Age of Onset    Cancer Mother     Hypertension Maternal Aunt     Cancer Daughter        Social History Socioeconomic History    Marital status: Single     Spouse name: Not on file    Number of children: 3    Years of education: Not on file    Highest education level: Not on file   Occupational History    Occupation: RETIRED   Social Needs    Financial resource strain: Not on file    Food insecurity     Worry: Not on file     Inability: Not on file   [employer] needs     Medical: Not on file     Non-medical: Not on file   Tobacco Use    Smoking status: Former Smoker     Years: 5.00     Quit date: 1985     Years since quittin.1    Smokeless tobacco: Never Used   Substance and Sexual Activity    Alcohol use: No     Alcohol/week: 0.0 standard drinks    Drug use: No    Sexual activity: Not on file   Lifestyle    Physical activity     Days per week: Not on file     Minutes per session: Not on file    Stress: Not on file   Relationships    Social connections     Talks on phone: Not on file     Gets together: Not on file     Attends Confucianism service: Not on file     Active member of club or organization: Not on file     Attends meetings of clubs or organizations: Not on file     Relationship status: Not on file    Intimate partner violence     Fear of current or ex partner: Not on file     Emotionally abused: Not on file     Physically abused: Not on file     Forced sexual activity: Not on file   Other Topics Concern    Not on file   Social History Narrative    Not on file         Review of Systems:  Review of Systems   Constitution: Negative. HENT: Positive for hearing loss. Eyes:        Glasses   Cardiovascular: Positive for chest pain. Occasional chest pain   Respiratory: Positive for cough. Endocrine:        Diabetic   Hematologic/Lymphatic: Negative. Skin: Negative. Musculoskeletal: Positive for arthralgias, joint pain and myalgias. Gastrointestinal: Negative. Genitourinary: Positive for dysuria and nocturia. Neurological: Positive for dizziness and loss of balance. Gait issue, uses a walker   Psychiatric/Behavioral: Negative. Physical Exam:  There were no vitals taken for this visit. Physical Exam  Skin:         Neurological:      Mental Status: She is alert and oriented to person, place, and time. Psychiatric:         Mood and Affect: Mood normal.         Thought Content: Thought content normal.           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 Possible side effects, risk of tolerance and or dependence and alternative treatments discussed    Obtaining appropriate analgesic effect of treatment   No signs of potential drug abuse or diversion identified    [x] Ill effects of being on chronic pain medications such as sleep disturbances, respiratory depression, hormonal changes, withdrawal symptoms, chronic opioid dependence and tolerance as well as risk of taking opioids with Benzodiazepines and taking opioids along with alcohol,  werediscussed with patient. I had asked the patient to minimize medication use and utilize pain medications only for uncontrolled rest pain or pain with exertional activities. I advised patient not to self-escalate painmedications without consulting with us. At each of patient's future visits we will try to taper pain medications, while adjusting the adjunct medications, and re-evaluating for Physical Therapy to improve spinal andjoint strength. We will continue to have discussions to decrease pain medications as tolerated. Counseled patient on effects their pain medication and /or their medical condition mayhave on their  ability to drive or operate machinery. Instructed not to drive or operate machinery if drowsy     I also discussed with the patient regarding the dangers of combining narcotic pain medication with tranquilizers, alcohol or illegal drugs or taking the medication any way other than prescribed. The dangers were discussed  including respiratory depression and death. Patient was told to tell  all  physicians regarding the medications he is getting from pain clinic. Patient is warned not to take any unprescribed medications over-the-countermedications that can depress breathing . Patient is advised to talk to the pharmacist or physicians if planning to take any over-the-counter medications before  takeing them. Patient is strongly advised to avoid tranquilizers or  relaxants, illegal drugs  or any medications that can depress breathing  Patient is also advised to tell us if there is any changes in their medications from other physicians.             TREATMENT OPTIONS:       Medication Agreement Requirements Met  Continue Opioid therapy  Script written for  percocet  Follow up appointment made

## 2021-02-15 ENCOUNTER — HOSPITAL ENCOUNTER (OUTPATIENT)
Dept: PAIN MANAGEMENT | Age: 86
Discharge: HOME | End: 2021-02-15
Payer: COMMERCIAL

## 2021-02-15 DIAGNOSIS — M25.551 HIP PAIN, BILATERAL: ICD-10-CM

## 2021-02-15 DIAGNOSIS — Z79.899 ENCOUNTER FOR MEDICATION MANAGEMENT: ICD-10-CM

## 2021-02-15 DIAGNOSIS — M51.36 DDD (DEGENERATIVE DISC DISEASE), LUMBAR: ICD-10-CM

## 2021-02-15 DIAGNOSIS — M25.552 HIP PAIN, BILATERAL: ICD-10-CM

## 2021-02-15 DIAGNOSIS — M25.551 PAIN OF BOTH HIP JOINTS: ICD-10-CM

## 2021-02-15 DIAGNOSIS — M54.42 CHRONIC BILATERAL LOW BACK PAIN WITH BILATERAL SCIATICA: Chronic | ICD-10-CM

## 2021-02-15 DIAGNOSIS — M25.552 PAIN OF BOTH HIP JOINTS: ICD-10-CM

## 2021-02-15 DIAGNOSIS — M54.41 CHRONIC BILATERAL LOW BACK PAIN WITH BILATERAL SCIATICA: Chronic | ICD-10-CM

## 2021-02-15 DIAGNOSIS — G89.29 CHRONIC BILATERAL LOW BACK PAIN WITH BILATERAL SCIATICA: Chronic | ICD-10-CM

## 2021-02-15 DIAGNOSIS — M51.36 LUMBAR DEGENERATIVE DISC DISEASE: ICD-10-CM

## 2021-02-15 DIAGNOSIS — M48.061 SPINAL STENOSIS OF LUMBAR REGION WITHOUT NEUROGENIC CLAUDICATION: Primary | ICD-10-CM

## 2021-02-15 PROCEDURE — 99213 OFFICE O/P EST LOW 20 MIN: CPT

## 2021-02-15 PROCEDURE — 99442 PR PHYS/QHP TELEPHONE EVALUATION 11-20 MIN: CPT | Performed by: NURSE PRACTITIONER

## 2021-02-15 RX ORDER — LEVOFLOXACIN 500 MG/1
TABLET, FILM COATED ORAL
COMMUNITY
Start: 2021-02-08 | End: 2021-03-16 | Stop reason: ALTCHOICE

## 2021-02-15 RX ORDER — FERROUS SULFATE 325(65) MG
325 TABLET ORAL
COMMUNITY

## 2021-02-15 RX ORDER — OXYCODONE AND ACETAMINOPHEN 7.5; 325 MG/1; MG/1
1 TABLET ORAL EVERY 6 HOURS PRN
Qty: 120 TABLET | Refills: 0 | Status: SHIPPED | OUTPATIENT
Start: 2021-02-17 | End: 2021-03-16 | Stop reason: SDUPTHER

## 2021-02-15 ASSESSMENT — ENCOUNTER SYMPTOMS
CONSTIPATION: 1
BACK PAIN: 1

## 2021-02-15 NOTE — PROGRESS NOTES
Leonardo 89 PROGRESS NOTE      Patient  completed []  video visit   [x]   phone call:   13   Minutes :   []    to  review Medication Agreement    []  Follow up after procedure   []  Discuss treatment options    Location:  Provider:  working from    [x]    home    []   [facility] - MAIK CALL , patient at  home   Chief Complaint:  Back pain and joint pain  She c/o low back pain which has increased. The pain radiates down her legs. She has no history of lumbar surgery. She also c/o  Hip pain and she has had hip replacement surgery,,She has multiple joint pain. She has been on percocet 7.5/325 every 6 hours prn. Her daughter states because of issues with her new insurance co, her mother could only get her script filled increments of 30 tabs and only get a total of 90 tabs instead of 120 tabs, She has more pain and less active due to increase in her pain. She is being treated for UTI and apparently has low B12,level. She will be seeing a neurologist, She has not been sleeping well. She has not been active, She had 1 ED visit  About 3 weeks ago for confusion and pain in arms and shoulders. Back Pain  This is a chronic problem. The problem occurs constantly. The problem has been gradually worsening since onset. The pain is present in the lumbar spine. The quality of the pain is described as aching. Radiates to: legs. The pain is at a severity of 9/10. The pain is severe. The pain is the same all the time. Exacerbated by: cold weather. Associated symptoms include numbness. (Feet) Risk factors include lack of exercise. She has tried analgesics (voltaren gel) for the symptoms.              Treatment goals:  Functional status: reduce pain      Aberrancy:   Any alcoholic beverages     no       Any illegal drugs  np       Analgesia:      9               Adverse  Effects :no      ADL;s :not active    Data:    When was thelast UDS:   2-6-2020          Was the UDS appropriate: Record/Diagnostics Review:      As above, I did review the imaging     2/10/2020 12:39 PM - Radha Grant Incoming Lab Results From [facility]    Component Value Ref Range & Units Status Collected Lab   Pain Management Drug Panel Interp, Urine Consistent   Final 02/06/2020  4:30 PM [facility]   (NOTE)   ________________________________________________________________   DRUGS EXPECTED:   PERCOCET (OXYCODONE) [2/6/20]   ________________________________________________________________   CONSISTENT with medications provided:   PERCOCET (OXYCODONE) : based on oxycodone and metabolites detected   below cutoff   ________________________________________________________________   Drugs Not Included in this Assay:   Acetaminophen   ________________________________________________________________   INTERPRETIVE INFORMATION: Pain Mgt Neil, Mass Spec/EMIT, Ur,                            Interp   Interpretation depends on accuracy and completeness of patient   medication information submitted by client. 6-Acetylmorphine, Ur Not Detected   Final           ** FINAL **   Procedure:  LUMBAR AP LAT L5S1 [place]    CDX  08/20/2013     [id number]   Reason for Exam:  lumbar DDD       FULL RESULT:   Lumbar spine 3 views       There is no lumbar compression fracture or subluxation. Diffuse lumbar    spondylosis is present. Severe degenerative disc space narrowing at the    levels of L3-L4 and L4-L5 as well as L5-S1. Facet joint degenerative    changes at the levels of L3-S1. There is a minimal grade 1    anterolisthesis of L3 over L4. Pedicles are unremarkable. There is no    scoliosis. There are surgical clips in the right side of the lower    abdomen.  Evidence of a left hip total arthroplasty.               IMPRESSION:    Diffuse lumbar spondylosis and multi-level degenerative    disease.       Report Electronically signed by Nancy Uriarte M.D. on 8/20/2013 2:13 PM   Transcribed by: [facility] on Aug 20 2013  2:15P Read by: Mayra Dove M.D.  [id number] on Aug 20 2013  2:15P    Electronically Signed by: Brittany Coburn. Mayra Dove M.D. on:  Aug 20 2013  2:15P                                                                                               Pill count: appropriate  fill date : states has #10 percocet left had to cut down so she would not run out as she did not get the full amount of percocet tabs due to insurance issues      Morphine equivalent dose as reported on OARRS:48.21  Periodic Controlled Substance Monitoring: Possible medication side effects, risk of tolerance/dependence & alternative treatments discussed., No signs of potential drug abuse or diversion identified., Obtaining appropriate analgesic effect of treatment., Assessed functional status. Nehemias Mosley, APRN - CNP)  Review ofOARRS does not show any aberrant prescription behavior. Medication is helping the patient stay active. Patient denies any side effects and reports adequate analgesia. No sign of misuse/abuse.             Past Medical History:   Diagnosis Date    Anxiety     Arthritis     Bronchitis     CAD (coronary artery disease)     Carotid arterial disease (HCC)     COPD (chronic obstructive pulmonary disease) (HCC)     Diabetes mellitus ([address]     Hyperlipidemia     Hypertension     Mitral regurgitation     Myalgia     Pulmonary hypertension ([address]     Shingles 2018    left facial area and involved eye    Sleep apnea     Syncope and collapse        Past Surgical History:   Procedure Laterality Date    ABDOMEN SURGERY      CATARACT REMOVAL WITH IMPLANT Right 1     SECTION      COLECTOMY      COLONOSCOPY      HIP ARTHROPLASTY      3 on the left and 1 on the right    HYSTERECTOMY      INSERTABLE CARDIAC MONITOR  2018    [employer]    JOINT REPLACEMENT Right     TOTAL KNEE    JOINT REPLACEMENT Bilateral     right x2, left x3 hips    KNEE SURGERY         No Known Allergies      Current Outpatient Medications:   levoFLOXacin (LEVAQUIN) 500 MG tablet, , Disp: , Rfl:     diclofenac sodium (VOLTAREN) 1 % GEL, , Disp: , Rfl:     ferrous sulfate (IRON 325) 325 (65 Fe) MG tablet, Take 325 mg by mouth daily (with breakfast), Disp: , Rfl:     oxyCODONE-acetaminophen (PERCOCET) 7.5-325 MG per tablet, Take 1 tablet by mouth every 6 hours as needed for Pain for up to 30 days. , Disp: 120 tablet, Rfl: 0    losartan (COZAAR) 25 MG tablet, , Disp: , Rfl:     simvastatin (ZOCOR) 40 MG tablet, , Disp: , Rfl:     metoprolol tartrate (LOPRESSOR) 25 MG tablet, , Disp: , Rfl:     escitalopram (LEXAPRO) 20 MG tablet, , Disp: , Rfl:     amLODIPine (NORVASC) 5 MG tablet, TAKE ONE TABLET BY MOUTH DAILY, Disp: 90 tablet, Rfl: 3    levothyroxine (LEVOTHROID) 50 MCG tablet, Take 1 tablet by mouth daily, Disp: 30 tablet, Rfl: 3    metFORMIN (GLUCOPHAGE) 500 MG tablet, TAKE ONE TABLET BY MOUTH TWICE A DAY, Disp: 180 tablet, Rfl: 2    aspirin 325 MG tablet, Take 325 mg by mouth daily. , Disp: , Rfl:     hydrOXYzine (ATARAX) 25 MG tablet, Take 25 mg by mouth every 8 hours as needed for Anxiety, Disp: , Rfl:     furosemide (LASIX) 20 MG tablet, Take 1 tablet by mouth daily as needed (for 2 lb weight gain/increased swelling/increased shortness of breath), Disp: 60 tablet, Rfl: 3    ASPERCREME LIDOCAINE EX, Apply topically, Disp: , Rfl:     VENTOLIN  (90 Base) MCG/ACT inhaler, , Disp: , Rfl:     omeprazole (PRILOSEC) 20 MG delayed release capsule, , Disp: , Rfl:     hydrocortisone 2.5 % cream, Apply topically 2 times daily. , Disp: 30 g, Rfl: 2    glucose monitoring kit (FREESTYLE) monitoring kit, 1 kit by Does not apply route daily as needed, Disp: 1 kit, Rfl: 0    glucose blood VI test strips (EXACTECH TEST) strip, 1 each by In Vitro route daily Type 2 diabetes qd testing, Disp: 100 each, Rfl: 3    Accu-Chek Multiclix Lancets MISC, Test qd;type 2 diabetes, Disp: 100 each, Rfl: 3 Skin: Negative. Musculoskeletal: Positive for back pain and joint pain. Gastrointestinal: Positive for constipation. Genitourinary: Positive for nocturia. Neurological: Positive for loss of balance and numbness. Psychiatric/Behavioral: The patient is nervous/anxious. Physical Exam:  There were no vitals taken for this visit. Physical Exam  Skin:         Neurological:      Mental Status: She is alert and oriented to person, place, and time. Psychiatric:         Mood and Affect: Mood normal.         Thought Content: Thought content normal.           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 Possible side effects, risk of tolerance and or dependence and alternative treatments discussed    Obtaining appropriate analgesic effect of treatment   No signs of potential drug abuse or diversion identified    [x] Ill effects of being on chronic pain medications such as sleep disturbances, respiratory depression, hormonal changes, withdrawal symptoms, chronic opioid dependence and tolerance as well as risk of taking opioids with Benzodiazepines and taking opioids along with alcohol,  werediscussed with patient. I had asked the patient to minimize medication use and utilize pain medications only for uncontrolled rest pain or pain with exertional activities. I advised patient not to self-escalate painmedications without consulting with us.

## 2021-03-16 ENCOUNTER — TELEPHONE (OUTPATIENT)
Dept: PAIN MANAGEMENT | Age: 86
End: 2021-03-16

## 2021-03-16 ENCOUNTER — HOSPITAL ENCOUNTER (OUTPATIENT)
Dept: PAIN MANAGEMENT | Age: 86
Discharge: HOME | End: 2021-03-16
Payer: COMMERCIAL

## 2021-03-16 DIAGNOSIS — M25.552 HIP PAIN, BILATERAL: ICD-10-CM

## 2021-03-16 DIAGNOSIS — M25.552 PAIN OF BOTH HIP JOINTS: ICD-10-CM

## 2021-03-16 DIAGNOSIS — M25.551 PAIN OF BOTH HIP JOINTS: ICD-10-CM

## 2021-03-16 DIAGNOSIS — M51.36 DDD (DEGENERATIVE DISC DISEASE), LUMBAR: ICD-10-CM

## 2021-03-16 DIAGNOSIS — Z79.899 ENCOUNTER FOR MEDICATION MANAGEMENT: ICD-10-CM

## 2021-03-16 DIAGNOSIS — G89.29 CHRONIC BILATERAL LOW BACK PAIN WITH BILATERAL SCIATICA: Chronic | ICD-10-CM

## 2021-03-16 DIAGNOSIS — M54.42 CHRONIC BILATERAL LOW BACK PAIN WITH BILATERAL SCIATICA: Chronic | ICD-10-CM

## 2021-03-16 DIAGNOSIS — M48.061 SPINAL STENOSIS OF LUMBAR REGION WITHOUT NEUROGENIC CLAUDICATION: Primary | ICD-10-CM

## 2021-03-16 DIAGNOSIS — M51.36 LUMBAR DEGENERATIVE DISC DISEASE: ICD-10-CM

## 2021-03-16 DIAGNOSIS — M54.41 CHRONIC BILATERAL LOW BACK PAIN WITH BILATERAL SCIATICA: Chronic | ICD-10-CM

## 2021-03-16 DIAGNOSIS — M25.551 HIP PAIN, BILATERAL: ICD-10-CM

## 2021-03-16 PROCEDURE — 99442 PR PHYS/QHP TELEPHONE EVALUATION 11-20 MIN: CPT | Performed by: NURSE PRACTITIONER

## 2021-03-16 PROCEDURE — 99213 OFFICE O/P EST LOW 20 MIN: CPT

## 2021-03-16 RX ORDER — CALCIUM CARBONATE 300MG(750)
TABLET,CHEWABLE ORAL DAILY
COMMUNITY
End: 2021-04-16

## 2021-03-16 RX ORDER — OXYCODONE AND ACETAMINOPHEN 7.5; 325 MG/1; MG/1
1 TABLET ORAL EVERY 6 HOURS PRN
Qty: 120 TABLET | Refills: 0 | Status: SHIPPED | OUTPATIENT
Start: 2021-03-19 | End: 2021-04-16 | Stop reason: SDUPTHER

## 2021-03-16 ASSESSMENT — ENCOUNTER SYMPTOMS
SHORTNESS OF BREATH: 1
CONSTIPATION: 1

## 2021-03-16 NOTE — TELEPHONE ENCOUNTER
I returned patient's call requesting records be sent to a new Pain Clinic. I left a message on patient's VM stating she will need to come in or have her daughter stop by to  a record of release. This office needs the patient's signature before I can send the records. I asked patient to call with questions.

## 2021-03-16 NOTE — PROGRESS NOTES
Leonardo 89 PROGRESS NOTE      Patient  completed []  video visit   [x]   phone call:   12   Minutes :       [x]    to  review Medication Agreement    []  Follow up after procedure   []  Discuss treatment options      Location:  Provider:  working from    [x]    home    []   [facility] - MAIK CALL ,   patient at     home  Chief Complaint: hip pain    She c/o bilateral hip pain which has increased. The pain has increased. She has history of bilateral hip replacement surgery, She ambulates with a walker. She states sleeps fairly well. She states not very active. She states PT helped some when she did it. No Ed visits. She will be seeing a neurologist tomorrow for issues with her memory. Hip Pain   There was no injury mechanism. The pain is present in the left hip, right hip, left thigh and right thigh. The quality of the pain is described as aching (sharp). The pain is at a severity of 8/10. The pain is severe. The pain has been worsening since onset. Associated symptoms include muscle weakness. Foreign body present: hip replacements. The symptoms are aggravated by weight bearing (walking). Treatments tried: percocet,  rest. The treatment provided mild relief.            Treatment goals:  Functional status: walk more      Aberrancy:   Any alcoholic beverages   no         Any illegal drugs   no      Analgesia:       8              Adverse  Effects :no    ADL;s :not active      Data:    When was thelast UDS: 2-6-2020    Detected below cutoff  Record/Diagnostics Review:      As above, I did review the imaging   2/10/2020 12:39 PM - Rudy, [facility] Incoming Lab Results From [facility]    Component Value Ref Range & Units Status Collected Lab   Pain Management Drug Panel Interp, Urine Consistent   Final 02/06/2020  4:30 PM [facility]   (NOTE)   ________________________________________________________________   DRUGS EXPECTED:   PERCOCET (OXYCODONE) [2/6/20] ________________________________________________________________   CONSISTENT with medications provided:   PERCOCET (OXYCODONE) : based on oxycodone and metabolites detected   below cutoff   ________________________________________________________________   Drugs Not Included in this Assay:   Acetaminophen   ________________________________________________________________   INTERPRETIVE INFORMATION: Pain Mgt Neil, Mass Spec/EMIT, Ur,                            Interp   Interpretation depends on accuracy and completeness of patient   medication information submitted by client. 6-Acetylmorphine, Ur Not Detected                            Pill count: appropriate  fill date :3-  Morphine equivalent dose as reported on OARRS: 45  Periodic Controlled Substance Monitoring: Possible medication side effects, risk of tolerance/dependence & alternative treatments discussed., No signs of potential drug abuse or diversion identified. , Assessed functional status., Obtaining appropriate analgesic effect of treatment. Rosina Panchal, APRN - CNP)  Review ofOARRS does not show any aberrant prescription behavior. Medication is helping the patient stay active. Patient denies any side effects and reports adequate analgesia. No sign of misuse/abuse.             Past Medical History:   Diagnosis Date    Anxiety     Arthritis     Bronchitis     CAD (coronary artery disease)     Carotid arterial disease ([address]     COPD (chronic obstructive pulmonary disease) (HCC)     Diabetes mellitus ([address]     Hyperlipidemia     Hypertension     Mitral regurgitation     Myalgia     Pulmonary hypertension ([address]     Shingles 2018    left facial area and involved eye    Sleep apnea     Syncope and collapse        Past Surgical History:   Procedure Laterality Date    ABDOMEN SURGERY      CATARACT REMOVAL WITH IMPLANT Right      SECTION      COLECTOMY      COLONOSCOPY      HIP ARTHROPLASTY      3 on the left and 1 on glucose blood VI test strips (EXACTECH TEST) strip, 1 each by In Vitro route daily Type 2 diabetes qd testing, Disp: 100 each, Rfl: 3    Accu-Chek Multiclix Lancets MISC, Test qd;type 2 diabetes, Disp: 100 each, Rfl: 3    Scooter MISC, by Does not apply route Use daily dx arthritis obesity pulmonary htn,copd, Disp: 1 each, Rfl: 0    Family History   Problem Relation Age of Onset    Cancer Mother     Hypertension Maternal Aunt     Cancer Daughter        Social History     Socioeconomic History    Marital status: Single     Spouse name: Not on file    Number of children: 3    Years of education: Not on file    Highest education level: Not on file   Occupational History    Occupation: RETIRED   Social Needs    Financial resource strain: Not on file    Food insecurity     Worry: Not on file     Inability: Not on file   [employer] needs     Medical: Not on file     Non-medical: Not on file   Tobacco Use    Smoking status: Former Smoker     Years: 5.00     Quit date: 1985     Years since quittin.3    Smokeless tobacco: Never Used   Substance and Sexual Activity    Alcohol use: No     Alcohol/week: 0.0 standard drinks    Drug use: No    Sexual activity: Not on file   Lifestyle    Physical activity     Days per week: Not on file     Minutes per session: Not on file    Stress: Not on file   Relationships    Social connections     Talks on phone: Not on file     Gets together: Not on file     Attends Yarsani service: Not on file     Active member of club or organization: Not on file     Attends meetings of clubs or organizations: Not on file     Relationship status: Not on file    Intimate partner violence     Fear of current or ex partner: Not on file     Emotionally abused: Not on file     Physically abused: Not on file     Forced sexual activity: Not on file   Other Topics Concern    Not on file   Social History Narrative    Not on file         Review of Systems:  Review of Systems Constitution: Positive for malaise/fatigue. HENT: Positive for hearing loss. Eyes: Positive for visual disturbance. Cardiovascular: Positive for chest pain. Has appt to see Cardiologist   Respiratory: Positive for shortness of breath. Endocrine:        Blood sugar 123   Hematologic/Lymphatic: Negative. Skin: Positive for rash. Musculoskeletal: Positive for joint pain. Gastrointestinal: Positive for constipation. Genitourinary: Positive for nocturia. Neurological: Positive for dizziness and loss of balance. Uses walker   Psychiatric/Behavioral: Positive for memory loss. The patient is nervous/anxious. Physical Exam:  There were no vitals taken for this visit. Physical Exam  Skin:         Neurological:      Mental Status: She is alert and oriented to person, place, and time. Psychiatric:         Mood and Affect: Mood normal.         Thought Content: Thought content normal.           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      Possible side effects, risk of tolerance and or dependence and alternative treatments discussed    Obtaining appropriate analgesic effect of treatment   No signs of potential drug abuse or diversion identified    [x] Ill effects of being on chronic pain medications such as sleep disturbances, respiratory depression, hormonal changes, withdrawal symptoms, chronic opioid dependence and tolerance as well as risk of taking opioids with Benzodiazepines and taking opioids along with alcohol, werediscussed with patient. I had asked the patient to minimize medication use and utilize pain medications only for uncontrolled rest pain or pain with exertional activities. I advised patient not to self-escalate painmedications without consulting with us. At each of patient's future visits we will try to taper pain medications, while adjusting the adjunct medications, and re-evaluating for Physical Therapy to improve spinal andjoint strength. We will continue to have discussions to decrease pain medications as tolerated. Counseled patient on effects their pain medication and /or their medical condition mayhave on their  ability to drive or operate machinery. Instructed not to drive or operate machinery if drowsy     I also discussed with the patient regarding the dangers of combining narcotic pain medication with tranquilizers, alcohol or illegal drugs or taking the medication any way other than prescribed. The dangers were discussed  including respiratory depression and death. Patient was told to tell  all  physicians regarding the medications he is getting from pain clinic. Patient is warned not to take any unprescribed medications over-the-countermedications that can depress breathing . Patient is advised to talk to the pharmacist or physicians if planning to take any over-the-counter medications before  takeing them. Patient is strongly advised to avoid tranquilizers or  relaxants, illegal drugs  or any medications that can depress breathing  Patient is also advised to tell us if there is any changes in their medications from other physicians.     1. UDT, daughter will [facility] to have done, [facility] will have [facility]ed there      TREATMENT OPTIONS:     UDT  Medication Agreement Requirements Met  Continue Opioid therapy  Script written for  percocet  Follow up appointment made

## 2021-04-16 ENCOUNTER — HOSPITAL ENCOUNTER (OUTPATIENT)
Dept: PAIN MANAGEMENT | Age: 86
Discharge: HOME | End: 2021-04-16
Payer: COMMERCIAL

## 2021-04-16 DIAGNOSIS — G89.29 HIP PAIN, CHRONIC, RIGHT: ICD-10-CM

## 2021-04-16 DIAGNOSIS — G89.29 CHRONIC BILATERAL LOW BACK PAIN WITH BILATERAL SCIATICA: Chronic | ICD-10-CM

## 2021-04-16 DIAGNOSIS — M54.42 CHRONIC BILATERAL LOW BACK PAIN WITH BILATERAL SCIATICA: Chronic | ICD-10-CM

## 2021-04-16 DIAGNOSIS — M17.10 ARTHROPATHY, LOWER LEG: ICD-10-CM

## 2021-04-16 DIAGNOSIS — M25.551 HIP PAIN, BILATERAL: ICD-10-CM

## 2021-04-16 DIAGNOSIS — Z79.899 ENCOUNTER FOR MEDICATION MANAGEMENT: ICD-10-CM

## 2021-04-16 DIAGNOSIS — M25.551 HIP PAIN, CHRONIC, RIGHT: ICD-10-CM

## 2021-04-16 DIAGNOSIS — M54.41 CHRONIC BILATERAL LOW BACK PAIN WITH BILATERAL SCIATICA: Chronic | ICD-10-CM

## 2021-04-16 DIAGNOSIS — M25.552 HIP PAIN, BILATERAL: ICD-10-CM

## 2021-04-16 DIAGNOSIS — M51.36 DDD (DEGENERATIVE DISC DISEASE), LUMBAR: ICD-10-CM

## 2021-04-16 DIAGNOSIS — M25.551 PAIN OF BOTH HIP JOINTS: ICD-10-CM

## 2021-04-16 DIAGNOSIS — M51.36 LUMBAR DEGENERATIVE DISC DISEASE: ICD-10-CM

## 2021-04-16 DIAGNOSIS — M48.061 SPINAL STENOSIS OF LUMBAR REGION WITHOUT NEUROGENIC CLAUDICATION: Primary | ICD-10-CM

## 2021-04-16 DIAGNOSIS — M25.552 PAIN OF BOTH HIP JOINTS: ICD-10-CM

## 2021-04-16 PROCEDURE — 99213 OFFICE O/P EST LOW 20 MIN: CPT

## 2021-04-16 PROCEDURE — 99442 PR PHYS/QHP TELEPHONE EVALUATION 11-20 MIN: CPT | Performed by: NURSE PRACTITIONER

## 2021-04-16 RX ORDER — MAGNESIUM OXIDE 400 MG/1
400 TABLET ORAL DAILY
COMMUNITY

## 2021-04-16 RX ORDER — MEMANTINE HYDROCHLORIDE 5 MG/1
TABLET ORAL
COMMUNITY
Start: 2021-04-13

## 2021-04-16 RX ORDER — OXYCODONE AND ACETAMINOPHEN 7.5; 325 MG/1; MG/1
1 TABLET ORAL EVERY 6 HOURS PRN
Qty: 120 TABLET | Refills: 0 | Status: SHIPPED | OUTPATIENT
Start: 2021-04-20 | End: 2021-05-18 | Stop reason: SDUPTHER

## 2021-04-16 ASSESSMENT — ENCOUNTER SYMPTOMS
RESPIRATORY NEGATIVE: 1
CONSTIPATION: 1

## 2021-04-16 NOTE — PROGRESS NOTES
Leonardo 89 PROGRESS NOTE      Patient  completed []  video visit   [x]   phone call:    11     Minutes :       [x]    to  review Medication Agreement    []  Follow up after procedure   []  Discuss treatment options      Location:  Provider:  working from    [x]    home    []   [facility] - [facility] CALL ,   patient at  home       Chief Complaint:  Right shoulder and right hip pain    She c/o right shoulder it is more intense. She has no history of surgery,  She c/o right hip pain which has increased, She has history of  Right hip replacement . It is harder to ambulate, she uses a walker. Sleep is good. No Ed visits. Shoulder Pain   The pain is present in the right shoulder, right arm, right wrist and right ankle. This is a chronic problem. There has been no history of extremity trauma. The problem occurs constantly. The problem has been gradually worsening. The quality of the pain is described as aching (pulsating). The pain is at a severity of 8/10. The pain is severe. Associated symptoms include a limited range of motion. Exacerbated by: moving right ar, reaching out or to the back. Treatments tried: laying down, pain medication. Hip Pain   There was no injury mechanism. The pain is present in the right hip. The quality of the pain is described as aching. The pain is at a severity of 9/10. The pain has been worsening since onset. Associated symptoms include a loss of motion. Foreign body present: hip replacement. Exacerbated by: walking. She has tried heat for the symptoms.              Treatment goals:  Functional status: reduce pain  To a 5    Aberrancy:   Any alcoholic beverages    no        Any illegal drugs  no       Analgesia:          9           Adverse  Effects :no      ADL;s :not active    Data:    When was thelast UDS:     2-6-2020       Was the UDS appropriate:  [] yes []   No  Detected below cutoff      Record/Diagnostics Review:      As above, I did review the imaging 2/10/2020 12:39 PM - Radha Grant Incoming Lab Results From [facility]    Component Value Ref Range & Units Status Collected Lab   Pain Management Drug Panel Interp, Urine Consistent   Final 02/06/2020  4:30 PM [facility]   (NOTE)   ________________________________________________________________   DRUGS EXPECTED:   PERCOCET (OXYCODONE) [2/6/20]   ________________________________________________________________   CONSISTENT with medications provided:   PERCOCET (OXYCODONE) : based on oxycodone and metabolites detected   below cutoff   ________________________________________________________________   Drugs Not Included in this Assay:   Acetaminophen   ________________________________________________________________   INTERPRETIVE INFORMATION: Pain Mgt Neil, Mass Spec/EMIT, Ur,                            Interp   Interpretation depends on accuracy and completeness of patient   medication information submitted by client. Pill count: appropriate    fill date :4-    Morphine equivalent dose as reported on OARRS:45  Periodic Controlled Substance Monitoring: Possible medication side effects, risk of tolerance/dependence & alternative treatments discussed., No signs of potential drug abuse or diversion identified. , Assessed functional status., Obtaining appropriate analgesic effect of treatment. Merced Caceres, APRN - CNP)  Review ofOARRS does not show any aberrant prescription behavior. Medication is helping the patient stay active. Patient denies any side effects and reports adequate analgesia. No sign of misuse/abuse.             Past Medical History:   Diagnosis Date    Anxiety     Arthritis     Bronchitis     CAD (coronary artery disease)     [facility] [address]     COPD (chronic obstructive pulmonary disease) (HCC)     Diabetes mellitus ([facility] [address]     Hyperlipidemia     Hypertension     Mitral regurgitation     Myalgia     Pulmonary hypertension ([facility] [address]     Shingles 09/2018    left facial area and involved eye    Sleep apnea     Syncope and collapse        Past Surgical History:   Procedure Laterality Date    ABDOMEN SURGERY      CATARACT REMOVAL WITH IMPLANT Right      SECTION      COLECTOMY      COLONOSCOPY      HIP ARTHROPLASTY      3 on the left and 1 on the right    HYSTERECTOMY      INSERTABLE CARDIAC MONITOR  2018    [employer]tr[employer]    JOINT REPLACEMENT Right     TOTAL KNEE    JOINT REPLACEMENT Bilateral     right x2, left x3 hips    KNEE SURGERY         No Known Allergies      Current Outpatient Medications:     memantine (NAMENDA) 5 MG tablet, , Disp: , Rfl:     magnesium oxide (MAG-OX) 400 MG tablet, Take 400 mg by mouth daily, Disp: , Rfl:     cyanocobalamin 1000 MCG tablet, Take 1,000 mcg by mouth daily, Disp: , Rfl:     oxyCODONE-acetaminophen (PERCOCET) 7.5-325 MG per tablet, Take 1 tablet by mouth every 6 hours as needed for Pain for up to 30 days. , Disp: 120 tablet, Rfl: 0    ferrous sulfate (IRON 325) 325 (65 Fe) MG tablet, Take 325 mg by mouth daily (with breakfast), Disp: , Rfl:     losartan (COZAAR) 25 MG tablet, , Disp: , Rfl:     simvastatin (ZOCOR) 40 MG tablet, , Disp: , Rfl:     metoprolol tartrate (LOPRESSOR) 25 MG tablet, , Disp: , Rfl:     escitalopram (LEXAPRO) 20 MG tablet, , Disp: , Rfl:     amLODIPine (NORVASC) 5 MG tablet, TAKE ONE TABLET BY MOUTH DAILY, Disp: 90 tablet, Rfl: 3    levothyroxine (LEVOTHROID) 50 MCG tablet, Take 1 tablet by mouth daily, Disp: 30 tablet, Rfl: 3    metFORMIN (GLUCOPHAGE) 500 MG tablet, TAKE ONE TABLET BY MOUTH TWICE A DAY, Disp: 180 tablet, Rfl: 2    aspirin 325 MG tablet, Take 325 mg by mouth daily. , Disp: , Rfl:     diclofenac sodium (VOLTAREN) 1 % GEL, , Disp: , Rfl:     hydrOXYzine (ATARAX) 25 MG tablet, Take 25 mg by mouth every 8 hours as needed for Anxiety, Disp: , Rfl:     furosemide (LASIX) 20 MG tablet, Take 1 tablet by mouth daily as needed (for 2 lb weight gain/increased swelling/increased shortness of breath), Disp: 60 tablet, Rfl: 3    ASPERCREME LIDOCAINE EX, Apply topically, Disp: , Rfl:     VENTOLIN  (90 Base) MCG/ACT inhaler, , Disp: , Rfl:     omeprazole (PRILOSEC) 20 MG delayed release capsule, , Disp: , Rfl:     hydrocortisone 2.5 % cream, Apply topically 2 times daily. , Disp: 30 g, Rfl: 2    glucose monitoring kit (FREESTYLE) monitoring kit, 1 kit by Does not apply route daily as needed, Disp: 1 kit, Rfl: 0    glucose blood VI test strips (EXACTECH TEST) strip, 1 each by In Vitro route daily Type 2 diabetes qd testing, Disp: 100 each, Rfl: 3    Accu-Chek Multiclix Lancets MISC, Test qd;type 2 diabetes, Disp: 100 each, Rfl: 3    Scooter MISC, by Does not apply route Use daily dx arthritis obesity pulmonary htn,copd, Disp: 1 each, Rfl: 0    Family History   Problem Relation Age of Onset    Cancer Mother     Hypertension Maternal Aunt     Cancer Daughter        Social History     Socioeconomic History    Marital status: Single     Spouse name: Not on file    Number of children: 3    Years of education: Not on file    Highest education level: Not on file   Occupational History    Occupation: RETIRED   Social Needs    Financial resource strain: Not on file    Food insecurity     Worry: Not on file     Inability: Not on file   [employer] needs     Medical: Not on file     Non-medical: Not on file   Tobacco Use    Smoking status: Former Smoker     Years: 5.00     Quit date: 1985     Years since quittin.4    Smokeless tobacco: Never Used   Substance and Sexual Activity    Alcohol use: No     Alcohol/week: 0.0 standard drinks    Drug use: No    Sexual activity: Not on file   Lifestyle    Physical activity     Days per week: Not on file     Minutes per session: Not on file    Stress: Not on file   Relationships    Social connections     Talks on phone: Not on file     Gets together: Not on file     Attends Anabaptist service: Not on treatment   No signs of potential drug abuse or diversion identified    [x] Ill effects of being on chronic pain medications such as sleep disturbances, respiratory depression, hormonal changes, withdrawal symptoms, chronic opioid dependence and tolerance as well as risk of taking opioids with Benzodiazepines and taking opioids along with alcohol,  werediscussed with patient. I had asked the patient to minimize medication use and utilize pain medications only for uncontrolled rest pain or pain with exertional activities. I advised patient not to self-escalate painmedications without consulting with us. At each of patient's future visits we will try to taper pain medications, while adjusting the adjunct medications, and re-evaluating for Physical Therapy to improve spinal andjoint strength. We will continue to have discussions to decrease pain medications as tolerated. Counseled patient on effects their pain medication and /or their medical condition mayhave on their  ability to drive or operate machinery. Instructed not to drive or operate machinery if drowsy     I also discussed with the patient regarding the dangers of combining narcotic pain medication with tranquilizers, alcohol or illegal drugs or taking the medication any way other than prescribed. The dangers were discussed  including respiratory depression and death. Patient was told to tell  all  physicians regarding the medications he is getting from pain clinic. Patient is warned not to take any unprescribed medications over-the-countermedications that can depress breathing . Patient is advised to talk to the pharmacist or physicians if planning to take any over-the-counter medications before  takeing them. Patient is strongly advised to avoid tranquilizers or  relaxants, illegal drugs  or any medications that can depress breathing  Patient is also advised to tell us if there is any changes in their medications from other physicians.         1. Spoke with daughter UDT was not done last month, she states will get done today    TREATMENT OPTIONS:     UDT  Medication Agreement Requirements Met  Continue Opioid therapy  Script written for  percocetFollow up appointment made

## 2021-05-18 ENCOUNTER — HOSPITAL ENCOUNTER (OUTPATIENT)
Dept: PAIN MANAGEMENT | Age: 86
Discharge: HOME | End: 2021-05-18
Payer: COMMERCIAL

## 2021-05-18 DIAGNOSIS — M25.551 PAIN OF BOTH HIP JOINTS: ICD-10-CM

## 2021-05-18 DIAGNOSIS — G89.29 CHRONIC BILATERAL LOW BACK PAIN WITH BILATERAL SCIATICA: Chronic | ICD-10-CM

## 2021-05-18 DIAGNOSIS — M54.42 CHRONIC BILATERAL LOW BACK PAIN WITH BILATERAL SCIATICA: Chronic | ICD-10-CM

## 2021-05-18 DIAGNOSIS — M54.41 CHRONIC BILATERAL LOW BACK PAIN WITH BILATERAL SCIATICA: Chronic | ICD-10-CM

## 2021-05-18 DIAGNOSIS — Z79.899 ENCOUNTER FOR MEDICATION MANAGEMENT: ICD-10-CM

## 2021-05-18 DIAGNOSIS — M17.11 ARTHRITIS OF KNEE, RIGHT: ICD-10-CM

## 2021-05-18 DIAGNOSIS — M51.36 DDD (DEGENERATIVE DISC DISEASE), LUMBAR: ICD-10-CM

## 2021-05-18 DIAGNOSIS — M25.552 PAIN OF BOTH HIP JOINTS: ICD-10-CM

## 2021-05-18 DIAGNOSIS — M25.552 HIP PAIN, BILATERAL: ICD-10-CM

## 2021-05-18 DIAGNOSIS — M51.36 LUMBAR DEGENERATIVE DISC DISEASE: ICD-10-CM

## 2021-05-18 DIAGNOSIS — M25.551 HIP PAIN, BILATERAL: ICD-10-CM

## 2021-05-18 DIAGNOSIS — M48.061 SPINAL STENOSIS OF LUMBAR REGION WITHOUT NEUROGENIC CLAUDICATION: Primary | ICD-10-CM

## 2021-05-18 PROCEDURE — 99442 PR PHYS/QHP TELEPHONE EVALUATION 11-20 MIN: CPT | Performed by: NURSE PRACTITIONER

## 2021-05-18 PROCEDURE — 99213 OFFICE O/P EST LOW 20 MIN: CPT

## 2021-05-18 RX ORDER — OXYCODONE AND ACETAMINOPHEN 7.5; 325 MG/1; MG/1
1 TABLET ORAL EVERY 6 HOURS PRN
Qty: 120 TABLET | Refills: 0 | Status: SHIPPED | OUTPATIENT
Start: 2021-05-23 | End: 2021-06-16 | Stop reason: SDUPTHER

## 2021-05-18 RX ORDER — ERGOCALCIFEROL 1.25 MG/1
50000 CAPSULE ORAL WEEKLY
COMMUNITY
End: 2021-08-18

## 2021-05-18 ASSESSMENT — ENCOUNTER SYMPTOMS
COUGH: 1
CONSTIPATION: 1
SHORTNESS OF BREATH: 1

## 2021-05-18 NOTE — PROGRESS NOTES
Leonardo 89 PROGRESS NOTE      Patient  completed []  video visit   [x]   phone call:    13     Minutes :       []    to  review Medication Agreement    []  Follow up after procedure   []  Discuss treatment options      Location:  Provider:  working from    [x]    home    []   [facility] - MA[facility] CALL ,   patient at         Chief Complaint: multiple joint pain  She c/o multiple joint pain, hips, right shoulder and knees. She has history of bilateral hip arthroplasty and  Right total knee arthroplasty. Her pain is a 9. It is hard walking, she uses a walker. She is sleeping well. Her activity is limited. She states PT helped when she did it. She has had no Ed visits. Generalized Body Aches  This is a chronic problem. The problem has been gradually worsening. Associated symptoms include arthralgias and coughing. Exacerbated by: walking, cold weather. She has tried heat (voltaren gel) for the symptoms. The treatment provided mild relief.                Treatment goals:  Functional status: for pain to go away    Aberrancy:   Any alcoholic beverages   no         Any illegal drugs   no      Analgesia:   9                  Adverse  Effects :no    ADL;s :limited      Data:    When was thelast UDS:   2-6-2020         Was the UDS appropriate:  [x] yes []   No  But below cutoff    Record/Diagnostics Review:      As above, I did review the imaging     2/10/2020 12:39 PM - Rudy, Michaelapn Incoming Lab Results From [facility]    Component Value Ref Range & Units Status Collected Lab   Pain Management Drug Panel Interp, Urine Consistent   Final 02/06/2020  4:30 PM [facility]   (NOTE)   ________________________________________________________________   DRUGS EXPECTED:   PERCOCET (OXYCODONE) [2/6/20]   ________________________________________________________________   CONSISTENT with medications provided:   PERCOCET (OXYCODONE) : based on oxycodone and metabolites detected   below cutoff ________________________________________________________________   Drugs Not Included in this Assay:   Acetaminophen   ________________________________________________________________   INTERPRETIVE INFORMATION: Pain Mgt Neil, Mass Spec/EMIT, Ur,                            Interp   Interpretation depends on accuracy and completeness of patient   medication information submitted by client                   Pill count: appropriate    fill date : she has extra states # 20 percocet tabs so fill date will be 2021    Morphine equivalent dose as reported on OARRS:45  Periodic Controlled Substance Monitoring: Possible medication side effects, risk of tolerance/dependence & alternative treatments discussed., No signs of potential drug abuse or diversion identified. , Assessed functional status., Obtaining appropriate analgesic effect of treatment. Elvia Sims, APRN - CNP)  Review ofOARRS does not show any aberrant prescription behavior. Medication is helping the patient stay active. Patient denies any side effects and reports adequate analgesia. No sign of misuse/abuse.             Past Medical History:   Diagnosis Date    Anxiety     Arthritis     Bronchitis     CAD (coronary artery disease)     Carotid arterial disease (HCC)     COPD (chronic obstructive pulmonary disease) (HCC)     Diabetes mellitus ([address]     Hyperlipidemia     Hypertension     Mitral regurgitation     Myalgia     Pulmonary hypertension ([address]     Shingles 2018    left facial area and involved eye    Sleep apnea     Syncope and collapse        Past Surgical History:   Procedure Laterality Date    ABDOMEN SURGERY      CATARACT REMOVAL WITH IMPLANT Right      SECTION      COLECTOMY      COLONOSCOPY      HIP ARTHROPLASTY      3 on the left and 1 on the right    HYSTERECTOMY      INSERTABLE CARDIAC MONITOR  2018    [employer]    JOINT REPLACEMENT Right     TOTAL KNEE    JOINT REPLACEMENT Bilateral     right x2, left x3 hips    KNEE SURGERY         No Known Allergies      Current Outpatient Medications:     [START ON 5/23/2021] oxyCODONE-acetaminophen (PERCOCET) 7.5-325 MG per tablet, Take 1 tablet by mouth every 6 hours as needed for Pain for up to 30 days. , Disp: 120 tablet, Rfl: 0    memantine (NAMENDA) 5 MG tablet, , Disp: , Rfl:     magnesium oxide (MAG-OX) 400 MG tablet, Take 400 mg by mouth daily, Disp: , Rfl:     cyanocobalamin 1000 MCG tablet, Take 1,000 mcg by mouth daily, Disp: , Rfl:     ferrous sulfate (IRON 325) 325 (65 Fe) MG tablet, Take 325 mg by mouth daily (with breakfast), Disp: , Rfl:     losartan (COZAAR) 25 MG tablet, , Disp: , Rfl:     simvastatin (ZOCOR) 40 MG tablet, , Disp: , Rfl:     metoprolol tartrate (LOPRESSOR) 25 MG tablet, , Disp: , Rfl:     escitalopram (LEXAPRO) 20 MG tablet, , Disp: , Rfl:     amLODIPine (NORVASC) 5 MG tablet, TAKE ONE TABLET BY MOUTH DAILY, Disp: 90 tablet, Rfl: 3    levothyroxine (LEVOTHROID) 50 MCG tablet, Take 1 tablet by mouth daily, Disp: 30 tablet, Rfl: 3    metFORMIN (GLUCOPHAGE) 500 MG tablet, TAKE ONE TABLET BY MOUTH TWICE A DAY, Disp: 180 tablet, Rfl: 2    aspirin 325 MG tablet, Take 325 mg by mouth daily. , Disp: , Rfl:     vitamin D (ERGOCALCIFEROL) 1.25 MG (58339 UT) CAPS capsule, Take 50,000 Units by mouth once a week, Disp: , Rfl:     diclofenac sodium (VOLTAREN) 1 % GEL, , Disp: , Rfl:     hydrOXYzine (ATARAX) 25 MG tablet, Take 25 mg by mouth every 8 hours as needed for Anxiety, Disp: , Rfl:     furosemide (LASIX) 20 MG tablet, Take 1 tablet by mouth daily as needed (for 2 lb weight gain/increased swelling/increased shortness of breath), Disp: 60 tablet, Rfl: 3    ASPERCREME LIDOCAINE EX, Apply topically, Disp: , Rfl:     VENTOLIN  (90 Base) MCG/ACT inhaler, , Disp: , Rfl:     omeprazole (PRILOSEC) 20 MG delayed release capsule, , Disp: , Rfl:     hydrocortisone 2.5 % cream, Apply topically 2 times daily. , Disp: 30 g, Rfl: 2   glucose monitoring kit (FREESTYLE) monitoring kit, 1 kit by Does not apply route daily as needed, Disp: 1 kit, Rfl: 0    glucose blood VI test strips (EXACTECH TEST) strip, 1 each by In Vitro route daily Type 2 diabetes qd testing, Disp: 100 each, Rfl: 3    Accu-Chek Multiclix Lancets MISC, Test qd;type 2 diabetes, Disp: 100 each, Rfl: 3    Scooter MISC, by Does not apply route Use daily dx arthritis obesity pulmonary htn,copd, Disp: 1 each, Rfl: 0    Family History   Problem Relation Age of Onset    Cancer Mother     Hypertension Maternal Aunt     Cancer Daughter        Social History     Socioeconomic History    Marital status: Single     Spouse name: Not on file    Number of children: 3    Years of education: Not on file    Highest education level: Not on file   Occupational History    Occupation: RETIRED   Tobacco Use    Smoking status: Former Smoker     Years: 5.00     Quit date: 1985     Years since quittin.5    Smokeless tobacco: Never Used   Vaping Use    Vaping Use: Former   Substance and Sexual Activity    Alcohol use: No     Alcohol/week: 0.0 standard drinks    Drug use: No    Sexual activity: Not on file   Other Topics Concern    Not on file   Social History Narrative    Not on file     Social Determinants of Health     Financial Resource Strain:     Difficulty of Paying Living Expenses:    Food Insecurity:     Worried About Running Out of Food in the Last Year:     Ran Out of Food in the Last Year:    Transportation Needs:     Lack of Transportation (Medical):      Lack of Transportation (Non-Medical):    Physical Activity:     Days of Exercise per Week:     Minutes of Exercise per Session:    Stress:     Feeling of Stress :    Social Connections:     Frequency of Communication with Friends and Family:     Frequency of Social Gatherings with Friends and Family:     Attends Yarsani Services:     Active Member of Clubs or Organizations:     Attends Club or Organization Meetings:     Marital Status:    Intimate Partner Violence:     Fear of Current or Ex-Partner:     Emotionally Abused:     Physically Abused:     Sexually Abused:          Review of Systems:  Review of Systems   Constitutional: Negative. HENT: Negative. Eyes:        Glasses   Cardiovascular: Positive for dyspnea on exertion. Respiratory: Positive for cough and shortness of breath. Endocrine:        Diabetic   Hematologic/Lymphatic: Bruises/bleeds easily. Skin: Negative. Musculoskeletal: Positive for arthralgias and joint pain. Gastrointestinal: Positive for constipation. Genitourinary: Positive for nocturia. Neurological: Positive for dizziness. Uses a walker   Psychiatric/Behavioral: The patient is nervous/anxious. On medication         Physical Exam:  There were no vitals taken for this visit. Physical Exam  Skin:         Neurological:      Mental Status: She is alert and oriented to person, place, and time. Psychiatric:         Mood and Affect: Mood normal.         Thought Content:  Thought content normal.           Assessment:    Problem List Items Addressed This Visit     Spinal stenosis of lumbar region without neurogenic claudication - Primary    Relevant Medications    oxyCODONE-acetaminophen (PERCOCET) 7.5-325 MG per tablet (Start on 5/23/2021)    Pain of both hip joints    Relevant Medications    oxyCODONE-acetaminophen (PERCOCET) 7.5-325 MG per tablet (Start on 5/23/2021)    Lumbar degenerative disc disease    Relevant Medications    oxyCODONE-acetaminophen (PERCOCET) 7.5-325 MG per tablet (Start on 5/23/2021)    Hip pain, bilateral    Relevant Medications    oxyCODONE-acetaminophen (PERCOCET) 7.5-325 MG per tablet (Start on 5/23/2021)    Encounter for medication management    Relevant Medications    oxyCODONE-acetaminophen (PERCOCET) 7.5-325 MG per tablet (Start on 5/23/2021)    DDD (degenerative disc disease), lumbar    Relevant Medications dangers were discussed  including respiratory depression and death. Patient was told to tell  all  physicians regarding the medications he is getting from pain clinic. Patient is warned not to take any unprescribed medications over-the-countermedications that can depress breathing . Patient is advised to talk to the pharmacist or physicians if planning to take any over-the-counter medications before  takeing them. Patient is strongly advised to avoid tranquilizers or  relaxants, illegal drugs  or any medications that can depress breathing  Patient is also advised to tell us if there is any changes in their medications from other physicians.       UDT ordered 3-, was done [facility] [place] [facility] [place] [address], results not back      TREATMENT OPTIONS:       Medication Agreement Requirements Met  Continue Opioid therapy  Script written for  percocet  Follow up appointment made

## 2021-06-12 ASSESSMENT — ENCOUNTER SYMPTOMS
SHORTNESS OF BREATH: 1
ALLERGIC/IMMUNOLOGIC NEGATIVE: 1
COUGH: 1
CONSTIPATION: 1
BACK PAIN: 1
EYE PAIN: 0
PHOTOPHOBIA: 0
VOMITING: 0
NAUSEA: 0
ABDOMINAL PAIN: 0
SORE THROAT: 1
DIARRHEA: 1
EYES NEGATIVE: 1
BOWEL INCONTINENCE: 0
SINUS PRESSURE: 1

## 2021-06-12 NOTE — PROGRESS NOTES
Marquis Pimentel is a 82 y.o. female evaluated on 6/16/2021. Modality of virtual service provided -via \ telephone   Consent:  Patient and/or health care decision maker is aware that that patient may receive a bill for this telephone service, depending on one's insurance coverage, and has provided verbal consent to proceed: Yes    Patient identification was verified at the start of the visit: Yes    Chief complaint: Marquis Pimentel is 82 y.o.,  female, with  with chief complaint of pain involving the low back and hips bilaterally. .    Pain is slightly worse than before pain is worse in the hips the right hip is worse cannot walk because of the pain-patient has not seen orthopedic for her hip pain. Her back pain is also worse radiating to both lower extremities. Patient does not want to undergo any further surgeries would like to avoid it if possible. .  She is also not interested in interventions at this time. Back Pain  This is a chronic problem. The current episode started more than 1 year ago. The problem occurs constantly. The problem has been gradually worsening since onset. The pain is present in the gluteal, lumbar spine and sacro-iliac. The quality of the pain is described as aching and stabbing [place] [facility]). Radiates to: Radiates towards the hips bilaterally. The pain is at a severity of 9/10 (49). The pain is severe. The pain is worse during the day. The symptoms are aggravated by bending, standing, position and twisting (Nimesh or lifting and activities of daily living). Stiffness is present in the morning. Associated symptoms include numbness and weakness. Pertinent negatives include no abdominal pain, bladder incontinence, bowel incontinence, chest pain, dysuria, fever or tingling. (Numbness in the feet and weakness in the lower extremities) Risk factors include lack of exercise, obesity and sedentary lifestyle. Hip Pain   The incident occurred more than 1 week ago (6 yrs ago).  Injury mechanism: had bilat. hip replacement. The pain is present in the right hip, left hip and right leg. The quality of the pain is described as aching, shooting and stabbing (sharp). The pain is at a severity of 10/10. The pain is severe. The pain has been constant since onset. Associated symptoms include an inability to bear weight and numbness. Pertinent negatives include no tingling. The symptoms are aggravated by movement and weight bearing. Alleviating factors:heat and rest   Lifestyle changes experienced with pain: Prevents or limits ADLs, Increases w/activity. Increases w/prolonged sitting/standing/walking  Mood changes,angry and anxious  Patient currently unemployed. Physical therapy did not help the pain. Are you under psychological counseling at present: No  Goals for treatment include:  Decrease in pain  Enjoy daily and recreational activities, return to previous status. Patient relates current medications are helping the pain. Patient reports taking pain medications as prescribed, denies obtaining medications from different sources and denies use of illegal drugs. Patient denies side effects from medications like nausea, vomiting, constipation or drowsiness. Patient reports current activities of daily living ar possible due to medications and would like to continue them. ACTIVITY/SOCIAL/EMOTIONAL:  Sleep Pattern: 7 hours per night.  generally restful sleep  Home Exercises:  Stretching and moving legs  Activity:unchanged  Emotional Issues: normal.   Currently seeing a Psychiatrist or Psychologist:  No     ADVERSE MEDICATION EFFECTS:   Nausea and vomiting: no   Constipation: no-Undercontrol-: yes    Dizziness/drowsy/sleepy--no  Urinary Retention: no    ABERRANT BEHAVIORS SINCE LAST VISIT  Lost rx/pills:------------------------------------------ no  Taking  medication as prescribed: ----------- yes  Urine Drug Screen ---------------------------------  yes Date------------------------------------------------- 2020              Results as expected ---------------------yes    Recent ER visits: -------------------------------------No  Pill count is appropriate: ---------------------------yes   Refills for prescriptions appropriate:---------- yes      Past Medical History:   Diagnosis Date    Anxiety     Arthritis     Bronchitis     CAD (coronary artery disease)     [facility]     COPD [facility]chronic obstructive pulmonary disease) [facility]     Diabetes mellitus ([facility] [facility]     Hyperlipidemia     Hypertension     Mitral regurgitation     Myalgia     Pulmonary hypertension ([facility] [facility]     Shingles 2018    left facial area and involved eye    Sleep apnea     Syncope and collapse        Past Surgical History:   Procedure Laterality Date    ABDOMEN SURGERY      CATARACT REMOVAL WITH IMPLANT Right      SECTION      COLECTOMY      COLONOSCOPY      HIP ARTHROPLASTY      3 on the left and 1 on the right    HYSTERECTOMY      INSERTABLE CARDIAC MONITOR  2018    [employer]    JOINT REPLACEMENT Right     TOTAL KNEE    JOINT REPLACEMENT Bilateral     right x2, left x3 hips    KNEE SURGERY         Family History   Problem Relation Age of Onset    Cancer Mother     Hypertension Maternal Aunt     Cancer Daughter        Social History     Socioeconomic History    Marital status: Single     Spouse name: None    Number of children: 3    Years of education: None    Highest education level: None   Occupational History    Occupation: RETIRED   Tobacco Use    Smoking status: Former Smoker     Years: 5.00     Quit date: 1985     Years since quittin.5    Smokeless tobacco: Never Used   Vaping Use    Vaping Use: Former   Substance and Sexual Activity    Alcohol use: No     Alcohol/week: 0.0 standard drinks    Drug use: No    Sexual activity: None   Other Topics Concern    None   Social History Narrative    None     Social Determinants of Health     Financial Resource Strain:     Difficulty of Paying Living Expenses:    Food Insecurity:     Worried About Running Out of Food in the Last Year:     [address] in the Last Year:    Transportation Needs:     Lack of Transportation (Medical):      Lack of Transportation (Non-Medical):    Physical Activity:     Days of Exercise per Week:     Minutes of Exercise per Session:    Stress:     Feeling of Stress :    Social Connections:     Frequency of Communication with Friends and Family:     Frequency of Social Gatherings with Friends and Family:     Attends Buddhist Services:     Active Member of Clubs or Organizations:     Attends Club or Organization Meetings:     Marital Status:    Intimate Partner Violence:     Fear of Current or Ex-Partner:     Emotionally Abused:     Physically Abused:     Sexually Abused:        No Known Allergies    Current Outpatient Medications on File Prior to Encounter   Medication Sig Dispense Refill    vitamin D (ERGOCALCIFEROL) 1.25 MG (42593 UT) CAPS capsule Take 50,000 Units by mouth once a week      memantine (NAMENDA) 5 MG tablet       magnesium oxide (MAG-OX) 400 MG tablet Take 400 mg by mouth daily      cyanocobalamin 1000 MCG tablet Take 1,000 mcg by mouth daily      diclofenac sodium (VOLTAREN) 1 % GEL       ferrous sulfate (IRON 325) 325 (65 Fe) MG tablet Take 325 mg by mouth daily (with breakfast)      losartan (COZAAR) 25 MG tablet       hydrOXYzine (ATARAX) 25 MG tablet Take 25 mg by mouth every 8 hours as needed for Anxiety      simvastatin (ZOCOR) 40 MG tablet       metoprolol tartrate (LOPRESSOR) 25 MG tablet       escitalopram (LEXAPRO) 20 MG tablet       amLODIPine (NORVASC) 5 MG tablet TAKE ONE TABLET BY MOUTH DAILY 90 tablet 3    furosemide (LASIX) 20 MG tablet Take 1 tablet by mouth daily as needed (for 2 lb weight gain/increased swelling/increased shortness of breath) 60 tablet 3    ASPERCREME LIDOCAINE Psychiatric/Behavioral: Negative. Negative for sleep disturbance and suicidal ideas. The patient is not nervous/anxious. There is no change in the review of systems since her last visit    Physical Exam  Skin:         Neurological:      Mental Status: She is alert and oriented to person, place, and time.    Psychiatric:         Mood and Affect: Mood normal.            DATA:  LAB.:  2/10/2020 12:39 PM - [facility] Incoming Lab Results From [facility]    Component Value Ref Range & Units Status Collected Lab   Pain Management Drug Panel Interp, Urine Consistent   Final 02/06/2020  4:30 PM [facility]   (NOTE)   ________________________________________________________________   DRUGS EXPECTED:   PERCOCET (OXYCODONE) [2/6/20]   ________________________________________________________________   CONSISTENT with medications provided:   PERCOCET (OXYCODONE) : based on oxycodone and metabolites detected   below cutoff   ________________________________________________________________        X-Ray reports:  Exams:  CR SPINE CERVICAL 3 VWS OR LESS              Examination: CR SPINE CERVICAL 3 VWS OR LESS              Clinical History: Reason for Study: RE NECK PAIN -                      Provided history: 77-year-old female with neck pain              COMPARISON: None              FINDINGS: 3 views of the cervical spine were obtained.         Mineralization is diffusely diminished.  Vertebral body height and        alignment is grossly within normal limits with significant        degenerative disc disease throughout the cervical spine.  Posterior        elements are intact with grossly normal alignment.  Multilevel facet        arthropathy is noted in addition to uncovertebral degeneration.         Craniocervical junction is normally aligned.  No prevertebral soft        tissue swelling.              IMPRESSION: Significant multilevel degenerative disc and facet        disease with no acute fracture or malalignment.              Workstation:[id number]              Finalized by Tayler Norwood MD on 11/24/2017     Procedure:  LUMBAR AP LAT L5S1 [place] CDX  08/20/2013     [id number] Reason for Exam:  lumbar DDD   FULL RESULT:   Lumbar spine 3 views   There is no lumbar compression fracture or subluxation. Diffuse lumbar spondylosis is present. Severe degenerative disc space narrowing at the levels of L3-L4 and L4-L5 as well as L5-S1. Facet joint degenerative changes at the levels of L3-S1. There is a minimal grade 1 anterolisthesis of L3 over L4. Pedicles are unremarkable. There is no scoliosis. There are surgical clips in the right side of the lower abdomen. Evidence of a left hip total arthroplasty. IMPRESSION:   Diffuse lumbar spondylosis and multi-level degenerative disease. Report Electronically signed by Paul Ac M.D. on 8/20/2013     Impression: Based on the [address] [place] [address] guidelines:  Negative study. The BMD values are within the normal limits. FULL RESULT:   Lumbar spine 3 views   There is no lumbar compression fracture or subluxation. Diffuse lumbar spondylosis is present. Severe degenerative disc space narrowing at the levels of L3-L4 and L4-L5 as well as L5-S1. Facet joint degenerative changes at the levels of L3-S1. There is a minimal grade 1 anterolisthesis of L3 over L4. Pedicles are unremarkable. There is no scoliosis. There are surgical clips in the right side of the lower abdomen. Evidence of a left hip total arthroplasty. IMPRESSION:   Diffuse lumbar spondylosis and multi-level degenerative disease. Report Electronically signed by Paul Ac M.D. on 8/20/2013    Clinical  impression:  1. Lumbar degenerative disc disease    2. DDD (degenerative disc disease), lumbar    3. Hip pain, bilateral    4. Arthritis of knee, right    5. Chronic pain following surgery or procedure    6.  Diabetic polyneuropathy associated with type 2 diabetes mellitus ([address]    7. Spinal stenosis of lumbar region without neurogenic claudication    8. Encounter for medication management    9. Pain of both hip joints    10. Chronic bilateral low back pain with bilateral sciatica        Plan of care: We will continue current pain medications  Current medications are being tolerated without any Adverse side effects. Orders Placed This Encounter   Medications    oxyCODONE-acetaminophen (PERCOCET) 7.5-325 MG per tablet     Sig: Take 1 tablet by mouth every 6 hours as needed for Pain for up to 30 days. Dispense:  120 tablet     Refill:  0     Reduce doses taken as pain becomes manageable     Urine drug screens have been appropriate. No aberrant activity noted. Analgesia is achieved. Activities of daily living are possible because of medications. Safe use of medications explained to patient. PDMP Monitoring:    Last PDMP Guera Mirza as Reviewed [facility]):  Review User Review Instant Review Result   Monty Ordonez 6/12/2021  6:15 AM Reviewed PDMP [1]     Counselling/Preventive measures for pain  Control:    [x]  Spine strengthening exercises are discussed with patient in detail. [x] Ill effects of being on chronic pain medications such as sleep disturbances, hormonal changes, withdrawal symptoms,  chronic opioid dependence and tolerance were discussed with patient. I had asked the patient to minimize medication use and utilize pain medications only for uncontrolled rest pain or pain with exertional activities. I advised patient not to self escalate pain medications without consulting with us. At each of patient's future visits we will try to taper pain medications, while adjusting the adjunct medications, and re-evaluating for Physical Therapy to improve spinal and joint strength. We will continue to have discussions to decrease pain medications as tolerated.    I also discussed with the patient regarding the dangers of combining narcotic pain medication with tranquilizers, alcohol or illegal drugs or taking the medication any other than prescribed. The dangers including the respiratory depression and death. Patient was told to tell  to all  physicians regarding the medications he is getting from pain clinic. Patient is warned not to take any unprescribed medications over-the-counter medications that can depress breathing . Patient is advised to talk to the pharmacist or physicians if planning to take any over-the-counter medications before  takeing them. Patient is strongly advised to avoid tranquilizers or  Relaxants for any medications that can depress breathing or recreational drugs. Patient is also advised to tell us if there is any changes in his medications from other physicians. We discussed the same at today's visit and have not been to implement it, as the patient's pain is not under control with current medications. Decision Making Process : Patient's health history and referral records thoroughly reviewed before focused physical examination and discussion with patient. I have spent 25 mins. Over 50% of today's visit is spent on examining the patient and counseling and coordinating the care. Level of complexity of date to be reviewed is Moderate. The chart date reviewed include the following: Imaging Reports. Summary of Care. Time spent reviewing with patient the below reports:   Medication safety, Treatment options. Level of diagnosis and management options of this case is multiple: involving the following management options: Interventions as needed, medication management as appropriate, future visits, activity modification, heat/ice as needed, Urine drug screen as required. [x]The patient's questions were answered to the best of my abilities. This note was created using voice recognition software. There may be inaccuracies of transcription  that are inadvertently overlooked prior to the signature. There is any questions about the transcription please contact me.     Return in  4 weeks with physician / CNP  for further plan of treatment. Due to the COVID-19 pandemic and the appropriate interventions by [facility], our non-urgent pain management patients will not be seen in the office at this time for their protection and the protection of our staff. To offer continuity of care, their prescriptions will be escribed this month after a careful chart review and review of their OARRS report  Pursuant to the emergency declaration under the [facility] waiver authority and the [employer] Act, this Virtual Visit was conducted, with patient's consent, to reduce the patient's risk of exposure to COVID-19 and provide continuity of care for an established patient. Services were provided through a video synchronous discussion virtually to substitute for in-person appointment. \"  Documentation:  I communicated with the patient and/or health care decision maker about plan of care  Details of this discussion including any medical advice provided: Total Time: minutes: 21-30 minutes    I affirm this is a Patient Initiated Episode with an Established Patient who has not had a related appointment within my department in the past 7 days or scheduled within the next 24 hours.     Electronically signed by Zahida Kumari MD on 6/16/2021 at 10:38 AM

## 2021-06-16 ENCOUNTER — HOSPITAL ENCOUNTER (OUTPATIENT)
Dept: PAIN MANAGEMENT | Age: 86
Discharge: HOME | End: 2021-06-16
Payer: COMMERCIAL

## 2021-06-16 DIAGNOSIS — M25.551 PAIN OF BOTH HIP JOINTS: ICD-10-CM

## 2021-06-16 DIAGNOSIS — G89.29 CHRONIC BILATERAL LOW BACK PAIN WITH BILATERAL SCIATICA: Chronic | ICD-10-CM

## 2021-06-16 DIAGNOSIS — M17.11 ARTHRITIS OF KNEE, RIGHT: ICD-10-CM

## 2021-06-16 DIAGNOSIS — Z79.899 ENCOUNTER FOR MEDICATION MANAGEMENT: ICD-10-CM

## 2021-06-16 DIAGNOSIS — M54.42 CHRONIC BILATERAL LOW BACK PAIN WITH BILATERAL SCIATICA: Chronic | ICD-10-CM

## 2021-06-16 DIAGNOSIS — M51.36 LUMBAR DEGENERATIVE DISC DISEASE: Primary | ICD-10-CM

## 2021-06-16 DIAGNOSIS — G89.28 CHRONIC PAIN FOLLOWING SURGERY OR PROCEDURE: ICD-10-CM

## 2021-06-16 DIAGNOSIS — M25.551 HIP PAIN, BILATERAL: ICD-10-CM

## 2021-06-16 DIAGNOSIS — M54.41 CHRONIC BILATERAL LOW BACK PAIN WITH BILATERAL SCIATICA: Chronic | ICD-10-CM

## 2021-06-16 DIAGNOSIS — M25.552 HIP PAIN, BILATERAL: ICD-10-CM

## 2021-06-16 DIAGNOSIS — M48.061 SPINAL STENOSIS OF LUMBAR REGION WITHOUT NEUROGENIC CLAUDICATION: ICD-10-CM

## 2021-06-16 DIAGNOSIS — M25.552 PAIN OF BOTH HIP JOINTS: ICD-10-CM

## 2021-06-16 DIAGNOSIS — E11.42 DIABETIC POLYNEUROPATHY ASSOCIATED WITH TYPE 2 DIABETES MELLITUS (HCC): ICD-10-CM

## 2021-06-16 DIAGNOSIS — M51.36 DDD (DEGENERATIVE DISC DISEASE), LUMBAR: ICD-10-CM

## 2021-06-16 PROCEDURE — 99213 OFFICE O/P EST LOW 20 MIN: CPT

## 2021-06-16 PROCEDURE — 99214 OFFICE O/P EST MOD 30 MIN: CPT | Performed by: PAIN MEDICINE

## 2021-06-16 RX ORDER — OXYCODONE AND ACETAMINOPHEN 7.5; 325 MG/1; MG/1
1 TABLET ORAL EVERY 6 HOURS PRN
Qty: 120 TABLET | Refills: 0 | Status: SHIPPED | OUTPATIENT
Start: 2021-06-22 | End: 2021-07-19 | Stop reason: SDUPTHER

## 2021-07-19 ENCOUNTER — HOSPITAL ENCOUNTER (OUTPATIENT)
Dept: PAIN MANAGEMENT | Age: 86
Discharge: HOME | End: 2021-07-19
Payer: COMMERCIAL

## 2021-07-19 DIAGNOSIS — M48.061 SPINAL STENOSIS OF LUMBAR REGION WITHOUT NEUROGENIC CLAUDICATION: ICD-10-CM

## 2021-07-19 DIAGNOSIS — M54.41 CHRONIC BILATERAL LOW BACK PAIN WITH BILATERAL SCIATICA: Chronic | ICD-10-CM

## 2021-07-19 DIAGNOSIS — M25.552 PAIN OF BOTH HIP JOINTS: ICD-10-CM

## 2021-07-19 DIAGNOSIS — M25.552 HIP PAIN, BILATERAL: ICD-10-CM

## 2021-07-19 DIAGNOSIS — M25.551 HIP PAIN, BILATERAL: ICD-10-CM

## 2021-07-19 DIAGNOSIS — Z79.899 ENCOUNTER FOR MEDICATION MANAGEMENT: ICD-10-CM

## 2021-07-19 DIAGNOSIS — G89.29 CHRONIC BILATERAL LOW BACK PAIN WITH BILATERAL SCIATICA: Chronic | ICD-10-CM

## 2021-07-19 DIAGNOSIS — M51.36 DDD (DEGENERATIVE DISC DISEASE), LUMBAR: ICD-10-CM

## 2021-07-19 DIAGNOSIS — M54.42 CHRONIC BILATERAL LOW BACK PAIN WITH BILATERAL SCIATICA: Chronic | ICD-10-CM

## 2021-07-19 DIAGNOSIS — M25.551 PAIN OF BOTH HIP JOINTS: ICD-10-CM

## 2021-07-19 DIAGNOSIS — M51.36 LUMBAR DEGENERATIVE DISC DISEASE: ICD-10-CM

## 2021-07-19 PROCEDURE — 99213 OFFICE O/P EST LOW 20 MIN: CPT

## 2021-07-19 PROCEDURE — 99212 OFFICE O/P EST SF 10 MIN: CPT | Performed by: NURSE PRACTITIONER

## 2021-07-19 RX ORDER — OXYCODONE AND ACETAMINOPHEN 7.5; 325 MG/1; MG/1
1 TABLET ORAL EVERY 6 HOURS PRN
Qty: 120 TABLET | Refills: 0 | Status: SHIPPED | OUTPATIENT
Start: 2021-07-23 | End: 2021-08-18 | Stop reason: SDUPTHER

## 2021-07-19 ASSESSMENT — ENCOUNTER SYMPTOMS
CONSTIPATION: 0
SHORTNESS OF BREATH: 0
BACK PAIN: 1
COUGH: 0

## 2021-07-19 NOTE — PROGRESS NOTES
analgesic effect of treatment. Leslie Padilla, APRN - CNP)      Past Medical History:   Diagnosis Date    Anxiety     Arthritis     Bronchitis     CAD (coronary artery disease)     [facility]     COPD [facility]chronic obstructive pulmonary disease) [facility]     Diabetes mellitus ([facility]     Hyperlipidemia     Hypertension     Mitral regurgitation     Myalgia     Pulmonary hypertension ([facility]     Shingles 2018    left facial area and involved eye    Sleep apnea     Syncope and collapse        Past Surgical History:   Procedure Laterality Date    ABDOMEN SURGERY      CATARACT REMOVAL WITH IMPLANT Right 1-209     SECTION      COLECTOMY      COLONOSCOPY      HIP ARTHROPLASTY      3 on the left and 1 on the right    HYSTERECTOMY      INSERTABLE CARDIAC MONITOR  2018    Medtronic    JOINT REPLACEMENT Right     TOTAL KNEE    JOINT REPLACEMENT Bilateral     right x2, left x3 hips    KNEE SURGERY         No Known Allergies      Current Outpatient Medications:     oxyCODONE-acetaminophen (PERCOCET) 7.5-325 MG per tablet, Take 1 tablet by mouth every 6 hours as needed for Pain for up to 30 days. , Disp: 120 tablet, Rfl: 0    vitamin D (ERGOCALCIFEROL) 1.25 MG (29848 UT) CAPS capsule, Take 50,000 Units by mouth once a week, Disp: , Rfl:     memantine (NAMENDA) 5 MG tablet, , Disp: , Rfl:     magnesium oxide (MAG-OX) 400 MG tablet, Take 400 mg by mouth daily, Disp: , Rfl:     cyanocobalamin 1000 MCG tablet, Take 1,000 mcg by mouth daily, Disp: , Rfl:     diclofenac sodium (VOLTAREN) 1 % GEL, , Disp: , Rfl:     ferrous sulfate (IRON 325) 325 (65 Fe) MG tablet, Take 325 mg by mouth daily (with breakfast), Disp: , Rfl:     losartan (COZAAR) 25 MG tablet, , Disp: , Rfl:     hydrOXYzine (ATARAX) 25 MG tablet, Take 25 mg by mouth every 8 hours as needed for Anxiety, Disp: , Rfl:     simvastatin (ZOCOR) 40 MG tablet, , Disp: , Rfl:     metoprolol tartrate (LOPRESSOR) 25 MG tablet, , Disp: , Rfl:     escitalopram (LEXAPRO) 20 MG tablet, , Disp: , Rfl:     amLODIPine (NORVASC) 5 MG tablet, TAKE ONE TABLET BY MOUTH DAILY, Disp: 90 tablet, Rfl: 3    furosemide (LASIX) 20 MG tablet, Take 1 tablet by mouth daily as needed (for 2 lb weight gain/increased swelling/increased shortness of breath), Disp: 60 tablet, Rfl: 3    ASPERCREME LIDOCAINE EX, Apply topically, Disp: , Rfl:     VENTOLIN  (90 Base) MCG/ACT inhaler, , Disp: , Rfl:     omeprazole (PRILOSEC) 20 MG delayed release capsule, , Disp: , Rfl:     hydrocortisone 2.5 % cream, Apply topically 2 times daily. , Disp: 30 g, Rfl: 2    levothyroxine (LEVOTHROID) 50 MCG tablet, Take 1 tablet by mouth daily, Disp: 30 tablet, Rfl: 3    glucose monitoring kit (FREESTYLE) monitoring kit, 1 kit by Does not apply route daily as needed, Disp: 1 kit, Rfl: 0    glucose blood VI test strips (EXACTECH TEST) strip, 1 each by In Vitro route daily Type 2 diabetes qd testing, Disp: 100 each, Rfl: 3    Accu-Chek Multiclix Lancets MISC, Test qd;type 2 diabetes, Disp: 100 each, Rfl: 3    Scooter MISC, by Does not apply route Use daily dx arthritis obesity pulmonary htn,copd, Disp: 1 each, Rfl: 0    metFORMIN (GLUCOPHAGE) 500 MG tablet, TAKE ONE TABLET BY MOUTH TWICE A DAY, Disp: 180 tablet, Rfl: 2    aspirin 325 MG tablet, Take 325 mg by mouth daily. , Disp: , Rfl:     Family History   Problem Relation Age of Onset    Cancer Mother     Hypertension Maternal Aunt     Cancer Daughter        Social History     Socioeconomic History    Marital status: Single     Spouse name: Not on file    Number of children: 3    Years of education: Not on file    Highest education level: Not on file   Occupational History    Occupation: RETIRED   Tobacco Use    Smoking status: Former Smoker     Years: 5.00     Quit date: 1985     Years since quittin.6    Smokeless tobacco: Never Used   Vaping Use    Vaping Use: Former   Substance and Sexual Activity    Alcohol use: No     Alcohol/week: 0.0 standard drinks    Drug use: No    Sexual activity: Not on file   Other Topics Concern    Not on file   Social History Narrative    Not on file     Social Determinants of Health     Financial Resource Strain:     Difficulty of Paying Living Expenses:    Food Insecurity:     Worried About Running Out of Food in the Last Year:     Ran Out of Food in the Last Year:    Transportation Needs:     Lack of Transportation (Medical):  Lack of Transportation (Non-Medical):    Physical Activity:     Days of Exercise per Week:     Minutes of Exercise per Session:    Stress:     Feeling of Stress :    Social Connections:     Frequency of Communication with Friends and Family:     Frequency of Social Gatherings with Friends and Family:     Attends Holiness Services:     Active Member of Clubs or Organizations:     Attends Club or Organization Meetings:     Marital Status:    Intimate Partner Violence:     Fear of Current or Ex-Partner:     Emotionally Abused:     Physically Abused:     Sexually Abused:        Review of Systems:  Review of Systems   Constitutional: Negative for chills and fever. Cardiovascular: Negative for chest pain and palpitations. Respiratory: Negative for cough and shortness of breath. Musculoskeletal: Positive for back pain and joint pain. Gastrointestinal: Negative for constipation. Neurological: Negative for disturbances in coordination and loss of balance. Physical Exam:  There were no vitals taken for this visit. Physical Exam  Neurological:      Mental Status: She is alert. Psychiatric:         Mood and Affect: Mood normal.         Record/Diagnostics Review:    Last melva 2/2020 and was appropriate     Pt's daughter states pt had UDS over a month ago at [facility] lab in [place]. No results available to me in Epic.       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)    Relevant Medications    oxyCODONE-acetaminophen (PERCOCET) 7.5-325 MG per tablet (Start on 7/23/2021)    Spinal stenosis of lumbar region without neurogenic claudication    Relevant Medications    oxyCODONE-acetaminophen (PERCOCET) 7.5-325 MG per tablet (Start on 7/23/2021)    Encounter for medication management    Relevant Medications    oxyCODONE-acetaminophen (PERCOCET) 7.5-325 MG per tablet (Start on 7/23/2021)    DDD (degenerative disc disease), lumbar    Relevant Medications    oxyCODONE-acetaminophen (PERCOCET) 7.5-325 MG per tablet (Start on 7/23/2021)    Lumbar degenerative disc disease    Relevant Medications    oxyCODONE-acetaminophen (PERCOCET) 7.5-325 MG per tablet (Start on 7/23/2021)    Pain of both hip joints    Relevant Medications    oxyCODONE-acetaminophen (PERCOCET) 7.5-325 MG per tablet (Start on 7/23/2021)    Hip pain, bilateral    Relevant Medications    oxyCODONE-acetaminophen (PERCOCET) 7.5-325 MG per tablet (Start on 7/23/2021)             Treatment Plan:  Patient relates current medications are helping the pain. Patient reports taking pain medications as prescribed, denies obtaining medications from different sources and denies use of illegal drugs. Patient denies side effects from medications like nausea, vomiting, constipation or drowsiness.  Patient reports current activities of daily living are possible due to medications and would like to continue them. As always, we encourage daily stretching and strengthening exercises, and recommend minimizing use of pain medications unless patient cannot get through daily activities due to pain. Contract requirements met. Continue opioid therapy. Script written for percocet  Follow up appointment made for 4 weeks    Pt's daughter states pt had UDS over a month ago at [address] [facility] in [place]. No results in Epic - will call lab to see if there is any record of the lab being completed. If no record, obtain at next visit     Kd Best, was evaluated through a synchronous (real-time) audio-video encounter. The patient (or guardian if applicable) is aware that this is a billable service. Verbal consent to proceed has been obtained within the past 12 months. The visit was conducted pursuant to the emergency declaration under the [address] [place] [address], [address] [place] [address] authority and the [employer] General Act. Patient identification was verified, and a caregiver was present when appropriate. The patient was located in a state where the provider was credentialed to provide care. Total time spent for this encounter: 15 minutes    --STACIA Vega CNP on 7/19/2021 at 1:33 PM    An electronic signature was used to authenticate this note.

## 2021-08-18 ENCOUNTER — HOSPITAL ENCOUNTER (OUTPATIENT)
Dept: PAIN MANAGEMENT | Age: 86
Discharge: HOME | End: 2021-08-18
Payer: COMMERCIAL

## 2021-08-18 DIAGNOSIS — M25.551 PAIN OF BOTH HIP JOINTS: ICD-10-CM

## 2021-08-18 DIAGNOSIS — M25.562 CHRONIC PAIN OF BOTH KNEES: ICD-10-CM

## 2021-08-18 DIAGNOSIS — Z79.899 ENCOUNTER FOR MEDICATION MANAGEMENT: ICD-10-CM

## 2021-08-18 DIAGNOSIS — M54.41 CHRONIC BILATERAL LOW BACK PAIN WITH BILATERAL SCIATICA: Chronic | ICD-10-CM

## 2021-08-18 DIAGNOSIS — M51.36 LUMBAR DEGENERATIVE DISC DISEASE: ICD-10-CM

## 2021-08-18 DIAGNOSIS — E11.42 DIABETIC POLYNEUROPATHY ASSOCIATED WITH TYPE 2 DIABETES MELLITUS (HCC): ICD-10-CM

## 2021-08-18 DIAGNOSIS — M48.061 SPINAL STENOSIS OF LUMBAR REGION WITHOUT NEUROGENIC CLAUDICATION: Primary | ICD-10-CM

## 2021-08-18 DIAGNOSIS — M25.561 CHRONIC PAIN OF BOTH KNEES: ICD-10-CM

## 2021-08-18 DIAGNOSIS — M25.551 HIP PAIN, BILATERAL: ICD-10-CM

## 2021-08-18 DIAGNOSIS — M25.552 PAIN OF BOTH HIP JOINTS: ICD-10-CM

## 2021-08-18 DIAGNOSIS — M54.42 CHRONIC BILATERAL LOW BACK PAIN WITH BILATERAL SCIATICA: Chronic | ICD-10-CM

## 2021-08-18 DIAGNOSIS — M25.551 PAIN IN JOINT, PELVIC REGION AND THIGH, RIGHT: ICD-10-CM

## 2021-08-18 DIAGNOSIS — M25.552 HIP PAIN, BILATERAL: ICD-10-CM

## 2021-08-18 DIAGNOSIS — M51.36 DDD (DEGENERATIVE DISC DISEASE), LUMBAR: ICD-10-CM

## 2021-08-18 DIAGNOSIS — G89.29 CHRONIC PAIN OF BOTH KNEES: ICD-10-CM

## 2021-08-18 DIAGNOSIS — G89.29 CHRONIC BILATERAL LOW BACK PAIN WITH BILATERAL SCIATICA: Chronic | ICD-10-CM

## 2021-08-18 PROCEDURE — 99442 PR PHYS/QHP TELEPHONE EVALUATION 11-20 MIN: CPT | Performed by: NURSE PRACTITIONER

## 2021-08-18 PROCEDURE — 99213 OFFICE O/P EST LOW 20 MIN: CPT

## 2021-08-18 RX ORDER — OXYCODONE AND ACETAMINOPHEN 7.5; 325 MG/1; MG/1
1 TABLET ORAL EVERY 6 HOURS PRN
Qty: 120 TABLET | Refills: 0 | Status: SHIPPED | OUTPATIENT
Start: 2021-08-23 | End: 2021-09-16 | Stop reason: SDUPTHER

## 2021-08-18 ASSESSMENT — ENCOUNTER SYMPTOMS
CONSTIPATION: 1
SHORTNESS OF BREATH: 1
COUGH: 1

## 2021-08-18 NOTE — PROGRESS NOTES
[facility] PROGRESS NOTE      Patient  completed []  video visit   [x]   phone call:   15      Minutes :       [x]    to  review Medication Agreement    []  Follow up after procedure   []  Discuss treatment options      Location:  Provider:  working from    [x]    home    []   [facility] - [facility] CALL ,   patient at home       Chief Complaint: shoulder pain      She c/o shoulder pain radiating down her arms . The pain has increased. She has multiple joint pain. She has had bilateral hip arthroplasty and right knee arthroplasty. She has done PT in the past which helped. She states she does some stretching. She ambulates with a walker. She sleeps well. Shoulder Pain   The pain is present in the left shoulder, left arm, right shoulder and right arm. This is a chronic problem. The current episode started more than 1 year ago. There has been no history of extremity trauma. The problem occurs constantly. The problem has been gradually worsening. The quality of the pain is described as sharp. The pain is at a severity of 10/10. The pain is severe. Associated symptoms include a limited range of motion. Exacerbated by: tryiing to get up, lifting and reaching. She has tried oral narcotics, heat and cold (ointment) for the symptoms. The treatment provided mild relief.            Treatment goals:  Functional status: reduce pain 3-4    Aberrancy:   Any alcoholic beverages   no         Any illegal drugs   no      Analgesia:    10                 Adverse  Effects :no    ADL;s :some stretching    Data:    When was thelast UDS:   2-6-2020         Was the UDS appropriate:  [] yes []   no  Detected below cutoff    Record/Diagnostics Review:      As above, I did review the imaging       2/10/2020 12:39 PM - Radha Grant Incoming Lab Results From [employer]    Component Value Ref Range & Units Status Collected Lab   Pain Management Drug Panel Interp, Urine Consistent   Final 02/06/2020  4:30 PM [facility]   (NOTE) ________________________________________________________________   DRUGS EXPECTED:   PERCOCET (OXYCODONE) [20]   ________________________________________________________________   CONSISTENT with medications provided:   PERCOCET (OXYCODONE) : based on oxycodone and metabolites detected   below cutoff   ________________________________________________________________   Drugs Not Included in this Assay:   Acetaminophen   ________________________________________________________________   INTERPRETIVE INFORMATION: Pain Mgt Neil, [place] Spec/EMIT, Ur,                            Interp   Interpretation depends on accuracy and completeness of patient   medication information submitted by client. Pill count: appropriate    fill date : 2021    Morphine equivalent dose as reported on OARRS: 45  Periodic Controlled Substance Monitoring: Possible medication side effects, risk of tolerance/dependence & alternative treatments discussed., No signs of potential drug abuse or diversion identified. , Assessed functional status., Obtaining appropriate analgesic effect of treatment. Madyson Cabrera, APRN - CNP)  Review ofOARRS does not show any aberrant prescription behavior. Medication is helping the patient stay active. Patient denies any side effects and reports adequate analgesia. No sign of misuse/abuse.             Past Medical History:   Diagnosis Date    Anxiety     Arthritis     Bronchitis     CAD (coronary artery disease)     [facility]     COPD (chronic obstructive pulmonary disease) (HCC)     Diabetes mellitus ([facility]     Hyperlipidemia     Hypertension     Mitral regurgitation     Myalgia     Pulmonary hypertension ([facility]     Shingles 2018    left facial area and involved eye    Sleep apnea     Syncope and collapse        Past Surgical History:   Procedure Laterality Date    ABDOMEN SURGERY      CATARACT REMOVAL WITH IMPLANT Right      SECTION      COLECTOMY      COLONOSCOPY      HIP ARTHROPLASTY      3 on the left and 1 on the right    HYSTERECTOMY      INSERTABLE CARDIAC MONITOR  05/2018    [employer]tr[employer]    JOINT REPLACEMENT Right     TOTAL KNEE    JOINT REPLACEMENT Bilateral     right x2, left x3 hips    KNEE SURGERY         No Known Allergies      Current Outpatient Medications:     oxyCODONE-acetaminophen (PERCOCET) 7.5-325 MG per tablet, Take 1 tablet by mouth every 6 hours as needed for Pain for up to 30 days. , Disp: 120 tablet, Rfl: 0    memantine (NAMENDA) 5 MG tablet, , Disp: , Rfl:     magnesium oxide (MAG-OX) 400 MG tablet, Take 400 mg by mouth daily, Disp: , Rfl:     cyanocobalamin 1000 MCG tablet, Take 1,000 mcg by mouth daily, Disp: , Rfl:     ferrous sulfate (IRON 325) 325 (65 Fe) MG tablet, Take 325 mg by mouth daily (with breakfast), Disp: , Rfl:     losartan (COZAAR) 25 MG tablet, , Disp: , Rfl:     simvastatin (ZOCOR) 40 MG tablet, , Disp: , Rfl:     metoprolol tartrate (LOPRESSOR) 25 MG tablet, , Disp: , Rfl:     escitalopram (LEXAPRO) 20 MG tablet, , Disp: , Rfl:     amLODIPine (NORVASC) 5 MG tablet, TAKE ONE TABLET BY MOUTH DAILY, Disp: 90 tablet, Rfl: 3    omeprazole (PRILOSEC) 20 MG delayed release capsule, , Disp: , Rfl:     levothyroxine (LEVOTHROID) 50 MCG tablet, Take 1 tablet by mouth daily, Disp: 30 tablet, Rfl: 3    metFORMIN (GLUCOPHAGE) 500 MG tablet, TAKE ONE TABLET BY MOUTH TWICE A DAY, Disp: 180 tablet, Rfl: 2    aspirin 325 MG tablet, Take 325 mg by mouth daily. , Disp: , Rfl:     diclofenac sodium (VOLTAREN) 1 % GEL, , Disp: , Rfl:     hydrOXYzine (ATARAX) 25 MG tablet, Take 25 mg by mouth every 8 hours as needed for Anxiety, Disp: , Rfl:     furosemide (LASIX) 20 MG tablet, Take 1 tablet by mouth daily as needed (for 2 lb weight gain/increased swelling/increased shortness of breath), Disp: 60 tablet, Rfl: 3    ASPERCREME LIDOCAINE EX, Apply topically, Disp: , Rfl:     VENTOLIN  (90 Base) Friends and Family:     Attends Sabianist Services:     Active Member of Clubs or Organizations:     Attends Club or Organization Meetings:     Marital Status:    Intimate Partner Violence:     Fear of Current or Ex-Partner:     Emotionally Abused:     Physically Abused:     Sexually Abused:          Review of Systems:  Review of Systems   Constitutional: Negative. HENT: Positive for hearing loss. Eyes:        Glasses   Cardiovascular: Positive for chest pain. Occasional chest pain   Respiratory: Positive for cough and shortness of breath. Endocrine: Negative. Diabetic blood sugar 110 average    Hematologic/Lymphatic: Bruises/bleeds easily. Skin: Negative. Musculoskeletal: Positive for joint pain. Gastrointestinal: Positive for constipation. Genitourinary: Positive for nocturia. Neurological: Positive for dizziness and loss of balance. Uses a walker   Psychiatric/Behavioral: Positive for depression. The patient is nervous/anxious. Physical Exam:  There were no vitals taken for this visit. Physical Exam  Skin:         Neurological:      Mental Status: She is alert and oriented to person, place, and time. Psychiatric:         Mood and Affect: Mood normal.         Thought Content: Thought content normal.           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      Possible side effects, risk of tolerance and or dependence and alternative treatments discussed    Obtaining appropriate analgesic effect of treatment   No signs of potential drug abuse or diversion identified    [x] Ill effects of being on chronic pain medications such as sleep disturbances, respiratory depression, hormonal changes, withdrawal symptoms, chronic opioid dependence and tolerance as well as risk of taking opioids with Benzodiazepines and taking opioids along with alcohol,  werediscussed with patient. I had asked the patient to minimize medication use and utilize pain medications only for uncontrolled rest pain or pain with exertional activities. I advised patient not to self-escalate painmedications without consulting with us. At each of patient's future visits we will try to taper pain medications, while adjusting the adjunct medications, and re-evaluating for Physical Therapy to improve spinal andjoint strength. We will continue to have discussions to decrease pain medications as tolerated. Counseled patient on effects their pain medication and /or their medical condition mayhave on their  ability to drive or operate machinery. Instructed not to drive or operate machinery if drowsy     I also discussed with the patient regarding the dangers of combining narcotic pain medication with tranquilizers, alcohol or illegal drugs or taking the medication any way other than prescribed. The dangers were discussed  including respiratory depression and death. Patient was told to tell  all  physicians regarding the medications he is getting from pain clinic. Patient is warned not to take any unprescribed medications over-the-countermedications that can depress breathing . Patient is advised to talk to the pharmacist or physicians if planning to take any over-the-counter medications before  takeing them.  Patient is strongly advised to avoid tranquilizers or  relaxants, illegal drugs  or any medications that can depress breathing  Patient is also advised to tell us if there is any changes in their medications from other physicians.       1,w e do not have last UDT results, her daughter states she took her to the [facility] on [address] for UDT, will try to get results      TREATMENT OPTIONS:     UDT  Medication Agreement Requirements Met  Continue Opioid therapy  Script written for  percocet  Follow up appointment made

## 2021-08-19 ENCOUNTER — TELEPHONE (OUTPATIENT)
Dept: PAIN MANAGEMENT | Age: 86
End: 2021-08-19

## 2021-09-16 ENCOUNTER — HOSPITAL ENCOUNTER (OUTPATIENT)
Dept: PAIN MANAGEMENT | Age: 86
Discharge: HOME | End: 2021-09-16
Payer: COMMERCIAL

## 2021-09-16 DIAGNOSIS — G89.29 HIP PAIN, CHRONIC, RIGHT: ICD-10-CM

## 2021-09-16 DIAGNOSIS — M25.551 HIP PAIN, BILATERAL: ICD-10-CM

## 2021-09-16 DIAGNOSIS — M51.36 DDD (DEGENERATIVE DISC DISEASE), LUMBAR: ICD-10-CM

## 2021-09-16 DIAGNOSIS — M54.41 CHRONIC BILATERAL LOW BACK PAIN WITH BILATERAL SCIATICA: ICD-10-CM

## 2021-09-16 DIAGNOSIS — M17.10 ARTHROPATHY, LOWER LEG: ICD-10-CM

## 2021-09-16 DIAGNOSIS — M48.061 SPINAL STENOSIS OF LUMBAR REGION WITHOUT NEUROGENIC CLAUDICATION: ICD-10-CM

## 2021-09-16 DIAGNOSIS — M51.36 LUMBAR DEGENERATIVE DISC DISEASE: ICD-10-CM

## 2021-09-16 DIAGNOSIS — G89.29 CHRONIC BILATERAL LOW BACK PAIN WITH BILATERAL SCIATICA: ICD-10-CM

## 2021-09-16 DIAGNOSIS — M25.551 PAIN IN JOINT, PELVIC REGION AND THIGH, RIGHT: ICD-10-CM

## 2021-09-16 DIAGNOSIS — M25.551 HIP PAIN, CHRONIC, RIGHT: ICD-10-CM

## 2021-09-16 DIAGNOSIS — M17.11 ARTHRITIS OF KNEE, RIGHT: Primary | ICD-10-CM

## 2021-09-16 DIAGNOSIS — M25.552 PAIN OF BOTH HIP JOINTS: ICD-10-CM

## 2021-09-16 DIAGNOSIS — M25.552 HIP PAIN, BILATERAL: ICD-10-CM

## 2021-09-16 DIAGNOSIS — M54.42 CHRONIC BILATERAL LOW BACK PAIN WITH BILATERAL SCIATICA: ICD-10-CM

## 2021-09-16 DIAGNOSIS — Z79.899 ENCOUNTER FOR MEDICATION MANAGEMENT: ICD-10-CM

## 2021-09-16 DIAGNOSIS — M25.551 PAIN OF BOTH HIP JOINTS: ICD-10-CM

## 2021-09-16 PROCEDURE — 99442 PR PHYS/QHP TELEPHONE EVALUATION 11-20 MIN: CPT | Performed by: NURSE PRACTITIONER

## 2021-09-16 PROCEDURE — 99213 OFFICE O/P EST LOW 20 MIN: CPT

## 2021-09-16 RX ORDER — OXYCODONE AND ACETAMINOPHEN 7.5; 325 MG/1; MG/1
1 TABLET ORAL EVERY 6 HOURS PRN
Qty: 120 TABLET | Refills: 0 | Status: SHIPPED | OUTPATIENT
Start: 2021-09-24 | End: 2021-11-22 | Stop reason: SDUPTHER

## 2021-09-16 ASSESSMENT — ENCOUNTER SYMPTOMS
GASTROINTESTINAL NEGATIVE: 1
COUGH: 1

## 2021-09-16 NOTE — PROGRESS NOTES
________________________________________________________________   Drugs Not Included in this Assay:   Acetaminophen   ________________________________________________________________   INTERPRETIVE INFORMATION: Pain Mgt Neil, [place] Spec/EMIT, Ur,                            Interp   Interpretation depends on accuracy and completeness of patient        FINAL **   Procedure:  LUMBAR AP LAT L5S1 [place]    CDX  08/20/2013     [id number]   Reason for Exam:  lumbar DDD       FULL RESULT:   Lumbar spine 3 views       There is no lumbar compression fracture or subluxation. Diffuse lumbar    spondylosis is present. Severe degenerative disc space narrowing at the    levels of L3-L4 and L4-L5 as well as L5-S1. Facet joint degenerative    changes at the levels of L3-S1. There is a minimal grade 1    anterolisthesis of L3 over L4. Pedicles are unremarkable. There is no    scoliosis. There are surgical clips in the right side of the lower    abdomen. Evidence of a left hip total arthroplasty.               IMPRESSION:    Diffuse lumbar spondylosis and multi-level degenerative    disease.       Report Electronically signed by Edith Carbone M.D. on 8/20/2013 2:13 PM   Transcribed by: [facility] on Aug 20 2013  2:15P    Read by: Jn Soni M.D.  [id number] on Aug 20 2013  2:15P    Electronically Signed by: Bianca Soni M.D. on:  Aug 20 2013  2:15P                                                                                               Pill count: appropriate    fill date :9-    Morphine equivalent dose as reported on [place]AR[place]: 45  Periodic Controlled Substance Monitoring: Possible medication side effects, risk of tolerance/dependence & alternative treatments discussed., No signs of potential drug abuse or diversion identified. , Assessed functional status., Obtaining appropriate analgesic effect of treatment. Alexa Lainez, APRN - CNP)  Review of[place]ARRS does not show any aberrant prescription behavior.  Medication is helping the patient stay active. Patient denies any side effects and reports adequate analgesia. No sign of misuse/abuse. Past Medical History:   Diagnosis Date    Anxiety     Arthritis     Bronchitis     CAD (coronary artery disease)     Carotid arterial disease (HCC)     COPD (chronic obstructive pulmonary disease) (HCC)     Diabetes mellitus ([facility] [address]     Hyperlipidemia     Hypertension     Mitral regurgitation     Myalgia     Pulmonary hypertension ([facility] [address]     Shingles 2018    left facial area and involved eye    Sleep apnea     Syncope and collapse        Past Surgical History:   Procedure Laterality Date    ABDOMEN SURGERY      CATARACT REMOVAL WITH IMPLANT Right      SECTION      COLECTOMY      COLONOSCOPY      HIP ARTHROPLASTY      3 on the left and 1 on the right    HYSTERECTOMY      INSERTABLE CARDIAC MONITOR  2018    Medtronic    JOINT REPLACEMENT Right     TOTAL KNEE    JOINT REPLACEMENT Bilateral     right x2, left x3 hips    KNEE SURGERY         No Known Allergies      Current Outpatient Medications:     oxyCODONE-acetaminophen (PERCOCET) 7.5-325 MG per tablet, Take 1 tablet by mouth every 6 hours as needed for Pain for up to 30 days. , Disp: 120 tablet, Rfl: 0    memantine (NAMENDA) 5 MG tablet, , Disp: , Rfl:     magnesium oxide (MAG-OX) 400 MG tablet, Take 400 mg by mouth daily, Disp: , Rfl:     cyanocobalamin 1000 MCG tablet, Take 1,000 mcg by mouth daily, Disp: , Rfl:     ferrous sulfate (IRON 325) 325 (65 Fe) MG tablet, Take 325 mg by mouth daily (with breakfast), Disp: , Rfl:     losartan (COZAAR) 25 MG tablet, , Disp: , Rfl:     simvastatin (ZOCOR) 40 MG tablet, , Disp: , Rfl:     metoprolol tartrate (LOPRESSOR) 25 MG tablet, , Disp: , Rfl:     escitalopram (LEXAPRO) 20 MG tablet, , Disp: , Rfl:     amLODIPine (NORVASC) 5 MG tablet, TAKE ONE TABLET BY MOUTH DAILY, Disp: 90 tablet, Rfl: 3    levothyroxine (LEVOTHROID) 50 MCG tablet, Take 1 tablet by mouth daily, Disp: 30 tablet, Rfl: 3    metFORMIN (GLUCOPHAGE) 500 MG tablet, TAKE ONE TABLET BY MOUTH TWICE A DAY, Disp: 180 tablet, Rfl: 2    aspirin 325 MG tablet, Take 325 mg by mouth daily. , Disp: , Rfl:     diclofenac sodium (VOLTAREN) 1 % GEL, , Disp: , Rfl:     hydrOXYzine (ATARAX) 25 MG tablet, Take 25 mg by mouth every 8 hours as needed for Anxiety, Disp: , Rfl:     furosemide (LASIX) 20 MG tablet, Take 1 tablet by mouth daily as needed (for 2 lb weight gain/increased swelling/increased shortness of breath), Disp: 60 tablet, Rfl: 3    ASPERCREME LIDOCAINE EX, Apply topically, Disp: , Rfl:     VENTOLIN  (90 Base) MCG/ACT inhaler, , Disp: , Rfl:     omeprazole (PRILOSEC) 20 MG delayed release capsule, , Disp: , Rfl:     hydrocortisone 2.5 % cream, Apply topically 2 times daily. , Disp: 30 g, Rfl: 2    glucose monitoring kit (FREESTYLE) monitoring kit, 1 kit by Does not apply route daily as needed, Disp: 1 kit, Rfl: 0    glucose blood VI test strips (EXACTECH TEST) strip, 1 each by In Vitro route daily Type 2 diabetes qd testing, Disp: 100 each, Rfl: 3    Accu-Chek Multiclix Lancets MISC, Test qd;type 2 diabetes, Disp: 100 each, Rfl: 3    Scooter MISC, by Does not apply route Use daily dx arthritis obesity pulmonary htn,copd, Disp: 1 each, Rfl: 0    Family History   Problem Relation Age of Onset    Cancer Mother     Hypertension Maternal Aunt     Cancer Daughter        Social History     Socioeconomic History    Marital status: Single     Spouse name: Not on file    Number of children: 3    Years of education: Not on file    Highest education level: Not on file   Occupational History    Occupation: RETIRED   Tobacco Use    Smoking status: Former Smoker     Years: 5.00     Quit date: 1985     Years since quittin.8    Smokeless tobacco: Never Used   Vaping Use    Vaping Use: Former   Substance and Sexual Activity    Alcohol use: No     Alcohol/week: 0.0 standard drinks    Drug use: No    Sexual activity: Not on file   Other Topics Concern    Not on file   Social History Narrative    Not on file     Social Determinants of Health     Financial Resource Strain:     Difficulty of Paying Living Expenses:    Food Insecurity:     Worried About Running Out of Food in the Last Year:     [address] in the Last Year:    Transportation Needs:     Lack of Transportation (Medical):  Lack of Transportation (Non-Medical):    Physical Activity:     Days of Exercise per Week:     Minutes of Exercise per Session:    Stress:     Feeling of Stress :    Social Connections:     Frequency of Communication with Friends and Family:     Frequency of Social Gatherings with Friends and Family:     Attends Zoroastrianism Services:     Active Member of Clubs or Organizations:     Attends Club or Organization Meetings:     Marital Status:    Intimate Partner Violence:     Fear of Current or Ex-Partner:     Emotionally Abused:     Physically Abused:     Sexually Abused:          Review of Systems:  Review of Systems   Constitutional: Negative. HENT: Positive for hearing loss. Eyes: Positive for visual disturbance. Cardiovascular: Negative. Respiratory: Positive for cough. Endocrine:        Diabetic,    Hematologic/Lymphatic: Bruises/bleeds easily. Skin: Negative. Musculoskeletal: Positive for arthralgias, arthritis and joint pain. Gastrointestinal: Negative. Genitourinary: Positive for nocturia. Neurological:        Uses a walker   Psychiatric/Behavioral: The patient is nervous/anxious. Sees PCP         Physical Exam:  There were no vitals taken for this visit. Physical Exam  Skin:         Neurological:      Mental Status: She is alert and oriented to person, place, and time. Psychiatric:         Mood and Affect: Mood normal.         Thought Content:  Thought content normal.           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 Possible side effects, risk of tolerance and or dependence and alternative treatments discussed    Obtaining appropriate analgesic effect of treatment   No signs of potential drug abuse or diversion identified    [x] Ill effects of being on chronic pain medications such as sleep disturbances, respiratory depression, hormonal changes, withdrawal symptoms, chronic opioid dependence and tolerance as well as risk of taking opioids with Benzodiazepines and taking opioids along with alcohol,  werediscussed with patient. I had asked the patient to minimize medication use and utilize pain medications only for uncontrolled rest pain or pain with exertional activities. I advised patient not to self-escalate painmedications without consulting with us. At each of patient's future visits we will try to taper pain medications, while adjusting the adjunct medications, and re-evaluating for Physical Therapy to improve spinal andjoint strength. We will continue to have discussions to decrease pain medications as tolerated. Counseled patient on effects their pain medication and /or their medical condition mayhave on their  ability to drive or operate machinery. Instructed not to drive or operate machinery if drowsy     I also discussed with the patient regarding the dangers of combining narcotic pain medication with tranquilizers, alcohol or illegal drugs or taking the medication any way other than prescribed.  The dangers were discussed  including

## 2021-10-18 ENCOUNTER — HOSPITAL ENCOUNTER (OUTPATIENT)
Dept: PAIN MANAGEMENT | Age: 86
Discharge: HOME | End: 2021-10-18
Payer: COMMERCIAL

## 2021-10-18 DIAGNOSIS — G89.29 CHRONIC PAIN OF BOTH KNEES: ICD-10-CM

## 2021-10-18 DIAGNOSIS — M25.552 PAIN OF BOTH HIP JOINTS: ICD-10-CM

## 2021-10-18 DIAGNOSIS — M51.36 LUMBAR DEGENERATIVE DISC DISEASE: ICD-10-CM

## 2021-10-18 DIAGNOSIS — G89.29 CHRONIC BILATERAL LOW BACK PAIN WITH BILATERAL SCIATICA: Chronic | ICD-10-CM

## 2021-10-18 DIAGNOSIS — M51.36 DDD (DEGENERATIVE DISC DISEASE), LUMBAR: ICD-10-CM

## 2021-10-18 DIAGNOSIS — M48.061 SPINAL STENOSIS OF LUMBAR REGION WITHOUT NEUROGENIC CLAUDICATION: ICD-10-CM

## 2021-10-18 DIAGNOSIS — M17.11 ARTHRITIS OF KNEE, RIGHT: Primary | ICD-10-CM

## 2021-10-18 DIAGNOSIS — M54.42 CHRONIC BILATERAL LOW BACK PAIN WITH BILATERAL SCIATICA: Chronic | ICD-10-CM

## 2021-10-18 DIAGNOSIS — M25.562 CHRONIC PAIN OF BOTH KNEES: ICD-10-CM

## 2021-10-18 DIAGNOSIS — M54.41 CHRONIC BILATERAL LOW BACK PAIN WITH BILATERAL SCIATICA: Chronic | ICD-10-CM

## 2021-10-18 DIAGNOSIS — M25.561 CHRONIC PAIN OF BOTH KNEES: ICD-10-CM

## 2021-10-18 DIAGNOSIS — Z79.899 ENCOUNTER FOR MEDICATION MANAGEMENT: ICD-10-CM

## 2021-10-18 DIAGNOSIS — E11.42 DIABETIC POLYNEUROPATHY ASSOCIATED WITH TYPE 2 DIABETES MELLITUS (HCC): ICD-10-CM

## 2021-10-18 DIAGNOSIS — M25.551 PAIN OF BOTH HIP JOINTS: ICD-10-CM

## 2021-10-18 PROCEDURE — 99213 OFFICE O/P EST LOW 20 MIN: CPT

## 2021-10-18 PROCEDURE — 99442 PR PHYS/QHP TELEPHONE EVALUATION 11-20 MIN: CPT | Performed by: NURSE PRACTITIONER

## 2021-10-18 RX ORDER — IPRATROPIUM BROMIDE AND ALBUTEROL SULFATE 2.5; .5 MG/3ML; MG/3ML
3 SOLUTION RESPIRATORY (INHALATION) 4 TIMES DAILY PRN
COMMUNITY
Start: 2021-09-16

## 2021-10-18 ASSESSMENT — ENCOUNTER SYMPTOMS
GASTROINTESTINAL NEGATIVE: 1
EYES NEGATIVE: 1

## 2021-10-18 NOTE — PROGRESS NOTES
[facility] 89 PROGRESS NOTE      Patient  completed []  video visit   [x]   phone call:    11    Minutes :       [x]    to  review Medication Agreement    []  Follow up after procedure   []  Discuss treatment options      Location:  Provider:  working from    [x]    home    []   [facility] - [facility] CALL ,   patient at home       Chief Complaint: right shoulder pain    He c/o right shoulder pain which has increased. She has history of multiple joint pain. She has history of bilateral hip arthroplasty and a knee arthroplasty. She follows with her PCP. She reports she sleeps well. She is getting home PT, She uses  A walker at home      Shoulder Pain   The pain is present in the right shoulder. This is a chronic problem. There has been no history of extremity trauma. The problem occurs constantly. The problem has been unchanged. The quality of the pain is described as aching. The pain is at a severity of 8/10. The pain is severe. Associated symptoms include a limited range of motion. Exacerbated by: using right arm. She has tried oral narcotics (voltaren gel) for the symptoms. Her past medical history is significant for osteoarthritis.        Treatment goals:  Functional status: reduce pain    Aberrancy:   Any alcoholic beverages    No Any illegal drugs: no      Analgesia:    8                 Adverse  Effects :no      ADL;s :home PT    Data:    When was thelast UDS:   2-6-2020         Was the UDS appropriate:  [] yes []   no    Detected below cutoff    Record/Diagnostics Review:      As above, I did review the imaging         2/10/2020 12:39 PM - Rudy, [facility] Incoming Lab Results From [facility]    Component Value Ref Range & Units Status Collected Lab   Pain Management Drug Panel Interp, Urine Consistent   Final 02/06/2020  4:30 PM [facility]   (NOTE)   ________________________________________________________________   DRUGS EXPECTED:   PERCOCET (OXYCODONE) [2/6/20] INSERTABLE CARDIAC MONITOR  05/2018    [employer]tronic    JOINT REPLACEMENT Right     TOTAL KNEE    JOINT REPLACEMENT Bilateral     right x2, left x3 hips    KNEE SURGERY         No Known Allergies      Current Outpatient Medications:     oxyCODONE-acetaminophen (PERCOCET) 7.5-325 MG per tablet, Take 1 tablet by mouth every 6 hours as needed for Pain for up to 30 days. , Disp: 120 tablet, Rfl: 0    memantine (NAMENDA) 5 MG tablet, , Disp: , Rfl:     magnesium oxide (MAG-OX) 400 MG tablet, Take 400 mg by mouth daily, Disp: , Rfl:     cyanocobalamin 1000 MCG tablet, Take 1,000 mcg by mouth daily, Disp: , Rfl:     ferrous sulfate (IRON 325) 325 (65 Fe) MG tablet, Take 325 mg by mouth daily (with breakfast), Disp: , Rfl:     simvastatin (ZOCOR) 40 MG tablet, , Disp: , Rfl:     metoprolol tartrate (LOPRESSOR) 25 MG tablet, , Disp: , Rfl:     escitalopram (LEXAPRO) 20 MG tablet, , Disp: , Rfl:     amLODIPine (NORVASC) 5 MG tablet, TAKE ONE TABLET BY MOUTH DAILY, Disp: 90 tablet, Rfl: 3    levothyroxine (LEVOTHROID) 50 MCG tablet, Take 1 tablet by mouth daily, Disp: 30 tablet, Rfl: 3    metFORMIN (GLUCOPHAGE) 500 MG tablet, TAKE ONE TABLET BY MOUTH TWICE A DAY, Disp: 180 tablet, Rfl: 2    aspirin 325 MG tablet, Take 325 mg by mouth daily. , Disp: , Rfl:     ipratropium-albuterol (DUONEB) 0.5-2.5 (3) MG/3ML SOLN nebulizer solution, Inhale 3 mLs into the lungs 4 times daily as needed, Disp: , Rfl:     diclofenac sodium (VOLTAREN) 1 % GEL, , Disp: , Rfl:     losartan (COZAAR) 25 MG tablet, , Disp: , Rfl:     hydrOXYzine (ATARAX) 25 MG tablet, Take 25 mg by mouth every 8 hours as needed for Anxiety, Disp: , Rfl:     furosemide (LASIX) 20 MG tablet, Take 1 tablet by mouth daily as needed (for 2 lb weight gain/increased swelling/increased shortness of breath), Disp: 60 tablet, Rfl: 3    ASPERCREME LIDOCAINE EX, Apply topically, Disp: , Rfl:     VENTOLIN  (90 Base) MCG/ACT inhaler, , Disp: , Rfl:    omeprazole (PRILOSEC) 20 MG delayed release capsule, , Disp: , Rfl:     hydrocortisone 2.5 % cream, Apply topically 2 times daily. , Disp: 30 g, Rfl: 2    glucose monitoring kit (FREESTYLE) monitoring kit, 1 kit by Does not apply route daily as needed, Disp: 1 kit, Rfl: 0    glucose blood VI test strips (EXACTECH TEST) strip, 1 each by In Vitro route daily Type 2 diabetes qd testing, Disp: 100 each, Rfl: 3    Accu-Chek Multiclix Lancets MISC, Test qd;type 2 diabetes, Disp: 100 each, Rfl: 3    Scooter MISC, by Does not apply route Use daily dx arthritis obesity pulmonary htn,copd, Disp: 1 each, Rfl: 0    Family History   Problem Relation Age of Onset    Cancer Mother     Hypertension Maternal Aunt     Cancer Daughter        Social History     Socioeconomic History    Marital status: Single     Spouse name: Not on file    Number of children: 3    Years of education: Not on file    Highest education level: Not on file   Occupational History    Occupation: RETIRED   Tobacco Use    Smoking status: Former Smoker     Years: 5.00     Quit date: 1985     Years since quittin.9    Smokeless tobacco: Never Used   Vaping Use    Vaping Use: Former   Substance and Sexual Activity    Alcohol use: No     Alcohol/week: 0.0 standard drinks    Drug use: No    Sexual activity: Not on file   Other Topics Concern    Not on file   Social History Narrative    Not on file     Social Determinants of Health     Financial Resource Strain:     Difficulty of Paying Living Expenses:    Food Insecurity:     Worried About Running Out of Food in the Last Year:     Ran Out of Food in the Last Year:    Transportation Needs:     Lack of Transportation (Medical):      Lack of Transportation (Non-Medical):    Physical Activity:     Days of Exercise per Week:     Minutes of Exercise per Session:    Stress:     Feeling of Stress :    Social Connections:     Frequency of Communication with Friends and Family:     Frequency of Social Gatherings with Friends and Family:     Attends Buddhist Services:     Active Member of Clubs or Organizations:     Attends Club or Organization Meetings:     Marital Status:    Intimate Partner Violence:     Fear of Current or Ex-Partner:     Emotionally Abused:     Physically Abused:     Sexually Abused:          Review of Systems:  Review of Systems   Constitutional: Negative. HENT: Positive for hearing loss. Eyes: Negative. Glasses   Cardiovascular: Negative. Endocrine:        Diabetic:   Low 100's   Hematologic/Lymphatic: Bruises/bleeds easily. Skin: Negative. Musculoskeletal: Positive for joint pain. Gastrointestinal: Negative. Genitourinary: Positive for nocturia. Neurological: Positive for dizziness and headaches. Uses walker   Psychiatric/Behavioral: Positive for depression. Managing         Physical Exam:  There were no vitals taken for this visit. Physical Exam  Skin:         Neurological:      Mental Status: She is oriented to person, place, and time. Psychiatric:         Mood and Affect: Mood normal.         Thought Content: Thought content normal.           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      Possible side effects, risk of tolerance and or dependence and alternative treatments discussed    Obtaining appropriate analgesic effect of treatment   No signs of potential drug abuse or diversion identified    [x] Ill effects of being on chronic pain medications such as sleep disturbances, respiratory depression, hormonal changes, withdrawal symptoms, chronic opioid dependence and tolerance as well as risk of taking opioids with Benzodiazepines and taking opioids along with alcohol,  werediscussed with patient. I had asked the patient to minimize medication use and utilize pain medications only for uncontrolled rest pain or pain with exertional activities. I advised patient not to self-escalate painmedications without consulting with us. At each of patient's future visits we will try to taper pain medications, while adjusting the adjunct medications, and re-evaluating for Physical Therapy to improve spinal andjoint strength. We will continue to have discussions to decrease pain medications as tolerated. Counseled patient on effects their pain medication and /or their medical condition mayhave on their  ability to drive or operate machinery. Instructed not to drive or operate machinery if drowsy     I also discussed with the patient regarding the dangers of combining narcotic pain medication with tranquilizers, alcohol or illegal drugs or taking the medication any way other than prescribed. The dangers were discussed  including respiratory depression and death. Patient was told to tell  all  physicians regarding the medications he is getting from pain clinic. Patient is warned not to take any unprescribed medications over-the-countermedications that can depress breathing . Patient is advised to talk to the pharmacist or physicians if planning to take any over-the-counter medications before  takeing them. Patient is strongly advised to avoid tranquilizers or  relaxants, illegal drugs  or any medications that can depress breathing  Patient is also advised to tell us if there is any changes in their medications from other physicians.       Daughter states took her mother a

## 2021-11-08 ENCOUNTER — TELEPHONE (OUTPATIENT)
Dept: PAIN MANAGEMENT | Age: 86
End: 2021-11-08

## 2021-11-08 NOTE — TELEPHONE ENCOUNTER
Patient's granddaughter calls and states Cristin Durham was in the hospital ([facility]) for a week beginning 10/24. Cristin Durham did go and have her UDS done last week at a [facility] lab; results to be faxed to this office. The granddaughter wanted to let us know. I advised I would forward this call to the [facility].

## 2021-11-21 RX ORDER — ATORVASTATIN CALCIUM 40 MG/1
TABLET, FILM COATED ORAL
COMMUNITY
Start: 2021-10-25

## 2021-11-21 RX ORDER — AMLODIPINE BESYLATE 10 MG/1
TABLET ORAL
COMMUNITY
Start: 2021-10-25 | End: 2022-05-26

## 2021-11-21 RX ORDER — CEPHALEXIN 500 MG/1
CAPSULE ORAL
COMMUNITY
Start: 2021-10-25 | End: 2021-11-22 | Stop reason: ALTCHOICE

## 2021-11-21 RX ORDER — BUDESONIDE AND FORMOTEROL FUMARATE DIHYDRATE 160; 4.5 UG/1; UG/1
AEROSOL RESPIRATORY (INHALATION)
COMMUNITY
Start: 2021-09-16

## 2021-11-22 ENCOUNTER — HOSPITAL ENCOUNTER (OUTPATIENT)
Dept: PAIN MANAGEMENT | Age: 86
Discharge: HOME | End: 2021-11-22
Payer: COMMERCIAL

## 2021-11-22 DIAGNOSIS — E11.42 DIABETIC POLYNEUROPATHY ASSOCIATED WITH TYPE 2 DIABETES MELLITUS (HCC): ICD-10-CM

## 2021-11-22 DIAGNOSIS — G89.29 CHRONIC BILATERAL LOW BACK PAIN WITH BILATERAL SCIATICA: Chronic | ICD-10-CM

## 2021-11-22 DIAGNOSIS — M17.11 ARTHRITIS OF KNEE, RIGHT: Primary | ICD-10-CM

## 2021-11-22 DIAGNOSIS — M48.061 SPINAL STENOSIS OF LUMBAR REGION WITHOUT NEUROGENIC CLAUDICATION: ICD-10-CM

## 2021-11-22 DIAGNOSIS — M25.551 HIP PAIN, BILATERAL: ICD-10-CM

## 2021-11-22 DIAGNOSIS — M25.552 PAIN OF BOTH HIP JOINTS: ICD-10-CM

## 2021-11-22 DIAGNOSIS — M54.42 CHRONIC BILATERAL LOW BACK PAIN WITH BILATERAL SCIATICA: Chronic | ICD-10-CM

## 2021-11-22 DIAGNOSIS — M51.36 DDD (DEGENERATIVE DISC DISEASE), LUMBAR: ICD-10-CM

## 2021-11-22 DIAGNOSIS — M25.551 HIP PAIN, CHRONIC, RIGHT: ICD-10-CM

## 2021-11-22 DIAGNOSIS — M51.36 LUMBAR DEGENERATIVE DISC DISEASE: ICD-10-CM

## 2021-11-22 DIAGNOSIS — M54.41 CHRONIC BILATERAL LOW BACK PAIN WITH BILATERAL SCIATICA: Chronic | ICD-10-CM

## 2021-11-22 DIAGNOSIS — M17.10 ARTHROPATHY, LOWER LEG: ICD-10-CM

## 2021-11-22 DIAGNOSIS — Z79.899 ENCOUNTER FOR MEDICATION MANAGEMENT: ICD-10-CM

## 2021-11-22 DIAGNOSIS — G89.29 HIP PAIN, CHRONIC, RIGHT: ICD-10-CM

## 2021-11-22 DIAGNOSIS — M25.552 HIP PAIN, BILATERAL: ICD-10-CM

## 2021-11-22 DIAGNOSIS — M25.551 PAIN OF BOTH HIP JOINTS: ICD-10-CM

## 2021-11-22 PROCEDURE — 99442 PR PHYS/QHP TELEPHONE EVALUATION 11-20 MIN: CPT | Performed by: NURSE PRACTITIONER

## 2021-11-22 PROCEDURE — 99213 OFFICE O/P EST LOW 20 MIN: CPT

## 2021-11-22 RX ORDER — OXYCODONE AND ACETAMINOPHEN 7.5; 325 MG/1; MG/1
1 TABLET ORAL EVERY 6 HOURS PRN
Qty: 120 TABLET | Refills: 0 | Status: SHIPPED | OUTPATIENT
Start: 2021-11-24 | End: 2021-12-17 | Stop reason: SDUPTHER

## 2021-11-22 ASSESSMENT — ENCOUNTER SYMPTOMS
CONSTIPATION: 1
COUGH: 1

## 2021-11-22 NOTE — PROGRESS NOTES
Leonardo 89 PROGRESS NOTE      Patient  completed []  video visit   [x]   phone call:    12     Minutes :       [x]    to  review Medication Agreement    []  Follow up after procedure   []  Discuss treatment options      Location:  Provider:  working from    [x]    home    []   [facility] CALL ,   patient at home       Chief Complaint:  Joint pain, right shoulder    She c/o multiple joint pain, her right shoulder bothers her. She has no history of shoulder surgery. She recently completed PT but it did not help. She was in the hosopital last month, feeling weak and tired, and had confusion, daughter states her B?p was elevated. She reports she is sleeping well. Her activity has slowed. Shoulder Pain   The pain is present in the right shoulder. This is a chronic problem. There has been no history of extremity trauma. The problem occurs constantly. Quality: sharp. Associated symptoms include a limited range of motion. Exacerbated by: using right arm. She has tried oral narcotics (laying down, voltaren gel) for the symptoms.        Treatment goals: for pain to go away Functional status:       Aberrancy:   Any alcoholic beverages    no         Any illegal drugs   no      Analgesia:      8               Adverse  Effects :no      ADL;s :limited    Data:    When was thelast UDS:   2-6-2020       Was the UDS appropriate:  [x] yes []   no      Record/Diagnostics Review:      As above, I did review the imaging     DRUG SCREEN, PAIN  Order: [id number]   Status: Final result     Visible to patient: No (not released)     Next appt: 12/17/2021 at 01:20 PM in Pain Management STACIA Santos CNP)     0 Result Notes     Ref Range & Units 2/6/20 1630 Resulting Agency   Pain Management Drug Panel Interp, Urine  Consistent  [facility]   Comment: (NOTE)   ________________________________________________________________   DRUGS EXPECTED:   PERCOCET (OXYCODONE) [2/6/20] ________________________________________________________________   CONSISTENT with medications provided:   PERCOCET (OXYCODONE) : based on oxycodone and metabolites detected   below cutoff   ________________________________________________________________   Drugs Not Included in this Assay:   Acetaminophen   ________________________________________________________________   INTERPRETIVE INFORMATION: Pain Mgt Neil, [place] Spec/EMIT, Ur,                            Interp   Interpretation depends on accuracy and completeness of patient   medication information submitted by client. 6-Acetylmorphine, Ur  Not Detected  [facility]   7-Aminoclonazepam, Urine  Not Detected  [facility]   Alpha-OH-Alpraz, Urine  Not Detected  [facility]   Alprazolam, Urine  Not Detected  [facility]   Amphetamines, urine  Not Detected  [facility]   Barbiturates, Ur  Not Detected  [facility]   Benzoylecgonine, Ur  Not Detected  [facility]   Buprenorphine Urine  Not Detected  [facility]   Carisoprodol, Ur  Not Detected  [facility]   Comment: (NOTE)   The carisoprodol immunoassay has cross-reactivity to carisoprodol   and meprobamate.     Clonazepam, Urine  Not Detected  [facility]   Codeine, Urine  Not Detected  [facility]   MDA, Ur  Not Detected  [facility]   Diazepam, Urine  Not Detected  [facility]   Ethyl Glucuronide Ur  Not Detected  [facility]   Fentanyl, Ur  Not Detected  [facility]   Hydrocodone, Urine  Not Detected  [facility]   Hydromorphone, Urine  Not Detected  [facility]   Lorazepam, Urine  Not Detected  [facility]   Marijuana Metab, Ur  Not Detected  [facility]   MDEA, JYOTHI, Ur  Not Detected  [facility]   MDMA, Urine  Not Detected  [facility]   Meperidine Metab, Ur  Not Detected  [facility]   Methadone, Urine  Not Detected  [facility]   Methamphetamine, Urine  Not Detected  [facility]   Methylphenidate  Not Detected  [facility]   Midazolam, Urine  Not Detected  [facility]   Morphine Urine  Not Detected  [facility]   Norbuprenorphine, Urine  Not Detected  [facility]   Nordiazepam, Urine  Not Detected  [facility]   Norfentanyl, Urine  Not Detected  [facility]   NORHYDROCODONE, URINE  Not Detected  [facility] Administration has not approved or cleared this test; however, FDA   clearance or approval is not currently required for clinical use. The results are not intended to be used as the sole means for   clinical diagnosis or patient management decisions. EER Hi Res Interp Ur  See Note  [facility]   Comment: (NOTE)   Access [facility] Enhanced Report using either link below:   -Direct access: [email or web address]   -Enter Username, Password: [email or web address]   Username: [email or web address]   Password: [employer]   Performed by [place] [id number], [id number] [facility] [phone]   www. Huber Mansfield MD, Lab. Director               Specimen Collected: 02/06/20 16:30 Last Resulted: 02/10/20 12:39                FINAL **   Procedure:  LUMBAR AP LAT L5S1 [place]    CDX  08/20/2013     [id number]   Reason for Exam:  lumbar DDD       FULL RESULT:   Lumbar spine 3 views       There is no lumbar compression fracture or subluxation. Diffuse lumbar    spondylosis is present. Severe degenerative disc space narrowing at the    levels of L3-L4 and L4-L5 as well as L5-S1. Facet joint degenerative    changes at the levels of L3-S1. There is a minimal grade 1    anterolisthesis of L3 over L4. Pedicles are unremarkable. There is no    scoliosis. There are surgical clips in the right side of the lower    abdomen. Evidence of a left hip total arthroplasty.               IMPRESSION:    Diffuse lumbar spondylosis and multi-level degenerative    disease.       Report Electronically signed by Saundra Logan M.D. on 8/20/2013 2:13 PM   Transcribed by: [facility] on Aug 20 2013  2:15P    Read by: Damion Sanders M.D.  [id number] on Aug 20 2013  2:15P    Electronically Signed by: Cheyenne Salazar.  Damion Sanders M.D. on:  Aug 20 2013  2:15P                                                                                                   Pill count: appropriate    fill date :filled 9-    Morphine equivalent dose as reported on OARRS:45  States has #10 percocet tabs left  Periodic Controlled Substance Monitoring: Possible medication side effects, risk of tolerance/dependence & alternative treatments discussed., No signs of potential drug abuse or diversion identified. , Assessed functional status., Obtaining appropriate analgesic effect of treatment. Mundo Andres, APRN - CNP)  Review ofOARRS does not show any aberrant prescription behavior. Medication is helping the patient stay active. Patient denies any side effects and reports adequate analgesia. No sign of misuse/abuse.             Past Medical History:   Diagnosis Date    Anxiety     Arthritis     Bronchitis     CAD (coronary artery disease)     Carotid arterial disease ([address]     COPD (chronic obstructive pulmonary disease) (HCC)     Diabetes mellitus ([address]     Hyperlipidemia     Hypertension     Mitral regurgitation     Myalgia     [facility] [address]     Shingles 2018    left facial area and involved eye    Sleep apnea     Syncope and collapse        Past Surgical History:   Procedure Laterality Date    ABDOMEN SURGERY      CATARACT REMOVAL WITH IMPLANT Right      SECTION      COLECTOMY      COLONOSCOPY      HIP ARTHROPLASTY      3 on the left and 1 on the right    HYSTERECTOMY      INSERTABLE CARDIAC MONITOR  2018    [employer]    JOINT REPLACEMENT Right     TOTAL KNEE    JOINT REPLACEMENT Bilateral     right x2, left x3 hips    KNEE SURGERY         No Known Allergies      Current Outpatient Medications:     cephALEXin (KEFLEX) 500 MG capsule, , Disp: , Rfl:     amLODIPine (NORVASC) 10 MG tablet, , Disp: , Rfl:     atorvastatin (LIPITOR) 40 MG tablet, , Disp: , Rfl:     SYMBICORT 160-4.5 MCG/ACT AERO, , Disp: , Rfl:     ipratropium-albuterol (DUONEB) 0.5-2.5 (3) MG/3ML SOLN nebulizer solution, Inhale 3 mLs into the lungs 4 times daily as needed, Disp: , Rfl:     oxyCODONE-acetaminophen (PERCOCET) 7.5-325 MG per tablet, Take (GLUCOPHAGE) 500 MG tablet, TAKE ONE TABLET BY MOUTH TWICE A DAY, Disp: 180 tablet, Rfl: 2    aspirin 325 MG tablet, Take 325 mg by mouth daily. , Disp: , Rfl:     Family History   Problem Relation Age of Onset    Cancer Mother     Hypertension Maternal Aunt     Cancer Daughter        Social History     Socioeconomic History    Marital status: Single     Spouse name: Not on file    Number of children: 3    Years of education: Not on file    Highest education level: Not on file   Occupational History    Occupation: RETIRED   Tobacco Use    Smoking status: Former Smoker     Years: 5.00     Quit date: 1985     Years since quittin.0    Smokeless tobacco: Never Used   Vaping Use    Vaping Use: Former   Substance and Sexual Activity    Alcohol use: No     Alcohol/week: 0.0 standard drinks    Drug use: No    Sexual activity: Not on file   Other Topics Concern    Not on file   Social History Narrative    Not on file     Social Determinants of Health     Financial Resource Strain:     Difficulty of Paying Living Expenses: Not on file   Food Insecurity:     Worried About [address] in the Last Year: Not on file    [address] in the Last Year: Not on file   Transportation Needs:     Lack of Transportation (Medical): Not on file    Lack of Transportation (Non-Medical):  Not on file   Physical Activity:     Days of Exercise per Week: Not on file    Minutes of Exercise per Session: Not on file   Stress:     Feeling of Stress : Not on file   Social Connections:     Frequency of Communication with Friends and Family: Not on file    Frequency of Social Gatherings with Friends and Family: Not on file    Attends Jew Services: Not on file    Active Member of Clubs or Organizations: Not on file    Attends Club or Organization Meetings: Not on file    Marital Status: Not on file   Intimate Partner Violence:     Fear of Current or Ex-Partner: Not on file    Emotionally Abused: of taking opioids with Benzodiazepines and taking opioids along with alcohol,  werediscussed with patient. I had asked the patient to minimize medication use and utilize pain medications only for uncontrolled rest pain or pain with exertional activities. I advised patient not to self-escalate painmedications without consulting with us. At each of patient's future visits we will try to taper pain medications, while adjusting the adjunct medications, and re-evaluating for Physical Therapy to improve spinal andjoint strength. We will continue to have discussions to decrease pain medications as tolerated. Counseled patient on effects their pain medication and /or their medical condition mayhave on their  ability to drive or operate machinery. Instructed not to drive or operate machinery if drowsy     I also discussed with the patient regarding the dangers of combining narcotic pain medication with tranquilizers, alcohol or illegal drugs or taking the medication any way other than prescribed. The dangers were discussed  including respiratory depression and death. Patient was told to tell  all  physicians regarding the medications he is getting from pain clinic. Patient is warned not to take any unprescribed medications over-the-countermedications that can depress breathing . Patient is advised to talk to the pharmacist or physicians if planning to take any over-the-counter medications before  takeing them. Patient is strongly advised to avoid tranquilizers or  relaxants, illegal drugs  or any medications that can depress breathing  Patient is also advised to tell us if there is any changes in their medications from other physicians. 1, she has not gotten UDT done, was ordered 3-2021, states went to a [facility] lab, could not get done to insurance, ordered in 8-2021 and again , Daughter states her mom has to comse back again, it was credited at [place].  She states ist is very hard for her mother to go out of the house ,    Addendum: I called the laboratory [facility], the person I spoke to said she did not give urine specimen and said she would be back, she said she does not know why she left, she doesn\"t know if she could not urinate or go in a cup. Will wait until she has visit in pain clinic and get UDT done.   Since she is elderly and has to rely on transportation      TREATMENT OPTIONS:       Medication Agreement Requirements Met  Continue Opioid therapy  Script written for  Percocet  Follow up appointment made

## 2021-12-16 NOTE — PROGRESS NOTES
Leonardo 89 PROGRESS NOTE      Patient  completed []  video visit   [x]   phone call: 13        Minutes :   [] in person visit to pain clinic    []    to  review Medication Agreement    []  Follow up after procedure   []  Discuss treatment options      Location:  Provider:  working from    [x]    home    []   [facility] - [facility] CALL ,   patient at   [] pain clinic     [x]  home         Chief Complaint: right shoulder and left hip pain    She c/o pain right shoulder  and left hip. She reports her shoulder pain has increased as well as her left hip. She has had right knee and left and right hip arthroplasty, She has multiple joint pain. She ambulates with a walker at home. She is not active, She has been up during the night. Daijailene states she getsup during night  when she has Anise Jud states she has UTI and PCP will be calling in an antibiotic. Shoulder Pain   The pain is present in the right shoulder. This is a chronic problem. There has been no history of extremity trauma. The problem occurs constantly. Quality: sharp. The pain is at a severity of 9/10. The pain is severe. Associated symptoms include a limited range of motion. The symptoms are aggravated by heat (movement of arm). She has tried OTC ointments and heat (rest) for the symptoms. The treatment provided mild relief. Hip Pain   The incident occurred more than 1 week ago. There was no injury mechanism. The pain is present in the left hip and left thigh. Quality: sharp. The pain is at a severity of 10/10. The pain has been worsening since onset. Associated symptoms include a loss of motion and muscle weakness. Foreign body present: left hip replaced. Exacerbated by: getting up from sitting or laying down. Treatments tried: pain med.          Treatment goals:  Functional status: get some rest    Aberrancy:   Any alcoholic beverages    no        Any illegal drugs no        Analgesia:        9, 10             Adverse  Effects :      ADL;s :not active    Data:    When was thelast UDS:    11-        Was the UDS appropriate:  [x] yes []   no      Record/Diagnostics Review:      As above, I did review the imaging     Done at Pathology labs, positive for oxycodone and oxymorphone      FINAL **   Procedure:  LUMBAR AP LAT L5S1 [place]    CDX  08/20/2013     [id number]   Reason for Exam:  lumbar DDD       FULL RESULT:   Lumbar spine 3 views       There is no lumbar compression fracture or subluxation. Diffuse lumbar    spondylosis is present. Severe degenerative disc space narrowing at the    levels of L3-L4 and L4-L5 as well as L5-S1. Facet joint degenerative    changes at the levels of L3-S1. There is a minimal grade 1    anterolisthesis of L3 over L4. Pedicles are unremarkable. There is no    scoliosis. There are surgical clips in the right side of the lower    abdomen. Evidence of a left hip total arthroplasty.               IMPRESSION:    Diffuse lumbar spondylosis and multi-level degenerative    disease.       Report Electronically signed by Natanael Erazo M.D. on 8/20/2013 2:13 PM   Transcribed by: [facility], [place] on Aug 20 2013  2:15P    Read by: Tawana Bueno M.D.  [id number] on Aug 20 2013  2:15P    Electronically Signed by: Chantal Hartmann. Tawana Bueno M.D. on:  Aug 20 2013  2:15P                                                                                                 Pill count: appropriate    fill date :12- last filled 9-    Morphine equivalent dose as reported on OARRS:45     Review ofOARRS does not show any aberrant prescription behavior. Medication is helping the patient stay active. Patient denies any side effects and reports adequate analgesia. No sign of misuse/abuse.             Past Medical History:   Diagnosis Date    Anxiety     Arthritis     Bronchitis     CAD (coronary artery disease)     Carotid arterial disease ([facility]     COPD (chronic obstructive pulmonary disease) (HCC)     Diabetes mellitus ([facility]     Hyperlipidemia     Hypertension     Mitral regurgitation     Myalgia     [facility]monary [facility]     Shingles 2018    left facial area and involved eye    Sleep apnea     Syncope and collapse        Past Surgical History:   Procedure Laterality Date    ABDOMEN SURGERY      CATARACT REMOVAL WITH IMPLANT Right 1     SECTION      COLECTOMY      COLONOSCOPY      HIP ARTHROPLASTY      3 on the left and 1 on the right    HYSTERECTOMY      INSERTABLE CARDIAC MONITOR  2018    [employer]    JOINT REPLACEMENT Right     TOTAL KNEE    JOINT REPLACEMENT Bilateral     right x2, left x3 hips    KNEE SURGERY         No Known Allergies      Current Outpatient Medications:     oxyCODONE-acetaminophen (PERCOCET) 7.5-325 MG per tablet, Take 1 tablet by mouth every 6 hours as needed for Pain for up to 30 days. , Disp: 120 tablet, Rfl: 0    amLODIPine (NORVASC) 10 MG tablet, , Disp: , Rfl:     atorvastatin (LIPITOR) 40 MG tablet, , Disp: , Rfl:     SYMBICORT 160-4.5 MCG/ACT AERO, , Disp: , Rfl:     ipratropium-albuterol (DUONEB) 0.5-2.5 (3) MG/3ML SOLN nebulizer solution, Inhale 3 mLs into the lungs 4 times daily as needed, Disp: , Rfl:     memantine (NAMENDA) 5 MG tablet, , Disp: , Rfl:     magnesium oxide (MAG-OX) 400 MG tablet, Take 400 mg by mouth daily, Disp: , Rfl:     cyanocobalamin 1000 MCG tablet, Take 1,000 mcg by mouth daily, Disp: , Rfl:     diclofenac sodium (VOLTAREN) 1 % GEL, , Disp: , Rfl:     ferrous sulfate (IRON 325) 325 (65 Fe) MG tablet, Take 325 mg by mouth daily (with breakfast), Disp: , Rfl:     losartan (COZAAR) 25 MG tablet, , Disp: , Rfl:     hydrOXYzine (ATARAX) 25 MG tablet, Take 25 mg by mouth every 8 hours as needed for Anxiety, Disp: , Rfl:     metoprolol tartrate (LOPRESSOR) 25 MG tablet, , Disp: , Rfl:     escitalopram (LEXAPRO) 20 MG tablet, , Disp: , Rfl:     furosemide (LASIX) 20 MG tablet, Take 1 tablet by mouth daily as needed (for 2 lb weight gain/increased swelling/increased shortness of breath), Disp: 60 tablet, Rfl: 3    ASPERCREME LIDOCAINE EX, Apply topically, Disp: , Rfl:     VENTOLIN  (90 Base) MCG/ACT inhaler, , Disp: , Rfl:     omeprazole (PRILOSEC) 20 MG delayed release capsule, , Disp: , Rfl:     hydrocortisone 2.5 % cream, Apply topically 2 times daily. , Disp: 30 g, Rfl: 2    levothyroxine (LEVOTHROID) 50 MCG tablet, Take 1 tablet by mouth daily, Disp: 30 tablet, Rfl: 3    glucose monitoring kit (FREESTYLE) monitoring kit, 1 kit by Does not apply route daily as needed, Disp: 1 kit, Rfl: 0    glucose blood VI test strips (EXACTECH TEST) strip, 1 each by In Vitro route daily Type 2 diabetes qd testing, Disp: 100 each, Rfl: 3    Accu-Chek Multiclix Lancets MISC, Test qd;type 2 diabetes, Disp: 100 each, Rfl: 3    Scooter MISC, by Does not apply route Use daily dx arthritis obesity pulmonary htn,copd, Disp: 1 each, Rfl: 0    metFORMIN (GLUCOPHAGE) 500 MG tablet, TAKE ONE TABLET BY MOUTH TWICE A DAY, Disp: 180 tablet, Rfl: 2    aspirin 325 MG tablet, Take 325 mg by mouth daily. , Disp: , Rfl:     Family History   Problem Relation Age of Onset    Cancer Mother     Hypertension Maternal Aunt     Cancer Daughter        Social History     Socioeconomic History    Marital status: Single     Spouse name: Not on file    Number of children: 3    Years of education: Not on file    Highest education level: Not on file   Occupational History    Occupation: RETIRED   Tobacco Use    Smoking status: Former Smoker     Years: 5.00     Quit date: 1985     Years since quittin.0    Smokeless tobacco: Never Used   Vaping Use    Vaping Use: Former   Substance and Sexual Activity    Alcohol use: No     Alcohol/week: 0.0 standard drinks    Drug use: No    Sexual activity: Not on file   Other Topics Concern    Not on file   Social History Narrative    Not on file     Social Determinants of Health     Financial Resource Strain:  Difficulty of Paying Living Expenses: Not on file   Food Insecurity:     Worried About Running Out of Food in the Last Year: Not on file    Ran Out of Food in the Last Year: Not on file   Transportation Needs:     Lack of Transportation (Medical): Not on file    Lack of Transportation (Non-Medical): Not on file   Physical Activity:     Days of Exercise per Week: Not on file    Minutes of Exercise per Session: Not on file   Stress:     Feeling of Stress : Not on file   Social Connections:     Frequency of Communication with Friends and Family: Not on file    Frequency of Social Gatherings with Friends and Family: Not on file    Attends Rastafari Services: Not on file    Active Member of [address] [place] [address] or Organizations: Not on file    Attends Club or Organization Meetings: Not on file    Marital Status: Not on file   Intimate Partner Violence:     Fear of Current or Ex-Partner: Not on file    Emotionally Abused: Not on file    Physically Abused: Not on file    Sexually Abused: Not on file   Housing Stability:     Unable to Pay for Housing in the Last Year: Not on file    Number of [place] in the Last Year: Not on file    Unstable Housing in the Last Year: Not on file         Review of Systems:  Review of Systems   Constitutional: Negative. HENT: Positive for hearing loss. Eyes: Positive for visual disturbance. Cardiovascular: Negative. Respiratory: Negative. Endocrine:        Diabetic   Hematologic/Lymphatic: Bruises/bleeds easily. Skin: Negative. Musculoskeletal: Positive for joint pain. Gastrointestinal: Negative. Genitourinary:        UTI  Urine discolored   Neurological: Positive for dizziness and loss of balance. Gait issue, uses  A  walker   Psychiatric/Behavioral: Positive for depression. Physical Exam:  There were no vitals taken for this visit.     Physical Exam  Skin:         Neurological:      Mental Status: She is alert and oriented to person, place, and time. Psychiatric:         Mood and Affect: Mood normal.         Thought Content: Thought content normal.           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 Possible side effects, risk of tolerance and or dependence and alternative treatments discussed    Obtaining appropriate analgesic effect of treatment   No signs of potential drug abuse or diversion identified    [x] Ill effects of being on chronic pain medications such as sleep disturbances, respiratory depression, hormonal changes, withdrawal symptoms, chronic opioid dependence and tolerance as well as risk of taking opioids with Benzodiazepines and taking opioids along with alcohol,  werediscussed with patient. I had asked the patient to minimize medication use and utilize pain medications only for uncontrolled rest pain or pain with exertional activities. I advised patient not to self-escalate painmedications without consulting with us. At each of patient's future visits we will try to taper pain medications, while adjusting the adjunct medications, and re-evaluating for Physical Therapy to improve spinal andjoint strength. We will continue to have discussions to decrease pain medications as tolerated.       Counseled patient on effects their pain medication and /or their medical condition mayhave on their  ability to drive or operate machinery. Instructed not to drive or operate machinery if drowsy     I also discussed with the patient regarding the dangers of combining narcotic pain medication with tranquilizers, alcohol or illegal drugs or taking the medication any way other than prescribed. The dangers were discussed  including respiratory depression and death. Patient was told to tell  all  physicians regarding the medications he is getting from pain clinic. Patient is warned not to take any unprescribed medications over-the-countermedications that can depress breathing . Patient is advised to talk to the pharmacist or physicians if planning to take any over-the-counter medications before  takeing them. Patient is strongly advised to avoid tranquilizers or  relaxants, illegal drugs  or any medications that can depress breathing  Patient is also advised to tell us if there is any changes in their medications from other physicians.     1. She did not know she had a script at pharmacy done 2021 and it , will do script for percocet to fill today        TREATMENT OPTIONS:       Medication Agreement Requirements Met  Continue Opioid therapy  Script written for  percocet  Follow up appointment made

## 2021-12-17 ENCOUNTER — HOSPITAL ENCOUNTER (OUTPATIENT)
Dept: PAIN MANAGEMENT | Age: 86
Discharge: HOME | End: 2021-12-17
Payer: COMMERCIAL

## 2021-12-17 DIAGNOSIS — M54.41 CHRONIC BILATERAL LOW BACK PAIN WITH BILATERAL SCIATICA: Chronic | ICD-10-CM

## 2021-12-17 DIAGNOSIS — M25.551 PAIN IN JOINT, PELVIC REGION AND THIGH, RIGHT: ICD-10-CM

## 2021-12-17 DIAGNOSIS — M25.561 CHRONIC PAIN OF BOTH KNEES: ICD-10-CM

## 2021-12-17 DIAGNOSIS — Z79.899 ENCOUNTER FOR MEDICATION MANAGEMENT: ICD-10-CM

## 2021-12-17 DIAGNOSIS — M54.42 CHRONIC BILATERAL LOW BACK PAIN WITH BILATERAL SCIATICA: Chronic | ICD-10-CM

## 2021-12-17 DIAGNOSIS — G89.29 CHRONIC BILATERAL LOW BACK PAIN WITH BILATERAL SCIATICA: Chronic | ICD-10-CM

## 2021-12-17 DIAGNOSIS — M25.551 PAIN OF BOTH HIP JOINTS: ICD-10-CM

## 2021-12-17 DIAGNOSIS — E11.42 DIABETIC POLYNEUROPATHY ASSOCIATED WITH TYPE 2 DIABETES MELLITUS (HCC): ICD-10-CM

## 2021-12-17 DIAGNOSIS — M17.11 ARTHRITIS OF KNEE, RIGHT: Primary | ICD-10-CM

## 2021-12-17 DIAGNOSIS — G89.28 CHRONIC PAIN FOLLOWING SURGERY OR PROCEDURE: ICD-10-CM

## 2021-12-17 DIAGNOSIS — M25.552 HIP PAIN, BILATERAL: ICD-10-CM

## 2021-12-17 DIAGNOSIS — M25.562 CHRONIC PAIN OF BOTH KNEES: ICD-10-CM

## 2021-12-17 DIAGNOSIS — M25.551 HIP PAIN, BILATERAL: ICD-10-CM

## 2021-12-17 DIAGNOSIS — G89.29 CHRONIC PAIN OF BOTH KNEES: ICD-10-CM

## 2021-12-17 DIAGNOSIS — M51.36 LUMBAR DEGENERATIVE DISC DISEASE: ICD-10-CM

## 2021-12-17 DIAGNOSIS — M48.061 SPINAL STENOSIS OF LUMBAR REGION WITHOUT NEUROGENIC CLAUDICATION: ICD-10-CM

## 2021-12-17 DIAGNOSIS — M25.552 PAIN OF BOTH HIP JOINTS: ICD-10-CM

## 2021-12-17 DIAGNOSIS — M51.36 DDD (DEGENERATIVE DISC DISEASE), LUMBAR: ICD-10-CM

## 2021-12-17 PROCEDURE — 99442 PR PHYS/QHP TELEPHONE EVALUATION 11-20 MIN: CPT | Performed by: NURSE PRACTITIONER

## 2021-12-17 PROCEDURE — 99213 OFFICE O/P EST LOW 20 MIN: CPT

## 2021-12-17 RX ORDER — OXYCODONE AND ACETAMINOPHEN 7.5; 325 MG/1; MG/1
1 TABLET ORAL EVERY 6 HOURS PRN
Qty: 120 TABLET | Refills: 0 | Status: SHIPPED | OUTPATIENT
Start: 2021-12-17 | End: 2022-01-13 | Stop reason: SDUPTHER

## 2021-12-17 ASSESSMENT — ENCOUNTER SYMPTOMS
RESPIRATORY NEGATIVE: 1
GASTROINTESTINAL NEGATIVE: 1

## 2022-01-12 NOTE — PROGRESS NOTES
Leonardo 89 PROGRESS NOTE      Patient  completed []  video visit   [x]   phone call:    10     Minutes :   [] in person visit to pain clinic    [x]    to  review Medication Agreement    []  Follow up after procedure   []  Discuss treatment options      Location:  Provider:  working from    []    home    [x]   [facility] - MAIK CALL ,   patient at   [x] pain clinic     []  home         Chief Complaint:  Shoulder pain      She c/o multiple joint pain. She has history of bilateral hip and right knee arthroplasty, She did not complete x-rays hips or CT lumbar spine that has been ordered. Most of her pain is in her right shoulder, She states it radiates  down her rigth  Arm. She also has left shoulder pain. She has limited ROM. She has done PT in the past,which did not help. Her sleep is good. Her activity is limited, she uses a walker. Daughter on phone call. States mother had covid. She is coughing. Daughter states her mother fell last week, tripped over her foot and fell. She did not seek treatment. Shoulder Pain   The pain is present in the right shoulder, right arm and left shoulder. This is a chronic problem. There has been no history of extremity trauma. The problem occurs constantly. The problem has been gradually worsening. The quality of the pain is described as aching (sharp). The pain is at a severity of 9/10. Associated symptoms include a limited range of motion. Exacerbated by: lifting. She has tried oral narcotics, heat and OTC ointments for the symptoms.      Treatment goals:  Functional status:  Reduce pain      Aberrancy:   Any alcoholic beverages    no        Any illegal drugs no        Analgesia:       9              Adverse  Effects : no      ADL;s :limited    Data:    When was thelast UDS:  11-          Was the UDS appropriate:  [] yes []   no    Done at Pathology labs was positive for oxycodone and oxymorphone  Record/Diagnostics Review:      As above, I did review the imaging                     Pill count: appropriate    fill date : 2022    Morphine equivalent dose as reported on [place]AR[place]: 45  Periodic Controlled Substance Monitoring: Possible medication side effects, risk of tolerance/dependence & alternative treatments discussed. ,No signs of potential drug abuse or diversion identified. ,Assessed functional status. ,Obtaining appropriate analgesic effect of treatment. Hina Betancur, APRN - CNP)  Review ofOARRS does not show any aberrant prescription behavior. Medication is helping the patient stay active. Patient denies any side effects and reports adequate analgesia. No sign of misuse/abuse. Past Medical History:   Diagnosis Date    Anxiety     Arthritis     Bronchitis     CAD (coronary artery disease)     Carotid arterial disease (HCC)     COPD (chronic obstructive pulmonary disease) (HCC)     Diabetes mellitus ([facility] [address]     Hyperlipidemia     Hypertension     Mitral regurgitation     Myalgia     Pulmonary hypertension ([facility] [address]     Shingles 2018    left facial area and involved eye    Sleep apnea     Syncope and collapse        Past Surgical History:   Procedure Laterality Date    ABDOMEN SURGERY      CATARACT REMOVAL WITH IMPLANT Right      SECTION      COLECTOMY      COLONOSCOPY      HIP ARTHROPLASTY      3 on the left and 1 on the right    HYSTERECTOMY      INSERTABLE CARDIAC MONITOR  2018    [employer]    JOINT REPLACEMENT Right     TOTAL KNEE    JOINT REPLACEMENT Bilateral     right x2, left x3 hips    KNEE SURGERY         No Known Allergies      Current Outpatient Medications:     oxyCODONE-acetaminophen (PERCOCET) 7.5-325 MG per tablet, Take 1 tablet by mouth every 6 hours as needed for Pain for up to 30 days. , Disp: 120 tablet, Rfl: 0    amLODIPine (NORVASC) 10 MG tablet, , Disp: , Rfl:     atorvastatin (LIPITOR) 40 MG tablet, , Disp: , Rfl:     SYMBICORT 160-4.5 MCG/ACT AERO, , Disp: , Rfl:    ipratropium-albuterol (DUONEB) 0.5-2.5 (3) MG/3ML SOLN nebulizer solution, Inhale 3 mLs into the lungs 4 times daily as needed, Disp: , Rfl:     memantine (NAMENDA) 5 MG tablet, , Disp: , Rfl:     magnesium oxide (MAG-OX) 400 MG tablet, Take 400 mg by mouth daily, Disp: , Rfl:     cyanocobalamin 1000 MCG tablet, Take 1,000 mcg by mouth daily, Disp: , Rfl:     diclofenac sodium (VOLTAREN) 1 % GEL, , Disp: , Rfl:     ferrous sulfate (IRON 325) 325 (65 Fe) MG tablet, Take 325 mg by mouth daily (with breakfast), Disp: , Rfl:     losartan (COZAAR) 25 MG tablet, , Disp: , Rfl:     hydrOXYzine (ATARAX) 25 MG tablet, Take 25 mg by mouth every 8 hours as needed for Anxiety, Disp: , Rfl:     metoprolol tartrate (LOPRESSOR) 25 MG tablet, , Disp: , Rfl:     escitalopram (LEXAPRO) 20 MG tablet, , Disp: , Rfl:     furosemide (LASIX) 20 MG tablet, Take 1 tablet by mouth daily as needed (for 2 lb weight gain/increased swelling/increased shortness of breath), Disp: 60 tablet, Rfl: 3    ASPERCREME LIDOCAINE EX, Apply topically, Disp: , Rfl:     VENTOLIN  (90 Base) MCG/ACT inhaler, , Disp: , Rfl:     omeprazole (PRILOSEC) 20 MG delayed release capsule, , Disp: , Rfl:     hydrocortisone 2.5 % cream, Apply topically 2 times daily. , Disp: 30 g, Rfl: 2    levothyroxine (LEVOTHROID) 50 MCG tablet, Take 1 tablet by mouth daily, Disp: 30 tablet, Rfl: 3    glucose monitoring kit (FREESTYLE) monitoring kit, 1 kit by Does not apply route daily as needed, Disp: 1 kit, Rfl: 0    glucose blood VI test strips (EXACTECH TEST) strip, 1 each by In Vitro route daily Type 2 diabetes qd testing, Disp: 100 each, Rfl: 3    Accu-Chek Multiclix Lancets MISC, Test qd;type 2 diabetes, Disp: 100 each, Rfl: 3    Scooter MISC, by Does not apply route Use daily dx arthritis obesity pulmonary htn,copd, Disp: 1 each, Rfl: 0    metFORMIN (GLUCOPHAGE) 500 MG tablet, TAKE ONE TABLET BY MOUTH TWICE A DAY, Disp: 180 tablet, Rfl: 2    aspirin 325 MG tablet, Take 325 mg by mouth daily. , Disp: , Rfl:     Family History   Problem Relation Age of Onset    Cancer Mother     Hypertension Maternal Aunt     Cancer Daughter        Social History     Socioeconomic History    Marital status: Single     Spouse name: Not on file    Number of children: 3    Years of education: Not on file    Highest education level: Not on file   Occupational History    Occupation: RETIRED   Tobacco Use    Smoking status: Former Smoker     Years: 5.00     Quit date: 1985     Years since quittin.1    Smokeless tobacco: Never Used   Vaping Use    Vaping Use: Former   Substance and Sexual Activity    Alcohol use: No     Alcohol/week: 0.0 standard drinks    Drug use: No    Sexual activity: Not on file   Other Topics Concern    Not on file   Social History Narrative    Not on file     Social Determinants of Health     Financial Resource Strain:     Difficulty of Paying Living Expenses: Not on file   Food Insecurity:     Worried About [address] in the Last Year: Not on file    [address] in the Last Year: Not on file   Transportation Needs:     Lack of Transportation (Medical): Not on file    Lack of Transportation (Non-Medical):  Not on file   Physical Activity:     Days of Exercise per Week: Not on file    Minutes of Exercise per Session: Not on file   Stress:     Feeling of Stress : Not on file   Social Connections:     Frequency of Communication with Friends and Family: Not on file    Frequency of Social Gatherings with Friends and Family: Not on file    Attends Druze Services: Not on file    Active Member of Clubs or Organizations: Not on file    Attends Club or Organization Meetings: Not on file    Marital Status: Not on file   Intimate Partner Violence:     Fear of Current or Ex-Partner: Not on file    Emotionally Abused: Not on file    Physically Abused: Not on file    Sexually Abused: Not on file   Housing Stability:  Unable to Pay for Housing in the Last Year: Not on file    Number of Places Lived in the Last Year: Not on file    Unstable Housing in the Last Year: Not on file         Review of Systems:  Review of Systems   Constitutional: Positive for decreased appetite and malaise/fatigue. HENT: Positive for congestion and hearing loss. Eyes: Positive for visual disturbance. Cardiovascular: Positive for chest pain. Occasional chest pain   Respiratory: Positive for cough and shortness of breath. Endocrine:        Diabetic   Hematologic/Lymphatic: Bruises/bleeds easily. Skin: Negative. Musculoskeletal: Positive for falls and joint pain. Gastrointestinal: Positive for nausea. Genitourinary: Positive for nocturia. Neurological: Positive for dizziness, headaches and loss of balance. Uses a walker   Psychiatric/Behavioral: Positive for depression. The patient is nervous/anxious. Managing         Physical Exam:  There were no vitals taken for this visit. Physical Exam  Skin:         Neurological:      Mental Status: She is alert and oriented to person, place, and time. Psychiatric:         Mood and Affect: Mood normal.         Thought Content: Thought content normal.           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      Possible side effects, risk of tolerance and or dependence and alternative treatments discussed    Obtaining appropriate analgesic effect of treatment   No signs of potential drug abuse or diversion identified    [x] Ill effects of being on chronic pain medications such as sleep disturbances, respiratory depression, hormonal changes, withdrawal symptoms, chronic opioid dependence and tolerance as well as risk of taking opioids with Benzodiazepines and taking opioids along with alcohol,  werediscussed with patient. I had asked the patient to minimize medication use and utilize pain medications only for uncontrolled rest pain or pain with exertional activities. I advised patient not to self-escalate painmedications without consulting with us. At each of patient's future visits we will try to taper pain medications, while adjusting the adjunct medications, and re-evaluating for Physical Therapy to improve spinal andjoint strength. We will continue to have discussions to decrease pain medications as tolerated. Counseled patient on effects their pain medication and /or their medical condition mayhave on their  ability to drive or operate machinery. Instructed not to drive or operate machinery if drowsy     I also discussed with the patient regarding the dangers of combining narcotic pain medication with tranquilizers, alcohol or illegal drugs or taking the medication any way other than prescribed. The dangers were discussed  including respiratory depression and death. Patient was told to tell  all  physicians regarding the medications he is getting from pain clinic. Patient is warned not to take any unprescribed medications over-the-countermedications that can depress breathing . Patient is advised to talk to the pharmacist or physicians if planning to take any over-the-counter medications before  takeing them.  Patient is strongly advised to avoid tranquilizers or  relaxants, illegal drugs  or any medications that can depress breathing  Patient is also advised to tell us if there is any changes in their medications from other physicians.           TREATMENT OPTIONS:       Medication Agreement Requirements Met  Continue Opioid therapy  Script written for  percocet  Follow up appointment made

## 2022-01-13 ENCOUNTER — HOSPITAL ENCOUNTER (OUTPATIENT)
Dept: PAIN MANAGEMENT | Age: 87
Discharge: HOME | End: 2022-01-13
Payer: COMMERCIAL

## 2022-01-13 DIAGNOSIS — M17.11 ARTHRITIS OF KNEE, RIGHT: Primary | ICD-10-CM

## 2022-01-13 DIAGNOSIS — M25.551 HIP PAIN, BILATERAL: ICD-10-CM

## 2022-01-13 DIAGNOSIS — M48.061 SPINAL STENOSIS OF LUMBAR REGION WITHOUT NEUROGENIC CLAUDICATION: ICD-10-CM

## 2022-01-13 DIAGNOSIS — M54.41 CHRONIC BILATERAL LOW BACK PAIN WITH BILATERAL SCIATICA: Chronic | ICD-10-CM

## 2022-01-13 DIAGNOSIS — M25.552 HIP PAIN, BILATERAL: ICD-10-CM

## 2022-01-13 DIAGNOSIS — Z79.899 ENCOUNTER FOR MEDICATION MANAGEMENT: ICD-10-CM

## 2022-01-13 DIAGNOSIS — M25.551 PAIN OF BOTH HIP JOINTS: ICD-10-CM

## 2022-01-13 DIAGNOSIS — M25.562 CHRONIC PAIN OF BOTH KNEES: ICD-10-CM

## 2022-01-13 DIAGNOSIS — M25.552 PAIN OF BOTH HIP JOINTS: ICD-10-CM

## 2022-01-13 DIAGNOSIS — M51.36 DDD (DEGENERATIVE DISC DISEASE), LUMBAR: ICD-10-CM

## 2022-01-13 DIAGNOSIS — G89.29 CHRONIC BILATERAL LOW BACK PAIN WITH BILATERAL SCIATICA: Chronic | ICD-10-CM

## 2022-01-13 DIAGNOSIS — E11.42 DIABETIC POLYNEUROPATHY ASSOCIATED WITH TYPE 2 DIABETES MELLITUS (HCC): ICD-10-CM

## 2022-01-13 DIAGNOSIS — M51.36 LUMBAR DEGENERATIVE DISC DISEASE: ICD-10-CM

## 2022-01-13 DIAGNOSIS — M25.561 CHRONIC PAIN OF BOTH KNEES: ICD-10-CM

## 2022-01-13 DIAGNOSIS — G89.29 CHRONIC PAIN OF BOTH KNEES: ICD-10-CM

## 2022-01-13 DIAGNOSIS — M54.42 CHRONIC BILATERAL LOW BACK PAIN WITH BILATERAL SCIATICA: Chronic | ICD-10-CM

## 2022-01-13 PROCEDURE — 99213 OFFICE O/P EST LOW 20 MIN: CPT

## 2022-01-13 PROCEDURE — 99213 OFFICE O/P EST LOW 20 MIN: CPT | Performed by: NURSE PRACTITIONER

## 2022-01-13 RX ORDER — OXYCODONE AND ACETAMINOPHEN 7.5; 325 MG/1; MG/1
1 TABLET ORAL EVERY 6 HOURS PRN
Qty: 120 TABLET | Refills: 0 | Status: SHIPPED | OUTPATIENT
Start: 2022-01-16 | End: 2022-02-14 | Stop reason: SDUPTHER

## 2022-01-13 ASSESSMENT — ENCOUNTER SYMPTOMS
COUGH: 1
NAUSEA: 1
SHORTNESS OF BREATH: 1

## 2022-02-14 ENCOUNTER — HOSPITAL ENCOUNTER (OUTPATIENT)
Dept: PAIN MANAGEMENT | Age: 87
Discharge: HOME | End: 2022-02-14
Payer: COMMERCIAL

## 2022-02-14 VITALS
OXYGEN SATURATION: 97 % | HEART RATE: 75 BPM | DIASTOLIC BLOOD PRESSURE: 54 MMHG | TEMPERATURE: 98.4 F | SYSTOLIC BLOOD PRESSURE: 140 MMHG | RESPIRATION RATE: 20 BRPM

## 2022-02-14 DIAGNOSIS — M25.552 HIP PAIN, BILATERAL: ICD-10-CM

## 2022-02-14 DIAGNOSIS — M48.061 SPINAL STENOSIS OF LUMBAR REGION WITHOUT NEUROGENIC CLAUDICATION: ICD-10-CM

## 2022-02-14 DIAGNOSIS — M54.41 CHRONIC BILATERAL LOW BACK PAIN WITH BILATERAL SCIATICA: Chronic | ICD-10-CM

## 2022-02-14 DIAGNOSIS — M54.42 CHRONIC BILATERAL LOW BACK PAIN WITH BILATERAL SCIATICA: Chronic | ICD-10-CM

## 2022-02-14 DIAGNOSIS — M25.552 PAIN OF BOTH HIP JOINTS: ICD-10-CM

## 2022-02-14 DIAGNOSIS — M51.36 LUMBAR DEGENERATIVE DISC DISEASE: ICD-10-CM

## 2022-02-14 DIAGNOSIS — M25.551 PAIN OF BOTH HIP JOINTS: ICD-10-CM

## 2022-02-14 DIAGNOSIS — M25.551 HIP PAIN, BILATERAL: ICD-10-CM

## 2022-02-14 DIAGNOSIS — G89.29 CHRONIC BILATERAL LOW BACK PAIN WITH BILATERAL SCIATICA: Chronic | ICD-10-CM

## 2022-02-14 DIAGNOSIS — M51.36 DDD (DEGENERATIVE DISC DISEASE), LUMBAR: ICD-10-CM

## 2022-02-14 DIAGNOSIS — Z79.899 ENCOUNTER FOR MEDICATION MANAGEMENT: ICD-10-CM

## 2022-02-14 PROCEDURE — 99213 OFFICE O/P EST LOW 20 MIN: CPT

## 2022-02-14 PROCEDURE — 99213 OFFICE O/P EST LOW 20 MIN: CPT | Performed by: NURSE PRACTITIONER

## 2022-02-14 RX ORDER — OXYCODONE AND ACETAMINOPHEN 7.5; 325 MG/1; MG/1
1 TABLET ORAL EVERY 6 HOURS PRN
Qty: 120 TABLET | Refills: 0 | Status: SHIPPED | OUTPATIENT
Start: 2022-02-17 | End: 2022-03-21 | Stop reason: SDUPTHER

## 2022-02-14 ASSESSMENT — ENCOUNTER SYMPTOMS
COUGH: 0
BACK PAIN: 1
CONSTIPATION: 0
SHORTNESS OF BREATH: 0

## 2022-02-14 NOTE — PROGRESS NOTES
Chief Complaint: back pain, multiple joint pain    [facility] Pt reports low back pain for many years. The pain does radiate down her legs at times with numbness to left hip thigh area. She does follow with orthopedics for hip pain. She uses a cane for ambulation due to occasional weakness in legs. She has not had surgery to the area. She has had aquatic PT in the past that has helped but not interested in starting right now. She lives at [facility] is able to care for self with the help of her family.       Back Pain  This is a chronic problem. The current episode started more than 1 year ago. The problem occurs constantly. The problem is unchanged. The pain is present in the lumbar spine. The quality of the pain is described as aching. The pain is at a severity of 9/10. The pain is moderate. The symptoms are aggravated by position and standing. Pertinent negatives include no chest pain, fever, numbness, paresthesias or tingling. She has tried analgesics and bed rest for the symptoms. The treatment provided mild relief. Hip Pain   The incident occurred more than 1 week ago. There was no injury mechanism. The pain is present in the right hip and left hip. The quality of the pain is described as aching. The pain is moderate. The pain has been fluctuating since onset. Pertinent negatives include no numbness or tingling. The symptoms are aggravated by movement and weight bearing. She has tried non-weight bearing and rest for the symptoms. The treatment provided mild relief. Shoulder Pain   The pain is present in the right shoulder. This is a chronic problem. The current episode started more than 1 year ago. The problem occurs constantly. The problem has been unchanged. The quality of the pain is described as aching and burning. The pain is at a severity of 8/10. The pain is moderate. Associated symptoms include a limited range of motion. Pertinent negatives include no fever, numbness or tingling.  The symptoms are aggravated by activity and lying down. The treatment provided mild relief. Family history includes rheumatoid arthritis. Patient denies any new neurological symptoms. No bowel or bladder incontinence, no weakness, and no falling. Pill count: appropriate due 2/15    Morphine equivalent: 45    Periodic Controlled Substance Monitoring: Possible medication side effects, risk of tolerance/dependence & alternative treatments discussed. ,No signs of potential drug abuse or diversion identified. ,Obtaining appropriate analgesic effect of treatment. Denise Cummins, APRN - CNP)      Past Medical History:   Diagnosis Date    Anxiety     Arthritis     Bronchitis     CAD (coronary artery disease)     [facility]ot[facility] [address]     COPD (chronic obstructive pulmonary disease) ([facility] [address]     Diabetes mellitus ([facility] [address]     Hyperlipidemia     Hypertension     Mitral regurgitation     Myalgia     Pulmonary hypertension ([facility] [address]     Shingles 2018    left facial area and involved eye    Sleep apnea     Syncope and collapse        Past Surgical History:   Procedure Laterality Date    ABDOMEN SURGERY      CATARACT REMOVAL WITH IMPLANT Right      SECTION      COLECTOMY      COLONOSCOPY      HIP ARTHROPLASTY      3 on the left and 1 on the right    HYSTERECTOMY      INSERTABLE CARDIAC MONITOR  2018    [employer]    JOINT REPLACEMENT Right     TOTAL KNEE    JOINT REPLACEMENT Bilateral     right x2, left x3 hips    KNEE SURGERY         No Known Allergies      Current Outpatient Medications:     oxyCODONE-acetaminophen (PERCOCET) 7.5-325 MG per tablet, Take 1 tablet by mouth every 6 hours as needed for Pain for up to 30 days. , Disp: 120 tablet, Rfl: 0    amLODIPine (NORVASC) 10 MG tablet, , Disp: , Rfl:     atorvastatin (LIPITOR) 40 MG tablet, , Disp: , Rfl:     SYMBICORT 160-4.5 MCG/ACT AERO, , Disp: , Rfl:     ipratropium-albuterol (DUONEB) 0.5-2.5 (3) MG/3ML SOLN nebulizer solution, Inhale 3 mLs into the lungs 4 times daily as needed, Disp: , Rfl:     memantine (NAMENDA) 5 MG tablet, , Disp: , Rfl:     magnesium oxide (MAG-OX) 400 MG tablet, Take 400 mg by mouth daily, Disp: , Rfl:     cyanocobalamin 1000 MCG tablet, Take 1,000 mcg by mouth daily, Disp: , Rfl:     diclofenac sodium (VOLTAREN) 1 % GEL, , Disp: , Rfl:     ferrous sulfate (IRON 325) 325 (65 Fe) MG tablet, Take 325 mg by mouth daily (with breakfast), Disp: , Rfl:     losartan (COZAAR) 25 MG tablet, , Disp: , Rfl:     hydrOXYzine (ATARAX) 25 MG tablet, Take 25 mg by mouth every 8 hours as needed for Anxiety, Disp: , Rfl:     metoprolol tartrate (LOPRESSOR) 25 MG tablet, , Disp: , Rfl:     escitalopram (LEXAPRO) 20 MG tablet, , Disp: , Rfl:     furosemide (LASIX) 20 MG tablet, Take 1 tablet by mouth daily as needed (for 2 lb weight gain/increased swelling/increased shortness of breath), Disp: 60 tablet, Rfl: 3    ASPERCREME LIDOCAINE EX, Apply topically, Disp: , Rfl:     VENTOLIN  (90 Base) MCG/ACT inhaler, , Disp: , Rfl:     omeprazole (PRILOSEC) 20 MG delayed release capsule, , Disp: , Rfl:     hydrocortisone 2.5 % cream, Apply topically 2 times daily. , Disp: 30 g, Rfl: 2    levothyroxine (LEVOTHROID) 50 MCG tablet, Take 1 tablet by mouth daily, Disp: 30 tablet, Rfl: 3    glucose monitoring kit (FREESTYLE) monitoring kit, 1 kit by Does not apply route daily as needed, Disp: 1 kit, Rfl: 0    glucose blood VI test strips (EXACTECH TEST) strip, 1 each by In Vitro route daily Type 2 diabetes qd testing, Disp: 100 each, Rfl: 3    Accu-Chek Multiclix Lancets MISC, Test qd;type 2 diabetes, Disp: 100 each, Rfl: 3    Scooter MISC, by Does not apply route Use daily dx arthritis obesity pulmonary htn,copd, Disp: 1 each, Rfl: 0    metFORMIN (GLUCOPHAGE) 500 MG tablet, TAKE ONE TABLET BY MOUTH TWICE A DAY, Disp: 180 tablet, Rfl: 2    aspirin 325 MG tablet, Take 325 mg by mouth daily. , Disp: , Rfl: Family History   Problem Relation Age of Onset    Cancer Mother     Hypertension Maternal Aunt     Cancer Daughter        Social History     Socioeconomic History    Marital status: Single     Spouse name: Not on file    Number of children: 3    Years of education: Not on file    Highest education level: Not on file   Occupational History    Occupation: RETIRED   Tobacco Use    Smoking status: Former Smoker     Years: 5.00     Quit date: 1985     Years since quittin.2    Smokeless tobacco: Never Used   Vaping Use    Vaping Use: Former   Substance and Sexual Activity    Alcohol use: No     Alcohol/week: 0.0 standard drinks    Drug use: No    Sexual activity: Not on file   Other Topics Concern    Not on file   Social History Narrative    Not on file     Social Determinants of Health     Financial Resource Strain:     Difficulty of Paying Living Expenses: Not on file   Food Insecurity:     Worried About [address] in the Last Year: Not on file    [address] [employer] in the Last Year: Not on file   Transportation Needs:     Lack of Transportation (Medical): Not on file    Lack of Transportation (Non-Medical):  Not on file   Physical Activity:     Days of Exercise per Week: Not on file    Minutes of Exercise per Session: Not on file   Stress:     Feeling of Stress : Not on file   Social Connections:     Frequency of Communication with Friends and Family: Not on file    Frequency of Social Gatherings with Friends and Family: Not on file    Attends Restorationism Services: Not on file    Active Member of Clubs or Organizations: Not on file    Attends Club or Organization Meetings: Not on file    Marital Status: Not on file   Intimate Partner Violence:     Fear of Current or Ex-Partner: Not on file    Emotionally Abused: Not on file    Physically Abused: Not on file    Sexually Abused: Not on file   Housing Stability:     Unable to Pay for Housing in the Last Year: Not on file    Number of Places Lived in the Last Year: Not on file    Unstable Housing in the Last Year: Not on file       Review of Systems:  Review of Systems   Constitutional: Negative for chills and fever. Cardiovascular: Negative for chest pain and palpitations. Respiratory: Negative for cough and shortness of breath. Musculoskeletal: Positive for back pain, joint pain and myalgias. Gastrointestinal: Negative for constipation. Neurological: Negative for disturbances in coordination, loss of balance, numbness, paresthesias and tingling. Physical Exam:  BP (!) 140/54   Pulse 75   Temp 98.4 °F (36.9 °C) (Skin)   Resp 20   SpO2 97%     Physical Exam  HENT:      Head: Normocephalic. Pulmonary:      Effort: Pulmonary effort is normal.   Musculoskeletal:      Right shoulder: Tenderness present. Cervical back: Normal range of motion. Right hip: Tenderness present. Left hip: Tenderness present. Skin:     General: Skin is warm and dry. Neurological:      Mental Status: She is alert and oriented to person, place, and time.          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)    Relevant Medications    oxyCODONE-acetaminophen (PERCOCET) 7.5-325 MG per tablet (Start on 2/17/2022)    Spinal stenosis of lumbar region without neurogenic claudication    Relevant Medications    oxyCODONE-acetaminophen (PERCOCET) 7.5-325 MG per tablet (Start on 2/17/2022)    Encounter for medication management    Relevant Medications    oxyCODONE-acetaminophen (PERCOCET) 7.5-325 MG per tablet (Start on 2/17/2022)    DDD (degenerative disc disease), lumbar    Relevant Medications    oxyCODONE-acetaminophen (PERCOCET) 7.5-325 MG per tablet (Start on 2/17/2022)    Lumbar degenerative disc disease    Relevant Medications    oxyCODONE-acetaminophen (PERCOCET) 7.5-325 MG per tablet (Start on 2/17/2022)    Pain of both hip joints    Relevant Medications    oxyCODONE-acetaminophen (PERCOCET) 7.5-325 MG per tablet (Start on 2/17/2022)    Hip pain, bilateral    Relevant Medications    oxyCODONE-acetaminophen (PERCOCET) 7.5-325 MG per tablet (Start on 2/17/2022)             Treatment Plan:  Patient relates current medications are helping the pain. Patient reports taking pain medications as prescribed, denies obtaining medications from different sources and denies use of illegal drugs. Patient denies side effects from medications like nausea, vomiting, constipation or drowsiness. Patient reports current activities of daily living are possible due to medications and would like to continue them. As always, we encourage daily stretching and strengthening exercises, and recommend minimizing use of pain medications unless patient cannot get through daily activities due to pain. Contract requirements met. Continue opioid therapy.  Script written for percocet  Follow up appointment made for 4 weeks

## 2022-03-21 ENCOUNTER — HOSPITAL ENCOUNTER (OUTPATIENT)
Dept: PAIN MANAGEMENT | Age: 87
Discharge: HOME | End: 2022-03-21
Payer: COMMERCIAL

## 2022-03-21 VITALS
RESPIRATION RATE: 20 BRPM | TEMPERATURE: 96 F | HEART RATE: 78 BPM | OXYGEN SATURATION: 98 % | DIASTOLIC BLOOD PRESSURE: 68 MMHG | SYSTOLIC BLOOD PRESSURE: 130 MMHG

## 2022-03-21 DIAGNOSIS — M48.061 SPINAL STENOSIS OF LUMBAR REGION WITHOUT NEUROGENIC CLAUDICATION: ICD-10-CM

## 2022-03-21 DIAGNOSIS — M25.551 PAIN OF BOTH HIP JOINTS: ICD-10-CM

## 2022-03-21 DIAGNOSIS — M51.36 DDD (DEGENERATIVE DISC DISEASE), LUMBAR: ICD-10-CM

## 2022-03-21 DIAGNOSIS — M25.552 PAIN OF BOTH HIP JOINTS: ICD-10-CM

## 2022-03-21 DIAGNOSIS — M25.552 HIP PAIN, BILATERAL: ICD-10-CM

## 2022-03-21 DIAGNOSIS — M25.551 HIP PAIN, BILATERAL: ICD-10-CM

## 2022-03-21 DIAGNOSIS — Z79.899 ENCOUNTER FOR MEDICATION MANAGEMENT: ICD-10-CM

## 2022-03-21 DIAGNOSIS — M54.42 CHRONIC BILATERAL LOW BACK PAIN WITH BILATERAL SCIATICA: Chronic | ICD-10-CM

## 2022-03-21 DIAGNOSIS — G89.29 CHRONIC BILATERAL LOW BACK PAIN WITH BILATERAL SCIATICA: Chronic | ICD-10-CM

## 2022-03-21 DIAGNOSIS — M54.41 CHRONIC BILATERAL LOW BACK PAIN WITH BILATERAL SCIATICA: Chronic | ICD-10-CM

## 2022-03-21 DIAGNOSIS — M51.36 LUMBAR DEGENERATIVE DISC DISEASE: ICD-10-CM

## 2022-03-21 PROCEDURE — 99213 OFFICE O/P EST LOW 20 MIN: CPT | Performed by: NURSE PRACTITIONER

## 2022-03-21 PROCEDURE — 99213 OFFICE O/P EST LOW 20 MIN: CPT

## 2022-03-21 RX ORDER — OXYCODONE AND ACETAMINOPHEN 7.5; 325 MG/1; MG/1
1 TABLET ORAL EVERY 6 HOURS PRN
Qty: 120 TABLET | Refills: 0 | Status: SHIPPED | OUTPATIENT
Start: 2022-03-21 | End: 2022-04-29 | Stop reason: SDUPTHER

## 2022-03-21 ASSESSMENT — ENCOUNTER SYMPTOMS
COUGH: 0
CONSTIPATION: 0
SHORTNESS OF BREATH: 0
BACK PAIN: 1

## 2022-03-21 NOTE — PROGRESS NOTES
Chief Complaint: back pain, multiple joint pain    [facility]  Pt reports low back pain for many years. The pain does radiate down her legs at times with numbness to left hip thigh area. She does follow with orthopedics for hip pain. She uses a cane for ambulation due to occasional weakness in legs. She has not had surgery to the area. She has had aquatic PT in the past that has helped but not interested in starting right now. She lives at [facility] is able to care for self with the help of her family. Has a visiting nurse every week. Back Pain  This is a chronic problem. The current episode started more than 1 year ago. The problem occurs constantly. The problem is unchanged. The pain is present in the lumbar spine. The quality of the pain is described as aching. The pain is at a severity of 9/10. The pain is moderate. The symptoms are aggravated by position and sitting. Pertinent negatives include no chest pain or fever. She has tried analgesics and bed rest for the symptoms. The treatment provided mild relief. Patient denies any new neurological symptoms. No bowel or bladder incontinence, no weakness, and no falling. Pill count: appropriate due today    Morphine equivalent: 45    Periodic Controlled Substance Monitoring: Possible medication side effects, risk of tolerance/dependence & alternative treatments discussed. ,No signs of potential drug abuse or diversion identified. ,Obtaining appropriate analgesic effect of treatment.  Gabby Amato, APRN - CNP)      Past Medical History:   Diagnosis Date    Anxiety     Arthritis     Bronchitis     CAD (coronary artery disease)     Carotid arterial disease ([address]     COPD (chronic obstructive pulmonary disease) ([address]     Diabetes mellitus ([address]     Hyperlipidemia     Hypertension     Mitral regurgitation     Myalgia     Pulmonary hypertension ([address]     Shingles 09/2018    left facial area and involved eye    Sleep apnea     Syncope and collapse        Past Surgical History:   Procedure Laterality Date    ABDOMEN SURGERY      CATARACT REMOVAL WITH IMPLANT Right      SECTION      COLECTOMY      COLONOSCOPY      HIP ARTHROPLASTY      3 on the left and 1 on the right    HYSTERECTOMY      INSERTABLE CARDIAC MONITOR  2018    [employer]    JOINT REPLACEMENT Right     TOTAL KNEE    JOINT REPLACEMENT Bilateral     right x2, left x3 hips    KNEE SURGERY         No Known Allergies      Current Outpatient Medications:     oxyCODONE-acetaminophen (PERCOCET) 7.5-325 MG per tablet, Take 1 tablet by mouth every 6 hours as needed for Pain for up to 30 days. , Disp: 120 tablet, Rfl: 0    amLODIPine (NORVASC) 10 MG tablet, , Disp: , Rfl:     atorvastatin (LIPITOR) 40 MG tablet, , Disp: , Rfl:     SYMBICORT 160-4.5 MCG/ACT AERO, , Disp: , Rfl:     ipratropium-albuterol (DUONEB) 0.5-2.5 (3) MG/3ML SOLN nebulizer solution, Inhale 3 mLs into the lungs 4 times daily as needed, Disp: , Rfl:     memantine (NAMENDA) 5 MG tablet, , Disp: , Rfl:     magnesium oxide (MAG-OX) 400 MG tablet, Take 400 mg by mouth daily, Disp: , Rfl:     cyanocobalamin 1000 MCG tablet, Take 1,000 mcg by mouth daily, Disp: , Rfl:     diclofenac sodium (VOLTAREN) 1 % GEL, , Disp: , Rfl:     ferrous sulfate (IRON 325) 325 (65 Fe) MG tablet, Take 325 mg by mouth daily (with breakfast), Disp: , Rfl:     losartan (COZAAR) 25 MG tablet, , Disp: , Rfl:     hydrOXYzine (ATARAX) 25 MG tablet, Take 25 mg by mouth every 8 hours as needed for Anxiety, Disp: , Rfl:     metoprolol tartrate (LOPRESSOR) 25 MG tablet, , Disp: , Rfl:     escitalopram (LEXAPRO) 20 MG tablet, , Disp: , Rfl:     furosemide (LASIX) 20 MG tablet, Take 1 tablet by mouth daily as needed (for 2 lb weight gain/increased swelling/increased shortness of breath) (Patient not taking: Reported on 3/21/2022), Disp: 60 tablet, Rfl: 3    ASPERCREME LIDOCAINE EX, Apply topically, Disp: , Rfl:    VENTOLIN  (90 Base) MCG/ACT inhaler, , Disp: , Rfl:     omeprazole (PRILOSEC) 20 MG delayed release capsule, , Disp: , Rfl:     hydrocortisone 2.5 % cream, Apply topically 2 times daily. , Disp: 30 g, Rfl: 2    levothyroxine (LEVOTHROID) 50 MCG tablet, Take 1 tablet by mouth daily, Disp: 30 tablet, Rfl: 3    glucose monitoring kit (FREESTYLE) monitoring kit, 1 kit by Does not apply route daily as needed, Disp: 1 kit, Rfl: 0    glucose blood VI test strips (EXACTECH TEST) strip, 1 each by In Vitro route daily Type 2 diabetes qd testing, Disp: 100 each, Rfl: 3    Accu-Chek Multiclix Lancets MISC, Test qd;type 2 diabetes, Disp: 100 each, Rfl: 3    Scooter MISC, by Does not apply route Use daily dx arthritis obesity pulmonary htn,copd, Disp: 1 each, Rfl: 0    metFORMIN (GLUCOPHAGE) 500 MG tablet, TAKE ONE TABLET BY MOUTH TWICE A DAY, Disp: 180 tablet, Rfl: 2    aspirin 325 MG tablet, Take 325 mg by mouth daily. , Disp: , Rfl:     Family History   Problem Relation Age of Onset    Cancer Mother     Hypertension Maternal Aunt     Cancer Daughter        Social History     Socioeconomic History    Marital status: Single     Spouse name: Not on file    Number of children: 3    Years of education: Not on file    Highest education level: Not on file   Occupational History    Occupation: RETIRED   Tobacco Use    Smoking status: Former Smoker     Years: 5.00     Quit date: 1985     Years since quittin.3    Smokeless tobacco: Never Used   Vaping Use    Vaping Use: Former   Substance and Sexual Activity    Alcohol use: No     Alcohol/week: 0.0 standard drinks    Drug use: No    Sexual activity: Not on file   Other Topics Concern    Not on file   Social History Narrative    Not on file     Social Determinants of Health     Financial Resource Strain:     Difficulty of Paying Living Expenses: Not on file   Food Insecurity:     Worried About [address] in the Last Year: Not on file  Ran Out of Food in the Last Year: Not on file   Transportation Needs:     Lack of Transportation (Medical): Not on file    Lack of Transportation (Non-Medical): Not on file   Physical Activity:     Days of Exercise per Week: Not on file    Minutes of Exercise per Session: Not on file   Stress:     Feeling of Stress : Not on file   Social Connections:     Frequency of Communication with Friends and Family: Not on file    Frequency of Social Gatherings with Friends and Family: Not on file    Attends Holiness Services: Not on file    Active Member of [address] [place] [address] [employer] or Organizations: Not on file    Attends Club or Organization Meetings: Not on file    Marital Status: Not on file   Intimate Partner Violence:     Fear of Current or Ex-Partner: Not on file    Emotionally Abused: Not on file    Physically Abused: Not on file    Sexually Abused: Not on file   Housing Stability:     Unable to Pay for Housing in the Last Year: Not on file    Number of [place] in the Last Year: Not on file    Unstable Housing in the Last Year: Not on file       Review of Systems:  Review of Systems   Constitutional: Negative for chills and fever. Cardiovascular: Negative for chest pain and palpitations. Respiratory: Negative for cough and shortness of breath. Musculoskeletal: Positive for back pain. Gastrointestinal: Negative for constipation. Neurological: Negative for disturbances in coordination and loss of balance. Physical Exam:  /68   Pulse 78   Temp 96 °F (35.6 °C)   Resp 20   SpO2 98%     Physical Exam  HENT:      Head: Normocephalic. Pulmonary:      Effort: Pulmonary effort is normal.   Musculoskeletal:         General: Normal range of motion. Cervical back: Normal range of motion. Lumbar back: Tenderness present. Skin:     General: Skin is warm and dry. Neurological:      Mental Status: She is alert and oriented to person, place, and time.          Record/Diagnostics Review: Last melva 2/2020 and was appropriate     Assessment:  Problem List Items Addressed This Visit     Chronic bilateral low back pain with bilateral sciatica (Chronic)    Relevant Medications    oxyCODONE-acetaminophen (PERCOCET) 7.5-325 MG per tablet    Spinal stenosis of lumbar region without neurogenic claudication    Relevant Medications    oxyCODONE-acetaminophen (PERCOCET) 7.5-325 MG per tablet    Encounter for medication management    Relevant Medications    oxyCODONE-acetaminophen (PERCOCET) 7.5-325 MG per tablet    DDD (degenerative disc disease), lumbar    Relevant Medications    oxyCODONE-acetaminophen (PERCOCET) 7.5-325 MG per tablet    Lumbar degenerative disc disease    Relevant Medications    oxyCODONE-acetaminophen (PERCOCET) 7.5-325 MG per tablet    Pain of both hip joints    Relevant Medications    oxyCODONE-acetaminophen (PERCOCET) 7.5-325 MG per tablet    Hip pain, bilateral    Relevant Medications    oxyCODONE-acetaminophen (PERCOCET) 7.5-325 MG per tablet             Treatment Plan:  Patient relates current medications are helping the pain. Patient reports taking pain medications as prescribed, denies obtaining medications from different sources and denies use of illegal drugs. Patient denies side effects from medications like nausea, vomiting, constipation or drowsiness. Patient reports current activities of daily living are possible due to medications and would like to continue them. As always, we encourage daily stretching and strengthening exercises, and recommend minimizing use of pain medications unless patient cannot get through daily activities due to pain. Contract requirements met. Continue opioid therapy. Script written for percocet  Pt was hospitalized for one day with UTI this month - on Augmentin and doing well  Pt is moving to [place] after next appt.    Follow up appointment made for 4 weeks

## 2022-04-26 ENCOUNTER — APPOINTMENT (OUTPATIENT)
Dept: CT IMAGING | Age: 87
End: 2022-04-26
Payer: COMMERCIAL

## 2022-04-26 ENCOUNTER — APPOINTMENT (OUTPATIENT)
Dept: GENERAL RADIOLOGY | Age: 87
End: 2022-04-26
Payer: COMMERCIAL

## 2022-04-26 ENCOUNTER — HOSPITAL ENCOUNTER (EMERGENCY)
Age: 87
Discharge: HOME | End: 2022-04-27
Attending: EMERGENCY MEDICINE
Payer: COMMERCIAL

## 2022-04-26 VITALS
TEMPERATURE: 98.7 F | SYSTOLIC BLOOD PRESSURE: 102 MMHG | WEIGHT: 224 LBS | DIASTOLIC BLOOD PRESSURE: 59 MMHG | HEIGHT: 67 IN | OXYGEN SATURATION: 98 % | BODY MASS INDEX: 35.16 KG/M2 | HEART RATE: 67 BPM | RESPIRATION RATE: 16 BRPM

## 2022-04-26 DIAGNOSIS — N39.0 URINARY TRACT INFECTION WITHOUT HEMATURIA, SITE UNSPECIFIED: Primary | ICD-10-CM

## 2022-04-26 DIAGNOSIS — R53.1 GENERAL WEAKNESS: ICD-10-CM

## 2022-04-26 LAB
-: ABNORMAL
ABSOLUTE EOS #: 0.19 K/UL (ref 0–0.44)
ABSOLUTE IMMATURE GRANULOCYTE: 0.02 K/UL (ref 0–0.3)
ABSOLUTE LYMPH #: 1.15 K/UL (ref 1.1–3.7)
ABSOLUTE MONO #: 0.67 K/UL (ref 0.1–1.2)
AMORPHOUS: ABNORMAL
ANION GAP SERPL CALCULATED.3IONS-SCNC: 8 MMOL/L (ref 9–17)
BACTERIA: ABNORMAL
BASOPHILS # BLD: 1 % (ref 0–2)
BASOPHILS ABSOLUTE: 0.05 K/UL (ref 0–0.2)
BILIRUBIN URINE: NEGATIVE
BUN BLDV-MCNC: 23 MG/DL (ref 8–23)
BUN/CREAT BLD: 17 (ref 9–20)
CALCIUM SERPL-MCNC: 9.3 MG/DL (ref 8.6–10.4)
CHLORIDE BLD-SCNC: 106 MMOL/L (ref 98–107)
CO2: 29 MMOL/L (ref 20–31)
COLOR: YELLOW
CREAT SERPL-MCNC: 1.38 MG/DL (ref 0.5–0.9)
EOSINOPHILS RELATIVE PERCENT: 3 % (ref 1–4)
EPITHELIAL CELLS UA: ABNORMAL /HPF (ref 0–5)
GFR AFRICAN AMERICAN: 44 ML/MIN
GFR NON-AFRICAN AMERICAN: 36 ML/MIN
GFR SERPL CREATININE-BSD FRML MDRD: ABNORMAL ML/MIN/{1.73_M2}
GLUCOSE BLD-MCNC: 106 MG/DL (ref 70–99)
GLUCOSE URINE: NEGATIVE
HCT VFR BLD CALC: 30.9 % (ref 36.3–47.1)
HEMOGLOBIN: 9.4 G/DL (ref 11.9–15.1)
IMMATURE GRANULOCYTES: 0 %
KETONES, URINE: ABNORMAL
LEUKOCYTE ESTERASE, URINE: NEGATIVE
LYMPHOCYTES # BLD: 15 % (ref 24–43)
MCH RBC QN AUTO: 28.1 PG (ref 25.2–33.5)
MCHC RBC AUTO-ENTMCNC: 30.4 G/DL (ref 28.4–34.8)
MCV RBC AUTO: 92.2 FL (ref 82.6–102.9)
MONOCYTES # BLD: 9 % (ref 3–12)
NITRITE, URINE: NEGATIVE
NRBC AUTOMATED: 0 PER 100 WBC
PDW BLD-RTO: 14.6 % (ref 11.8–14.4)
PH UA: 6 (ref 5–8)
PLATELET # BLD: 251 K/UL (ref 138–453)
PMV BLD AUTO: 10.4 FL (ref 8.1–13.5)
POTASSIUM SERPL-SCNC: 4.4 MMOL/L (ref 3.7–5.3)
PROTEIN UA: NEGATIVE
RBC # BLD: 3.35 M/UL (ref 3.95–5.11)
RBC # BLD: ABNORMAL 10*6/UL
RBC UA: ABNORMAL /HPF (ref 0–2)
SEG NEUTROPHILS: 72 % (ref 36–65)
SEGMENTED NEUTROPHILS ABSOLUTE COUNT: 5.55 K/UL (ref 1.5–8.1)
SODIUM BLD-SCNC: 143 MMOL/L (ref 135–144)
SPECIFIC GRAVITY UA: 1.02 (ref 1–1.03)
TURBIDITY: ABNORMAL
URINE HGB: NEGATIVE
UROBILINOGEN, URINE: NORMAL
WBC # BLD: 7.6 K/UL (ref 3.5–11.3)
WBC UA: ABNORMAL /HPF (ref 0–5)

## 2022-04-26 PROCEDURE — 99284 EMERGENCY DEPT VISIT MOD MDM: CPT

## 2022-04-26 PROCEDURE — 70450 CT HEAD/BRAIN W/O DYE: CPT

## 2022-04-26 PROCEDURE — 71045 X-RAY EXAM CHEST 1 VIEW: CPT

## 2022-04-26 PROCEDURE — 80048 BASIC METABOLIC PNL TOTAL CA: CPT

## 2022-04-26 PROCEDURE — 81001 URINALYSIS AUTO W/SCOPE: CPT

## 2022-04-26 PROCEDURE — 85025 COMPLETE CBC W/AUTO DIFF WBC: CPT

## 2022-04-26 PROCEDURE — 2580000003 HC RX 258: Performed by: PHYSICIAN ASSISTANT

## 2022-04-26 PROCEDURE — 6360000002 HC RX W HCPCS: Performed by: PHYSICIAN ASSISTANT

## 2022-04-26 PROCEDURE — 96365 THER/PROPH/DIAG IV INF INIT: CPT

## 2022-04-26 RX ORDER — SODIUM CHLORIDE 9 MG/ML
INJECTION, SOLUTION INTRAVENOUS CONTINUOUS
Status: DISCONTINUED | OUTPATIENT
Start: 2022-04-26 | End: 2022-04-27 | Stop reason: HOSPADM

## 2022-04-26 RX ORDER — CIPROFLOXACIN 500 MG/1
500 TABLET, FILM COATED ORAL 2 TIMES DAILY
Qty: 14 TABLET | Refills: 0 | Status: SHIPPED | OUTPATIENT
Start: 2022-04-26 | End: 2022-05-03

## 2022-04-26 RX ADMIN — CEFTRIAXONE SODIUM 1000 MG: 1 INJECTION, POWDER, FOR SOLUTION INTRAMUSCULAR; INTRAVENOUS at 23:53

## 2022-04-26 RX ADMIN — SODIUM CHLORIDE: 9 INJECTION, SOLUTION INTRAVENOUS at 20:58

## 2022-04-27 NOTE — ED NOTES
Pt presents to the er c/o an intermittent uti for the past two months pt is with increased confusion urinary frequency and bilateral flank and lower abdominal pain     Bessy Quintero RN  04/26/22 2022

## 2022-04-27 NOTE — ED PROVIDER NOTES
[address] [place] [address] ED  eMERGENCY dEPARTMENTeNCOUnter      Pt Name: Benito Cranker  MRN: [id number]  [place] 1935  Date ofevaluation: 4/26/2022  Provider: Mary Faust PA-C    CHIEF COMPLAINT       Chief Complaint   Patient presents with    Urinary Tract Infection     Pt has been treated for recurrent UTIs since February; daughter states pt was on ATB and hospitalized with improving symptoms but states pt still complains of UTI symptoms; urine was tested 2 weeks ago and showed bacteria still in urine; pt now c/o right flank/ABD pain     Flank Pain         HISTORY OF PRESENT ILLNESS  (Location/Symptom, Timing/Onset, Context/Setting, Quality, Duration, Modifying Factors, Severity.)   Benito Cranker is a 82 y.o. female who presents to the emergency department with concerns for UTI. Granddaughter reports patient has been treated intermittently for UTI for the last few months. Has not had any any antibiotics over the last month. Concerned about UTI and worsening and possibly leading to confusion. Patient is lucid here in the ER showing no signs of confusion. No chest pain shortness of breath. No fevers or chills. No other complaints. Nursing Notes were reviewed. ALLERGIES     Patient has no known allergies. CURRENT MEDICATIONS       Previous Medications    ACCU-CHEK MULTICLIX LANCETS MISC    Test qd;type 2 diabetes    AMLODIPINE (NORVASC) 10 MG TABLET        ASPERCREME LIDOCAINE EX    Apply topically    ASPIRIN 325 MG TABLET    Take 325 mg by mouth daily.     ATORVASTATIN (LIPITOR) 40 MG TABLET        CYANOCOBALAMIN 1000 MCG TABLET    Take 1,000 mcg by mouth daily    DICLOFENAC SODIUM (VOLTAREN) 1 % GEL        ESCITALOPRAM (LEXAPRO) 20 MG TABLET        FERROUS SULFATE (IRON 325) 325 (65 FE) MG TABLET    Take 325 mg by mouth daily (with breakfast)    FUROSEMIDE (LASIX) 20 MG TABLET    Take 1 tablet by mouth daily as needed (for 2 lb weight gain/increased swelling/increased shortness of breath)    GLUCOSE BLOOD VI TEST STRIPS (EXACTECH TEST) STRIP    1 each by In Vitro route daily Type 2 diabetes qd testing    GLUCOSE MONITORING KIT (FREESTYLE) MONITORING KIT    1 kit by Does not apply route daily as needed    HYDROCORTISONE 2.5 % CREAM    Apply topically 2 times daily. HYDROXYZINE (ATARAX) 25 MG TABLET    Take 25 mg by mouth every 8 hours as needed for Anxiety    IPRATROPIUM-ALBUTEROL (DUONEB) 0.5-2.5 (3) MG/3ML SOLN NEBULIZER SOLUTION    Inhale 3 mLs into the lungs 4 times daily as needed    LEVOTHYROXINE (LEVOTHROID) 50 MCG TABLET    Take 1 tablet by mouth daily    LOSARTAN (COZAAR) 25 MG TABLET        MAGNESIUM OXIDE (MAG-OX) 400 MG TABLET    Take 400 mg by mouth daily    MEMANTINE (NAMENDA) 5 MG TABLET        METFORMIN (GLUCOPHAGE) 500 MG TABLET    TAKE ONE TABLET BY MOUTH TWICE A DAY    METOPROLOL TARTRATE (LOPRESSOR) 25 MG TABLET        OMEPRAZOLE (PRILOSEC) 20 MG DELAYED RELEASE CAPSULE        OXYCODONE-ACETAMINOPHEN (PERCOCET) 7.5-325 MG PER TABLET    Take 1 tablet by mouth every 6 hours as needed for Pain for up to 30 days.     SCOOTER MISC    by Does not apply route Use daily dx arthritis obesity pulmonary htn,copd    SYMBICORT 160-4.5 MCG/ACT AERO        VENTOLIN  (90 BASE) MCG/ACT INHALER           PAST MEDICAL HISTORY         Diagnosis Date    Anxiety     Arthritis     Bronchitis     CAD (coronary artery disease)     Carotid arterial disease (HCC)     COPD (chronic obstructive pulmonary disease) (HCC)     Diabetes [facility]     Hyperlipidemia     Hypertension     Mitral regurgitation     Myalgia     Pulmonary hypertension ([facility]     Shingles 2018    left facial area and involved eye    Sleep apnea     Syncope and collapse        SURGICAL HISTORY           Procedure Laterality Date    ABDOMEN SURGERY      CATARACT REMOVAL WITH IMPLANT Right 1-     SECTION      COLECTOMY      COLONOSCOPY      HIP ARTHROPLASTY      3 on the left and 1 on the right    HYSTERECTOMY      INSERTABLE CARDIAC MONITOR  2018    [employer]    JOINT REPLACEMENT Right     TOTAL KNEE    JOINT REPLACEMENT Bilateral     right x2, left x3 hips    KNEE SURGERY           FAMILY HISTORY           Problem Relation Age of Onset    Cancer Mother     Hypertension Maternal Aunt     Cancer Daughter      Family Status   Relation Name Status    Mother      Father          murdered   [facility]      Ronnie  (Not Specified)        SOCIAL HISTORY      reports that she quit smoking about 36 years ago. She quit after 5.00 years of use. She has never used smokeless tobacco. She reports that she does not drink alcohol and does not use drugs. REVIEW OFSYSTEMS    (2-9 systems for level 4, 10 or more for level 5)   Review of Systems    Except as noted above the remainder of the review of systems was reviewed and negative. PHYSICAL EXAM    (up to 7 for level 4, 8 or more for level 5)     ED Triage Vitals [22 1832]   BP Temp Temp Source Pulse Resp SpO2 Height Weight   (!) 102/59 98.7 °F (37.1 °C) Oral 67 16 98 % 5' 7\" (1.702 m) 224 lb (101.6 kg)      Physical Exam  Constitutional:       Appearance: She is well-developed. HENT:      Head: Normocephalic and atraumatic. Cardiovascular:      Rate and Rhythm: Normal rate and regular rhythm. Pulmonary:      Effort: Pulmonary effort is normal.      Breath sounds: Normal breath sounds. Abdominal:      General: There is no distension. Palpations: Abdomen is soft. Tenderness: There is no abdominal tenderness. Musculoskeletal:         General: Normal range of motion. Cervical back: Normal range of motion and neck supple. Skin:     General: Skin is warm. Findings: No rash. Neurological:      Mental Status: She is alert and oriented to person, place, and time.    Psychiatric:         Behavior: Behavior normal.                 DIAGNOSTIC RESULTS     EKG: All EKG's are interpreted by the Emergency Department Physician who either signs or Co-signs this chart in the absence of a cardiologist.        RADIOLOGY:   Non-plain film images such as CT, Ultrasound and MRI are read by the radiologist. Plain radiographic images arevisualized and preliminarily interpreted by the emergency physician with the below findings:        Interpretation per the Radiologist below, if available at thetime of this note:          ED BEDSIDE ULTRASOUND:   Performed by ED Physician - none    LABS:  Labs Reviewed   URINALYSIS WITH REFLEX TO CULTURE - Abnormal; Notable for the following components:       Result Value    Turbidity UA Cloudy (*)     Ketones, Urine TRACE (*)     All other components within normal limits   CBC WITH AUTO DIFFERENTIAL - Abnormal; Notable for the following components:    RBC 3.35 (*)     Hemoglobin 9.4 (*)     Hematocrit 30.9 (*)     RDW 14.6 (*)     Seg Neutrophils 72 (*)     Lymphocytes 15 (*)     All other components within normal limits   BASIC METABOLIC PANEL - Abnormal; Notable for the following components:    Glucose 106 (*)     CREATININE 1.38 (*)     Anion Gap 8 (*)     GFR Non- 36 (*)     GFR  44 (*)     All other components within normal limits   MICROSCOPIC URINALYSIS - Abnormal; Notable for the following components:    Bacteria, UA MODERATE (*)     Amorphous, UA 3+ (*)     All other components within normal limits   CULTURE, URINE   URINALYSIS       All other labs were within normal range or not returned as of this dictation. EMERGENCY DEPARTMENT COURSE and DIFFERENTIAL DIAGNOSIS/MDM:   Vitals:    Vitals:    04/26/22 1832   BP: (!) 102/59   Pulse: 67   Resp: 16   Temp: 98.7 °F (37.1 °C)   TempSrc: Oral   SpO2: 98%   Weight: 224 lb (101.6 kg)   Height: 5' 7\" (1.702 m)     Family is convinced of UTI. Requesting antibiotics.   States that Cipro will not fast.  Previously already antibiotics were given to them which helped immensely multiple antibiotic was given to them outpatient which is Augmentin in the recent past.  Unknown clear if sample is contaminated or patient has actually a UTI. Cultures will be performed. We will give Cipro. No additional findings seen. Patient is nonseptic and nontoxic appearing. CONSULTS:  None    PROCEDURES:  Procedures        FINAL IMPRESSION      1. Urinary tract infection without hematuria, site unspecified    2.  General weakness          DISPOSITION/PLAN   DISPOSITION Discharge - Pending Orders Complete 04/26/2022 11:47:34 PM      PATIENTREFERRED TO:   Guerrero Benavides PA-C  [facility]  [phone]    In 3 days        DISCHARGE MEDICATIONS:     New Prescriptions    CIPROFLOXACIN (CIPRO) 500 MG TABLET    Take 1 tablet by mouth 2 times daily for 7 days           (Please note that portions of this note were completed with a voice recognition program.  Efforts were made to edit thedictations but occasionally words are mis-transcribed.)    MECHE Denny PA-C  04/27/22 0001

## 2022-04-29 ENCOUNTER — HOSPITAL ENCOUNTER (OUTPATIENT)
Dept: PAIN MANAGEMENT | Age: 87
Discharge: HOME | End: 2022-04-29
Payer: COMMERCIAL

## 2022-04-29 VITALS
WEIGHT: 224 LBS | RESPIRATION RATE: 20 BRPM | BODY MASS INDEX: 35.16 KG/M2 | HEIGHT: 67 IN | SYSTOLIC BLOOD PRESSURE: 120 MMHG | TEMPERATURE: 97.3 F | HEART RATE: 64 BPM | OXYGEN SATURATION: 100 % | DIASTOLIC BLOOD PRESSURE: 64 MMHG

## 2022-04-29 DIAGNOSIS — M54.41 CHRONIC BILATERAL LOW BACK PAIN WITH BILATERAL SCIATICA: Chronic | ICD-10-CM

## 2022-04-29 DIAGNOSIS — E11.42 DIABETIC POLYNEUROPATHY ASSOCIATED WITH TYPE 2 DIABETES MELLITUS (HCC): Primary | ICD-10-CM

## 2022-04-29 DIAGNOSIS — M25.551 HIP PAIN, BILATERAL: ICD-10-CM

## 2022-04-29 DIAGNOSIS — M25.551 PAIN OF BOTH HIP JOINTS: ICD-10-CM

## 2022-04-29 DIAGNOSIS — M54.42 CHRONIC BILATERAL LOW BACK PAIN WITH BILATERAL SCIATICA: Chronic | ICD-10-CM

## 2022-04-29 DIAGNOSIS — M51.36 LUMBAR DEGENERATIVE DISC DISEASE: ICD-10-CM

## 2022-04-29 DIAGNOSIS — M54.50 LUMBAR PAIN: ICD-10-CM

## 2022-04-29 DIAGNOSIS — M25.552 PAIN OF BOTH HIP JOINTS: ICD-10-CM

## 2022-04-29 DIAGNOSIS — M51.36 DDD (DEGENERATIVE DISC DISEASE), LUMBAR: ICD-10-CM

## 2022-04-29 DIAGNOSIS — M48.061 SPINAL STENOSIS OF LUMBAR REGION WITHOUT NEUROGENIC CLAUDICATION: ICD-10-CM

## 2022-04-29 DIAGNOSIS — Z79.899 ENCOUNTER FOR MEDICATION MANAGEMENT: ICD-10-CM

## 2022-04-29 DIAGNOSIS — G89.29 CHRONIC BILATERAL LOW BACK PAIN WITH BILATERAL SCIATICA: Chronic | ICD-10-CM

## 2022-04-29 DIAGNOSIS — M25.552 HIP PAIN, BILATERAL: ICD-10-CM

## 2022-04-29 PROCEDURE — 99213 OFFICE O/P EST LOW 20 MIN: CPT

## 2022-04-29 PROCEDURE — 99213 OFFICE O/P EST LOW 20 MIN: CPT | Performed by: NURSE PRACTITIONER

## 2022-04-29 RX ORDER — OXYCODONE AND ACETAMINOPHEN 7.5; 325 MG/1; MG/1
1 TABLET ORAL EVERY 6 HOURS PRN
Qty: 120 TABLET | Refills: 0 | Status: SHIPPED
Start: 2022-04-29 | End: 2022-05-26 | Stop reason: DRUGHIGH

## 2022-04-29 RX ORDER — LANOLIN ALCOHOL/MO/W.PET/CERES
3 CREAM (GRAM) TOPICAL DAILY
COMMUNITY
End: 2022-04-29

## 2022-04-29 ASSESSMENT — ENCOUNTER SYMPTOMS
BOWEL INCONTINENCE: 0
BACK PAIN: 1

## 2022-04-29 ASSESSMENT — PAIN SCALES - GENERAL: PAINLEVEL_OUTOF10: 9

## 2022-04-29 NOTE — PROGRESS NOTES
Chief Complaint   Patient presents with    Back Pain    Medication Refill         [facility]   Back pain, multiple joint pain    HPI: Pt reports low back pain for many years. The pain does radiate down her legs at times with numbness to left hip thigh area. She follows with orthopedics for hip pain. She uses a cane for ambulation due to occasional weakness in legs. She has not had surgery. She has had aquatic PT in the past that has helped but not interested in starting right now. She lives at [facility] is able to care for self with the help of her family. Has a visiting nurse every week.     She has a UTI and is on antibiotics. Attributes some of her back pain to kidney/flank pain.       Back Pain  This is a chronic problem. The current episode started more than 1 year ago. The problem occurs intermittently. The problem is unchanged. The pain is present in the lumbar spine. The quality of the pain is described as aching, burning, cramping, shooting and stabbing. The pain radiates to the left foot, left thigh, left knee, right foot, right knee and right thigh. The pain is at a severity of 9/10. The pain is worse during the day. The symptoms are aggravated by bending, sitting and standing. Associated symptoms include bladder incontinence, headaches, leg pain, numbness, tingling and weakness. Pertinent negatives include no bowel incontinence, chest pain or fever. She has tried heat (PT, topical creams) for the symptoms. Patient denies any new neurological symptoms. No bowel or bladder incontinence, no weakness, and no falling.     Pill count: appropriate / Oxycodone - 3 - was due to fill 4/20    Morphine equivalent: 45    Controlled Substance Monitoring:    Acute and Chronic Pain Monitoring:   RX Monitoring 4/29/2022   Attestation -   Acute Pain Prescriptions -   Periodic Controlled Substance Monitoring Possible medication side effects, risk of tolerance/dependence & alternative treatments discussed. ;No signs of potential drug abuse or diversion identified.;Obtaining appropriate analgesic effect of treatment. Chronic Pain > 50 MEDD -   Chronic Pain > 80 MEDD -         Past Medical History:   Diagnosis Date    Anxiety     Arthritis     Bronchitis     CAD (coronary artery disease)     Carotid arterial disease ([address]     COPD (chronic obstructive pulmonary disease) (HCC)     Diabetes mellitus (HCC)     Hyperlipidemia     Hypertension     Mitral regurgitation     Myalgia     Pulmonary hypertension ([facility] [address]     Shingles 2018    left facial area and involved eye    Sleep apnea     Syncope and collapse        Past Surgical History:   Procedure Laterality Date    ABDOMEN SURGERY      CATARACT REMOVAL WITH IMPLANT Right      SECTION      COLECTOMY      COLONOSCOPY      HIP ARTHROPLASTY      3 on the left and 1 on the right    HYSTERECTOMY      INSERTABLE CARDIAC MONITOR  2018    Medtronic    JOINT REPLACEMENT Right     TOTAL KNEE    JOINT REPLACEMENT Bilateral     right x2, left x3 hips    KNEE SURGERY         No Known Allergies      Current Outpatient Medications:     ciprofloxacin (CIPRO) 500 MG tablet, Take 1 tablet by mouth 2 times daily for 7 days, Disp: 14 tablet, Rfl: 0    oxyCODONE-acetaminophen (PERCOCET) 7.5-325 MG per tablet, Take 1 tablet by mouth every 6 hours as needed for Pain for up to 30 days. , Disp: 120 tablet, Rfl: 0    amLODIPine (NORVASC) 10 MG tablet, , Disp: , Rfl:     atorvastatin (LIPITOR) 40 MG tablet, , Disp: , Rfl:     SYMBICORT 160-4.5 MCG/ACT AERO, , Disp: , Rfl:     ipratropium-albuterol (DUONEB) 0.5-2.5 (3) MG/3ML SOLN nebulizer solution, Inhale 3 mLs into the lungs 4 times daily as needed, Disp: , Rfl:     memantine (NAMENDA) 5 MG tablet, , Disp: , Rfl:     magnesium oxide (MAG-OX) 400 MG tablet, Take 400 mg by mouth daily, Disp: , Rfl:     cyanocobalamin 1000 MCG tablet, Take 1,000 mcg by mouth daily, Disp: , Rfl:    diclofenac sodium (VOLTAREN) 1 % GEL, , Disp: , Rfl:     ferrous sulfate (IRON 325) 325 (65 Fe) MG tablet, Take 325 mg by mouth daily (with breakfast), Disp: , Rfl:     losartan (COZAAR) 25 MG tablet, , Disp: , Rfl:     hydrOXYzine (ATARAX) 25 MG tablet, Take 25 mg by mouth every 8 hours as needed for Anxiety, Disp: , Rfl:     metoprolol tartrate (LOPRESSOR) 25 MG tablet, , Disp: , Rfl:     escitalopram (LEXAPRO) 20 MG tablet, , Disp: , Rfl:     furosemide (LASIX) 20 MG tablet, Take 1 tablet by mouth daily as needed (for 2 lb weight gain/increased swelling/increased shortness of breath) (Patient not taking: Reported on 3/21/2022), Disp: 60 tablet, Rfl: 3    ASPERCREME LIDOCAINE EX, Apply topically, Disp: , Rfl:     VENTOLIN  (90 Base) MCG/ACT inhaler, , Disp: , Rfl:     omeprazole (PRILOSEC) 20 MG delayed release capsule, , Disp: , Rfl:     hydrocortisone 2.5 % cream, Apply topically 2 times daily. , Disp: 30 g, Rfl: 2    levothyroxine (LEVOTHROID) 50 MCG tablet, Take 1 tablet by mouth daily, Disp: 30 tablet, Rfl: 3    glucose monitoring kit (FREESTYLE) monitoring kit, 1 kit by Does not apply route daily as needed, Disp: 1 kit, Rfl: 0    glucose blood VI test strips (EXACTECH TEST) strip, 1 each by In Vitro route daily Type 2 diabetes qd testing, Disp: 100 each, Rfl: 3    Accu-Chek Multiclix Lancets MISC, Test qd;type 2 diabetes, Disp: 100 each, Rfl: 3    Scooter MISC, by Does not apply route Use daily dx arthritis obesity pulmonary htn,copd, Disp: 1 each, Rfl: 0    metFORMIN (GLUCOPHAGE) 500 MG tablet, TAKE ONE TABLET BY MOUTH TWICE A DAY, Disp: 180 tablet, Rfl: 2    aspirin 325 MG tablet, Take 325 mg by mouth daily. , Disp: , Rfl:     Family History   Problem Relation Age of Onset    Cancer Mother     Hypertension Maternal Aunt     Cancer Daughter        Social History     Socioeconomic History    Marital status: Single     Spouse name: Not on file    Number of children: 3    Years of education: Not on file    Highest education level: Not on file   Occupational History    Occupation: RETIRED   Tobacco Use    Smoking status: Former Smoker     Years: 5.00     Quit date: 1985     Years since quittin.4    Smokeless tobacco: Never Used   Vaping Use    Vaping Use: Former   Substance and Sexual Activity    Alcohol use: No     Alcohol/week: 0.0 standard drinks    Drug use: No    Sexual activity: Not on file   Other Topics Concern    Not on file   Social History Narrative    Not on file     Social Determinants of Health     Financial Resource Strain:     Difficulty of Paying Living Expenses: Not on file   Food Insecurity:     Worried About [email or web address] in the Last Year: Not on file    [address] [place] in the Last Year: Not on file   Transportation Needs:     Lack of Transportation (Medical): Not on file    Lack of Transportation (Non-Medical): Not on file   Physical Activity:     Days of Exercise per Week: Not on file    Minutes of Exercise per Session: Not on file   Stress:     Feeling of Stress : Not on file   Social Connections:     Frequency of Communication with Friends and Family: Not on file    Frequency of Social Gatherings with Friends and Family: Not on file    Attends Restorationist Services: Not on file    Active Member of [address] [place] [address] or Organizations: Not on file    Attends Club or Organization Meetings: Not on file    Marital Status: Not on file   Intimate Partner Violence:     Fear of Current or Ex-Partner: Not on file    Emotionally Abused: Not on file    Physically Abused: Not on file    Sexually Abused: Not on file   Housing Stability:     Unable to Pay for Housing in the Last Year: Not on file    Number of [place] in the Last Year: Not on file    Unstable Housing in the Last Year: Not on file       Review of Systems:  Review of Systems   Constitutional: Negative for fever. Cardiovascular: Negative for chest pain and palpitations. Musculoskeletal: Positive for back pain. Gastrointestinal: Negative for bowel incontinence. Genitourinary: Positive for bladder incontinence. Neurological: Positive for headaches, numbness, tingling and weakness. Negative for disturbances in coordination and loss of balance. Physical Exam:  /64   Pulse 64   Temp 97.3 °F (36.3 °C)   Resp 20   Ht 5' 7\" (1.702 m)   Wt 224 lb (101.6 kg)   SpO2 100%   BMI 35.08 kg/m²     Physical Exam  HENT:      Head: Normocephalic. Pulmonary:      Effort: Pulmonary effort is normal.   Musculoskeletal:         General: Normal range of motion. Cervical back: Normal range of motion. Lumbar back: Tenderness present. Back:    Skin:     General: Skin is warm and dry. Neurological:      Mental Status: She is alert and oriented to person, place, and time.          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)    Relevant Medications    oxyCODONE-acetaminophen (PERCOCET) 7.5-325 MG per tablet    Spinal stenosis of lumbar region without neurogenic claudication    Relevant Medications    oxyCODONE-acetaminophen (PERCOCET) 7.5-325 MG per tablet    Encounter for medication management    Relevant Medications    oxyCODONE-acetaminophen (PERCOCET) 7.5-325 MG per tablet    DDD (degenerative disc disease), lumbar    Relevant Medications    oxyCODONE-acetaminophen (PERCOCET) 7.5-325 MG per tablet    Diabetic polyneuropathy associated with type 2 diabetes mellitus ([facility] [address] - Primary    Lumbar degenerative disc disease    Relevant Medications    oxyCODONE-acetaminophen (PERCOCET) 7.5-325 MG per tablet    Pain of both hip joints    Relevant Medications    oxyCODONE-acetaminophen (PERCOCET) 7.5-325 MG per tablet    Hip pain, bilateral    Relevant Medications    oxyCODONE-acetaminophen (PERCOCET) 7.5-325 MG per tablet Other Visit Diagnoses     Lumbar pain        Relevant Medications    oxyCODONE-acetaminophen (PERCOCET) 7.5-325 MG per tablet    Other Relevant Orders    XR LUMBAR SPINE (2-3 VIEWS)             Treatment Plan:  Patient relates current medications are helping the pain. Patient reports taking pain medications as prescribed, denies obtaining medications from different sources and denies use of illegal drugs. Patient denies side effects from medications like nausea, vomiting, constipation or drowsiness. Patient reports current activities of daily living are possible due to medications and would like to continue them. As always, we encourage daily stretching and strengthening exercises, and recommend minimizing use of pain medications unless patient cannot get through daily activities due to pain. Contract requirements met. Continue opioid therapy. Script written for percocet  Pt will be moving to [place] this summer  Follow up appointment made for 4 weeks    I have reviewed the chief complaint and history of present illness (including ROS and [facility]) and vital documentation by my staff and I agree with their documentation and have added where applicable.

## 2022-04-30 NOTE — ED PROVIDER NOTES
eMERGENCY dEPARTMENT eNCOUnter   [address] Name: Angelica Hodgson  MRN: [id number]  [place] 1935  Date of evaluation: 4/30/22     Angelica Hodgson is a 82 y.o. female with CC: Urinary Tract Infection (Pt has been treated for recurrent UTIs since February; daughter states pt was on ATB and hospitalized with improving symptoms but states pt still complains of UTI symptoms; urine was tested 2 weeks ago and showed bacteria still in urine; pt now c/o right flank/ABD pain ) and Flank Pain        This visit was performed by both a physician and an APC. I performed all aspects of the MDM as documented. The care is provided during an unprecedented national emergency due to the novel coronavirus, COVID 19.     Valerie Nicholson MD  Attending Emergency Physician            Valerie Nicholson MD  04/30/22 [id number]

## 2022-05-26 ENCOUNTER — HOSPITAL ENCOUNTER (OUTPATIENT)
Dept: PAIN MANAGEMENT | Age: 87
Discharge: HOME | End: 2022-05-26
Payer: COMMERCIAL

## 2022-05-26 VITALS
HEIGHT: 67 IN | WEIGHT: 224 LBS | OXYGEN SATURATION: 95 % | DIASTOLIC BLOOD PRESSURE: 56 MMHG | HEART RATE: 64 BPM | SYSTOLIC BLOOD PRESSURE: 101 MMHG | BODY MASS INDEX: 35.16 KG/M2

## 2022-05-26 DIAGNOSIS — Z79.891 CHRONIC USE OF OPIATE DRUG FOR THERAPEUTIC PURPOSE: Chronic | ICD-10-CM

## 2022-05-26 DIAGNOSIS — G89.29 CHRONIC PAIN OF MULTIPLE SITES: Primary | ICD-10-CM

## 2022-05-26 DIAGNOSIS — M51.36 LUMBAR DEGENERATIVE DISC DISEASE: ICD-10-CM

## 2022-05-26 DIAGNOSIS — R52 CHRONIC PAIN OF MULTIPLE SITES: Primary | ICD-10-CM

## 2022-05-26 DIAGNOSIS — E11.42 DIABETIC POLYNEUROPATHY ASSOCIATED WITH TYPE 2 DIABETES MELLITUS (HCC): ICD-10-CM

## 2022-05-26 PROCEDURE — 99213 OFFICE O/P EST LOW 20 MIN: CPT

## 2022-05-26 PROCEDURE — 99213 OFFICE O/P EST LOW 20 MIN: CPT | Performed by: ANESTHESIOLOGY

## 2022-05-26 RX ORDER — OXYCODONE HYDROCHLORIDE 5 MG/1
5 TABLET ORAL EVERY 6 HOURS PRN
Qty: 120 TABLET | Refills: 0 | Status: SHIPPED | OUTPATIENT
Start: 2022-05-28 | End: 2022-06-30 | Stop reason: SDUPTHER

## 2022-05-26 ASSESSMENT — ENCOUNTER SYMPTOMS
BACK PAIN: 1
DIARRHEA: 0
CONSTIPATION: 1
ABDOMINAL PAIN: 1
NAUSEA: 0
VOMITING: 0

## 2022-05-26 ASSESSMENT — PAIN DESCRIPTION - PAIN TYPE: TYPE: CHRONIC PAIN

## 2022-05-26 ASSESSMENT — PAIN DESCRIPTION - ORIENTATION: ORIENTATION: LOWER

## 2022-05-26 ASSESSMENT — PAIN DESCRIPTION - DESCRIPTORS: DESCRIPTORS: ACHING;BURNING;CRAMPING;SHOOTING;STABBING

## 2022-05-26 ASSESSMENT — PAIN DESCRIPTION - FREQUENCY: FREQUENCY: INTERMITTENT

## 2022-05-26 ASSESSMENT — PAIN SCALES - GENERAL: PAINLEVEL_OUTOF10: 10

## 2022-05-26 ASSESSMENT — PAIN DESCRIPTION - LOCATION: LOCATION: BACK;HIP;KNEE

## 2022-05-26 NOTE — PROGRESS NOTES
The patient is a 82 y. o. Non- / non  female. Chief Complaint   Patient presents with    Back Pain    Medication Refill     Percocet        Back Pain  Associated symptoms include abdominal pain, numbness and weakness. Pertinent negatives include no fever or headaches. This is a pleasant 46-year-old female with history of chronic multisite body pain predominantly lower back pain  She is being seen in this clinic for several years  On chronic opioid therapy prescribed Percocet 7.5 mg 4 times a day  Patient is accompanied today by her daughter  The daughter reports history of progressive memory disturbance, dementia walking during sleep and confusion that has been progressively worsening    I discussed with the family that opioids are likely adversely contributing to her neurological issues  I recommend to decrease the opioid dose  They are agreeable to it and I will change to Percocet 5 mg 4 times a day  Consider further dose titration to 3 times a day or twice a day in future      Medication Refill: Percocet    Pain score Today:  10  Adverse effects (Constipation / Nausea / Sedation / sexual Dysfunction / others) : none  Mood: fair  Sleep pattern and quality: fair  Activity level: fair    Pill count Today: Percocet 18 tabs  Last dose taken 5/26/2022  OARRS report reviewed today: yes  ER/Hospitalizations/PCP visit related to pain since last visit:no   Any legal problems e.g. DUI etc.:No  Satisfied with current management: Yes    Opioid Contract:02/28/2022  Last Urine Dug screen dated:10/18/2021    Lab Results   Component Value Date    LABA1C 6.0 01/09/2020     Lab Results   Component Value Date     01/09/2020       Past Medical History, Past Surgical History, Social History, Allergies and Medications reviewed and updated in EPIC as indicated    Family History reviewed and is noncontributory.           Past Medical History:   Diagnosis Date    Anxiety     Arthritis     Bronchitis     CAD (coronary artery disease)     Carotid arterial disease (HCC)     COPD (chronic obstructive pulmonary disease) ([facility] [address] [facility]     Diabetes mellitus ([facility]     Hyperlipidemia     Hypertension     Mitral regurgitation     Myalgia     Pulmonary hypertension ([facility]     Shingles 2018    left facial area and involved eye    Sleep apnea     Syncope and collapse         Past Surgical History:   Procedure Laterality Date    ABDOMEN SURGERY      CATARACT REMOVAL WITH IMPLANT Right 1     SECTION      COLECTOMY      COLONOSCOPY      HIP ARTHROPLASTY      3 on the left and 1 on the right    HYSTERECTOMY      INSERTABLE CARDIAC MONITOR  2018    [employer]    JOINT REPLACEMENT Right     TOTAL KNEE    JOINT REPLACEMENT Bilateral     right x2, left x3 hips    KNEE SURGERY         Social History     Socioeconomic History    Marital status: Single     Spouse name: None    Number of children: 3    Years of education: None    Highest education level: None   Occupational History    Occupation: RETIRED   Tobacco Use    Smoking status: Former Smoker     Years: 5.00     Quit date: 1985     Years since quittin.5    Smokeless tobacco: Never Used   Vaping Use    Vaping Use: Former   Substance and Sexual Activity    Alcohol use: No     Alcohol/week: 0.0 standard drinks    Drug use: No    Sexual activity: None   Other Topics Concern    None   Social History Narrative    None     Social Determinants of Health     Financial Resource Strain:     Difficulty of Paying Living Expenses: Not on file   Food Insecurity:     Worried About Running Out of Food in the Last Year: Not on file    Reyna of Food in the Last Year: Not on file   Transportation Needs:     Lack of Transportation (Medical): Not on file    Lack of Transportation (Non-Medical):  Not on file   Physical Activity:     Days of Exercise per Week: Not on file    Minutes of Exercise per Session: Not on file   Stress:     Feeling of Stress : Not on file   Social Connections:     Frequency of Communication with Friends and Family: Not on file    Frequency of Social Gatherings with Friends and Family: Not on file    Attends Muslim Services: Not on file    Active Member of [address] [place] [address] [employer] or Organizations: Not on file    Attends Club or Organization Meetings: Not on file    Marital Status: Not on file   Intimate Partner Violence:     Fear of Current or Ex-Partner: Not on file    Emotionally Abused: Not on file    Physically Abused: Not on file    Sexually Abused: Not on file   Housing Stability:     Unable to Pay for Housing in the Last Year: Not on file    Number of [place] in the Last Year: Not on file    Unstable Housing in the Last Year: Not on file       Family History   Problem Relation Age of Onset    Cancer Mother     Hypertension Maternal Aunt     Cancer Daughter        No Known Allergies    Vitals:    05/26/22 1348   BP: (!) 101/56   Pulse: 64   SpO2: 95%       Current Outpatient Medications   Medication Sig Dispense Refill    [START ON 5/28/2022] oxyCODONE (ROXICODONE) 5 MG immediate release tablet Take 1 tablet by mouth every 6 hours as needed for Pain for up to 30 days.  120 tablet 0    atorvastatin (LIPITOR) 40 MG tablet       SYMBICORT 160-4.5 MCG/ACT AERO       ipratropium-albuterol (DUONEB) 0.5-2.5 (3) MG/3ML SOLN nebulizer solution Inhale 3 mLs into the lungs 4 times daily as needed      memantine (NAMENDA) 5 MG tablet       magnesium oxide (MAG-OX) 400 MG tablet Take 400 mg by mouth daily      cyanocobalamin 1000 MCG tablet Take 1,000 mcg by mouth daily      diclofenac sodium (VOLTAREN) 1 % GEL       ferrous sulfate (IRON 325) 325 (65 Fe) MG tablet Take 325 mg by mouth daily (with breakfast)      losartan (COZAAR) 25 MG tablet       hydrOXYzine (ATARAX) 25 MG tablet Take 25 mg by mouth every 8 hours as needed for Anxiety      escitalopram (LEXAPRO) 20 MG tablet       furosemide (LASIX) 20 MG tablet Take 1 tablet by mouth daily as needed (for 2 lb weight gain/increased swelling/increased shortness of breath) (Patient not taking: Reported on 3/21/2022) 60 tablet 3    ASPERCREME LIDOCAINE EX Apply topically      VENTOLIN  (90 Base) MCG/ACT inhaler       omeprazole (PRILOSEC) 20 MG delayed release capsule       hydrocortisone 2.5 % cream Apply topically 2 times daily. 30 g 2    levothyroxine (LEVOTHROID) 50 MCG tablet Take 1 tablet by mouth daily 30 tablet 3    glucose monitoring kit (FREESTYLE) monitoring kit 1 kit by Does not apply route daily as needed 1 kit 0    glucose blood VI test strips (EXACTECH TEST) strip 1 each by In Vitro route daily Type 2 diabetes qd testing 100 each 3    Accu-Chek Multiclix Lancets MISC Test qd;type 2 diabetes 100 each 3    Scooter MISC by Does not apply route Use daily dx arthritis obesity pulmonary htn,copd 1 each 0    metFORMIN (GLUCOPHAGE) 500 MG tablet TAKE ONE TABLET BY MOUTH TWICE A  tablet 2    aspirin 325 MG tablet Take 325 mg by mouth daily. No current facility-administered medications for this encounter. Review of Systems   Constitutional: Negative for fever. Gastrointestinal: Positive for abdominal pain and constipation. Negative for diarrhea, nausea and vomiting. Musculoskeletal: Positive for back pain, joint swelling and neck pain. Neurological: Positive for dizziness, weakness and numbness. Negative for light-headedness and headaches. Psychiatric/Behavioral: Positive for agitation, confusion and hallucinations. The patient is nervous/anxious. Objective:  General Appearance:  Comfortable, in no acute distress and in pain. Vital signs: (most recent): Blood pressure (!) 101/56, pulse 64, height 5' 7\" (1.702 m), weight 224 lb (101.6 kg), SpO2 95 %, not currently breastfeeding. Vital signs are normal.  No fever. Output: Producing urine and producing stool. Lungs:  Normal effort.   She is not in respiratory distress. Heart: Normal rate. Neurological: Patient is alert and oriented to person, place and time. Assessment & Plan   This is a pleasant 62-year-old female with history of chronic multisite body pain predominantly lower back pain  She is being seen in this clinic for several years  On chronic opioid therapy prescribed Percocet 7.5 mg 4 times a day  Patient is accompanied today by her daughter  The daughter reports history of progressive memory disturbance, dementia walking during sleep and confusion that has been progressively worsening    I discussed with the family that opioids are likely adversely contributing to her neurological issues  I recommend to decrease the opioid dose  They are agreeable to it and I will change to Percocet 5 mg 4 times a day  Consider further dose titration to 3 times a day or twice a day in future    1. Chronic pain of multiple sites    2. Chronic use of opiate drug for therapeutic purpose    3. Diabetic polyneuropathy associated with type 2 diabetes mellitus ([facility] [address]    4. Lumbar degenerative disc disease        No orders of the defined types were placed in this encounter. Orders Placed This Encounter   Medications    oxyCODONE (ROXICODONE) 5 MG immediate release tablet     Sig: Take 1 tablet by mouth every 6 hours as needed for Pain for up to 30 days. Dispense:  120 tablet     Refill:  0     Reduce doses taken as pain becomes manageable      Controlled Substance Monitoring:    Acute and Chronic Pain Monitoring:   RX Monitoring 5/26/2022   Attestation -   Acute Pain Prescriptions -   Periodic Controlled Substance Monitoring Possible medication side effects, risk of tolerance/dependence & alternative treatments discussed. ;No signs of potential drug abuse or diversion identified. ;Assessed functional status. Chronic Pain > 50 MEDD Obtained or confirmed \"Consent for Opioid Use\" on file.    Chronic Pain > 80 MEDD -               Electronically signed by Glendy Delaney MD on 5/26/2022 at 2:24 PM

## 2022-05-26 NOTE — PROGRESS NOTES
The patient is a 82 y. o. Non- / non  female. Chief Complaint   Patient presents with    Back Pain    Medication Refill     Percocet        HPI  Chief Complaint:  Medication Refill: Percocet    Pain score Today:  10  Adverse effects (Constipation / Nausea / Sedation / sexual Dysfunction / others) : none  Mood: fair  Sleep pattern and quality: fair  Activity level: fair    Pill count Today: Percocet 18 tabs  Last dose taken 2022  OARRS report reviewed today: yes  ER/Hospitalizations/PCP visit related to pain since last visit:no   Any legal problems e.g. DUI etc.:No  Satisfied with current management: Yes    Opioid Contract:2022  Last Urine Dug screen dated:10/18/2021    Lab Results   Component Value Date    LABA1C 6.0 2020     Lab Results   Component Value Date     2020       Past Medical History, Past Surgical History, Social History, Allergies and Medications reviewed and updated in EPIC as indicated    Family History reviewed and is noncontributory.           Past Medical History:   Diagnosis Date    Anxiety     Arthritis     Bronchitis     CAD (coronary artery disease)     Carotid arterial disease (HCC)     COPD (chronic obstructive pulmonary disease) (HCC)     Diabetes mellitus ([address]     Hyperlipidemia     Hypertension     Mitral regurgitation     Myalgia     [facility]monary hypertension ([facility]     Shingles 2018    left facial area and involved eye    Sleep apnea     Syncope and collapse         Past Surgical History:   Procedure Laterality Date    ABDOMEN SURGERY      CATARACT REMOVAL WITH IMPLANT Right 1-     SECTION      COLECTOMY      COLONOSCOPY      HIP ARTHROPLASTY      3 on the left and 1 on the right    HYSTERECTOMY      INSERTABLE CARDIAC MONITOR  2018    [employer]tr[employer]    JOINT REPLACEMENT Right     TOTAL KNEE    JOINT REPLACEMENT Bilateral     right x2, left x3 hips    KNEE SURGERY         Social History     Socioeconomic History    Marital status: Single     Spouse name: None    Number of children: 3    Years of education: None    Highest education level: None   Occupational History    Occupation: RETIRED   Tobacco Use    Smoking status: Former Smoker     Years: 5.00     Quit date: 1985     Years since quittin.5    Smokeless tobacco: Never Used   Vaping Use    Vaping Use: Former   Substance and Sexual Activity    Alcohol use: No     Alcohol/week: 0.0 standard drinks    Drug use: No    Sexual activity: None   Other Topics Concern    None   Social History Narrative    None     Social Determinants of Health     Financial Resource Strain:     Difficulty of Paying Living Expenses: Not on file   Food Insecurity:     Worried About Running Out of Food in the Last Year: Not on file    Reyna of Food in the Last Year: Not on file   Transportation Needs:     Lack of Transportation (Medical): Not on file    Lack of Transportation (Non-Medical):  Not on file   Physical Activity:     Days of Exercise per Week: Not on file    Minutes of Exercise per Session: Not on file   Stress:     Feeling of Stress : Not on file   Social Connections:     Frequency of Communication with Friends and Family: Not on file    Frequency of Social Gatherings with Friends and Family: Not on file    Attends Quaker Services: Not on file    Active Member of [address] [place] [address] [email or web address] or Organizations: Not on file    Attends Club or Organization Meetings: Not on file    Marital Status: Not on file   Intimate Partner Violence:     Fear of Current or Ex-Partner: Not on file    Emotionally Abused: Not on file    Physically Abused: Not on file    Sexually Abused: Not on file   Housing Stability:     Unable to Pay for Housing in the Last Year: Not on file    Number of [place] in the Last Year: Not on file    Unstable Housing in the Last Year: Not on file       Family History   Problem Relation Age of Onset    Cancer Mother     Hypertension Maternal Aunt     Cancer Daughter        No Known Allergies    Vitals:    05/26/22 1348   BP: (!) 101/56   Pulse: 64   SpO2: 95%       Current Outpatient Medications   Medication Sig Dispense Refill    oxyCODONE-acetaminophen (PERCOCET) 7.5-325 MG per tablet Take 1 tablet by mouth every 6 hours as needed for Pain for up to 30 days. 120 tablet 0    atorvastatin (LIPITOR) 40 MG tablet       SYMBICORT 160-4.5 MCG/ACT AERO       ipratropium-albuterol (DUONEB) 0.5-2.5 (3) MG/3ML SOLN nebulizer solution Inhale 3 mLs into the lungs 4 times daily as needed      memantine (NAMENDA) 5 MG tablet       magnesium oxide (MAG-OX) 400 MG tablet Take 400 mg by mouth daily      cyanocobalamin 1000 MCG tablet Take 1,000 mcg by mouth daily      diclofenac sodium (VOLTAREN) 1 % GEL       ferrous sulfate (IRON 325) 325 (65 Fe) MG tablet Take 325 mg by mouth daily (with breakfast)      losartan (COZAAR) 25 MG tablet       hydrOXYzine (ATARAX) 25 MG tablet Take 25 mg by mouth every 8 hours as needed for Anxiety      escitalopram (LEXAPRO) 20 MG tablet       furosemide (LASIX) 20 MG tablet Take 1 tablet by mouth daily as needed (for 2 lb weight gain/increased swelling/increased shortness of breath) (Patient not taking: Reported on 3/21/2022) 60 tablet 3    ASPERCREME LIDOCAINE EX Apply topically      VENTOLIN  (90 Base) MCG/ACT inhaler       omeprazole (PRILOSEC) 20 MG delayed release capsule       hydrocortisone 2.5 % cream Apply topically 2 times daily.  30 g 2    levothyroxine (LEVOTHROID) 50 MCG tablet Take 1 tablet by mouth daily 30 tablet 3    glucose monitoring kit (FREESTYLE) monitoring kit 1 kit by Does not apply route daily as needed 1 kit 0    glucose blood VI test strips (EXACTECH TEST) strip 1 each by In Vitro route daily Type 2 diabetes qd testing 100 each 3    Accu-Chek Multiclix Lancets MISC Test qd;type 2 diabetes 100 each 3    Scooter MISC by Does not apply route Use daily dx arthritis obesity pulmonary htn,copd 1 each 0    metFORMIN (GLUCOPHAGE) 500 MG tablet TAKE ONE TABLET BY MOUTH TWICE A  tablet 2    aspirin 325 MG tablet Take 325 mg by mouth daily. No current facility-administered medications for this encounter. Review of Systems   Gastrointestinal: Positive for abdominal pain and constipation. Negative for diarrhea, nausea and vomiting. Musculoskeletal: Positive for back pain, joint swelling and neck pain. Neurological: Positive for dizziness, weakness and numbness. Negative for light-headedness and headaches. Psychiatric/Behavioral: Positive for agitation, confusion and hallucinations. The patient is nervous/anxious. Objective  Assessment & Plan  No diagnosis found. No orders of the defined types were placed in this encounter. No orders of the defined types were placed in this encounter.            Electronically signed by Johana Bates MA on 5/26/2022 at 1:53 PM

## 2022-06-30 ENCOUNTER — HOSPITAL ENCOUNTER (OUTPATIENT)
Dept: PAIN MANAGEMENT | Age: 87
Discharge: HOME | End: 2022-06-30
Payer: COMMERCIAL

## 2022-06-30 VITALS — SYSTOLIC BLOOD PRESSURE: 129 MMHG | OXYGEN SATURATION: 98 % | DIASTOLIC BLOOD PRESSURE: 76 MMHG | HEART RATE: 70 BPM

## 2022-06-30 DIAGNOSIS — R52 CHRONIC PAIN OF MULTIPLE SITES: ICD-10-CM

## 2022-06-30 DIAGNOSIS — Z79.891 CHRONIC USE OF OPIATE DRUG FOR THERAPEUTIC PURPOSE: Primary | Chronic | ICD-10-CM

## 2022-06-30 DIAGNOSIS — E11.42 DIABETIC POLYNEUROPATHY ASSOCIATED WITH TYPE 2 DIABETES MELLITUS (HCC): ICD-10-CM

## 2022-06-30 DIAGNOSIS — M51.36 LUMBAR DEGENERATIVE DISC DISEASE: ICD-10-CM

## 2022-06-30 DIAGNOSIS — G89.29 CHRONIC PAIN OF MULTIPLE SITES: ICD-10-CM

## 2022-06-30 DIAGNOSIS — M48.061 SPINAL STENOSIS OF LUMBAR REGION WITHOUT NEUROGENIC CLAUDICATION: ICD-10-CM

## 2022-06-30 DIAGNOSIS — M51.36 DDD (DEGENERATIVE DISC DISEASE), LUMBAR: ICD-10-CM

## 2022-06-30 PROCEDURE — 99213 OFFICE O/P EST LOW 20 MIN: CPT

## 2022-06-30 PROCEDURE — 99213 OFFICE O/P EST LOW 20 MIN: CPT | Performed by: NURSE PRACTITIONER

## 2022-06-30 RX ORDER — OXYCODONE HYDROCHLORIDE 5 MG/1
5 TABLET ORAL EVERY 6 HOURS PRN
Qty: 120 TABLET | Refills: 0 | Status: SHIPPED | OUTPATIENT
Start: 2022-06-30 | End: 2022-07-28 | Stop reason: SDUPTHER

## 2022-06-30 ASSESSMENT — ENCOUNTER SYMPTOMS
SHORTNESS OF BREATH: 0
BACK PAIN: 1
COUGH: 0
CONSTIPATION: 0

## 2022-06-30 NOTE — PROGRESS NOTES
Chief Complaint   Patient presents with    Back Pain    Medication Refill     Oxycodone        [facility]     Pt reports low back pain for many years. The pain does radiate down her legs at times with numbness to left hip thigh area. She follows with orthopedics for hip pain. She uses a cane for ambulation due to occasional weakness in legs. She has not had surgery. She has had aquatic PT in the past that has helped but not interested in starting right now. She lives at [facility] is able to care for self with the help of her family. Has a visiting nurse every week.   The daughter reports history of progressive memory disturbance, dementia walking during sleep and confusion that has been progressively worsening. MD discussed with the family that opioids are likely adversely contributing to her neurological issues  recommended to decrease the opioid dose  They are agreeable to it and I will change to oxycodone 5 mg 4 times a day  Consider further dose titration to 3 times a day or twice a day in future  Today pt reports worsening pain and was dx with UTI that was causing some confusion. Explained will continue with QID dosing and can use tylenol sparingly for breakthrough pain. Back Pain  This is a chronic problem. The current episode started more than 1 year ago. The problem occurs constantly. The problem is unchanged. The pain is present in the gluteal, lumbar spine and sacro-iliac. The quality of the pain is described as aching, burning, stabbing, shooting and cramping. The pain radiates to the right knee, left knee, right thigh and left thigh. The pain is at a severity of 9/10. The pain is severe. The pain is the same all the time. The symptoms are aggravated by bending, coughing, position, standing, sitting, stress and twisting. Stiffness is present in the morning. Associated symptoms include tingling and weakness. Pertinent negatives include no chest pain, fever or headaches.  She has tried bed rest, home exercises, heat, ice, muscle relaxant, walking and NSAIDs for the symptoms. The treatment provided no relief. Patient denies any new neurological symptoms. No bowel or bladder incontinence, no weakness, and no falling. Pill count: appropriate 6 Oxycodone   Morphine equivalent: 45-->30    Controlled Substance Monitoring:    Acute and Chronic Pain Monitoring:   RX Monitoring 2022   Attestation -   Acute Pain Prescriptions -   Periodic Controlled Substance Monitoring Possible medication side effects, risk of tolerance/dependence & alternative treatments discussed. ;No signs of potential drug abuse or diversion identified. ;Assessed functional status. ;Obtaining appropriate analgesic effect of treatment. Chronic Pain > 50 MEDD -   Chronic Pain > 80 MEDD -         Periodic Controlled Substance Monitoring: Possible medication side effects, risk of tolerance/dependence & alternative treatments discussed. ,No signs of potential drug abuse or diversion identified. ,Assessed functional status. ,Obtaining appropriate analgesic effect of treatment.  Kojo Briceno, APRN - CNP)      Past Medical History:   Diagnosis Date    Anxiety     Arthritis     Bronchitis     CAD (coronary artery disease)     Carotid arterial disease ([address]     COPD (chronic obstructive pulmonary disease) ([address]     Diabetes mellitus ([address]     Hyperlipidemia     Hypertension     Mitral regurgitation     Myalgia     Pulmonary hypertension ([address]     Shingles 2018    left facial area and involved eye    Sleep apnea     Syncope and collapse        Past Surgical History:   Procedure Laterality Date    ABDOMINAL SURGERY      CATARACT EXTRACTION W/  INTRAOCULAR LENS IMPLANT Right      SECTION      COLECTOMY      COLONOSCOPY      HIP ARTHROPLASTY      3 on the left and 1 on the right    HYSTERECTOMY      INSERTABLE CARDIAC MONITOR  2018    [employer]    JOINT REPLACEMENT Right     TOTAL KNEE    JOINT REPLACEMENT Bilateral     right x2, left x3 hips    KNEE SURGERY         No Known Allergies      Current Outpatient Medications:     oxyCODONE (ROXICODONE) 5 MG immediate release tablet, Take 1 tablet by mouth every 6 hours as needed for Pain for up to 30 days. , Disp: 120 tablet, Rfl: 0    atorvastatin (LIPITOR) 40 MG tablet, , Disp: , Rfl:     SYMBICORT 160-4.5 MCG/ACT AERO, , Disp: , Rfl:     ipratropium-albuterol (DUONEB) 0.5-2.5 (3) MG/3ML SOLN nebulizer solution, Inhale 3 mLs into the lungs 4 times daily as needed, Disp: , Rfl:     memantine (NAMENDA) 5 MG tablet, , Disp: , Rfl:     magnesium oxide (MAG-OX) 400 MG tablet, Take 400 mg by mouth daily, Disp: , Rfl:     cyanocobalamin 1000 MCG tablet, Take 1,000 mcg by mouth daily, Disp: , Rfl:     diclofenac sodium (VOLTAREN) 1 % GEL, , Disp: , Rfl:     ferrous sulfate (IRON 325) 325 (65 Fe) MG tablet, Take 325 mg by mouth daily (with breakfast), Disp: , Rfl:     losartan (COZAAR) 25 MG tablet, , Disp: , Rfl:     hydrOXYzine (ATARAX) 25 MG tablet, Take 25 mg by mouth every 8 hours as needed for Anxiety, Disp: , Rfl:     escitalopram (LEXAPRO) 20 MG tablet, , Disp: , Rfl:     furosemide (LASIX) 20 MG tablet, Take 1 tablet by mouth daily as needed (for 2 lb weight gain/increased swelling/increased shortness of breath) (Patient not taking: Reported on 3/21/2022), Disp: 60 tablet, Rfl: 3    ASPERCREME LIDOCAINE EX, Apply topically, Disp: , Rfl:     VENTOLIN  (90 Base) MCG/ACT inhaler, , Disp: , Rfl:     omeprazole (PRILOSEC) 20 MG delayed release capsule, , Disp: , Rfl:     hydrocortisone 2.5 % cream, Apply topically 2 times daily. , Disp: 30 g, Rfl: 2    levothyroxine (LEVOTHROID) 50 MCG tablet, Take 1 tablet by mouth daily, Disp: 30 tablet, Rfl: 3    glucose monitoring kit (FREESTYLE) monitoring kit, 1 kit by Does not apply route daily as needed, Disp: 1 kit, Rfl: 0    glucose blood VI test strips (EXACTECH TEST) strip, 1 each by In [employer]: Not on file    Active Member of Clubs or Organizations: Not on file    Attends Club or Organization Meetings: Not on file    Marital Status: Not on file   Intimate Partner Violence:     Fear of Current or Ex-Partner: Not on file    Emotionally Abused: Not on file    Physically Abused: Not on file    Sexually Abused: Not on file   Housing Stability:     Unable to Pay for Housing in the Last Year: Not on file    Number of [place] in the Last Year: Not on file    Unstable Housing in the Last Year: Not on file       Review of Systems:  Review of Systems   Constitutional: Negative for chills and fever. Cardiovascular: Negative for chest pain. Respiratory: Negative for cough and shortness of breath. Musculoskeletal: Positive for back pain. Gastrointestinal: Negative for constipation. Neurological: Positive for tingling and weakness. Negative for headaches. Physical Exam:  /76   Pulse 70   SpO2 98%     Physical Exam  Cardiovascular:      Rate and Rhythm: Normal rate. Pulmonary:      Effort: Pulmonary effort is normal.   Musculoskeletal:         General: Normal range of motion. Comments: In w/c   Skin:     General: Skin is warm and dry. Neurological:      Mental Status: She is alert and oriented to person, place, and time.          Record/Diagnostics Review:    Last melva 11/21  and was appropriate     Assessment:  Problem List Items Addressed This Visit     Chronic use of opiate drug for therapeutic purpose - Primary (Chronic)    Relevant Medications    oxyCODONE (ROXICODONE) 5 MG immediate release tablet    Spinal stenosis of lumbar region without neurogenic claudication    Chronic pain of multiple sites    Relevant Medications    oxyCODONE (ROXICODONE) 5 MG immediate release tablet    DDD (degenerative disc disease), lumbar    Relevant Medications    oxyCODONE (ROXICODONE) 5 MG immediate release tablet    Diabetic polyneuropathy associated with type 2 diabetes mellitus (HCC)    Relevant Medications    oxyCODONE (ROXICODONE) 5 MG immediate release tablet    Lumbar degenerative disc disease    Relevant Medications    oxyCODONE (ROXICODONE) 5 MG immediate release tablet             Treatment Plan:  Patient relates current medications are helping the pain. Patient reports taking pain medications as prescribed, denies obtaining medications from different sources and denies use of illegal drugs. Patient denies side effects from medications like nausea, vomiting, constipation or drowsiness. Patient reports current activities of daily living are possible due to medications and would like to continue them. As always, we encourage daily stretching and strengthening exercises, and recommend minimizing use of pain medications unless patient cannot get through daily activities due to pain. Contract requirements met. Continue opioid therapy. Script written for oxycodone   Follow up appointment made for 4 weeks    I have reviewed the chief complaint and history of present illness (including ROS and PFSH) and vital documentation by my staff and I agree with their documentation and have added where applicable.

## 2022-07-28 ENCOUNTER — TELEPHONE (OUTPATIENT)
Dept: PAIN MANAGEMENT | Age: 87
End: 2022-07-28

## 2022-07-28 DIAGNOSIS — E11.42 DIABETIC POLYNEUROPATHY ASSOCIATED WITH TYPE 2 DIABETES MELLITUS (HCC): ICD-10-CM

## 2022-07-28 DIAGNOSIS — M51.36 LUMBAR DEGENERATIVE DISC DISEASE: ICD-10-CM

## 2022-07-28 DIAGNOSIS — R52 CHRONIC PAIN OF MULTIPLE SITES: ICD-10-CM

## 2022-07-28 DIAGNOSIS — Z79.891 CHRONIC USE OF OPIATE DRUG FOR THERAPEUTIC PURPOSE: Chronic | ICD-10-CM

## 2022-07-28 DIAGNOSIS — G89.29 CHRONIC PAIN OF MULTIPLE SITES: ICD-10-CM

## 2022-07-28 RX ORDER — OXYCODONE HYDROCHLORIDE 5 MG/1
5 TABLET ORAL EVERY 6 HOURS PRN
Qty: 120 TABLET | Refills: 0 | Status: SHIPPED | OUTPATIENT
Start: 2022-07-30 | End: 2022-09-06 | Stop reason: SDUPTHER

## 2022-09-06 ENCOUNTER — HOSPITAL ENCOUNTER (OUTPATIENT)
Dept: PAIN MANAGEMENT | Age: 87
Discharge: HOME | End: 2022-09-06
Payer: COMMERCIAL

## 2022-09-06 VITALS
SYSTOLIC BLOOD PRESSURE: 106 MMHG | OXYGEN SATURATION: 97 % | DIASTOLIC BLOOD PRESSURE: 61 MMHG | HEART RATE: 74 BPM | RESPIRATION RATE: 20 BRPM | TEMPERATURE: 97.6 F

## 2022-09-06 DIAGNOSIS — M51.36 DDD (DEGENERATIVE DISC DISEASE), LUMBAR: ICD-10-CM

## 2022-09-06 DIAGNOSIS — M48.061 SPINAL STENOSIS OF LUMBAR REGION WITHOUT NEUROGENIC CLAUDICATION: ICD-10-CM

## 2022-09-06 DIAGNOSIS — M51.36 LUMBAR DEGENERATIVE DISC DISEASE: ICD-10-CM

## 2022-09-06 DIAGNOSIS — R52 CHRONIC PAIN OF MULTIPLE SITES: ICD-10-CM

## 2022-09-06 DIAGNOSIS — E11.42 DIABETIC POLYNEUROPATHY ASSOCIATED WITH TYPE 2 DIABETES MELLITUS (HCC): ICD-10-CM

## 2022-09-06 DIAGNOSIS — Z79.899 ENCOUNTER FOR MEDICATION MANAGEMENT: ICD-10-CM

## 2022-09-06 DIAGNOSIS — M54.50 LUMBAR PAIN: ICD-10-CM

## 2022-09-06 DIAGNOSIS — Z79.891 CHRONIC USE OF OPIATE DRUG FOR THERAPEUTIC PURPOSE: Primary | Chronic | ICD-10-CM

## 2022-09-06 DIAGNOSIS — G89.29 CHRONIC PAIN OF MULTIPLE SITES: ICD-10-CM

## 2022-09-06 PROCEDURE — 99213 OFFICE O/P EST LOW 20 MIN: CPT

## 2022-09-06 PROCEDURE — 99213 OFFICE O/P EST LOW 20 MIN: CPT | Performed by: NURSE PRACTITIONER

## 2022-09-06 RX ORDER — OXYCODONE HYDROCHLORIDE 5 MG/1
5 TABLET ORAL EVERY 6 HOURS PRN
Qty: 120 TABLET | Refills: 0 | Status: SHIPPED | OUTPATIENT
Start: 2022-09-06 | End: 2022-10-04 | Stop reason: SDUPTHER

## 2022-09-06 ASSESSMENT — ENCOUNTER SYMPTOMS
BACK PAIN: 1
SHORTNESS OF BREATH: 0
CONSTIPATION: 0
COUGH: 0

## 2022-09-06 ASSESSMENT — PAIN SCALES - GENERAL: PAINLEVEL_OUTOF10: 9

## 2022-09-06 NOTE — PROGRESS NOTES
Chief Complaint   Patient presents with    Back Pain    Medication Refill         [facility]  Pt reports low back pain for many years. The pain does radiate down her legs at times with numbness to left hip thigh area. She follows with orthopedics for hip pain. She uses a cane for ambulation due to occasional weakness in legs. She has not had surgery. She has had aquatic PT in the past that has helped but not interested in starting right now. She lives at home with family and is able to care for self with the help of her family. Has a visiting nurse every week. The daughter reports history of progressive memory disturbance, dementia walking during sleep and confusion that has been progressively worsening. MD discussed with the family that opioids are likely adversely contributing to her neurological issues  recommended to decrease the opioid dose  They are agreeable to it and I will change to oxycodone 5 mg 4 times a day  Consider further dose titration to 3 times a day or twice a day in future  Today pt reports worsening pain and was dx with UTI that was causing some confusion. Explained will continue with QID dosing and can use tylenol sparingly for breakthrough pain. Back Pain  This is a chronic problem. The current episode started more than 1 year ago. The problem occurs intermittently. The problem has been gradually worsening since onset. The pain is present in the lumbar spine and gluteal. The quality of the pain is described as burning. The pain radiates to the right foot, right knee, right thigh, left knee, left thigh and left foot. The pain is at a severity of 9/10. The pain is severe. The pain is The same all the time. The symptoms are aggravated by bending, standing, twisting, position and lying down. Stiffness is present All day. Associated symptoms include numbness, tingling and weakness. Pertinent negatives include no chest pain, fever or headaches.  She has tried bed rest, home exercises, heat, ice, walking and NSAIDs for the symptoms. The treatment provided no relief. Patient denies any new neurological symptoms. No bowel or bladder incontinence, no weakness, and no falling. Pill count: Oxycodone 4 was due     Morphine equivalent: 30    Controlled Substance Monitoring:    Acute and Chronic Pain Monitoring:   RX Monitoring 2022   Attestation -   Acute Pain Prescriptions -   Periodic Controlled Substance Monitoring Possible medication side effects, risk of tolerance/dependence & alternative treatments discussed. ;No signs of potential drug abuse or diversion identified.;Obtaining appropriate analgesic effect of treatment.    Chronic Pain > 50 MEDD -   Chronic Pain > 80 MEDD -            Past Medical History:   Diagnosis Date    Anxiety     Arthritis     Bronchitis     CAD (coronary artery disease)     Carotid arterial disease (HCC)     COPD (chronic obstructive pulmonary disease) (HCC)     Diabetes mellitus ([address]     Hyperlipidemia     Hypertension     Mitral regurgitation     Myalgia     Pulmonary hypertension ([address]     Shingles 2018    left facial area and involved eye    Sleep apnea     Syncope and collapse        Past Surgical History:   Procedure Laterality Date    ABDOMEN SURGERY      CATARACT REMOVAL WITH IMPLANT Right      SECTION      COLECTOMY      COLONOSCOPY      HIP ARTHROPLASTY      3 on the left and 1 on the right    HYSTERECTOMY (CERVIX STATUS UNKNOWN)      INSERTABLE CARDIAC MONITOR  2018    [employer]tr[employer]    JOINT REPLACEMENT Right     TOTAL KNEE    JOINT REPLACEMENT Bilateral     right x2, left x3 hips    KNEE SURGERY         No Known Allergies      Current Outpatient Medications:     atorvastatin (LIPITOR) 40 MG tablet, , Disp: , Rfl:     SYMBICORT 160-4.5 MCG/ACT AERO, , Disp: , Rfl:     ipratropium-albuterol (DUONEB) 0.5-2.5 (3) MG/3ML SOLN nebulizer solution, Inhale 3 mLs into the lungs 4 times daily as needed, Disp: , Rfl:     memantine (NAMENDA) 5 MG tablet, , Disp: , Rfl:     magnesium oxide (MAG-OX) 400 MG tablet, Take 400 mg by mouth daily, Disp: , Rfl:     cyanocobalamin 1000 MCG tablet, Take 1,000 mcg by mouth daily, Disp: , Rfl:     diclofenac sodium (VOLTAREN) 1 % GEL, , Disp: , Rfl:     ferrous sulfate (IRON 325) 325 (65 Fe) MG tablet, Take 325 mg by mouth daily (with breakfast), Disp: , Rfl:     losartan (COZAAR) 25 MG tablet, , Disp: , Rfl:     hydrOXYzine (ATARAX) 25 MG tablet, Take 25 mg by mouth every 8 hours as needed for Anxiety, Disp: , Rfl:     escitalopram (LEXAPRO) 20 MG tablet, , Disp: , Rfl:     furosemide (LASIX) 20 MG tablet, Take 1 tablet by mouth daily as needed (for 2 lb weight gain/increased swelling/increased shortness of breath) (Patient not taking: Reported on 3/21/2022), Disp: 60 tablet, Rfl: 3    ASPERCREME LIDOCAINE EX, Apply topically, Disp: , Rfl:     VENTOLIN  (90 Base) MCG/ACT inhaler, , Disp: , Rfl:     omeprazole (PRILOSEC) 20 MG delayed release capsule, , Disp: , Rfl:     hydrocortisone 2.5 % cream, Apply topically 2 times daily. , Disp: 30 g, Rfl: 2    levothyroxine (LEVOTHROID) 50 MCG tablet, Take 1 tablet by mouth daily, Disp: 30 tablet, Rfl: 3    glucose monitoring kit (FREESTYLE) monitoring kit, 1 kit by Does not apply route daily as needed, Disp: 1 kit, Rfl: 0    glucose blood VI test strips (EXACTECH TEST) strip, 1 each by In Vitro route daily Type 2 diabetes qd testing, Disp: 100 each, Rfl: 3    Accu-Chek Multiclix Lancets MISC, Test qd;type 2 diabetes, Disp: 100 each, Rfl: 3    Scooter MISC, by Does not apply route Use daily dx arthritis obesity pulmonary htn,copd, Disp: 1 each, Rfl: 0    metFORMIN (GLUCOPHAGE) 500 MG tablet, TAKE ONE TABLET BY MOUTH TWICE A DAY, Disp: 180 tablet, Rfl: 2    aspirin 325 MG tablet, Take 325 mg by mouth daily. , Disp: , Rfl:     Family History   Problem Relation Age of Onset    Cancer Mother     Hypertension Maternal Aunt     Cancer Daughter        Social History Socioeconomic History    Marital status: Single     Spouse name: Not on file    Number of children: 3    Years of education: Not on file    Highest education level: Not on file   Occupational History    Occupation: RETIRED   Tobacco Use    Smoking status: Former     Years: 5.00     Types: Cigarettes     Quit date: 1985     Years since quittin.8    Smokeless tobacco: Never   Vaping Use    Vaping Use: Former   Substance and Sexual Activity    Alcohol use: No     Alcohol/week: 0.0 standard drinks    Drug use: No    Sexual activity: Not on file   Other Topics Concern    Not on file   Social History Narrative    Not on file     Social Determinants of Health     Financial Resource Strain: Not on file   Food Insecurity: Not on file   Transportation Needs: Not on file   Physical Activity: Not on file   Stress: Not on file   Social Connections: Not on file   Intimate Partner Violence: Not on file   Housing Stability: Not on file       Review of Systems:  Review of Systems   Constitutional: Negative for chills and fever. Cardiovascular:  Negative for chest pain and palpitations. Respiratory:  Negative for cough and shortness of breath. Musculoskeletal:  Positive for back pain, joint pain and myalgias. Gastrointestinal:  Negative for constipation. Neurological:  Positive for numbness, tingling and weakness. Negative for disturbances in coordination, headaches and loss of balance. Physical Exam:  /61   Pulse 74   Temp 97.6 °F (36.4 °C)   Resp 20   SpO2 97%     Physical Exam  HENT:      Head: Normocephalic. Pulmonary:      Effort: Pulmonary effort is normal.   Musculoskeletal:         General: Normal range of motion. Right shoulder: Tenderness present. Left shoulder: Tenderness present. Cervical back: Normal range of motion. Lumbar back: Tenderness present. Skin:     General: Skin is warm and dry.    Neurological:      Mental Status: She is alert and oriented to person, place, and time. Record/Diagnostics Review:    Last melva 3/2021 and was appropriate     Assessment:  Problem List Items Addressed This Visit       Chronic use of opiate drug for therapeutic purpose - Primary (Chronic)    Relevant Medications    oxyCODONE (ROXICODONE) 5 MG immediate release tablet    Spinal stenosis of lumbar region without neurogenic claudication    Chronic pain of multiple sites    Relevant Medications    oxyCODONE (ROXICODONE) 5 MG immediate release tablet    Encounter for medication management    DDD (degenerative disc disease), lumbar    Relevant Medications    oxyCODONE (ROXICODONE) 5 MG immediate release tablet    Lumbar pain    Relevant Medications    oxyCODONE (ROXICODONE) 5 MG immediate release tablet    Diabetic polyneuropathy associated with type 2 diabetes mellitus (HCC)    Relevant Medications    oxyCODONE (ROXICODONE) 5 MG immediate release tablet    Lumbar degenerative disc disease    Relevant Medications    oxyCODONE (ROXICODONE) 5 MG immediate release tablet          Treatment Plan:  Patient relates current medications are helping the pain. Patient reports taking pain medications as prescribed, denies obtaining medications from different sources and denies use of illegal drugs. Patient denies side effects from medications like nausea, vomiting, constipation or drowsiness. Patient reports current activities of daily living are possible due to medications and would like to continue them. As always, we encourage daily stretching and strengthening exercises, and recommend minimizing use of pain medications unless patient cannot get through daily activities due to pain. Contract requirements met. Continue opioid therapy.  Script written for oxycodone  Consider reducing dose to TID per plan of care   Follow up appointment made for 4 weeks    I have reviewed the chief complaint and history of present illness (including ROS and [address]) and vital documentation by my staff and I agree with their documentation and have added where applicable.

## 2022-10-04 ENCOUNTER — HOSPITAL ENCOUNTER (OUTPATIENT)
Dept: PAIN MANAGEMENT | Age: 87
Discharge: HOME | End: 2022-10-04
Payer: COMMERCIAL

## 2022-10-04 VITALS
HEIGHT: 67 IN | SYSTOLIC BLOOD PRESSURE: 138 MMHG | TEMPERATURE: 97.3 F | OXYGEN SATURATION: 94 % | DIASTOLIC BLOOD PRESSURE: 66 MMHG | BODY MASS INDEX: 35.16 KG/M2 | WEIGHT: 224 LBS | HEART RATE: 76 BPM | RESPIRATION RATE: 20 BRPM

## 2022-10-04 DIAGNOSIS — M51.36 DDD (DEGENERATIVE DISC DISEASE), LUMBAR: ICD-10-CM

## 2022-10-04 DIAGNOSIS — M48.061 SPINAL STENOSIS OF LUMBAR REGION WITHOUT NEUROGENIC CLAUDICATION: ICD-10-CM

## 2022-10-04 DIAGNOSIS — Z79.891 CHRONIC USE OF OPIATE DRUG FOR THERAPEUTIC PURPOSE: Primary | ICD-10-CM

## 2022-10-04 DIAGNOSIS — M25.551 PAIN IN JOINT, PELVIC REGION AND THIGH, RIGHT: ICD-10-CM

## 2022-10-04 PROCEDURE — 99213 OFFICE O/P EST LOW 20 MIN: CPT | Performed by: NURSE PRACTITIONER

## 2022-10-04 PROCEDURE — G0481 DRUG TEST DEF 8-14 CLASSES: HCPCS

## 2022-10-04 PROCEDURE — 99213 OFFICE O/P EST LOW 20 MIN: CPT

## 2022-10-04 PROCEDURE — 80307 DRUG TEST PRSMV CHEM ANLYZR: CPT

## 2022-10-04 RX ORDER — OXYCODONE HYDROCHLORIDE 5 MG/1
5 TABLET ORAL EVERY 6 HOURS PRN
Qty: 120 TABLET | Refills: 0 | Status: SHIPPED | OUTPATIENT
Start: 2022-10-06 | End: 2022-11-05

## 2022-10-04 ASSESSMENT — ENCOUNTER SYMPTOMS
BOWEL INCONTINENCE: 0
COUGH: 0
SHORTNESS OF BREATH: 0
CONSTIPATION: 0
BACK PAIN: 1

## 2022-10-04 ASSESSMENT — PAIN SCALES - GENERAL: PAINLEVEL_OUTOF10: 9

## 2022-10-04 NOTE — PROGRESS NOTES
Chief Complaint   Patient presents with    Back Pain     Med refill         [facility] [place]     Pt reports low back pain for many years. The pain does radiate down her legs at times with numbness to left hip thigh area. She follows with orthopedics for hip pain. She uses a cane for ambulation due to occasional weakness in legs. She has not had surgery. She has had aquatic PT in the past that has helped but not interested in starting right now. She lives at home with family and is able to care for self with the help of her family. Has a visiting nurse every week. The daughter reports history of progressive memory disturbance, dementia walking during sleep and confusion that has been progressively worsening. MD discussed with the family that opioids are likely adversely contributing to her neurological issues  recommended to decrease the opioid dose  They are agreeable to it and I will change to oxycodone 5 mg 4 times a day  Consider further dose titration to 3 times a day or twice a day in future    HPI:     Back Pain  This is a chronic problem. The current episode started more than 1 year ago. The problem occurs intermittently. The problem is unchanged. The pain is present in the lumbar spine. The quality of the pain is described as aching, burning, cramping and stabbing. The pain radiates to the left foot, left thigh, left knee, right foot, right knee and right thigh. The pain is at a severity of 9/10. The symptoms are aggravated by bending, sitting and standing. Associated symptoms include numbness, tingling and weakness. Pertinent negatives include no bladder incontinence, bowel incontinence, chest pain, fever or headaches. She has tried ice and heat for the symptoms. Patient denies any new neurological symptoms. No bowel or bladder incontinence, no weakness, and no falling.     Pill count: appropriate / Oxycodone - 13 10/6   Morphine equivalent 30    Controlled Substance Monitoring:    Acute and Chronic Pain Monitoring:   RX Monitoring 10/4/2022   Attestation -   Acute Pain Prescriptions -   Periodic Controlled Substance Monitoring Possible medication side effects, risk of tolerance/dependence & alternative treatments discussed. ;No signs of potential drug abuse or diversion identified. ;Assessed functional status. ;Obtaining appropriate analgesic effect of treatment. ;Random urine drug screen sent today. Chronic Pain > 50 MEDD -   Chronic Pain > 80 MEDD -          Periodic Controlled Substance Monitoring: Possible medication side effects, risk of tolerance/dependence & alternative treatments discussed., No signs of potential drug abuse or diversion identified. , Assessed functional status., Obtaining appropriate analgesic effect of treatment. , Random urine drug screen sent today. Chula Eaton, APRN - CNP)      Past Medical History:   Diagnosis Date    Anxiety     Arthritis     Bronchitis     CAD (coronary artery disease)     Carotid arterial disease (HCC)     COPD (chronic obstructive pulmonary disease) (HCC)     Diabetes mellitus ([address]     Hyperlipidemia     Hypertension     Mitral regurgitation     Myalgia     Pulmonary hypertension [address]     Shingles 2018    left facial area and involved eye    Sleep apnea     Syncope and collapse        Past Surgical History:   Procedure Laterality Date    ABDOMEN SURGERY      CATARACT REMOVAL WITH IMPLANT Right      SECTION      COLECTOMY      COLONOSCOPY      HIP ARTHROPLASTY      3 on the left and 1 on the right    HYSTERECTOMY (CERVIX STATUS UNKNOWN)      INSERTABLE CARDIAC MONITOR  2018    [employer]    JOINT REPLACEMENT Right     TOTAL KNEE    JOINT REPLACEMENT Bilateral     right x2, left x3 hips    KNEE SURGERY         No Known Allergies      Current Outpatient Medications:     oxyCODONE (ROXICODONE) 5 MG immediate release tablet, Take 1 tablet by mouth every 6 hours as needed for Pain for up to 30 days. , Disp: 120 tablet, Rfl: 0    atorvastatin (LIPITOR) 40 MG tablet, , Disp: , Rfl:     SYMBICORT 160-4.5 MCG/ACT AERO, , Disp: , Rfl:     ipratropium-albuterol (DUONEB) 0.5-2.5 (3) MG/3ML SOLN nebulizer solution, Inhale 3 mLs into the lungs 4 times daily as needed, Disp: , Rfl:     memantine (NAMENDA) 5 MG tablet, , Disp: , Rfl:     magnesium oxide (MAG-OX) 400 MG tablet, Take 400 mg by mouth daily, Disp: , Rfl:     cyanocobalamin 1000 MCG tablet, Take 1,000 mcg by mouth daily, Disp: , Rfl:     diclofenac sodium (VOLTAREN) 1 % GEL, , Disp: , Rfl:     ferrous sulfate (IRON 325) 325 (65 Fe) MG tablet, Take 325 mg by mouth daily (with breakfast), Disp: , Rfl:     losartan (COZAAR) 25 MG tablet, , Disp: , Rfl:     hydrOXYzine (ATARAX) 25 MG tablet, Take 25 mg by mouth every 8 hours as needed for Anxiety, Disp: , Rfl:     escitalopram (LEXAPRO) 20 MG tablet, , Disp: , Rfl:     furosemide (LASIX) 20 MG tablet, Take 1 tablet by mouth daily as needed (for 2 lb weight gain/increased swelling/increased shortness of breath) (Patient not taking: No sig reported), Disp: 60 tablet, Rfl: 3    ASPERCREME LIDOCAINE EX, Apply topically, Disp: , Rfl:     VENTOLIN  (90 Base) MCG/ACT inhaler, , Disp: , Rfl:     omeprazole (PRILOSEC) 20 MG delayed release capsule, , Disp: , Rfl:     hydrocortisone 2.5 % cream, Apply topically 2 times daily. , Disp: 30 g, Rfl: 2    levothyroxine (LEVOTHROID) 50 MCG tablet, Take 1 tablet by mouth daily, Disp: 30 tablet, Rfl: 3    glucose monitoring kit (FREESTYLE) monitoring kit, 1 kit by Does not apply route daily as needed, Disp: 1 kit, Rfl: 0    glucose blood VI test strips (EXACTECH TEST) strip, 1 each by In Vitro route daily Type 2 diabetes qd testing, Disp: 100 each, Rfl: 3    Accu-Chek Multiclix Lancets MISC, Test qd;type 2 diabetes, Disp: 100 each, Rfl: 3    Scooter MISC, by Does not apply route Use daily dx arthritis obesity pulmonary htn,copd, Disp: 1 each, Rfl: 0    metFORMIN (GLUCOPHAGE) 500 MG tablet, TAKE ONE TABLET BY MOUTH TWICE A DAY, Disp: 180 tablet, Rfl: 2    aspirin 325 MG tablet, Take 325 mg by mouth daily. , Disp: , Rfl:     Family History   Problem Relation Age of Onset    Cancer Mother     Hypertension Maternal Aunt     Cancer Daughter        Social History     Socioeconomic History    Marital status: Single     Spouse name: Not on file    Number of children: 3    Years of education: Not on file    Highest education level: Not on file   Occupational History    Occupation: RETIRED   Tobacco Use    Smoking status: Former     Years: 5.00     Types: Cigarettes     Quit date: 1985     Years since quittin.8    Smokeless tobacco: Never   Vaping Use    Vaping Use: Former   Substance and Sexual Activity    Alcohol use: No     Alcohol/week: 0.0 standard drinks    Drug use: No    Sexual activity: Not on file   Other Topics Concern    Not on file   Social History Narrative    Not on file     Social Determinants of Health     Financial Resource Strain: Not on file   Food Insecurity: Not on file   Transportation Needs: Not on file   Physical Activity: Not on file   Stress: Not on file   Social Connections: Not on file   Intimate Partner Violence: Not on file   Housing Stability: Not on file       Review of Systems:  Review of Systems   Constitutional: Negative for chills and fever. Cardiovascular:  Negative for chest pain. Respiratory:  Negative for cough and shortness of breath. Musculoskeletal:  Positive for back pain. Gastrointestinal:  Negative for bowel incontinence and constipation. Genitourinary:  Negative for bladder incontinence. Neurological:  Positive for numbness, tingling and weakness. Negative for headaches. Physical Exam:  /66   Pulse 76   Temp 97.3 °F (36.3 °C)   Resp 20   Ht 5' 7\" (1.702 m)   Wt 224 lb (101.6 kg)   SpO2 94%   BMI 35.08 kg/m²     Physical Exam  Cardiovascular:      Rate and Rhythm: Normal rate.    Pulmonary:      Effort: Pulmonary effort is normal.   Musculoskeletal:      Lumbar back: Decreased range of motion. Comments: In w/c BLE weakness    Skin:     General: Skin is warm and dry. Neurological:      Mental Status: She is alert and oriented to person, place, and time. Record/Diagnostics Review:    Last purvi 3/2021 and was appropriate     Assessment:  Problem List Items Addressed This Visit       Chronic use of opiate drug for therapeutic purpose - Primary (Chronic)    Spinal stenosis of lumbar region without neurogenic claudication    DDD (degenerative disc disease), lumbar          Treatment Plan:  Patient relates current medications are helping the pain. Patient reports taking pain medications as prescribed, denies obtaining medications from different sources and denies use of illegal drugs. Patient denies side effects from medications like nausea, vomiting, constipation or drowsiness. Patient reports current activities of daily living are possible due to medications and would like to continue them. As always, we encourage daily stretching and strengthening exercises, and recommend minimizing use of pain medications unless patient cannot get through daily activities due to pain. Contract requirements met. Continue opioid therapy. Script written for oxycodone   PURVI obtained today for monitoring purposes. Last dose of medication was  today  Follow up appointment made for 4 weeks    I have reviewed the chief complaint and history of present illness (including ROS and PFSH) and vital documentation by my staff and I agree with their documentation and have added where applicable.

## 2022-10-13 LAB
6-ACETYLMORPHINE, UR: NOT DETECTED
7-AMINOCLONAZEPAM, URINE: NOT DETECTED
ALPHA-OH-ALPRAZ, URINE: NOT DETECTED
ALPHA-OH-MIDAZOLAM, URINE: NOT DETECTED
ALPRAZOLAM, URINE: NOT DETECTED
AMPHETAMINES, URINE: NOT DETECTED
BARBITURATES, URINE: NOT DETECTED
BENZOYLECGONINE, UR: NOT DETECTED
BUPRENORPHINE URINE: NOT DETECTED
CARISOPRODOL, UR: NOT DETECTED
CLONAZEPAM, URINE: NOT DETECTED
CODEINE, URINE: NOT DETECTED
CREATININE URINE: 340.9 MG/DL (ref 20–400)
DIAZEPAM, URINE: NOT DETECTED
DRUGS EXPECTED, UR: NORMAL
EER HI RES INTERP UR: NORMAL
ETHYL GLUCURONIDE UR: NOT DETECTED
FENTANYL URINE: NOT DETECTED
GABAPENTIN: NOT DETECTED
HYDROCODONE, URINE: NOT DETECTED
HYDROMORPHONE, URINE: NOT DETECTED
LORAZEPAM, URINE: NOT DETECTED
MARIJUANA METAB, UR: NOT DETECTED
MDA, UR: NOT DETECTED
MDEA, EVE, UR: NOT DETECTED
MDMA URINE: NOT DETECTED
MEPERIDINE METAB, UR: NOT DETECTED
METHADONE, URINE: NOT DETECTED
METHAMPHETAMINE, URINE: NOT DETECTED
METHYLPHENIDATE: NOT DETECTED
MIDAZOLAM, URINE: NOT DETECTED
MORPHINE URINE: NOT DETECTED
NALOXONE URINE: NOT DETECTED
NORBUPRENORPHINE, URINE: NOT DETECTED
NORDIAZEPAM, URINE: NOT DETECTED
NORFENTANYL, URINE: NOT DETECTED
NORHYDROCODONE, URINE: NOT DETECTED
NOROXYCODONE, URINE: PRESENT
NOROXYMORPHONE, URINE: PRESENT
OXAZEPAM, URINE: NOT DETECTED
OXYCODONE URINE: PRESENT
OXYMORPHONE, URINE: PRESENT
PAIN MANAGEMENT DRUG PANEL INTERP, URINE: NORMAL
PAIN MGT DRUG PANEL, HI RES, UR: NORMAL
PCP,URINE: NOT DETECTED
PHENTERMINE, UR: NOT DETECTED
PREGABALIN: NOT DETECTED
TAPENTADOL, URINE: NOT DETECTED
TAPENTADOL-O-SULFATE, URINE: NOT DETECTED
TEMAZEPAM, URINE: NOT DETECTED
TRAMADOL, URINE: NOT DETECTED
ZOLPIDEM METABOLITE (ZCA), URINE: NOT DETECTED
ZOLPIDEM, URINE: NOT DETECTED

## 2022-11-10 ENCOUNTER — HOSPITAL ENCOUNTER (OUTPATIENT)
Dept: PAIN MANAGEMENT | Age: 87
Discharge: HOME | End: 2022-11-10
Payer: COMMERCIAL

## 2022-11-10 VITALS — DIASTOLIC BLOOD PRESSURE: 71 MMHG | HEART RATE: 68 BPM | OXYGEN SATURATION: 96 % | SYSTOLIC BLOOD PRESSURE: 140 MMHG

## 2022-11-10 DIAGNOSIS — M25.551 PAIN IN JOINT, PELVIC REGION AND THIGH, RIGHT: ICD-10-CM

## 2022-11-10 DIAGNOSIS — Z79.891 CHRONIC USE OF OPIATE DRUG FOR THERAPEUTIC PURPOSE: Primary | ICD-10-CM

## 2022-11-10 DIAGNOSIS — M48.061 SPINAL STENOSIS OF LUMBAR REGION WITHOUT NEUROGENIC CLAUDICATION: ICD-10-CM

## 2022-11-10 DIAGNOSIS — M51.36 DDD (DEGENERATIVE DISC DISEASE), LUMBAR: ICD-10-CM

## 2022-11-10 PROCEDURE — 99213 OFFICE O/P EST LOW 20 MIN: CPT

## 2022-11-10 PROCEDURE — 99213 OFFICE O/P EST LOW 20 MIN: CPT | Performed by: NURSE PRACTITIONER

## 2022-11-10 RX ORDER — OXYCODONE HYDROCHLORIDE 5 MG/1
5 TABLET ORAL EVERY 6 HOURS PRN
Qty: 120 TABLET | Refills: 0 | Status: SHIPPED | OUTPATIENT
Start: 2022-11-10 | End: 2022-12-10

## 2022-11-10 ASSESSMENT — ENCOUNTER SYMPTOMS
BOWEL INCONTINENCE: 0
CONSTIPATION: 0
COUGH: 0
BACK PAIN: 1
SHORTNESS OF BREATH: 0

## 2022-11-10 NOTE — PROGRESS NOTES
Chief Complaint: back pain    [facility] Pt reports low back pain for many years. The pain does radiate down her legs at times with numbness to left hip thigh area. She follows with orthopedics for hip pain. She uses a cane for ambulation due to occasional weakness in legs. She has not had surgery. She has had aquatic PT in the past that has helped but not interested in starting right now. She lives at home with family and is able to care for self with the help of her family. Has a visiting nurse every week. The daughter reports history of progressive memory disturbance, dementia walking during sleep and confusion that has been progressively worsening. MD discussed with the family that opioids are likely adversely contributing to her neurological issues andrecommended to decrease the opioid dose. They are agreeable to it and dose changed to oxycodone 5 mg 4 times a day  Consider further dose titration to 3 times a day or twice a day in future       Back Pain  This is a chronic problem. The current episode started more than 1 year ago. The problem occurs daily. The problem has been waxing and waning since onset. The pain is present in the lumbar spine. The quality of the pain is described as shooting, aching and burning. The pain radiates to the right thigh and right knee. The pain is at a severity of 3/10. The pain is mild. The pain is The same all the time. The symptoms are aggravated by position and sitting. Stiffness is present In the morning and at night. Associated symptoms include bladder incontinence. Pertinent negatives include no bowel incontinence, chest pain, fever, headaches, numbness, tingling or weakness. Treatments tried: Pain Medication. The treatment provided mild relief. Patient denies any new neurological symptoms. No bowel or bladder incontinence, no weakness, and no falling.     Pill count: appropriate Oxycodone #6 was due 11/5    Morphine equivalent: 30    Controlled Substance Monitoring:    Acute and Chronic Pain Monitoring:   RX Monitoring 11/10/2022   Attestation -   Acute Pain Prescriptions -   Periodic Controlled Substance Monitoring Possible medication side effects, risk of tolerance/dependence & alternative treatments discussed. ;No signs of potential drug abuse or diversion identified.;Obtaining appropriate analgesic effect of treatment.    Chronic Pain > 50 MEDD -   Chronic Pain > 80 MEDD -            Past Medical History:   Diagnosis Date    Anxiety     Arthritis     Bronchitis     CAD (coronary artery disease)     Carotid arterial disease (HCC)     COPD (chronic obstructive pulmonary disease) (HCC)     Diabetes mellitus ([facility] [address]     Hyperlipidemia     Hypertension     Mitral regurgitation     Myalgia     Pulmonary hypertension ([facility] [address]     Shingles 2018    left facial area and involved eye    Sleep apnea     Syncope and collapse        Past Surgical History:   Procedure Laterality Date    ABDOMEN SURGERY      CATARACT REMOVAL WITH IMPLANT Right      SECTION      COLECTOMY      COLONOSCOPY      HIP ARTHROPLASTY      3 on the left and 1 on the right    HYSTERECTOMY (CERVIX STATUS UNKNOWN)      INSERTABLE CARDIAC MONITOR  2018    [employer]    JOINT REPLACEMENT Right     TOTAL KNEE    JOINT REPLACEMENT Bilateral     right x2, left x3 hips    KNEE SURGERY         No Known Allergies      Current Outpatient Medications:     atorvastatin (LIPITOR) 40 MG tablet, , Disp: , Rfl:     SYMBICORT 160-4.5 MCG/ACT AERO, , Disp: , Rfl:     ipratropium-albuterol (DUONEB) 0.5-2.5 (3) MG/3ML SOLN nebulizer solution, Inhale 3 mLs into the lungs 4 times daily as needed, Disp: , Rfl:     memantine (NAMENDA) 5 MG tablet, , Disp: , Rfl:     magnesium oxide (MAG-OX) 400 MG tablet, Take 400 mg by mouth daily, Disp: , Rfl:     cyanocobalamin 1000 MCG tablet, Take 1,000 mcg by mouth daily, Disp: , Rfl:     diclofenac sodium (VOLTAREN) 1 % GEL, , Disp: , Rfl:     ferrous sulfate (IRON 325) 325 (65 Fe) MG tablet, Take 325 mg by mouth daily (with breakfast), Disp: , Rfl:     losartan (COZAAR) 25 MG tablet, , Disp: , Rfl:     hydrOXYzine (ATARAX) 25 MG tablet, Take 25 mg by mouth every 8 hours as needed for Anxiety, Disp: , Rfl:     escitalopram (LEXAPRO) 20 MG tablet, , Disp: , Rfl:     furosemide (LASIX) 20 MG tablet, Take 1 tablet by mouth daily as needed (for 2 lb weight gain/increased swelling/increased shortness of breath) (Patient not taking: No sig reported), Disp: 60 tablet, Rfl: 3    ASPERCREME LIDOCAINE EX, Apply topically, Disp: , Rfl:     VENTOLIN  (90 Base) MCG/ACT inhaler, , Disp: , Rfl:     omeprazole (PRILOSEC) 20 MG delayed release capsule, , Disp: , Rfl:     hydrocortisone 2.5 % cream, Apply topically 2 times daily. , Disp: 30 g, Rfl: 2    levothyroxine (LEVOTHROID) 50 MCG tablet, Take 1 tablet by mouth daily, Disp: 30 tablet, Rfl: 3    glucose monitoring kit (FREESTYLE) monitoring kit, 1 kit by Does not apply route daily as needed, Disp: 1 kit, Rfl: 0    glucose blood VI test strips (EXACTECH TEST) strip, 1 each by In Vitro route daily Type 2 diabetes qd testing, Disp: 100 each, Rfl: 3    Accu-Chek Multiclix Lancets MISC, Test qd;type 2 diabetes, Disp: 100 each, Rfl: 3    Scooter MISC, by Does not apply route Use daily dx arthritis obesity pulmonary htn,copd, Disp: 1 each, Rfl: 0    metFORMIN (GLUCOPHAGE) 500 MG tablet, TAKE ONE TABLET BY MOUTH TWICE A DAY, Disp: 180 tablet, Rfl: 2    aspirin 325 MG tablet, Take 325 mg by mouth daily. , Disp: , Rfl:     Family History   Problem Relation Age of Onset    Cancer Mother     Hypertension Maternal Aunt     Cancer Daughter        Social History     Socioeconomic History    Marital status: Single     Spouse name: Not on file    Number of children: 3    Years of education: Not on file    Highest education level: Not on file   Occupational History    Occupation: RETIRED   Tobacco Use    Smoking status: Former     Years: 5.00     Types: Cigarettes Quit date: 1985     Years since quittin.9    Smokeless tobacco: Never   Vaping Use    Vaping Use: Former   Substance and Sexual Activity    Alcohol use: No     Alcohol/week: 0.0 standard drinks    Drug use: No    Sexual activity: Not on file   Other Topics Concern    Not on file   Social History Narrative    Not on file     Social Determinants of Health     Financial Resource Strain: Not on file   Food Insecurity: Not on file   Transportation Needs: Not on file   Physical Activity: Not on file   Stress: Not on file   Social Connections: Not on file   Intimate Partner Violence: Not on file   Housing Stability: Not on file       Review of Systems:  Review of Systems   Constitutional: Negative for chills and fever. Cardiovascular:  Negative for chest pain and palpitations. Respiratory:  Negative for cough and shortness of breath. Musculoskeletal:  Positive for back pain. Gastrointestinal:  Negative for bowel incontinence and constipation. Genitourinary:  Positive for bladder incontinence. Neurological:  Negative for disturbances in coordination, headaches, loss of balance, numbness, tingling and weakness. Physical Exam:  BP (!) 140/71   Pulse 68   SpO2 96%     Physical Exam  HENT:      Head: Normocephalic. Pulmonary:      Effort: Pulmonary effort is normal.   Musculoskeletal:         General: Normal range of motion. Cervical back: Normal range of motion. Lumbar back: Tenderness present. Skin:     General: Skin is warm and dry. Neurological:      Mental Status: She is alert and oriented to person, place, and time.        Record/Diagnostics Review:    Last melva 10/2022 and was appropriate     Assessment:  Problem List Items Addressed This Visit       Chronic use of opiate drug for therapeutic purpose - Primary (Chronic)    Relevant Medications    oxyCODONE (ROXICODONE) 5 MG immediate release tablet    Pain in joint, pelvic region and thigh, right    Relevant Medications oxyCODONE (ROXICODONE) 5 MG immediate release tablet    Spinal stenosis of lumbar region without neurogenic claudication    Relevant Medications    oxyCODONE (ROXICODONE) 5 MG immediate release tablet    DDD (degenerative disc disease), lumbar    Relevant Medications    oxyCODONE (ROXICODONE) 5 MG immediate release tablet          Treatment Plan:  Patient relates current medications are helping the pain. Patient reports taking pain medications as prescribed, denies obtaining medications from different sources and denies use of illegal drugs. Patient denies side effects from medications like nausea, vomiting, constipation or drowsiness. Patient reports current activities of daily living are possible due to medications and would like to continue them. As always, we encourage daily stretching and strengthening exercises, and recommend minimizing use of pain medications unless patient cannot get through daily activities due to pain. Contract requirements met. Continue opioid therapy. Script written for oxycodone  Follow up appointment made for 4 weeks    I have reviewed the chief complaint and history of present illness (including ROS and PFSH) and vital documentation by my staff and I agree with their documentation and have added where applicable.

## 2023-01-10 ENCOUNTER — HOSPITAL ENCOUNTER (OUTPATIENT)
Dept: PAIN MANAGEMENT | Age: 88
Discharge: HOME | End: 2023-01-10
Payer: COMMERCIAL

## 2023-01-10 VITALS — DIASTOLIC BLOOD PRESSURE: 64 MMHG | HEART RATE: 75 BPM | SYSTOLIC BLOOD PRESSURE: 137 MMHG | OXYGEN SATURATION: 94 %

## 2023-01-10 DIAGNOSIS — M51.36 DDD (DEGENERATIVE DISC DISEASE), LUMBAR: ICD-10-CM

## 2023-01-10 DIAGNOSIS — M48.061 SPINAL STENOSIS OF LUMBAR REGION WITHOUT NEUROGENIC CLAUDICATION: ICD-10-CM

## 2023-01-10 DIAGNOSIS — M25.551 PAIN IN JOINT, PELVIC REGION AND THIGH, RIGHT: ICD-10-CM

## 2023-01-10 DIAGNOSIS — Z79.891 CHRONIC USE OF OPIATE DRUG FOR THERAPEUTIC PURPOSE: Primary | ICD-10-CM

## 2023-01-10 PROCEDURE — 99213 OFFICE O/P EST LOW 20 MIN: CPT

## 2023-01-10 RX ORDER — OXYCODONE HYDROCHLORIDE 5 MG/1
5 TABLET ORAL EVERY 6 HOURS PRN
Qty: 120 TABLET | Refills: 0 | Status: SHIPPED | OUTPATIENT
Start: 2023-01-10 | End: 2023-02-09

## 2023-01-10 ASSESSMENT — ENCOUNTER SYMPTOMS
BACK PAIN: 1
BOWEL INCONTINENCE: 0

## 2023-01-10 NOTE — PROGRESS NOTES
Chief Complaint:    [facility]     Pt reports low back pain for many years. The pain does radiate down her legs at times with numbness to left hip thigh area. She follows with orthopedics for hip pain. She uses a cane for ambulation due to occasional weakness in legs. She has not had surgery. She has had aquatic PT in the past that has helped but not interested in starting right now. She lives at home with family and is able to care for self with the help of her family. Has a visiting nurse every week. The daughter reports history of progressive memory disturbance, dementia walking during sleep and confusion that has been progressively worsening. MD discussed with the family that opioids are likely adversely contributing to her neurological issues andrecommended to decrease the opioid dose. They are agreeable to it and dose changed to oxycodone 5 mg 4 times a day  Consider further dose titration to 3 times a day or twice a day in future    Recent fall with left hip fx ORIF in Nov.and rehab stay. Now at home and waiting for inhome PT to be set up     Back Pain  This is a chronic problem. The current episode started more than 1 year ago. The problem occurs intermittently. The problem has been gradually worsening since onset. The pain is present in the lumbar spine. The quality of the pain is described as aching (\"squeezing\"). The pain radiates to the right thigh, right knee, left knee and left thigh. The pain is at a severity of 10/10 (10++). The pain is moderate. The pain is The same all the time. The symptoms are aggravated by position, standing, twisting and bending. Stiffness is present In the morning. Associated symptoms include bladder incontinence and numbness. Pertinent negatives include no bowel incontinence, chest pain, fever, headaches, tingling or weakness. She has tried NSAIDs and heat for the symptoms. The treatment provided mild relief.         Chief Complaint   Patient presents with    Back Pain Medication Refill        Patient denies any new neurological symptoms. No bowel or bladder incontinence, no weakness, and no falling. Pill count: appropriate \"forgot bottle\" last filled in Nov    Morphine equivalent: 30    Controlled Substance Monitoring:    Acute and Chronic Pain Monitoring:   RX Monitoring 1/10/2023   Attestation -   Acute Pain Prescriptions -   Periodic Controlled Substance Monitoring Possible medication side effects, risk of tolerance/dependence & alternative treatments discussed. ;No signs of potential drug abuse or diversion identified. ;Assessed functional status. ;Obtaining appropriate analgesic effect of treatment. Chronic Pain > 50 MEDD -   Chronic Pain > 80 MEDD -          Periodic Controlled Substance Monitoring: Possible medication side effects, risk of tolerance/dependence & alternative treatments discussed., No signs of potential drug abuse or diversion identified. , Assessed functional status., Obtaining appropriate analgesic effect of treatment. Aubrie Delaney, APRN - CNP)      Past Medical History:   Diagnosis Date    Anxiety     Arthritis     Bronchitis     CAD (coronary artery disease)     Carotid arterial disease (HCC)     COPD (chronic obstructive pulmonary disease) ([address]     Diabetes mellitus ([address]     Hyperlipidemia     Hypertension     Mitral regurgitation     Myalgia     Pulmonary hypertension ([address]     Shingles 2018    left facial area and involved eye    Sleep apnea     Syncope and collapse     UTI (urinary tract infection) 10/20/2022    Hospitalized.   d/c 10/24  [facility]       Past Surgical History:   Procedure Laterality Date    ABDOMEN SURGERY      CATARACT REMOVAL WITH IMPLANT Right -     SECTION      COLECTOMY      COLONOSCOPY      HIP ARTHROPLASTY      3 on the left and 1 on the right    HYSTERECTOMY (CERVIX STATUS UNKNOWN)      INSERTABLE CARDIAC MONITOR  2018    [employer]    JOINT REPLACEMENT Right     TOTAL KNEE    JOINT REPLACEMENT Bilateral     right x2, left x3 hips    KNEE SURGERY      REVISION TOTAL HIP ARTHROPLASTY Left 11/29/2022    [facility]       No Known Allergies      Current Outpatient Medications:     oxyCODONE (ROXICODONE) 5 MG immediate release tablet, Take 1 tablet by mouth every 6 hours as needed for Pain for up to 30 days. , Disp: 120 tablet, Rfl: 0    atorvastatin (LIPITOR) 40 MG tablet, , Disp: , Rfl:     SYMBICORT 160-4.5 MCG/ACT AERO, , Disp: , Rfl:     ipratropium-albuterol (DUONEB) 0.5-2.5 (3) MG/3ML SOLN nebulizer solution, Inhale 3 mLs into the lungs 4 times daily as needed, Disp: , Rfl:     memantine (NAMENDA) 5 MG tablet, , Disp: , Rfl:     magnesium oxide (MAG-OX) 400 MG tablet, Take 400 mg by mouth daily, Disp: , Rfl:     cyanocobalamin 1000 MCG tablet, Take 1,000 mcg by mouth daily, Disp: , Rfl:     diclofenac sodium (VOLTAREN) 1 % GEL, , Disp: , Rfl:     ferrous sulfate (IRON 325) 325 (65 Fe) MG tablet, Take 325 mg by mouth daily (with breakfast), Disp: , Rfl:     losartan (COZAAR) 25 MG tablet, 25 mg  in the morning and 25 mg in the evening. 2 every morning., Disp: , Rfl:     hydrOXYzine (ATARAX) 25 MG tablet, Take 25 mg by mouth every 8 hours as needed for Anxiety, Disp: , Rfl:     escitalopram (LEXAPRO) 20 MG tablet, , Disp: , Rfl:     furosemide (LASIX) 20 MG tablet, Take 1 tablet by mouth daily as needed (for 2 lb weight gain/increased swelling/increased shortness of breath) (Patient not taking: No sig reported), Disp: 60 tablet, Rfl: 3    ASPERCREME LIDOCAINE EX, Apply topically, Disp: , Rfl:     VENTOLIN  (90 Base) MCG/ACT inhaler, , Disp: , Rfl:     omeprazole (PRILOSEC) 20 MG delayed release capsule, , Disp: , Rfl:     hydrocortisone 2.5 % cream, Apply topically 2 times daily. , Disp: 30 g, Rfl: 2    levothyroxine (LEVOTHROID) 50 MCG tablet, Take 1 tablet by mouth daily, Disp: 30 tablet, Rfl: 3    glucose monitoring kit (FREESTYLE) monitoring kit, 1 kit by Does not apply route daily as needed, Disp: 1 kit, Rfl: 0    glucose blood VI test strips (EXACTECH TEST) strip, 1 each by In Vitro route daily Type 2 diabetes qd testing, Disp: 100 each, Rfl: 3    Accu-Chek Multiclix Lancets MISC, Test qd;type 2 diabetes, Disp: 100 each, Rfl: 3    Scooter MISC, by Does not apply route Use daily dx arthritis obesity pulmonary htn,copd, Disp: 1 each, Rfl: 0    metFORMIN (GLUCOPHAGE) 500 MG tablet, TAKE ONE TABLET BY MOUTH TWICE A DAY (Patient not taking: No sig reported), Disp: 180 tablet, Rfl: 2    aspirin 325 MG tablet, Take 325 mg by mouth daily. , Disp: , Rfl:     Family History   Problem Relation Age of Onset    Cancer Mother     Hypertension Maternal Aunt     Cancer Daughter        Social History     Socioeconomic History    Marital status: Single     Spouse name: Not on file    Number of children: 3    Years of education: Not on file    Highest education level: Not on file   Occupational History    Occupation: RETIRED   Tobacco Use    Smoking status: Former     Years: 5.00     Types: Cigarettes     Quit date: 1985     Years since quittin.1    Smokeless tobacco: Never   Vaping Use    Vaping Use: Former   Substance and Sexual Activity    Alcohol use: No     Alcohol/week: 0.0 standard drinks    Drug use: No    Sexual activity: Not on file   Other Topics Concern    Not on file   Social History Narrative    Not on file     Social Determinants of Health     Financial Resource Strain: Not on file   Food Insecurity: Not on file   Transportation Needs: Not on file   Physical Activity: Not on file   Stress: Not on file   Social Connections: Not on file   Intimate Partner Violence: Not on file   Housing Stability: Not on file       Review of Systems:  Review of Systems   Constitutional: Negative for fever. Cardiovascular:  Negative for chest pain. Musculoskeletal:  Positive for back pain. Gastrointestinal:  Negative for bowel incontinence. Genitourinary:  Positive for bladder incontinence.    Neurological: Positive for numbness. Negative for headaches, tingling and weakness. Physical Exam:  /64   Pulse 75   SpO2 94%     Physical Exam  Cardiovascular:      Rate and Rhythm: Normal rate. Pulmonary:      Effort: Pulmonary effort is normal.   Musculoskeletal:         General: Normal range of motion. Skin:     General: Skin is warm and dry. Neurological:      Mental Status: She is alert and oriented to person, place, and time. Record/Diagnostics Review:    Last melva 10/22  and was appropriate     Assessment:  Problem List Items Addressed This Visit       Chronic use of opiate drug for therapeutic purpose - Primary (Chronic)    Relevant Medications    oxyCODONE (ROXICODONE) 5 MG immediate release tablet    Pain in joint, pelvic region and thigh, right    Relevant Medications    oxyCODONE (ROXICODONE) 5 MG immediate release tablet    Spinal stenosis of lumbar region without neurogenic claudication    Relevant Medications    oxyCODONE (ROXICODONE) 5 MG immediate release tablet    DDD (degenerative disc disease), lumbar    Relevant Medications    oxyCODONE (ROXICODONE) 5 MG immediate release tablet          Treatment Plan:  Patient relates current medications are helping the pain. Patient reports taking pain medications as prescribed, denies obtaining medications from different sources and denies use of illegal drugs. Medication risk and benefits have been discussed. Patient denies side effects from medications like nausea, vomiting, constipation or drowsiness. Patient reports current activities of daily living are possible due to medications and would like to continue them. As always, we encourage daily stretching and strengthening exercises, and recommend minimizing use of pain medications unless patient cannot get through daily activities due to pain. Due to the high risk nature of this patient's pain medication close monitoring is required.    Continue current medication management, pt has been stable and compliant. Script written for oxycodone  Follow up appointment made for 4 weeks    I have reviewed the chief complaint and history of present illness (including ROS and PFSH) and vital documentation by my staff and I agree with their documentation and have added where applicable.

## 2023-02-14 ENCOUNTER — HOSPITAL ENCOUNTER (OUTPATIENT)
Dept: PAIN MANAGEMENT | Age: 88
Discharge: HOME | End: 2023-02-14
Payer: COMMERCIAL

## 2023-02-14 DIAGNOSIS — M48.061 SPINAL STENOSIS OF LUMBAR REGION WITHOUT NEUROGENIC CLAUDICATION: ICD-10-CM

## 2023-02-14 DIAGNOSIS — M25.551 PAIN IN JOINT, PELVIC REGION AND THIGH, RIGHT: ICD-10-CM

## 2023-02-14 DIAGNOSIS — Z79.891 CHRONIC USE OF OPIATE DRUG FOR THERAPEUTIC PURPOSE: Primary | ICD-10-CM

## 2023-02-14 DIAGNOSIS — M51.36 DDD (DEGENERATIVE DISC DISEASE), LUMBAR: ICD-10-CM

## 2023-02-14 PROCEDURE — 99213 OFFICE O/P EST LOW 20 MIN: CPT

## 2023-02-14 PROCEDURE — 99213 OFFICE O/P EST LOW 20 MIN: CPT | Performed by: NURSE PRACTITIONER

## 2023-02-14 RX ORDER — OXYCODONE HYDROCHLORIDE 5 MG/1
5 TABLET ORAL EVERY 6 HOURS PRN
Qty: 120 TABLET | Refills: 0 | Status: SHIPPED | OUTPATIENT
Start: 2023-02-18 | End: 2023-03-20

## 2023-02-14 ASSESSMENT — ENCOUNTER SYMPTOMS
BACK PAIN: 1
CONSTIPATION: 0
COUGH: 0
SHORTNESS OF BREATH: 0

## 2023-02-14 NOTE — PROGRESS NOTES
Chief Complaint   Patient presents with    Back Pain    Medication Refill        [facility]     Pt reports low back pain for many years. The pain does radiate down her legs at times with numbness to left hip thigh area. She follows with orthopedics for hip pain. She uses a cane for ambulation due to occasional weakness in legs. She has not had surgery. She has had aquatic PT in the past that has helped but not interested in starting right now. She lives at home with family and is able to care for self with the help of her family. Has a visiting nurse every week. The daughter reports history of progressive memory disturbance, dementia walking during sleep and confusion that has been progressively worsening. MD discussed with the family that opioids are likely adversely contributing to her neurological issues and recommended to decrease the opioid dose. They are agreeable to it and dose changed to oxycodone 5 mg 4 times a day  Consider further dose titration to 3 times a day or twice a day in future. Pt has almost 1 week left over but states this was d/t hospital stay and still feels she needs them QID    Recent  admission for pneumonia and sepsis - she is now home     HPI:     Back Pain  This is a chronic problem. The current episode started more than 1 year ago. The problem occurs rarely. The problem is unchanged. The pain is present in the lumbar spine. The quality of the pain is described as cramping. The pain radiates to the right thigh, right knee, left thigh and left knee. The pain is at a severity of 4/10. The pain is mild. The pain is The same all the time. The symptoms are aggravated by bending, standing and lying down. Associated symptoms include tingling. Pertinent negatives include no chest pain, fever, headaches, numbness or weakness. Risk factors include menopause, poor posture, lack of exercise and sedentary lifestyle.  She has tried bed rest, heat, home exercises, ice, walking and NSAIDs for the symptoms. The treatment provided mild relief. Patient denies any new neurological symptoms. No bowel or bladder incontinence, no weakness, and no falling. Pill count: Oxycodone-15 due  prescription adjusted appropriately to due     Morphine equivalent: 30    Controlled Substance Monitoring:    Acute and Chronic Pain Monitoring:   RX Monitoring 2023   Attestation -   Acute Pain Prescriptions -   Periodic Controlled Substance Monitoring Possible medication side effects, risk of tolerance/dependence & alternative treatments discussed. ;No signs of potential drug abuse or diversion identified. ;Assessed functional status. ;Obtaining appropriate analgesic effect of treatment. Chronic Pain > 50 MEDD -   Chronic Pain > 80 MEDD -          Periodic Controlled Substance Monitoring: Possible medication side effects, risk of tolerance/dependence & alternative treatments discussed., No signs of potential drug abuse or diversion identified. , Assessed functional status., Obtaining appropriate analgesic effect of treatment. Ebenezer Francois, APRN - CNP)      Past Medical History:   Diagnosis Date    Anxiety     Arthritis     Bronchitis     CAD (coronary artery disease)     Carotid arterial disease (HCC)     COPD (chronic obstructive pulmonary disease) ([address]     Diabetes mellitus ([address]     Hyperlipidemia     Hypertension     Mitral regurgitation     Myalgia     Pulmonary hypertension ([address]     Shingles 2018    left facial area and involved eye    Sleep apnea     Syncope and collapse     UTI (urinary tract infection) 10/20/2022    Hospitalized.   d/c 10/24  [facility]       Past Surgical History:   Procedure Laterality Date    ABDOMEN SURGERY      CATARACT REMOVAL WITH IMPLANT Right 1-     SECTION      COLECTOMY      COLONOSCOPY      HIP ARTHROPLASTY      3 on the left and 1 on the right    HYSTERECTOMY (CERVIX STATUS UNKNOWN)      INSERTABLE CARDIAC MONITOR  2018    [employer]    JOINT REPLACEMENT Right     TOTAL KNEE    JOINT REPLACEMENT Bilateral     right x2, left x3 hips    KNEE SURGERY      REVISION TOTAL HIP ARTHROPLASTY Left 11/29/2022    [facility]       No Known Allergies      Current Outpatient Medications:     atorvastatin (LIPITOR) 40 MG tablet, , Disp: , Rfl:     SYMBICORT 160-4.5 MCG/ACT AERO, , Disp: , Rfl:     ipratropium-albuterol (DUONEB) 0.5-2.5 (3) MG/3ML SOLN nebulizer solution, Inhale 3 mLs into the lungs 4 times daily as needed, Disp: , Rfl:     memantine (NAMENDA) 5 MG tablet, , Disp: , Rfl:     magnesium oxide (MAG-OX) 400 MG tablet, Take 400 mg by mouth daily, Disp: , Rfl:     cyanocobalamin 1000 MCG tablet, Take 1,000 mcg by mouth daily, Disp: , Rfl:     diclofenac sodium (VOLTAREN) 1 % GEL, , Disp: , Rfl:     ferrous sulfate (IRON 325) 325 (65 Fe) MG tablet, Take 325 mg by mouth daily (with breakfast), Disp: , Rfl:     losartan (COZAAR) 25 MG tablet, 25 mg  in the morning and 25 mg in the evening. 2 every morning., Disp: , Rfl:     hydrOXYzine (ATARAX) 25 MG tablet, Take 25 mg by mouth every 8 hours as needed for Anxiety, Disp: , Rfl:     escitalopram (LEXAPRO) 20 MG tablet, , Disp: , Rfl:     furosemide (LASIX) 20 MG tablet, Take 1 tablet by mouth daily as needed (for 2 lb weight gain/increased swelling/increased shortness of breath) (Patient not taking: No sig reported), Disp: 60 tablet, Rfl: 3    ASPERCREME LIDOCAINE EX, Apply topically, Disp: , Rfl:     VENTOLIN  (90 Base) MCG/ACT inhaler, , Disp: , Rfl:     omeprazole (PRILOSEC) 20 MG delayed release capsule, , Disp: , Rfl:     hydrocortisone 2.5 % cream, Apply topically 2 times daily. , Disp: 30 g, Rfl: 2    levothyroxine (LEVOTHROID) 50 MCG tablet, Take 1 tablet by mouth daily, Disp: 30 tablet, Rfl: 3    glucose monitoring kit (FREESTYLE) monitoring kit, 1 kit by Does not apply route daily as needed, Disp: 1 kit, Rfl: 0    glucose blood VI test strips (EXACTECH TEST) strip, 1 each by In Vitro route daily Type 2 diabetes qd testing, Disp: 100 each, Rfl: 3    Accu-Chek Multiclix Lancets MISC, Test qd;type 2 diabetes, Disp: 100 each, Rfl: 3    Scooter MISC, by Does not apply route Use daily dx arthritis obesity pulmonary htn,copd, Disp: 1 each, Rfl: 0    metFORMIN (GLUCOPHAGE) 500 MG tablet, TAKE ONE TABLET BY MOUTH TWICE A DAY (Patient not taking: No sig reported), Disp: 180 tablet, Rfl: 2    aspirin 325 MG tablet, Take 325 mg by mouth daily. , Disp: , Rfl:     Family History   Problem Relation Age of Onset    Cancer Mother     Hypertension Maternal Aunt     Cancer Daughter        Social History     Socioeconomic History    Marital status: Single     Spouse name: Not on file    Number of children: 3    Years of education: Not on file    Highest education level: Not on file   Occupational History    Occupation: RETIRED   Tobacco Use    Smoking status: Former     Years: 5.00     Types: Cigarettes     Quit date: 1985     Years since quittin.2    Smokeless tobacco: Never   Vaping Use    Vaping Use: Former   Substance and Sexual Activity    Alcohol use: No     Alcohol/week: 0.0 standard drinks    Drug use: No    Sexual activity: Not on file   Other Topics Concern    Not on file   Social History Narrative    Not on file     Social Determinants of Health     Financial Resource Strain: Not on file   Food Insecurity: Not on file   Transportation Needs: Not on file   Physical Activity: Not on file   Stress: Not on file   Social Connections: Not on file   Intimate Partner Violence: Not on file   Housing Stability: Not on file       Review of Systems:  Review of Systems   Constitutional: Negative for chills and fever. Cardiovascular:  Negative for chest pain. Respiratory:  Negative for cough and shortness of breath. Musculoskeletal:  Positive for back pain. Gastrointestinal:  Negative for constipation. Neurological:  Positive for tingling. Negative for headaches, numbness and weakness.      Physical Exam:  There were no vitals taken for this visit. Physical Exam    Record/Diagnostics Review:    Last melva 10/22 and was appropriate     Assessment:  Problem List Items Addressed This Visit       Chronic use of opiate drug for therapeutic purpose - Primary (Chronic)    Pain in joint, pelvic region and thigh, right    Spinal stenosis of lumbar region without neurogenic claudication    DDD (degenerative disc disease), lumbar          Treatment Plan:  Patient relates current medications are helping the pain. Patient reports taking pain medications as prescribed, denies obtaining medications from different sources and denies use of illegal drugs. Medication risk and benefits have been discussed. Patient denies side effects from medications like nausea, vomiting, constipation or drowsiness. Patient reports current activities of daily living are possible due to medications and would like to continue them. As always, we encourage daily stretching and strengthening exercises, and recommend minimizing use of pain medications unless patient cannot get through daily activities due to pain. Due to the high risk nature of this patient's pain medication close monitoring is required. Continue current medication management, pt has been stable and compliant. Script written for oxycodone  Follow up VV  appointment made for 4 weeks    I have reviewed the chief complaint and history of present illness (including ROS and [facility]) and vital documentation by my staff and I agree with their documentation and have added where applicable    Patricia Zazueta, was evaluated through a synchronous (real-time) audio-video encounter. The patient (or guardian if applicable) is aware that this is a billable service, which includes applicable co-pays. This Virtual Visit was conducted with patient's (and/or legal guardian's) consent.  The visit was conducted pursuant to the emergency declaration under the [address] [facility] [address] waiver authority and the [employer] General Act. Patient identification was verified, and a caregiver was present when appropriate. The patient was located at Home: [address]  Provider was located at [facility] (Appt Dept): [address],  [address] [facility]         Total time spent for this encounter: Not billed by time    --STACIA Syed CNP on 2/14/2023 at 1:28 PM    An electronic signature was used to authenticate this note. Eliud Owens

## 2023-04-20 ENCOUNTER — HOSPITAL ENCOUNTER (OUTPATIENT)
Dept: PAIN MANAGEMENT | Age: 88
Discharge: HOME | End: 2023-04-20
Payer: COMMERCIAL

## 2023-04-20 VITALS — HEART RATE: 58 BPM | SYSTOLIC BLOOD PRESSURE: 145 MMHG | DIASTOLIC BLOOD PRESSURE: 85 MMHG | OXYGEN SATURATION: 98 %

## 2023-04-20 DIAGNOSIS — R52 CHRONIC PAIN OF MULTIPLE SITES: ICD-10-CM

## 2023-04-20 DIAGNOSIS — G89.29 CHRONIC PAIN OF MULTIPLE SITES: ICD-10-CM

## 2023-04-20 DIAGNOSIS — Z79.891 CHRONIC USE OF OPIATE DRUG FOR THERAPEUTIC PURPOSE: Primary | Chronic | ICD-10-CM

## 2023-04-20 PROCEDURE — 99213 OFFICE O/P EST LOW 20 MIN: CPT

## 2023-04-20 RX ORDER — OXYCODONE HYDROCHLORIDE AND ACETAMINOPHEN 5; 325 MG/1; MG/1
1 TABLET ORAL 2 TIMES DAILY PRN
Qty: 60 TABLET | Refills: 0 | Status: SHIPPED | OUTPATIENT
Start: 2023-04-20 | End: 2023-05-20

## 2023-04-20 ASSESSMENT — ENCOUNTER SYMPTOMS
BACK PAIN: 1
GASTROINTESTINAL NEGATIVE: 1
RESPIRATORY NEGATIVE: 1

## 2023-04-20 NOTE — PROGRESS NOTES
The patient is a 82 y. o. Non- / non  female. Chief Complaint   Patient presents with    Back Pain    Medication Refill     Roxicodone        Back Pain  Pertinent negatives include no fever. Medication Refill  Pertinent negatives include no fever.  This is a 56-year-old female with a history of chronic multi site body pain  She is established patient at [facility] pain clinic  Has been on chronic opioid therapy in this clinic  In past she had side effects from opioids including memory deficits and cognition  Dose was then reduced  She was last seen in February and was prescribed at that time Percocet 5 mg 4 times a day  She missed her appointment in March and has been off opioids for 1 month  She is reporting increased pain from lack of medication but did not develop any withdrawal symptoms    Patient is here for medication refill  In interim she was also admitted earlier in the year for pneumonia    Discussed with patient and accompanying family member that reinitiating opioid therapy after a 4-week gap at the same previous dose will be unwise as there is resensitization for opioid after opioid holiday  Therefore I will start her on 5 mg twice a day  Explained to patient and the family member that if this dose is inadequate we can increase it to 3 or 4 times a day in future visit  Medication Refill: Roxicodone    Pain score Today:  8  Adverse effects (Constipation / Nausea / Sedation / sexual Dysfunction / others) : no  Mood: good  Sleep pattern and quality: fair  Activity level: fair    Pill count Today:0   Last dose taken  03/28/2023  OARRS report reviewed today: yes  ER/Hospitalizations/PCP visit related to pain since last visit:no   Any legal problems e.g. DUI etc.:No  Satisfied with current management: Yes    Opioid Contract:02/14/2022  Last Urine Dug screen dated:10/04/2022    Hemoglobin A1C   Date Value Ref Range Status   01/09/2020 6.0 4.0 - 6.0 % Final       Past Medical History, Past

## 2023-05-23 ENCOUNTER — HOSPITAL ENCOUNTER (OUTPATIENT)
Dept: PAIN MANAGEMENT | Age: 88
Discharge: HOME | End: 2023-05-23
Payer: COMMERCIAL

## 2023-05-23 VITALS — HEIGHT: 67 IN | BODY MASS INDEX: 35.16 KG/M2 | TEMPERATURE: 97.3 F | WEIGHT: 224 LBS

## 2023-05-23 DIAGNOSIS — R52 CHRONIC PAIN OF MULTIPLE SITES: ICD-10-CM

## 2023-05-23 DIAGNOSIS — G89.29 CHRONIC PAIN OF MULTIPLE SITES: ICD-10-CM

## 2023-05-23 DIAGNOSIS — M48.061 SPINAL STENOSIS OF LUMBAR REGION WITHOUT NEUROGENIC CLAUDICATION: ICD-10-CM

## 2023-05-23 DIAGNOSIS — Z79.891 CHRONIC USE OF OPIATE DRUG FOR THERAPEUTIC PURPOSE: Primary | Chronic | ICD-10-CM

## 2023-05-23 PROCEDURE — 99213 OFFICE O/P EST LOW 20 MIN: CPT | Performed by: NURSE PRACTITIONER

## 2023-05-23 PROCEDURE — 99213 OFFICE O/P EST LOW 20 MIN: CPT

## 2023-05-23 RX ORDER — DOXYCYCLINE HYCLATE 100 MG/1
CAPSULE ORAL
COMMUNITY
Start: 2023-03-06

## 2023-05-23 RX ORDER — CEPHALEXIN 500 MG/1
CAPSULE ORAL
COMMUNITY
Start: 2023-05-12

## 2023-05-23 RX ORDER — OXYCODONE HYDROCHLORIDE AND ACETAMINOPHEN 5; 325 MG/1; MG/1
1 TABLET ORAL EVERY 8 HOURS PRN
Qty: 90 TABLET | Refills: 0 | Status: SHIPPED | OUTPATIENT
Start: 2023-05-23 | End: 2023-06-22

## 2023-05-23 ASSESSMENT — ENCOUNTER SYMPTOMS
BACK PAIN: 1
BOWEL INCONTINENCE: 0

## 2023-05-23 ASSESSMENT — PAIN SCALES - GENERAL: PAINLEVEL_OUTOF10: 9

## 2023-05-23 NOTE — PROGRESS NOTES
(1.702 m)   Wt 224 lb (101.6 kg)   BMI 35.08 kg/m²     Physical Exam  Cardiovascular:      Rate and Rhythm: Normal rate. Pulmonary:      Effort: Pulmonary effort is normal.   Musculoskeletal:         General: Normal range of motion. Comments: Gait is not assessed, pt in w/c for visit    Skin:     General: Skin is warm and dry. Neurological:      Mental Status: She is alert and oriented to person, place, and time. Record/Diagnostics Review:    Last melva 10/22   and was appropriate     Assessment:  Problem List Items Addressed This Visit       Chronic use of opiate drug for therapeutic purpose - Primary (Chronic)    Relevant Medications    oxyCODONE-acetaminophen (PERCOCET) 5-325 MG per tablet    Spinal stenosis of lumbar region without neurogenic claudication    Chronic pain of multiple sites    Relevant Medications    oxyCODONE-acetaminophen (PERCOCET) 5-325 MG per tablet          Treatment Plan:  Patient relates current medications are helping the pain. Patient reports taking pain medications as prescribed, denies obtaining medications from different sources and denies use of illegal drugs. Medication risk and benefits have been discussed. Patient denies side effects from medications like nausea, vomiting, constipation or drowsiness. Patient reports current activities of daily living are possible due to medications and would like to continue them. As always, we encourage daily stretching and strengthening exercises, and recommend minimizing use of pain medications unless patient cannot get through daily activities due to pain. Due to the high risk nature of this patient's pain medication close monitoring is required. Continue current medication management, pt has been stable and compliant.   Script written for percocet - increased to TID   Follow up appointment made for 4 weeks    I have reviewed the chief complaint and history of present illness (including ROS and PFSH) and vital

## 2023-06-20 ENCOUNTER — HOSPITAL ENCOUNTER (OUTPATIENT)
Dept: PAIN MANAGEMENT | Age: 88
Discharge: HOME | End: 2023-06-20
Payer: COMMERCIAL

## 2023-06-20 VITALS — WEIGHT: 224 LBS | BODY MASS INDEX: 35.16 KG/M2 | TEMPERATURE: 97.3 F | HEIGHT: 67 IN

## 2023-06-20 DIAGNOSIS — M51.36 DDD (DEGENERATIVE DISC DISEASE), LUMBAR: ICD-10-CM

## 2023-06-20 DIAGNOSIS — M54.50 LUMBAR PAIN: ICD-10-CM

## 2023-06-20 DIAGNOSIS — M48.061 SPINAL STENOSIS OF LUMBAR REGION WITHOUT NEUROGENIC CLAUDICATION: ICD-10-CM

## 2023-06-20 DIAGNOSIS — G89.29 CHRONIC PAIN OF MULTIPLE SITES: ICD-10-CM

## 2023-06-20 DIAGNOSIS — Z79.891 CHRONIC USE OF OPIATE DRUG FOR THERAPEUTIC PURPOSE: Primary | Chronic | ICD-10-CM

## 2023-06-20 DIAGNOSIS — R52 CHRONIC PAIN OF MULTIPLE SITES: ICD-10-CM

## 2023-06-20 PROCEDURE — 99213 OFFICE O/P EST LOW 20 MIN: CPT | Performed by: NURSE PRACTITIONER

## 2023-06-20 PROCEDURE — 99213 OFFICE O/P EST LOW 20 MIN: CPT

## 2023-06-20 RX ORDER — OXYCODONE HYDROCHLORIDE AND ACETAMINOPHEN 5; 325 MG/1; MG/1
1 TABLET ORAL EVERY 8 HOURS PRN
Qty: 90 TABLET | Refills: 0 | Status: SHIPPED | OUTPATIENT
Start: 2023-06-23 | End: 2023-07-23

## 2023-06-20 ASSESSMENT — ENCOUNTER SYMPTOMS
BOWEL INCONTINENCE: 0
BACK PAIN: 1

## 2023-06-20 ASSESSMENT — PAIN SCALES - GENERAL: PAINLEVEL_OUTOF10: 6

## 2023-06-20 NOTE — PROGRESS NOTES
Chief Complaint   Patient presents with    Back Pain         [facility]   Pt reports low back pain for many years. The pain does radiate down her legs at times with numbness to left hip thigh area. She has not had surgery. She has had aquatic PT in the past that has helped but not interested in starting right now. She lives at home with family and is able to care for self with the help of her family. Has a visiting nurse every week. In past she had side effects from opioids including memory deficits and cognition. Dose was then reduced and when she missed an appt and had been off opioids for 1 month was restarted at BID  She is reporting increased pain from  due to recent falls feels  back pain has worsened will increase to TID per MD POC. Back Pain  This is a chronic problem. The current episode started more than 1 year ago. The problem occurs intermittently. The pain is present in the lumbar spine. The quality of the pain is described as aching and burning. The pain radiates to the left foot, left thigh, left knee, right foot, right knee and right thigh. The pain is at a severity of 6/10. The pain is The same all the time. The symptoms are aggravated by bending, sitting and standing. Associated symptoms include numbness, tingling and weakness. Pertinent negatives include no bladder incontinence, bowel incontinence, chest pain, fever or headaches. She has tried ice and heat for the symptoms. Patient denies any new neurological symptoms. No bowel or bladder incontinence, no weakness, and no falling. Pill count: appropriate / Oxycodone - 11 - due 6/22    Morphine equivalent: 22.5    Controlled Substance Monitoring:    Acute and Chronic Pain Monitoring:   RX Monitoring 6/20/2023   Attestation -   Acute Pain Prescriptions -   Periodic Controlled Substance Monitoring Possible medication side effects, risk of tolerance/dependence & alternative treatments discussed. ;No signs of potential drug abuse or

## 2024-03-18 ENCOUNTER — APPOINTMENT (OUTPATIENT)
Dept: CT IMAGING | Age: 89
DRG: 308 | End: 2024-03-18
Payer: MEDICARE

## 2024-03-18 ENCOUNTER — APPOINTMENT (OUTPATIENT)
Dept: GENERAL RADIOLOGY | Age: 89
DRG: 308 | End: 2024-03-18
Payer: MEDICARE

## 2024-03-18 ENCOUNTER — HOSPITAL ENCOUNTER (INPATIENT)
Age: 89
LOS: 6 days | Discharge: HOSPICE | DRG: 308 | End: 2024-03-24
Attending: EMERGENCY MEDICINE | Admitting: INTERNAL MEDICINE
Payer: MEDICARE

## 2024-03-18 DIAGNOSIS — R57.9 SHOCK (HCC): ICD-10-CM

## 2024-03-18 DIAGNOSIS — I48.20 CHRONIC ATRIAL FIBRILLATION (HCC): ICD-10-CM

## 2024-03-18 DIAGNOSIS — I46.9 CARDIAC ARREST (HCC): Primary | ICD-10-CM

## 2024-03-18 PROBLEM — J69.0 ASPIRATION PNEUMONIA OF RIGHT LOWER LOBE (HCC): Status: ACTIVE | Noted: 2024-03-18

## 2024-03-18 PROBLEM — Z86.711 HISTORY OF PULMONARY EMBOLISM: Chronic | Status: ACTIVE | Noted: 2024-03-18

## 2024-03-18 PROBLEM — I49.01 CARDIAC ARREST WITH VENTRICULAR FIBRILLATION (HCC): Status: ACTIVE | Noted: 2024-03-18

## 2024-03-18 PROBLEM — N18.31 STAGE 3A CHRONIC KIDNEY DISEASE (HCC): Status: ACTIVE | Noted: 2024-03-18

## 2024-03-18 PROBLEM — R79.89 ELEVATED LFTS: Status: ACTIVE | Noted: 2024-03-18

## 2024-03-18 PROBLEM — E87.0 HYPERNATREMIA: Status: ACTIVE | Noted: 2024-03-18

## 2024-03-18 PROBLEM — R57.0 CARDIOGENIC SHOCK (HCC): Status: ACTIVE | Noted: 2024-03-18

## 2024-03-18 PROBLEM — Z99.11 ON MECHANICALLY ASSISTED VENTILATION (HCC): Status: ACTIVE | Noted: 2024-03-18

## 2024-03-18 LAB
ALBUMIN SERPL-MCNC: 2.6 G/DL (ref 3.5–5.2)
ALP SERPL-CCNC: 199 U/L (ref 35–104)
ALT SERPL-CCNC: 228 U/L (ref 5–33)
ANION GAP SERPL CALCULATED.3IONS-SCNC: 25 MMOL/L (ref 9–17)
AST SERPL-CCNC: 509 U/L
BACTERIA URNS QL MICRO: ABNORMAL
BASOPHILS # BLD: 0 K/UL (ref 0–0.2)
BASOPHILS NFR BLD: 0 %
BILIRUB SERPL-MCNC: 0.8 MG/DL (ref 0.3–1.2)
BILIRUB UR QL STRIP: NEGATIVE
BUN BLD-MCNC: 29 MG/DL (ref 8–26)
BUN SERPL-MCNC: 29 MG/DL (ref 8–23)
BUN/CREAT SERPL: 21 (ref 9–20)
CA-I BLD-SCNC: 1.09 MMOL/L (ref 1.15–1.33)
CALCIUM SERPL-MCNC: 8.6 MG/DL (ref 8.6–10.4)
CHLORIDE BLD-SCNC: 112 MMOL/L (ref 98–107)
CHLORIDE SERPL-SCNC: 105 MMOL/L (ref 98–107)
CLARITY UR: ABNORMAL
CO2 BLD CALC-SCNC: 21 MMOL/L (ref 22–30)
CO2 SERPL-SCNC: 19 MMOL/L (ref 20–31)
COLOR UR: ABNORMAL
CREAT SERPL-MCNC: 1.4 MG/DL (ref 0.5–0.9)
CRYSTALS URNS MICRO: ABNORMAL /HPF
EGFR, POC: 37 ML/MIN/1.73M2
EOSINOPHIL # BLD: 0 K/UL (ref 0–0.4)
EOSINOPHILS RELATIVE PERCENT: 0 % (ref 1–4)
EPI CELLS #/AREA URNS HPF: ABNORMAL /HPF (ref 0–5)
ERYTHROCYTE [DISTWIDTH] IN BLOOD BY AUTOMATED COUNT: 15.4 % (ref 11.8–14.4)
FIO2: 100
FIO2: 100
FLUAV RNA RESP QL NAA+PROBE: NOT DETECTED
FLUBV RNA RESP QL NAA+PROBE: NOT DETECTED
GFR SERPL CREATININE-BSD FRML MDRD: 37 ML/MIN/1.73M2
GLUCOSE BLD-MCNC: 172 MG/DL (ref 65–105)
GLUCOSE BLD-MCNC: 44 MG/DL (ref 74–100)
GLUCOSE BLD-MCNC: 81 MG/DL (ref 65–105)
GLUCOSE SERPL-MCNC: 51 MG/DL (ref 70–99)
GLUCOSE UR STRIP-MCNC: NEGATIVE MG/DL
HCO3 VENOUS: 20.6 MMOL/L (ref 22–29)
HCT VFR BLD AUTO: 31 % (ref 36–46)
HCT VFR BLD AUTO: 31.5 % (ref 36.3–47.1)
HCT VFR BLD AUTO: 32 % (ref 36–46)
HGB BLD-MCNC: 9.6 G/DL (ref 11.9–15.1)
HGB UR QL STRIP.AUTO: ABNORMAL
IMM GRANULOCYTES # BLD AUTO: 0.52 K/UL (ref 0–0.3)
IMM GRANULOCYTES NFR BLD: 2 %
INR PPP: 1.6
KETONES UR STRIP-MCNC: ABNORMAL MG/DL
LACTATE BLDV-SCNC: 10.7 MMOL/L (ref 0.5–2.2)
LEUKOCYTE ESTERASE UR QL STRIP: ABNORMAL
LYMPHOCYTES NFR BLD: 6.73 K/UL (ref 1–4.8)
LYMPHOCYTES RELATIVE PERCENT: 26 % (ref 24–44)
MAGNESIUM SERPL-MCNC: 2 MG/DL (ref 1.6–2.6)
MCH RBC QN AUTO: 27.6 PG (ref 25.2–33.5)
MCHC RBC AUTO-ENTMCNC: 30.5 G/DL (ref 28.4–34.8)
MCV RBC AUTO: 90.5 FL (ref 82.6–102.9)
MODE: ABNORMAL
MONOCYTES NFR BLD: 0.52 K/UL (ref 0.2–0.8)
MONOCYTES NFR BLD: 2 % (ref 1–7)
MORPHOLOGY: ABNORMAL
NEGATIVE BASE EXCESS, ART: 8.5 MMOL/L (ref 0–2)
NEGATIVE BASE EXCESS, VEN: 6.8 MMOL/L (ref 0–2)
NEUTROPHILS NFR BLD: 70 % (ref 36–66)
NEUTS SEG NFR BLD: 18.13 K/UL (ref 1.8–7.7)
NITRITE UR QL STRIP: NEGATIVE
NRBC BLD-RTO: 0.2 PER 100 WBC
NUCLEATED RED BLOOD CELLS: 1 PER 100 WBC
O2 DELIVERY DEVICE: ABNORMAL
O2 SAT, VEN: 93.5 % (ref 60–85)
PATIENT TEMP: 37
PCO2, VEN: 48 MM HG (ref 41–51)
PH UR STRIP: >9 [PH] (ref 5–8)
PH VENOUS: 7.24 (ref 7.32–7.43)
PLATELET # BLD AUTO: 322 K/UL (ref 138–453)
PMV BLD AUTO: 10.3 FL (ref 8.1–13.5)
PO2, VEN: 81.2 MM HG (ref 30–50)
POC ANION GAP: 17 MMOL/L (ref 7–16)
POC CREATININE: 1.4 MG/DL (ref 0.51–1.19)
POC HCO3: 16.7 MMOL/L (ref 21–28)
POC HEMOGLOBIN (CALC): 10.6 G/DL (ref 12–16)
POC HEMOGLOBIN (CALC): 11 G/DL (ref 12–16)
POC O2 SATURATION: 99.8 % (ref 94–98)
POC PCO2: 32.5 MM HG (ref 35–48)
POC PH: 7.32 (ref 7.35–7.45)
POC PO2: 264.1 MM HG (ref 83–108)
POTASSIUM BLD-SCNC: 4 MMOL/L (ref 3.5–4.5)
POTASSIUM SERPL-SCNC: 4.2 MMOL/L (ref 3.7–5.3)
PROT SERPL-MCNC: 6.3 G/DL (ref 6.4–8.3)
PROT UR STRIP-MCNC: ABNORMAL MG/DL
PROTHROMBIN TIME: 18.6 SEC (ref 11.5–14.2)
RBC # BLD AUTO: 3.48 M/UL (ref 3.95–5.11)
RBC #/AREA URNS HPF: ABNORMAL /HPF (ref 0–2)
SAMPLE SITE: ABNORMAL
SARS-COV-2 RNA RESP QL NAA+PROBE: NOT DETECTED
SODIUM BLD-SCNC: 149 MMOL/L (ref 138–146)
SODIUM SERPL-SCNC: 149 MMOL/L (ref 135–144)
SOURCE: NORMAL
SP GR UR STRIP: 1.03 (ref 1–1.03)
SPECIMEN DESCRIPTION: NORMAL
TROPONIN I SERPL HS-MCNC: 90 NG/L (ref 0–14)
UROBILINOGEN UR STRIP-ACNC: NORMAL EU/DL (ref 0–1)
WBC #/AREA URNS HPF: ABNORMAL /HPF (ref 0–5)
WBC OTHER # BLD: 25.9 K/UL (ref 3.5–11.3)

## 2024-03-18 PROCEDURE — 6360000002 HC RX W HCPCS: Performed by: EMERGENCY MEDICINE

## 2024-03-18 PROCEDURE — 71045 X-RAY EXAM CHEST 1 VIEW: CPT

## 2024-03-18 PROCEDURE — 2580000003 HC RX 258: Performed by: INTERNAL MEDICINE

## 2024-03-18 PROCEDURE — 70450 CT HEAD/BRAIN W/O DYE: CPT

## 2024-03-18 PROCEDURE — 87040 BLOOD CULTURE FOR BACTERIA: CPT

## 2024-03-18 PROCEDURE — 2580000003 HC RX 258

## 2024-03-18 PROCEDURE — 71260 CT THORAX DX C+: CPT

## 2024-03-18 PROCEDURE — 80051 ELECTROLYTE PANEL: CPT

## 2024-03-18 PROCEDURE — 85014 HEMATOCRIT: CPT

## 2024-03-18 PROCEDURE — 4A133B1 MONITORING OF ARTERIAL PRESSURE, PERIPHERAL, PERCUTANEOUS APPROACH: ICD-10-PCS | Performed by: INTERNAL MEDICINE

## 2024-03-18 PROCEDURE — 82565 ASSAY OF CREATININE: CPT

## 2024-03-18 PROCEDURE — 82803 BLOOD GASES ANY COMBINATION: CPT

## 2024-03-18 PROCEDURE — 6360000002 HC RX W HCPCS: Performed by: INTERNAL MEDICINE

## 2024-03-18 PROCEDURE — 2700000000 HC OXYGEN THERAPY PER DAY

## 2024-03-18 PROCEDURE — 37799 UNLISTED PX VASCULAR SURGERY: CPT

## 2024-03-18 PROCEDURE — 85025 COMPLETE CBC W/AUTO DIFF WBC: CPT

## 2024-03-18 PROCEDURE — 5A1945Z RESPIRATORY VENTILATION, 24-96 CONSECUTIVE HOURS: ICD-10-PCS | Performed by: INTERNAL MEDICINE

## 2024-03-18 PROCEDURE — 87636 SARSCOV2 & INF A&B AMP PRB: CPT

## 2024-03-18 PROCEDURE — 2000000000 HC ICU R&B

## 2024-03-18 PROCEDURE — 74177 CT ABD & PELVIS W/CONTRAST: CPT

## 2024-03-18 PROCEDURE — 87181 SC STD AGAR DILUTION PER AGT: CPT

## 2024-03-18 PROCEDURE — 83605 ASSAY OF LACTIC ACID: CPT

## 2024-03-18 PROCEDURE — 2500000003 HC RX 250 WO HCPCS: Performed by: INTERNAL MEDICINE

## 2024-03-18 PROCEDURE — 81001 URINALYSIS AUTO W/SCOPE: CPT

## 2024-03-18 PROCEDURE — 2500000003 HC RX 250 WO HCPCS: Performed by: EMERGENCY MEDICINE

## 2024-03-18 PROCEDURE — 93005 ELECTROCARDIOGRAM TRACING: CPT | Performed by: INTERNAL MEDICINE

## 2024-03-18 PROCEDURE — 83735 ASSAY OF MAGNESIUM: CPT

## 2024-03-18 PROCEDURE — 6360000004 HC RX CONTRAST MEDICATION: Performed by: EMERGENCY MEDICINE

## 2024-03-18 PROCEDURE — 0BH17EZ INSERTION OF ENDOTRACHEAL AIRWAY INTO TRACHEA, VIA NATURAL OR ARTIFICIAL OPENING: ICD-10-PCS | Performed by: INTERNAL MEDICINE

## 2024-03-18 PROCEDURE — 87086 URINE CULTURE/COLONY COUNT: CPT

## 2024-03-18 PROCEDURE — 02HV33Z INSERTION OF INFUSION DEVICE INTO SUPERIOR VENA CAVA, PERCUTANEOUS APPROACH: ICD-10-PCS | Performed by: INTERNAL MEDICINE

## 2024-03-18 PROCEDURE — 84484 ASSAY OF TROPONIN QUANT: CPT

## 2024-03-18 PROCEDURE — 2500000003 HC RX 250 WO HCPCS

## 2024-03-18 PROCEDURE — 85610 PROTHROMBIN TIME: CPT

## 2024-03-18 PROCEDURE — 6360000002 HC RX W HCPCS

## 2024-03-18 PROCEDURE — 94761 N-INVAS EAR/PLS OXIMETRY MLT: CPT

## 2024-03-18 PROCEDURE — 72125 CT NECK SPINE W/O DYE: CPT

## 2024-03-18 PROCEDURE — 87184 SC STD DISK METHOD PER PLATE: CPT

## 2024-03-18 PROCEDURE — 87077 CULTURE AEROBIC IDENTIFY: CPT

## 2024-03-18 PROCEDURE — 80053 COMPREHEN METABOLIC PANEL: CPT

## 2024-03-18 PROCEDURE — 4A133J1 MONITORING OF ARTERIAL PULSE, PERIPHERAL, PERCUTANEOUS APPROACH: ICD-10-PCS | Performed by: INTERNAL MEDICINE

## 2024-03-18 PROCEDURE — 87186 SC STD MICRODIL/AGAR DIL: CPT

## 2024-03-18 PROCEDURE — 96365 THER/PROPH/DIAG IV INF INIT: CPT

## 2024-03-18 PROCEDURE — 94002 VENT MGMT INPAT INIT DAY: CPT

## 2024-03-18 PROCEDURE — P9041 ALBUMIN (HUMAN),5%, 50ML: HCPCS | Performed by: INTERNAL MEDICINE

## 2024-03-18 PROCEDURE — 5A12012 PERFORMANCE OF CARDIAC OUTPUT, SINGLE, MANUAL: ICD-10-PCS | Performed by: INTERNAL MEDICINE

## 2024-03-18 PROCEDURE — 2580000003 HC RX 258: Performed by: EMERGENCY MEDICINE

## 2024-03-18 PROCEDURE — 84520 ASSAY OF UREA NITROGEN: CPT

## 2024-03-18 PROCEDURE — 82330 ASSAY OF CALCIUM: CPT

## 2024-03-18 PROCEDURE — 04HY32Z INSERTION OF MONITORING DEVICE INTO LOWER ARTERY, PERCUTANEOUS APPROACH: ICD-10-PCS | Performed by: INTERNAL MEDICINE

## 2024-03-18 PROCEDURE — 3E033XZ INTRODUCTION OF VASOPRESSOR INTO PERIPHERAL VEIN, PERCUTANEOUS APPROACH: ICD-10-PCS | Performed by: INTERNAL MEDICINE

## 2024-03-18 PROCEDURE — 99291 CRITICAL CARE FIRST HOUR: CPT

## 2024-03-18 PROCEDURE — 82947 ASSAY GLUCOSE BLOOD QUANT: CPT

## 2024-03-18 PROCEDURE — 99223 1ST HOSP IP/OBS HIGH 75: CPT | Performed by: INTERNAL MEDICINE

## 2024-03-18 RX ORDER — ONDANSETRON 4 MG/1
4 TABLET, ORALLY DISINTEGRATING ORAL EVERY 8 HOURS PRN
Status: DISCONTINUED | OUTPATIENT
Start: 2024-03-18 | End: 2024-03-24 | Stop reason: HOSPADM

## 2024-03-18 RX ORDER — DEXTROSE AND SODIUM CHLORIDE 5; .45 G/100ML; G/100ML
INJECTION, SOLUTION INTRAVENOUS CONTINUOUS
Status: DISCONTINUED | OUTPATIENT
Start: 2024-03-18 | End: 2024-03-20

## 2024-03-18 RX ORDER — SODIUM CHLORIDE 0.9 % (FLUSH) 0.9 %
10 SYRINGE (ML) INJECTION PRN
Status: DISCONTINUED | OUTPATIENT
Start: 2024-03-18 | End: 2024-03-24 | Stop reason: HOSPADM

## 2024-03-18 RX ORDER — EPINEPHRINE IN SOD CHLOR,ISO 1 MG/10 ML
SYRINGE (ML) INTRAVENOUS DAILY PRN
Status: COMPLETED | OUTPATIENT
Start: 2024-03-18 | End: 2024-03-18

## 2024-03-18 RX ORDER — INSULIN LISPRO 100 [IU]/ML
0-8 INJECTION, SOLUTION INTRAVENOUS; SUBCUTANEOUS EVERY 6 HOURS
Status: DISCONTINUED | OUTPATIENT
Start: 2024-03-18 | End: 2024-03-22

## 2024-03-18 RX ORDER — NOREPINEPHRINE BITARTRATE 0.06 MG/ML
INJECTION, SOLUTION INTRAVENOUS
Status: COMPLETED
Start: 2024-03-18 | End: 2024-03-18

## 2024-03-18 RX ORDER — NOREPINEPHRINE BITARTRATE 0.06 MG/ML
1-100 INJECTION, SOLUTION INTRAVENOUS CONTINUOUS
Status: DISCONTINUED | OUTPATIENT
Start: 2024-03-18 | End: 2024-03-22

## 2024-03-18 RX ORDER — SODIUM CHLORIDE 9 MG/ML
INJECTION, SOLUTION INTRAVENOUS
Status: COMPLETED
Start: 2024-03-18 | End: 2024-03-18

## 2024-03-18 RX ORDER — EPINEPHRINE 1 MG/ML
INJECTION, SOLUTION INTRAMUSCULAR; SUBCUTANEOUS
Status: COMPLETED
Start: 2024-03-18 | End: 2024-03-18

## 2024-03-18 RX ORDER — BISACODYL 10 MG
10 SUPPOSITORY, RECTAL RECTAL DAILY PRN
Status: DISCONTINUED | OUTPATIENT
Start: 2024-03-18 | End: 2024-03-24 | Stop reason: HOSPADM

## 2024-03-18 RX ORDER — ACETAMINOPHEN 325 MG/1
650 TABLET ORAL EVERY 6 HOURS PRN
Status: DISCONTINUED | OUTPATIENT
Start: 2024-03-18 | End: 2024-03-24 | Stop reason: HOSPADM

## 2024-03-18 RX ORDER — ALBUMIN (HUMAN) 12.5 G/50ML
SOLUTION INTRAVENOUS
Status: DISPENSED
Start: 2024-03-18 | End: 2024-03-19

## 2024-03-18 RX ORDER — POTASSIUM CHLORIDE 7.45 MG/ML
10 INJECTION INTRAVENOUS PRN
Status: DISCONTINUED | OUTPATIENT
Start: 2024-03-18 | End: 2024-03-21

## 2024-03-18 RX ORDER — DEXTROSE MONOHYDRATE 25 G/50ML
INJECTION, SOLUTION INTRAVENOUS DAILY PRN
Status: COMPLETED | OUTPATIENT
Start: 2024-03-18 | End: 2024-03-18

## 2024-03-18 RX ORDER — SODIUM CHLORIDE 0.9 % (FLUSH) 0.9 %
5-40 SYRINGE (ML) INJECTION EVERY 12 HOURS SCHEDULED
Status: DISCONTINUED | OUTPATIENT
Start: 2024-03-18 | End: 2024-03-24 | Stop reason: HOSPADM

## 2024-03-18 RX ORDER — ONDANSETRON 2 MG/ML
4 INJECTION INTRAMUSCULAR; INTRAVENOUS EVERY 6 HOURS PRN
Status: DISCONTINUED | OUTPATIENT
Start: 2024-03-18 | End: 2024-03-24 | Stop reason: HOSPADM

## 2024-03-18 RX ORDER — SODIUM CHLORIDE 9 MG/ML
INJECTION, SOLUTION INTRAVENOUS PRN
Status: DISCONTINUED | OUTPATIENT
Start: 2024-03-18 | End: 2024-03-24 | Stop reason: HOSPADM

## 2024-03-18 RX ORDER — ENOXAPARIN SODIUM 100 MG/ML
40 INJECTION SUBCUTANEOUS DAILY
Status: DISCONTINUED | OUTPATIENT
Start: 2024-03-18 | End: 2024-03-19

## 2024-03-18 RX ORDER — POLYETHYLENE GLYCOL 3350 17 G/17G
17 POWDER, FOR SOLUTION ORAL DAILY PRN
Status: DISCONTINUED | OUTPATIENT
Start: 2024-03-18 | End: 2024-03-24 | Stop reason: HOSPADM

## 2024-03-18 RX ORDER — POTASSIUM CHLORIDE 20 MEQ/1
40 TABLET, EXTENDED RELEASE ORAL PRN
Status: DISCONTINUED | OUTPATIENT
Start: 2024-03-18 | End: 2024-03-21

## 2024-03-18 RX ORDER — AMIODARONE HYDROCHLORIDE 150 MG/3ML
INJECTION, SOLUTION INTRAVENOUS DAILY PRN
Status: COMPLETED | OUTPATIENT
Start: 2024-03-18 | End: 2024-03-18

## 2024-03-18 RX ORDER — 0.9 % SODIUM CHLORIDE 0.9 %
80 INTRAVENOUS SOLUTION INTRAVENOUS ONCE
Status: COMPLETED | OUTPATIENT
Start: 2024-03-18 | End: 2024-03-18

## 2024-03-18 RX ORDER — ACETAMINOPHEN 650 MG/1
650 SUPPOSITORY RECTAL EVERY 6 HOURS PRN
Status: DISCONTINUED | OUTPATIENT
Start: 2024-03-18 | End: 2024-03-24 | Stop reason: HOSPADM

## 2024-03-18 RX ORDER — ALBUMIN, HUMAN INJ 5% 5 %
25 SOLUTION INTRAVENOUS ONCE
Status: COMPLETED | OUTPATIENT
Start: 2024-03-18 | End: 2024-03-18

## 2024-03-18 RX ORDER — SODIUM CHLORIDE 0.9 % (FLUSH) 0.9 %
5-40 SYRINGE (ML) INJECTION PRN
Status: DISCONTINUED | OUTPATIENT
Start: 2024-03-18 | End: 2024-03-24 | Stop reason: HOSPADM

## 2024-03-18 RX ADMIN — EPINEPHRINE: 1 INJECTION INTRAMUSCULAR; INTRAVENOUS; SUBCUTANEOUS at 16:08

## 2024-03-18 RX ADMIN — SODIUM BICARBONATE 50 MEQ: 84 INJECTION, SOLUTION INTRAVENOUS at 15:41

## 2024-03-18 RX ADMIN — AMIODARONE HYDROCHLORIDE 150 MG: 50 INJECTION, SOLUTION INTRAVENOUS at 15:50

## 2024-03-18 RX ADMIN — SODIUM BICARBONATE 50 MEQ: 84 INJECTION, SOLUTION INTRAVENOUS at 18:36

## 2024-03-18 RX ADMIN — Medication 25 MCG/MIN: at 19:57

## 2024-03-18 RX ADMIN — AMIODARONE HYDROCHLORIDE 1 MG/MIN: 50 INJECTION, SOLUTION INTRAVENOUS at 15:52

## 2024-03-18 RX ADMIN — SODIUM CHLORIDE: 9 INJECTION, SOLUTION INTRAVENOUS at 19:53

## 2024-03-18 RX ADMIN — EPINEPHRINE 1 MG: 0.1 INJECTION INTRACARDIAC; INTRAVENOUS at 15:58

## 2024-03-18 RX ADMIN — SODIUM CHLORIDE, PRESERVATIVE FREE 10 ML: 5 INJECTION INTRAVENOUS at 22:07

## 2024-03-18 RX ADMIN — VASOPRESSIN 0.04 UNITS/MIN: 20 INJECTION INTRAVENOUS at 19:57

## 2024-03-18 RX ADMIN — Medication 2 MG/HR: at 23:03

## 2024-03-18 RX ADMIN — DEXTROSE MONOHYDRATE 25 G: 25 INJECTION, SOLUTION INTRAVENOUS at 16:17

## 2024-03-18 RX ADMIN — AMIODARONE HYDROCHLORIDE 1 MG/MIN: 50 INJECTION, SOLUTION INTRAVENOUS at 21:50

## 2024-03-18 RX ADMIN — Medication 25 MCG/HR: at 20:28

## 2024-03-18 RX ADMIN — SODIUM CHLORIDE 80 ML: 9 INJECTION, SOLUTION INTRAVENOUS at 22:06

## 2024-03-18 RX ADMIN — Medication 2 MCG/MIN: at 16:57

## 2024-03-18 RX ADMIN — ALBUMIN (HUMAN) 25 G: 12.5 INJECTION, SOLUTION INTRAVENOUS at 19:59

## 2024-03-18 RX ADMIN — PIPERACILLIN AND TAZOBACTAM 4500 MG: 4; .5 INJECTION, POWDER, FOR SOLUTION INTRAVENOUS at 23:07

## 2024-03-18 RX ADMIN — VANCOMYCIN HYDROCHLORIDE 2000 MG: 1 INJECTION, POWDER, LYOPHILIZED, FOR SOLUTION INTRAVENOUS at 19:55

## 2024-03-18 RX ADMIN — IOPAMIDOL 75 ML: 755 INJECTION, SOLUTION INTRAVENOUS at 22:07

## 2024-03-18 RX ADMIN — EPINEPHRINE 5 MCG/MIN: 1 INJECTION INTRAMUSCULAR; INTRAVENOUS; SUBCUTANEOUS at 16:00

## 2024-03-18 RX ADMIN — SODIUM CHLORIDE 1000 ML: 9 INJECTION, SOLUTION INTRAVENOUS at 16:07

## 2024-03-18 ASSESSMENT — PULMONARY FUNCTION TESTS
PIF_VALUE: 23
PIF_VALUE: 22
PIF_VALUE: 23
PIF_VALUE: 22
PIF_VALUE: 23
PIF_VALUE: 27
PIF_VALUE: 23
PIF_VALUE: 21
PIF_VALUE: 21
PIF_VALUE: 22
PIF_VALUE: 23
PIF_VALUE: 22
PIF_VALUE: 21
PIF_VALUE: 22
PIF_VALUE: 23
PIF_VALUE: 21
PIF_VALUE: 22
PIF_VALUE: 23
PIF_VALUE: 21
PIF_VALUE: 23
PIF_VALUE: 22
PIF_VALUE: 23
PIF_VALUE: 23
PIF_VALUE: 24
PIF_VALUE: 23
PIF_VALUE: 23
PIF_VALUE: 22
PIF_VALUE: 21
PIF_VALUE: 22
PIF_VALUE: 22
PIF_VALUE: 23

## 2024-03-18 ASSESSMENT — HEART SCORE: ECG: NON-SPECIFC REPOLARIZATION DISTURBANCE/LBTB/PM

## 2024-03-18 NOTE — ED NOTES
Per family pt is full code  Not candidate for cardiac cath.     Taking blood thinner for recent PE

## 2024-03-18 NOTE — PROCEDURES
Corin Gilbert is a 88 y.o. female patient.  1. Cardiac arrest (HCC)    2. Chronic atrial fibrillation (HCC)    3. Shock (HCC)      Past Medical History:   Diagnosis Date    Anxiety     Arthritis     Bronchitis     CAD (coronary artery disease)     Carotid arterial disease (HCC)     COPD (chronic obstructive pulmonary disease) (HCC)     Diabetes mellitus (HCC)     Hyperlipidemia     Hypertension     Mitral regurgitation     Myalgia     Pulmonary hypertension (HCC)     Shingles 09/2018    left facial area and involved eye    Sleep apnea     Syncope and collapse     UTI (urinary tract infection) 10/20/2022    Hospitalized.  d/c 10/24  [facility]     Blood pressure (!) 76/58, pulse (!) 116, resp. rate (!) 32, height 1.702 m (5' 7\"), weight 81.6 kg (180 lb), SpO2 100 %, not currently breastfeeding.  Ultrasound-guided  Insert Arterial Line    Date/Time: 3/18/2024 7:44 PM    Performed by: Mark Kent MD  Authorized by: Mark Kent MD  Consent: The procedure was performed in an emergent situation.  Patient identity confirmed: arm band and provided demographic data  Indications: multiple ABGs, respiratory failure and hemodynamic monitoring  Location: left femoral  Needle gauge: 18  Seldinger technique: Seldinger technique used  Number of attempts: 1  Post-procedure: line sutured  Post-procedure CMS: normal  Patient tolerance: patient tolerated the procedure well with no immediate complications          Mark Kent MD  3/18/2024

## 2024-03-18 NOTE — PROCEDURES
Corin Gilbert is a 88 y.o. female patient.  1. Cardiac arrest (HCC)    2. Chronic atrial fibrillation (HCC)      Past Medical History:   Diagnosis Date    Anxiety     Arthritis     Bronchitis     CAD (coronary artery disease)     Carotid arterial disease (HCC)     COPD (chronic obstructive pulmonary disease) (HCC)     Diabetes mellitus (HCC)     Hyperlipidemia     Hypertension     Mitral regurgitation     Myalgia     Pulmonary hypertension (HCC)     Shingles 09/2018    left facial area and involved eye    Sleep apnea     Syncope and collapse     UTI (urinary tract infection) 10/20/2022    Hospitalized.  d/c 10/24  [facility]     Blood pressure 102/80, pulse (!) 116, resp. rate (!) 32, height 1.702 m (5' 7\"), weight 81.6 kg (180 lb), SpO2 100 %, not currently breastfeeding.    Central Line    Date/Time: 3/18/2024 6:58 PM    Performed by: Mark Kent MD  Authorized by: Mark Kent MD    Consent:     Consent obtained:  Verbal    Consent given by:  Guardian    Risks, benefits, and alternatives were discussed: yes    Pre-procedure details:     Indication(s): central venous access      Hand hygiene: Hand hygiene performed prior to insertion      Sterile barrier technique: All elements of maximal sterile technique followed      Skin preparation agent: Skin preparation agent completely dried prior to procedure    Procedure details:     Location:  R femoral    Patient position:  Supine    Procedural supplies:  Triple lumen    Landmarks identified: yes      Ultrasound guidance: yes      Ultrasound guidance timing: prior to insertion      Sterile ultrasound techniques: Sterile gel and sterile probe covers were used      Number of attempts:  1    Successful placement: yes    Post-procedure details:     Post-procedure:  Dressing applied and line sutured    Procedure completion:  Tolerated      Mark Kent MD  3/18/2024

## 2024-03-18 NOTE — H&P
delayed release capsule  10/13/16   ProviderRichie MD   hydrocortisone 2.5 % cream Apply topically 2 times daily. 5/12/16   Alexa Moses PA-C   levothyroxine (LEVOTHROID) 50 MCG tablet Take 1 tablet by mouth daily 4/14/16   Alexa Moses PA-C   glucose monitoring kit (FREESTYLE) monitoring kit 1 kit by Does not apply route daily as needed 4/14/16   Alexa Moses PA-C   glucose blood VI test strips (EXACTECH TEST) strip 1 each by In Vitro route daily Type 2 diabetes qd testing 4/14/16   Alexa Moses PA-C   Accu-Chek Multiclix Lancets MISC Test qd;type 2 diabetes 4/14/16   Alexa Moses PA-C   Scooter MISC by Does not apply route Use daily dx arthritis obesity pulmonary htn,copd 4/14/16   Alexa Moses PA-C   metFORMIN (GLUCOPHAGE) 500 MG tablet TAKE ONE TABLET BY MOUTH TWICE A DAY  Patient not taking: Reported on 11/10/2022 1/28/16   Alexa Moses PA-C   aspirin 325 MG tablet Take 1 tablet by mouth daily    ProviderRichie MD        Allergies:     Patient has no known allergies.    Social History:     Tobacco:    reports that she quit smoking about 38 years ago. Her smoking use included cigarettes. She started smoking about 43 years ago. She has never used smokeless tobacco.  Alcohol:      reports no history of alcohol use.  Drug Use:  reports no history of drug use.    Family History:     Family History   Problem Relation Age of Onset    Cancer Mother     Hypertension Maternal Aunt     Cancer Daughter        Review of Systems:     Cannot be obtained due to respiratory failure    Physical Exam:   BP (!) 51/34   Pulse 72   Resp 20   Ht 1.702 m (5' 7\")   Wt 81.6 kg (180 lb)   SpO2 100%   BMI 28.19 kg/m²   No data recorded.    Recent Labs     03/18/24  1609 03/18/24  1643   POCGLU 44* 81     No intake or output data in the 24 hours ending 03/18/24 1735    General Appearance: Intubated, unresponsive  Mental status: Cannot be obtained due to respiratory failure  Head:

## 2024-03-18 NOTE — ED PROVIDER NOTES
EMERGENCY DEPARTMENT ENCOUNTER    Pt Name: Corin Gilbert  MRN: [id number]  Birthdate 1935  Date of evaluation: 3/18/24  CHIEF COMPLAINT       Chief Complaint   Patient presents with    Cardiac Arrest     Arrives via EMS from home, witnessed arrest and family initiated CPR. Pt received 2 shocks, 2 rounds of EPI and 1 bicarb PTA with rosc. Approx 20 seconds PTA, CPR reinitiated and continued on arrival. Pt intubated with CPR in progress at this time     HISTORY OF PRESENT ILLNESS   The history is provided by the patient and medical records.  The patient is an 88-year-old female with PMH of COPD, non-insulin-dependent diabetes, hypertension, hyperlipidemia who is brought in by EMS status postcardiac arrest.  Per family, patient was in her usual state of health sitting in her scooter when she became unresponsive.  Family started CPR and called 911.  EMS arrived shortly thereafter and report first rhythm on monitor was V-fib.  Patient received 2 shocks in the field, 2 epi in the field and 1 bicarb.  Subsequently, she was in PEA then gained ROSC with a rhythm of A-Fib with RVR.  EMS intubated without complication using etomidate and vecuronium.  Just prior to arrival, she became pulseless and CPR was continued.  Chest compressions administered using Jacques device.  After 1 round of epi, 1 round of bicarb, ROSC was obtained.  Subsequently, cardiac rhythm would alternate between V-fib and A-fib with RVR.  I administered shocks x 2, push dose epi x 3, 1 amp of bicarb.  She was administered amiodarone 150 mg IV push followed by amnio drip.  For pressure support she was started on anepinephrine drip.  FSBG was 44 and dextrose was administered.  No neuroexam secondary to vecuronium on board.      REVIEW OF SYSTEMS     Review of Systems  All other systems reviewed and are negative.    PASTMEDICAL HISTORY     Past Medical History:   Diagnosis Date    Anxiety     Arthritis     Bronchitis     CAD (coronary artery disease)

## 2024-03-18 NOTE — ED NOTES
Pt noted to have 3 runs of vtach with a rate > 180. Spoke with Dr. Gomez concerning pt stability. At this time pt deemed unstable for CT and taken to ICU on monitor with RN and RT

## 2024-03-18 NOTE — ED NOTES
PT arrives via EMS from home following witnessed arrest with family initiated CPR.   PT and initial rhythm of v-fib shocked to PEA with CPR resumed  2nd shock delivered and pt converted to afib RVR and then to sinus tach with ROSC for about 20 minutes  Pt received a total of 2 shocks, 2 of epi and 1  bicarb PTA.  Pt was intubated PTA with etomidate and vec, arrived with PIV to left FA and left tib IO  Just PTA pt lost pulses again and CPR resumed by EMS  On arrival, CPR in progress with BVM    Epi given at 1538 when pt arrived to ED  1539, paused for pulse check- none present and CPR resumed.       1611 epi drip to 10

## 2024-03-18 NOTE — ED NOTES
Upon exiting for CT, Dr. ALEJANDRO to room requesting to place central line. Pt placed back to central monitor and will go to CT when completed.

## 2024-03-19 ENCOUNTER — APPOINTMENT (OUTPATIENT)
Dept: GENERAL RADIOLOGY | Age: 89
DRG: 308 | End: 2024-03-19
Payer: MEDICARE

## 2024-03-19 ENCOUNTER — APPOINTMENT (OUTPATIENT)
Age: 89
DRG: 308 | End: 2024-03-19
Attending: INTERNAL MEDICINE
Payer: MEDICARE

## 2024-03-19 PROBLEM — I21.4 NSTEMI (NON-ST ELEVATED MYOCARDIAL INFARCTION) (HCC): Status: ACTIVE | Noted: 2024-03-19

## 2024-03-19 LAB
ALBUMIN SERPL-MCNC: 2.7 G/DL (ref 3.5–5.2)
ALP SERPL-CCNC: 174 U/L (ref 35–104)
ALT SERPL-CCNC: 176 U/L (ref 5–33)
ANION GAP SERPL CALCULATED.3IONS-SCNC: 19 MMOL/L (ref 9–17)
ANTI-XA UNFRAC HEPARIN: >1.1 IU/L (ref 0.3–0.7)
ANTI-XA UNFRAC HEPARIN: >1.1 IU/L (ref 0.3–0.7)
AST SERPL-CCNC: 592 U/L
BASOPHILS # BLD: 0 K/UL (ref 0–0.2)
BASOPHILS NFR BLD: 0 %
BILIRUB SERPL-MCNC: 1.2 MG/DL (ref 0.3–1.2)
BUN SERPL-MCNC: 35 MG/DL (ref 8–23)
BUN/CREAT SERPL: 21 (ref 9–20)
C DIFF GDH + TOXINS A+B STL QL IA.RAPID: NEGATIVE
CALCIUM SERPL-MCNC: 7.5 MG/DL (ref 8.6–10.4)
CHLORIDE SERPL-SCNC: 107 MMOL/L (ref 98–107)
CO2 SERPL-SCNC: 20 MMOL/L (ref 20–31)
CREAT SERPL-MCNC: 1.7 MG/DL (ref 0.5–0.9)
EOSINOPHIL # BLD: 0 K/UL (ref 0–0.4)
EOSINOPHILS RELATIVE PERCENT: 0 % (ref 1–4)
ERYTHROCYTE [DISTWIDTH] IN BLOOD BY AUTOMATED COUNT: 15.4 % (ref 11.8–14.4)
ERYTHROCYTE [DISTWIDTH] IN BLOOD BY AUTOMATED COUNT: 15.5 % (ref 11.8–14.4)
FERRITIN SERPL-MCNC: 1591 NG/ML (ref 13–150)
FIO2: 70
GFR SERPL CREATININE-BSD FRML MDRD: 29 ML/MIN/1.73M2
GLUCOSE BLD-MCNC: 155 MG/DL (ref 65–105)
GLUCOSE BLD-MCNC: 204 MG/DL (ref 65–105)
GLUCOSE BLD-MCNC: 229 MG/DL (ref 65–105)
GLUCOSE BLD-MCNC: 253 MG/DL (ref 65–105)
GLUCOSE SERPL-MCNC: 290 MG/DL (ref 70–99)
HCT VFR BLD AUTO: 21.3 % (ref 36.3–47.1)
HCT VFR BLD AUTO: 22.6 % (ref 36.3–47.1)
HCT VFR BLD AUTO: 22.8 % (ref 36.3–47.1)
HCT VFR BLD AUTO: 23 % (ref 36–46)
HGB BLD-MCNC: 6.8 G/DL (ref 11.9–15.1)
HGB BLD-MCNC: 7.3 G/DL (ref 11.9–15.1)
HGB BLD-MCNC: 7.4 G/DL (ref 11.9–15.1)
IMM GRANULOCYTES # BLD AUTO: 0 K/UL (ref 0–0.3)
IMM GRANULOCYTES NFR BLD: 0 %
INR PPP: 2
LACTATE BLDV-SCNC: 3.2 MMOL/L (ref 0.5–2.2)
LYMPHOCYTES NFR BLD: 1.41 K/UL (ref 1–4.8)
LYMPHOCYTES RELATIVE PERCENT: 5 % (ref 24–44)
MCH RBC QN AUTO: 27.5 PG (ref 25.2–33.5)
MCH RBC QN AUTO: 28 PG (ref 25.2–33.5)
MCHC RBC AUTO-ENTMCNC: 32 G/DL (ref 28.4–34.8)
MCHC RBC AUTO-ENTMCNC: 32.7 G/DL (ref 28.4–34.8)
MCV RBC AUTO: 85.6 FL (ref 82.6–102.9)
MCV RBC AUTO: 86 FL (ref 82.6–102.9)
MODE: ABNORMAL
MONOCYTES NFR BLD: 0.28 K/UL (ref 0.2–0.8)
MONOCYTES NFR BLD: 1 % (ref 1–7)
MORPHOLOGY: ABNORMAL
MORPHOLOGY: ABNORMAL
NEUTROPHILS NFR BLD: 94 % (ref 36–66)
NEUTS SEG NFR BLD: 26.51 K/UL (ref 1.8–7.7)
NRBC BLD-RTO: 0 PER 100 WBC
NRBC BLD-RTO: 0 PER 100 WBC
O2 DELIVERY DEVICE: ABNORMAL
PARTIAL THROMBOPLASTIN TIME: 38.9 SEC (ref 23.9–33.8)
PLATELET # BLD AUTO: 256 K/UL (ref 138–453)
PLATELET # BLD AUTO: 302 K/UL (ref 138–453)
PMV BLD AUTO: 10 FL (ref 8.1–13.5)
PMV BLD AUTO: 9.9 FL (ref 8.1–13.5)
POC HCO3: 21.2 MMOL/L (ref 21–28)
POC HEMOGLOBIN (CALC): 7.7 G/DL (ref 12–16)
POC O2 SATURATION: 99.8 % (ref 94–98)
POC PCO2: 21.6 MM HG (ref 35–48)
POC PH: 7.6 (ref 7.35–7.45)
POC PO2: 176.2 MM HG (ref 83–108)
POSITIVE BASE EXCESS, ART: 0.1 MMOL/L (ref 0–3)
POTASSIUM SERPL-SCNC: 3.6 MMOL/L (ref 3.7–5.3)
PROT SERPL-MCNC: 5.4 G/DL (ref 6.4–8.3)
PROTHROMBIN TIME: 22.8 SEC (ref 11.5–14.2)
RBC # BLD AUTO: 2.64 M/UL (ref 3.95–5.11)
RBC # BLD AUTO: 2.65 M/UL (ref 3.95–5.11)
SAMPLE SITE: ABNORMAL
SODIUM SERPL-SCNC: 146 MMOL/L (ref 135–144)
SPECIMEN DESCRIPTION: NORMAL
TROPONIN I SERPL HS-MCNC: 233 NG/L (ref 0–14)
TROPONIN I SERPL HS-MCNC: 266 NG/L (ref 0–14)
WBC OTHER # BLD: 26.8 K/UL (ref 3.5–11.3)
WBC OTHER # BLD: 28.2 K/UL (ref 3.5–11.3)

## 2024-03-19 PROCEDURE — 87324 CLOSTRIDIUM AG IA: CPT

## 2024-03-19 PROCEDURE — 87641 MR-STAPH DNA AMP PROBE: CPT

## 2024-03-19 PROCEDURE — 99233 SBSQ HOSP IP/OBS HIGH 50: CPT | Performed by: INTERNAL MEDICINE

## 2024-03-19 PROCEDURE — 6360000002 HC RX W HCPCS: Performed by: INTERNAL MEDICINE

## 2024-03-19 PROCEDURE — 85610 PROTHROMBIN TIME: CPT

## 2024-03-19 PROCEDURE — 83605 ASSAY OF LACTIC ACID: CPT

## 2024-03-19 PROCEDURE — 85018 HEMOGLOBIN: CPT

## 2024-03-19 PROCEDURE — 87449 NOS EACH ORGANISM AG IA: CPT

## 2024-03-19 PROCEDURE — 85027 COMPLETE CBC AUTOMATED: CPT

## 2024-03-19 PROCEDURE — 2700000000 HC OXYGEN THERAPY PER DAY

## 2024-03-19 PROCEDURE — 94003 VENT MGMT INPAT SUBQ DAY: CPT

## 2024-03-19 PROCEDURE — 2580000003 HC RX 258: Performed by: INTERNAL MEDICINE

## 2024-03-19 PROCEDURE — 84484 ASSAY OF TROPONIN QUANT: CPT

## 2024-03-19 PROCEDURE — 83550 IRON BINDING TEST: CPT

## 2024-03-19 PROCEDURE — P9047 ALBUMIN (HUMAN), 25%, 50ML: HCPCS

## 2024-03-19 PROCEDURE — 93306 TTE W/DOPPLER COMPLETE: CPT

## 2024-03-19 PROCEDURE — 82803 BLOOD GASES ANY COMBINATION: CPT

## 2024-03-19 PROCEDURE — 6360000002 HC RX W HCPCS

## 2024-03-19 PROCEDURE — 82947 ASSAY GLUCOSE BLOOD QUANT: CPT

## 2024-03-19 PROCEDURE — 80053 COMPREHEN METABOLIC PANEL: CPT

## 2024-03-19 PROCEDURE — 83540 ASSAY OF IRON: CPT

## 2024-03-19 PROCEDURE — 85730 THROMBOPLASTIN TIME PARTIAL: CPT

## 2024-03-19 PROCEDURE — 2500000003 HC RX 250 WO HCPCS: Performed by: INTERNAL MEDICINE

## 2024-03-19 PROCEDURE — 6370000000 HC RX 637 (ALT 250 FOR IP): Performed by: INTERNAL MEDICINE

## 2024-03-19 PROCEDURE — 85014 HEMATOCRIT: CPT

## 2024-03-19 PROCEDURE — 94761 N-INVAS EAR/PLS OXIMETRY MLT: CPT

## 2024-03-19 PROCEDURE — 82746 ASSAY OF FOLIC ACID SERUM: CPT

## 2024-03-19 PROCEDURE — 85025 COMPLETE CBC W/AUTO DIFF WBC: CPT

## 2024-03-19 PROCEDURE — 71045 X-RAY EXAM CHEST 1 VIEW: CPT

## 2024-03-19 PROCEDURE — 37799 UNLISTED PX VASCULAR SURGERY: CPT

## 2024-03-19 PROCEDURE — 82607 VITAMIN B-12: CPT

## 2024-03-19 PROCEDURE — 85520 HEPARIN ASSAY: CPT

## 2024-03-19 PROCEDURE — 82728 ASSAY OF FERRITIN: CPT

## 2024-03-19 PROCEDURE — 2000000000 HC ICU R&B

## 2024-03-19 RX ORDER — HEPARIN SODIUM 1000 [USP'U]/ML
30 INJECTION, SOLUTION INTRAVENOUS; SUBCUTANEOUS PRN
Status: DISCONTINUED | OUTPATIENT
Start: 2024-03-19 | End: 2024-03-20

## 2024-03-19 RX ORDER — HEPARIN SODIUM 1000 [USP'U]/ML
60 INJECTION, SOLUTION INTRAVENOUS; SUBCUTANEOUS ONCE
Status: DISCONTINUED | OUTPATIENT
Start: 2024-03-19 | End: 2024-03-22

## 2024-03-19 RX ORDER — HEPARIN SODIUM 10000 [USP'U]/100ML
5-30 INJECTION, SOLUTION INTRAVENOUS CONTINUOUS
Status: DISCONTINUED | OUTPATIENT
Start: 2024-03-19 | End: 2024-03-20

## 2024-03-19 RX ORDER — MIDAZOLAM HYDROCHLORIDE 1 MG/ML
2 INJECTION INTRAMUSCULAR; INTRAVENOUS
Status: DISCONTINUED | OUTPATIENT
Start: 2024-03-19 | End: 2024-03-24 | Stop reason: HOSPADM

## 2024-03-19 RX ORDER — ALBUMIN (HUMAN) 12.5 G/50ML
SOLUTION INTRAVENOUS
Status: COMPLETED
Start: 2024-03-19 | End: 2024-03-19

## 2024-03-19 RX ORDER — HEPARIN SODIUM 1000 [USP'U]/ML
60 INJECTION, SOLUTION INTRAVENOUS; SUBCUTANEOUS PRN
Status: DISCONTINUED | OUTPATIENT
Start: 2024-03-19 | End: 2024-03-20

## 2024-03-19 RX ORDER — SODIUM CHLORIDE 9 MG/ML
INJECTION, SOLUTION INTRAVENOUS PRN
Status: DISCONTINUED | OUTPATIENT
Start: 2024-03-19 | End: 2024-03-22

## 2024-03-19 RX ORDER — FENTANYL CITRATE 0.05 MG/ML
25 INJECTION, SOLUTION INTRAMUSCULAR; INTRAVENOUS
Status: DISCONTINUED | OUTPATIENT
Start: 2024-03-19 | End: 2024-03-24 | Stop reason: HOSPADM

## 2024-03-19 RX ADMIN — ALBUMIN (HUMAN) 12.5 G: 0.25 INJECTION, SOLUTION INTRAVENOUS at 01:56

## 2024-03-19 RX ADMIN — VANCOMYCIN HYDROCHLORIDE 500 MG: 500 INJECTION, POWDER, LYOPHILIZED, FOR SOLUTION INTRAVENOUS at 20:03

## 2024-03-19 RX ADMIN — SODIUM CHLORIDE, PRESERVATIVE FREE 10 ML: 5 INJECTION INTRAVENOUS at 08:09

## 2024-03-19 RX ADMIN — DEXTROSE AND SODIUM CHLORIDE: 5; 450 INJECTION, SOLUTION INTRAVENOUS at 00:04

## 2024-03-19 RX ADMIN — PIPERACILLIN AND TAZOBACTAM 3375 MG: 3; .375 INJECTION, POWDER, FOR SOLUTION INTRAVENOUS at 12:59

## 2024-03-19 RX ADMIN — PIPERACILLIN AND TAZOBACTAM 3375 MG: 3; .375 INJECTION, POWDER, FOR SOLUTION INTRAVENOUS at 06:32

## 2024-03-19 RX ADMIN — VASOPRESSIN 0.04 UNITS/MIN: 20 INJECTION INTRAVENOUS at 02:28

## 2024-03-19 RX ADMIN — VASOPRESSIN 0.04 UNITS/MIN: 20 INJECTION INTRAVENOUS at 11:21

## 2024-03-19 RX ADMIN — INSULIN LISPRO 4 UNITS: 100 INJECTION, SOLUTION INTRAVENOUS; SUBCUTANEOUS at 09:33

## 2024-03-19 RX ADMIN — AMIODARONE HYDROCHLORIDE 1 MG/MIN: 50 INJECTION, SOLUTION INTRAVENOUS at 05:02

## 2024-03-19 RX ADMIN — ENOXAPARIN SODIUM 40 MG: 100 INJECTION SUBCUTANEOUS at 00:12

## 2024-03-19 RX ADMIN — AMIODARONE HYDROCHLORIDE 1 MG/MIN: 50 INJECTION, SOLUTION INTRAVENOUS at 13:12

## 2024-03-19 RX ADMIN — MIDAZOLAM 2 MG: 1 INJECTION INTRAMUSCULAR; INTRAVENOUS at 20:13

## 2024-03-19 RX ADMIN — DEXTROSE AND SODIUM CHLORIDE: 5; 450 INJECTION, SOLUTION INTRAVENOUS at 13:02

## 2024-03-19 RX ADMIN — VASOPRESSIN 0.04 UNITS/MIN: 20 INJECTION INTRAVENOUS at 19:14

## 2024-03-19 RX ADMIN — AMIODARONE HYDROCHLORIDE 1 MG/MIN: 50 INJECTION, SOLUTION INTRAVENOUS at 19:55

## 2024-03-19 RX ADMIN — INSULIN LISPRO 2 UNITS: 100 INJECTION, SOLUTION INTRAVENOUS; SUBCUTANEOUS at 13:14

## 2024-03-19 RX ADMIN — HEPARIN SODIUM AND DEXTROSE 12 UNITS/KG/HR: 10000; 5 INJECTION INTRAVENOUS at 10:20

## 2024-03-19 RX ADMIN — PIPERACILLIN AND TAZOBACTAM 3375 MG: 3; .375 INJECTION, POWDER, FOR SOLUTION INTRAVENOUS at 21:14

## 2024-03-19 RX ADMIN — FENTANYL CITRATE 25 MCG: 0.05 INJECTION, SOLUTION INTRAMUSCULAR; INTRAVENOUS at 17:45

## 2024-03-19 ASSESSMENT — PULMONARY FUNCTION TESTS
PIF_VALUE: 23
PIF_VALUE: 23
PIF_VALUE: 22
PIF_VALUE: 23
PIF_VALUE: 25
PIF_VALUE: 22
PIF_VALUE: 22
PIF_VALUE: 23
PIF_VALUE: 23
PIF_VALUE: 21
PIF_VALUE: 22
PIF_VALUE: 21
PIF_VALUE: 22
PIF_VALUE: 22
PIF_VALUE: 24
PIF_VALUE: 23
PIF_VALUE: 23
PIF_VALUE: 22
PIF_VALUE: 22
PIF_VALUE: 21
PIF_VALUE: 22
PIF_VALUE: 20
PIF_VALUE: 22
PIF_VALUE: 23
PIF_VALUE: 24
PIF_VALUE: 22
PIF_VALUE: 22
PIF_VALUE: 23
PIF_VALUE: 22

## 2024-03-19 NOTE — CARE COORDINATION
Case Management Assessment  Initial Evaluation    Date/Time of Evaluation: 3/19/2024 11:36 AM  Assessment Completed by: ERWIN BETANCOURT RN    If patient is discharged prior to next notation, then this note serves as note for discharge by case management.    Patient Name: Corin Gilbert                   YOB: 1935  Diagnosis: Cardiac arrest (HCC) [I46.9]  Shock (HCC) [R57.9]  Chronic atrial fibrillation (HCC) [I48.20]  Cardiac arrest with ventricular fibrillation (HCC) [I46.9, I49.01]                   Date / Time: 3/18/2024  3:40 PM    Patient Admission Status: Inpatient   Readmission Risk (Low < 19, Mod (19-27), High > 27): Readmission Risk Score: 18    Current PCP: Hui Tatum APRN - CNP  PCP verified by CM? Yes    Chart Reviewed: Yes      History Provided by:    Patient Orientation: Unable to Assess    Patient Cognition: Other (see comment) (intubated)    Hospitalization in the last 30 days (Readmission):  No    If yes, Readmission Assessment in  Navigator will be completed.    Advance Directives:      Code Status: DNR-CCA   Patient's Primary Decision Maker is: Legal Next of Kin      Discharge Planning:    Patient lives with: Children Type of Home: House  Primary Care Giver: Family  Patient Support Systems include: Children   Current Financial resources: None  Current community resources: None  Current services prior to admission: Durable Medical Equipment            Current DME: Bedside Commode, Oxygen Therapy (Comment), Shower Chair, Home Aerosol, Walker            Type of Home Care services:  OT, PT, Skilled Therapy    ADLS  Prior functional level: Assistance with the following:, Bathing, Cooking, Housework, Shopping, Mobility  Current functional level: Assistance with the following:, Bathing, Dressing, Toileting, Feeding, Cooking, Housework, Shopping, Mobility    PT AM-PAC:   /24  OT AM-PAC:   /24    Family can provide assistance at DC: Yes  Would you like Case Management to discuss the

## 2024-03-19 NOTE — FLOWSHEET NOTE
03/18/24 2042   Urinary Catheter 03/18/24 2 Way   Placement Date: 03/18/24   2nd Staff Assisting: Leopoldo Ibrahim RN  Insertion attempts: 1  Catheter Type: 2 Way  Catheter Balloon Size: 10 mL   Catheter Care  Other (comment)  (CHG - perineum full of stool. Full pericare with soap and water. Much stool removed from vaginal cavity.)   Catheter Best Practices  Catheter secured to thigh   Status Draining   Output (mL) 30 mL  (U/A sent per Dr. Kent request.)     Urine sent for U/A at this time.

## 2024-03-19 NOTE — FLOWSHEET NOTE
03/18/24 2145   Treatment Team Notification   Reason for Communication Evaluate   Name of Team Member Notified Dr. Kent   Treatment Team Role Consulting Provider   Method of Communication Call   Response See orders   Notification Time 2145     Dr. Kent contacted to clarify orders for CT.    MD would like CT head, neck, chest and abdomen with contrast.     CT chest added at this time to complete request.    Patient in CT at this time and Simona, CT aware.     Patient remains stable at this time- vitals as charted.

## 2024-03-20 ENCOUNTER — APPOINTMENT (OUTPATIENT)
Dept: GENERAL RADIOLOGY | Age: 89
DRG: 308 | End: 2024-03-20
Payer: MEDICARE

## 2024-03-20 PROBLEM — F03.90 DEMENTIA (HCC): Status: ACTIVE | Noted: 2024-03-20

## 2024-03-20 PROBLEM — G30.1 LATE ONSET ALZHEIMER DEMENTIA (HCC): Status: ACTIVE | Noted: 2024-03-20

## 2024-03-20 PROBLEM — G93.1 ANOXIC ENCEPHALOPATHY (HCC): Status: ACTIVE | Noted: 2024-03-20

## 2024-03-20 PROBLEM — I46.9 CARDIAC ARREST (HCC): Status: ACTIVE | Noted: 2024-03-20

## 2024-03-20 PROBLEM — Z71.89 ACP (ADVANCE CARE PLANNING): Status: ACTIVE | Noted: 2024-03-20

## 2024-03-20 PROBLEM — F02.80 LATE ONSET ALZHEIMER DEMENTIA (HCC): Status: ACTIVE | Noted: 2024-03-20

## 2024-03-20 PROBLEM — Z51.5 PALLIATIVE CARE ENCOUNTER: Status: ACTIVE | Noted: 2024-03-20

## 2024-03-20 LAB
ALLEN TEST: ABNORMAL
ANION GAP SERPL CALCULATED.3IONS-SCNC: 13 MMOL/L (ref 9–17)
ANTI-XA UNFRAC HEPARIN: >1.1 IU/L (ref 0.3–0.7)
BUN SERPL-MCNC: 35 MG/DL (ref 8–23)
BUN/CREAT SERPL: 17 (ref 9–20)
CALCIUM SERPL-MCNC: 6.8 MG/DL (ref 8.6–10.4)
CHLORIDE SERPL-SCNC: 107 MMOL/L (ref 98–107)
CO2 SERPL-SCNC: 21 MMOL/L (ref 20–31)
CREAT SERPL-MCNC: 2.1 MG/DL (ref 0.5–0.9)
ERYTHROCYTE [DISTWIDTH] IN BLOOD BY AUTOMATED COUNT: 15.1 % (ref 11.8–14.4)
ERYTHROCYTE [DISTWIDTH] IN BLOOD BY AUTOMATED COUNT: 15.8 % (ref 11.8–14.4)
FIO2: 35
FOLATE SERPL-MCNC: 5.2 NG/ML (ref 4.8–24.2)
GFR SERPL CREATININE-BSD FRML MDRD: 23 ML/MIN/1.73M2
GLUCOSE BLD-MCNC: 119 MG/DL (ref 65–105)
GLUCOSE BLD-MCNC: 158 MG/DL (ref 65–105)
GLUCOSE BLD-MCNC: 172 MG/DL (ref 65–105)
GLUCOSE BLD-MCNC: 184 MG/DL (ref 65–105)
GLUCOSE BLD-MCNC: 185 MG/DL (ref 65–105)
GLUCOSE SERPL-MCNC: 215 MG/DL (ref 70–99)
HCT VFR BLD AUTO: 22.9 % (ref 36.3–47.1)
HCT VFR BLD AUTO: 23 % (ref 36.3–47.1)
HCT VFR BLD AUTO: 23.2 % (ref 36.3–47.1)
HCT VFR BLD AUTO: 23.5 % (ref 36.3–47.1)
HGB BLD-MCNC: 7.6 G/DL (ref 11.9–15.1)
HGB BLD-MCNC: 7.6 G/DL (ref 11.9–15.1)
HGB BLD-MCNC: 7.7 G/DL (ref 11.9–15.1)
HGB BLD-MCNC: 7.7 G/DL (ref 11.9–15.1)
IRON SATN MFR SERPL: 43 % (ref 20–55)
IRON SERPL-MCNC: 32 UG/DL (ref 37–145)
MAGNESIUM SERPL-MCNC: 1.3 MG/DL (ref 1.6–2.6)
MCH RBC QN AUTO: 28 PG (ref 25.2–33.5)
MCH RBC QN AUTO: 28.3 PG (ref 25.2–33.5)
MCHC RBC AUTO-ENTMCNC: 32.3 G/DL (ref 28.4–34.8)
MCHC RBC AUTO-ENTMCNC: 32.8 G/DL (ref 28.4–34.8)
MCV RBC AUTO: 86.2 FL (ref 82.6–102.9)
MCV RBC AUTO: 86.7 FL (ref 82.6–102.9)
MODE: ABNORMAL
MRSA, DNA, NASAL: NEGATIVE
NEGATIVE BASE EXCESS, ART: 2.3 MMOL/L (ref 0–2)
NRBC BLD-RTO: 0 PER 100 WBC
NRBC BLD-RTO: 0 PER 100 WBC
O2 DELIVERY DEVICE: ABNORMAL
PARTIAL THROMBOPLASTIN TIME: 115.8 SEC (ref 23.9–33.8)
PARTIAL THROMBOPLASTIN TIME: 56.7 SEC (ref 23.9–33.8)
PARTIAL THROMBOPLASTIN TIME: 71.6 SEC (ref 23.9–33.8)
PLATELET # BLD AUTO: 188 K/UL (ref 138–453)
PLATELET # BLD AUTO: 219 K/UL (ref 138–453)
PMV BLD AUTO: 10 FL (ref 8.1–13.5)
PMV BLD AUTO: 9.9 FL (ref 8.1–13.5)
POC HCO3: 22 MMOL/L (ref 21–28)
POC O2 SATURATION: 99.3 % (ref 94–98)
POC PCO2: 34.8 MM HG (ref 35–48)
POC PH: 7.41 (ref 7.35–7.45)
POC PO2: 147.6 MM HG (ref 83–108)
POTASSIUM SERPL-SCNC: 3.4 MMOL/L (ref 3.7–5.3)
RBC # BLD AUTO: 2.69 M/UL (ref 3.95–5.11)
RBC # BLD AUTO: 2.71 M/UL (ref 3.95–5.11)
SAMPLE SITE: ABNORMAL
SODIUM SERPL-SCNC: 141 MMOL/L (ref 135–144)
SPECIMEN DESCRIPTION: NORMAL
TIBC SERPL-MCNC: 74 UG/DL (ref 250–450)
UNSATURATED IRON BINDING CAPACITY: 42 UG/DL (ref 112–347)
VANCOMYCIN SERPL-MCNC: 15.7 UG/ML
VIT B12 SERPL-MCNC: >2000 PG/ML (ref 232–1245)
WBC OTHER # BLD: 21.2 K/UL (ref 3.5–11.3)
WBC OTHER # BLD: 21.2 K/UL (ref 3.5–11.3)

## 2024-03-20 PROCEDURE — C9113 INJ PANTOPRAZOLE SODIUM, VIA: HCPCS | Performed by: NURSE PRACTITIONER

## 2024-03-20 PROCEDURE — 85027 COMPLETE CBC AUTOMATED: CPT

## 2024-03-20 PROCEDURE — 2580000003 HC RX 258: Performed by: INTERNAL MEDICINE

## 2024-03-20 PROCEDURE — 36430 TRANSFUSION BLD/BLD COMPNT: CPT

## 2024-03-20 PROCEDURE — 94003 VENT MGMT INPAT SUBQ DAY: CPT

## 2024-03-20 PROCEDURE — 94761 N-INVAS EAR/PLS OXIMETRY MLT: CPT

## 2024-03-20 PROCEDURE — 85014 HEMATOCRIT: CPT

## 2024-03-20 PROCEDURE — 85520 HEPARIN ASSAY: CPT

## 2024-03-20 PROCEDURE — P9040 RBC LEUKOREDUCED IRRADIATED: HCPCS

## 2024-03-20 PROCEDURE — 86901 BLOOD TYPING SEROLOGIC RH(D): CPT

## 2024-03-20 PROCEDURE — 85730 THROMBOPLASTIN TIME PARTIAL: CPT

## 2024-03-20 PROCEDURE — 80202 ASSAY OF VANCOMYCIN: CPT

## 2024-03-20 PROCEDURE — 2580000003 HC RX 258: Performed by: NURSE PRACTITIONER

## 2024-03-20 PROCEDURE — 6360000002 HC RX W HCPCS: Performed by: INTERNAL MEDICINE

## 2024-03-20 PROCEDURE — 2000000000 HC ICU R&B

## 2024-03-20 PROCEDURE — 86920 COMPATIBILITY TEST SPIN: CPT

## 2024-03-20 PROCEDURE — 82803 BLOOD GASES ANY COMBINATION: CPT

## 2024-03-20 PROCEDURE — 86900 BLOOD TYPING SEROLOGIC ABO: CPT

## 2024-03-20 PROCEDURE — 6360000002 HC RX W HCPCS: Performed by: NURSE PRACTITIONER

## 2024-03-20 PROCEDURE — 99213 OFFICE O/P EST LOW 20 MIN: CPT

## 2024-03-20 PROCEDURE — 86850 RBC ANTIBODY SCREEN: CPT

## 2024-03-20 PROCEDURE — 99223 1ST HOSP IP/OBS HIGH 75: CPT | Performed by: NURSE PRACTITIONER

## 2024-03-20 PROCEDURE — 6370000000 HC RX 637 (ALT 250 FOR IP): Performed by: INTERNAL MEDICINE

## 2024-03-20 PROCEDURE — 74018 RADEX ABDOMEN 1 VIEW: CPT

## 2024-03-20 PROCEDURE — 37799 UNLISTED PX VASCULAR SURGERY: CPT

## 2024-03-20 PROCEDURE — 2700000000 HC OXYGEN THERAPY PER DAY

## 2024-03-20 PROCEDURE — 82947 ASSAY GLUCOSE BLOOD QUANT: CPT

## 2024-03-20 PROCEDURE — 36415 COLL VENOUS BLD VENIPUNCTURE: CPT

## 2024-03-20 PROCEDURE — 83735 ASSAY OF MAGNESIUM: CPT

## 2024-03-20 PROCEDURE — 71045 X-RAY EXAM CHEST 1 VIEW: CPT

## 2024-03-20 PROCEDURE — 80048 BASIC METABOLIC PNL TOTAL CA: CPT

## 2024-03-20 PROCEDURE — A4216 STERILE WATER/SALINE, 10 ML: HCPCS | Performed by: NURSE PRACTITIONER

## 2024-03-20 PROCEDURE — 99233 SBSQ HOSP IP/OBS HIGH 50: CPT | Performed by: INTERNAL MEDICINE

## 2024-03-20 PROCEDURE — 85018 HEMOGLOBIN: CPT

## 2024-03-20 PROCEDURE — 99223 1ST HOSP IP/OBS HIGH 75: CPT | Performed by: PSYCHIATRY & NEUROLOGY

## 2024-03-20 RX ORDER — INSULIN GLARGINE 100 [IU]/ML
10 INJECTION, SOLUTION SUBCUTANEOUS NIGHTLY
Status: DISCONTINUED | OUTPATIENT
Start: 2024-03-20 | End: 2024-03-22

## 2024-03-20 RX ORDER — AMIODARONE HYDROCHLORIDE 200 MG/1
200 TABLET ORAL DAILY
Status: DISCONTINUED | OUTPATIENT
Start: 2024-03-20 | End: 2024-03-21

## 2024-03-20 RX ORDER — POTASSIUM CHLORIDE 29.8 MG/ML
20 INJECTION INTRAVENOUS
Status: COMPLETED | OUTPATIENT
Start: 2024-03-20 | End: 2024-03-20

## 2024-03-20 RX ORDER — 0.9 % SODIUM CHLORIDE 0.9 %
250 INTRAVENOUS SOLUTION INTRAVENOUS ONCE
Status: COMPLETED | OUTPATIENT
Start: 2024-03-20 | End: 2024-03-20

## 2024-03-20 RX ORDER — MAGNESIUM SULFATE IN WATER 40 MG/ML
2000 INJECTION, SOLUTION INTRAVENOUS
Status: COMPLETED | OUTPATIENT
Start: 2024-03-20 | End: 2024-03-20

## 2024-03-20 RX ORDER — HEPARIN SODIUM 1000 [USP'U]/ML
60 INJECTION, SOLUTION INTRAVENOUS; SUBCUTANEOUS PRN
Status: DISCONTINUED | OUTPATIENT
Start: 2024-03-20 | End: 2024-03-22

## 2024-03-20 RX ORDER — POTASSIUM CHLORIDE 7.45 MG/ML
10 INJECTION INTRAVENOUS
Status: DISCONTINUED | OUTPATIENT
Start: 2024-03-20 | End: 2024-03-20

## 2024-03-20 RX ORDER — DEXTROSE MONOHYDRATE 100 MG/ML
INJECTION, SOLUTION INTRAVENOUS CONTINUOUS PRN
Status: DISCONTINUED | OUTPATIENT
Start: 2024-03-20 | End: 2024-03-24 | Stop reason: HOSPADM

## 2024-03-20 RX ORDER — HEPARIN SODIUM 1000 [USP'U]/ML
30 INJECTION, SOLUTION INTRAVENOUS; SUBCUTANEOUS PRN
Status: DISCONTINUED | OUTPATIENT
Start: 2024-03-20 | End: 2024-03-22

## 2024-03-20 RX ORDER — HEPARIN SODIUM 10000 [USP'U]/100ML
5-30 INJECTION, SOLUTION INTRAVENOUS CONTINUOUS
Status: DISCONTINUED | OUTPATIENT
Start: 2024-03-20 | End: 2024-03-22

## 2024-03-20 RX ADMIN — AMIODARONE HYDROCHLORIDE 1 MG/MIN: 50 INJECTION, SOLUTION INTRAVENOUS at 02:04

## 2024-03-20 RX ADMIN — INSULIN GLARGINE 10 UNITS: 100 INJECTION, SOLUTION SUBCUTANEOUS at 21:48

## 2024-03-20 RX ADMIN — MAGNESIUM SULFATE HEPTAHYDRATE 2000 MG: 40 INJECTION, SOLUTION INTRAVENOUS at 11:47

## 2024-03-20 RX ADMIN — SODIUM CHLORIDE 250 ML: 9 INJECTION, SOLUTION INTRAVENOUS at 05:25

## 2024-03-20 RX ADMIN — SODIUM CHLORIDE, PRESERVATIVE FREE 10 ML: 5 INJECTION INTRAVENOUS at 08:53

## 2024-03-20 RX ADMIN — HEPARIN SODIUM AND DEXTROSE 6 UNITS/KG/HR: 10000; 5 INJECTION INTRAVENOUS at 15:07

## 2024-03-20 RX ADMIN — PHENYLEPHRINE HYDROCHLORIDE 10 MCG/MIN: 50 INJECTION INTRAVENOUS at 19:48

## 2024-03-20 RX ADMIN — VASOPRESSIN 0.04 UNITS/MIN: 20 INJECTION INTRAVENOUS at 03:16

## 2024-03-20 RX ADMIN — HEPARIN SODIUM 1980 UNITS: 1000 INJECTION INTRAVENOUS; SUBCUTANEOUS at 15:42

## 2024-03-20 RX ADMIN — PANTOPRAZOLE SODIUM 40 MG: 40 INJECTION, POWDER, FOR SOLUTION INTRAVENOUS at 08:54

## 2024-03-20 RX ADMIN — VASOPRESSIN 0.04 UNITS/MIN: 20 INJECTION INTRAVENOUS at 10:53

## 2024-03-20 RX ADMIN — MAGNESIUM SULFATE HEPTAHYDRATE 2000 MG: 40 INJECTION, SOLUTION INTRAVENOUS at 09:23

## 2024-03-20 RX ADMIN — VASOPRESSIN 0.04 UNITS/MIN: 20 INJECTION INTRAVENOUS at 19:14

## 2024-03-20 RX ADMIN — PIPERACILLIN AND TAZOBACTAM 3375 MG: 3; .375 INJECTION, POWDER, FOR SOLUTION INTRAVENOUS at 05:16

## 2024-03-20 RX ADMIN — POTASSIUM CHLORIDE 20 MEQ: 29.8 INJECTION, SOLUTION INTRAVENOUS at 10:38

## 2024-03-20 RX ADMIN — AMIODARONE HYDROCHLORIDE 1 MG/MIN: 50 INJECTION, SOLUTION INTRAVENOUS at 09:55

## 2024-03-20 RX ADMIN — DEXTROSE AND SODIUM CHLORIDE: 5; 450 INJECTION, SOLUTION INTRAVENOUS at 02:07

## 2024-03-20 RX ADMIN — PIPERACILLIN AND TAZOBACTAM 3375 MG: 3; .375 INJECTION, POWDER, FOR SOLUTION INTRAVENOUS at 17:15

## 2024-03-20 RX ADMIN — POTASSIUM CHLORIDE 20 MEQ: 29.8 INJECTION, SOLUTION INTRAVENOUS at 11:44

## 2024-03-20 ASSESSMENT — PULMONARY FUNCTION TESTS
PIF_VALUE: 22
PIF_VALUE: 26
PIF_VALUE: 24
PIF_VALUE: 23
PIF_VALUE: 22
PIF_VALUE: 23
PIF_VALUE: 21

## 2024-03-20 NOTE — CARE COORDINATION
Discharge planning    Cardiac arrest. Intubated. Palliative and neuro consulted. Plan for family meeting tomorrow.

## 2024-03-20 NOTE — CONSULTS
Reason for Consult: Acute cardiopulmonary arrest  Requesting Physician: Moshe Gomez MD    CHIEF COMPLAINT: Acute cardiopulmonary arrest      HISTORY OF PRESENT ILLNESS:    This is an 88-year-old female with history of diabetes mellitus, essential hypertension, dyslipidemia, COPD, obstructive sleep apnea, peripheral arterial disease with mild to moderate carotid artery disease, mild dementia, chronic back pain and depression/anxiety brought to the emergency room after she had cardiopulmonary arrest. Patient was recently treated for pneumonia and pulm embolism and was on anticoagulation, as per family reports    As per reports patient was found to have ventricular tachycardia and was shocked in the field with CPR and for 30 minutes and brought to the emergency room where she was shocked again and CPR and for another 30 minutes. Post resuscitation EKG showed atrial fibrillation with rapid ventricular response, left anterior fascicular block and right bundle branch block.    ER team consulted cardiology for possible consideration of cardiac catheterization however EKG does not suggest an acute coronary syndrome, moreover considering a prolonged CPR code, we suggest to continue conservative therapy at this point.      Past Medical History:    Past Medical History:   Diagnosis Date    Anxiety     Arthritis     Bronchitis     CAD (coronary artery disease)     Carotid arterial disease (HCC)     COPD (chronic obstructive pulmonary disease) (HCC)     Diabetes mellitus (HCC)     Hyperlipidemia     Hypertension     Mitral regurgitation     Myalgia     Pulmonary hypertension (HCC)     Shingles 09/2018    left facial area and involved eye    Sleep apnea     Syncope and collapse     UTI (urinary tract infection) 10/20/2022    Hospitalized.  d/c 10/24  [facility]     Past Surgical History:    Past Surgical History:   Procedure Laterality Date    ABDOMEN SURGERY      CATARACT REMOVAL WITH IMPLANT Right 1-209    
  Palliative Care Inpatient Consult    NAME:  Corin Gilbert  MEDICAL RECORD NUMBER:  [id number]  AGE: 88 y.o.   GENDER: female  : 1935  TODAY'S DATE:  3/20/2024    Reasons for Consultation:    Symptom and/or pain management  Provision of information regarding PC and/or hospice philosophies  Complex, time-intensive communication and interdisciplinary psychosocial support  Clarification of goals of care and/or assistance with difficult decision-making  Guidance in regards to resources and transition(s)    Members of PC team contributing to this consultation are: NELIA Faith    Plan      Palliative Interaction:    I met with patient's family this afternoon - 1 son, 2 daughters, and multiple granddaughters present. After introductions, I explained the role of palliative team and her care. Patient's family tell me that prior to hospitalization, patient has been living with 2 daughters. She has dementia but has been able to get around the house okay. They tell me that the arrest was witnessed and family started CPR.     I updated the family where the patient is neurologically. I told them that we currently have internal medicine, critical care, neurology, and nephrology following with her. They tell me that they have been able to talk to most of the physicians today during rounds. I explained that as of now, she is not following commands. I asked the 3 children if she has ever discussed her healthcare wishes with family. All of the family tell me that they have not but she wouldn't want long-term care. Patient's son states that they have a brother who is flying in from [place] to be here with their mother. They have 1 other sister who is in a nursing facility and is very ill. I asked if the family would be willing to have a meeting once their brother arrives with the attending to discuss further goals of care. Family in agreement to meet on Friday with kofi and Dr. Colindres for further update. Family 
[facility]   Pharmacy Pharmacokinetic Monitoring Service - Vancomycin    Consulting Provider: Dr. Kent    Indication: PNA  Target Concentration: Goal AUC/BESSY 400-600 mg*hr/L  Day of Therapy: 2  Additional Antimicrobials: zosyn    Pertinent Laboratory Values:   Wt Readings from Last 1 Encounters:   03/18/24 81.6 kg (180 lb)     Temp Readings from Last 1 Encounters:   03/19/24 98.8 °F (37.1 °C) (Temporal)     Estimated Creatinine Clearance: 26 mL/min (A) (based on SCr of 1.7 mg/dL (H)).  Recent Labs     03/18/24  1603 03/18/24  1609 03/19/24  0525   CREATININE 1.4* 1.4* 1.7*   BUN 29*  --  35*   WBC 25.9*  --  28.2*     Procalcitonin: n/a    Pertinent Cultures:  Culture Date Source Results   3/18 Blood x2 NGTD   3/18 Urine  Pending    MRSA Nasal Swab: was ordered by provider, awaiting results.    Recent vancomycin administrations                     vancomycin (VANCOCIN) 2,000 mg in sodium chloride 0.9 % 500 mL IVPB (mg) 2,000 mg New Bag 03/18/24 1955                    Plan:  Current dosing regimen is  500 mg IV q24h  Repeat vancomycin concentration ordered for 3/20 @ 0600   Pharmacy will continue to monitor patient and adjust therapy as indicated    Thank you for the consult,  Teresa Rodríguez RPH  3/19/2024 9:18 AM    
Pharmacy dosing of initial vancomycin ordered by ED provider.    Wt Readings from Last 1 Encounters:   03/18/24 81.6 kg (180 lb)       2000 mg ordered x 1.    Pharmacy is waiting to see if vancomycin therapy is continued or stopped/changed by the admitting physician.    Per Dr. Gomez, unknown indication, per Dr. Kent, going with broad spectrum/sepsis coverage. S/p cardiac arrest.     
 40 mEq Oral PRN Orloginna, Jessee BLACK, DO        Or    potassium chloride 10 mEq/100 mL IVPB (Peripheral Line)  10 mEq IntraVENous PRN OrlopJessee, DO        ondansetron (ZOFRAN-ODT) disintegrating tablet 4 mg  4 mg Oral Q8H PRN Orkingsley, Jessee BLACK,         Or    ondansetron (ZOFRAN) injection 4 mg  4 mg IntraVENous Q6H PRN Jessee Gray, DO        polyethylene glycol (GLYCOLAX) packet 17 g  17 g Oral Daily PRN Orkingsley, Jessee BLACK, DO        bisacodyl (DULCOLAX) suppository 10 mg  10 mg Rectal Daily PRN Orlop, Jessee BLACK, DO        acetaminophen (TYLENOL) tablet 650 mg  650 mg Oral Q6H PRN Orloginna, Jessee BLACK, DO        Or    acetaminophen (TYLENOL) suppository 650 mg  650 mg Rectal Q6H PRN Orkingsley, Jessee BLACK, DO        perflutren lipid microspheres (DEFINITY) injection 1.5 mL  1.5 mL IntraVENous ONCE PRN Jessee Gray DO        sodium chloride flush 0.9 % injection 10 mL  10 mL IntraVENous PRN Moshe Gomez MD   10 mL at 03/18/24 2207    vasopressin 20 Units in sodium chloride 0.9 % 100 mL infusion  0.04 Units/min IntraVENous Continuous Mark Kent MD 12 mL/hr at 03/20/24 0724 0.04 Units/min at 03/20/24 0724    midazolam (VERSED) 1 mg/mL in NS infusion  1-10 mg/hr IntraVENous Continuous Mark Kent MD   Stopped at 03/20/24 0515    fentaNYL 10 mcg/ml in 0.9%  ml infusion   mcg/hr IntraVENous Continuous Mark Kent MD 2.5 mL/hr at 03/20/24 0724 25 mcg/hr at 03/20/24 0724       No Known Allergies    ROS:   Unable on ventilator     Vitals:    03/20/24 0828   BP:    Pulse: 62   Resp: 14   Temp:    SpO2: 100%     Admission weight: 81.6 kg (180 lb)    Neurological Examination on ventilator   Head/ Ears /Nose/Throat/external ear . Oral intubation   Neck and thyroid .Normal size. No bruits  Cardiovascular: Auscultation of heart with regular rate and rhythm   Musculoskeletal. Decreased muscle tone all limbs                                                           Muscle strength no limb 
midline and Soft, trachea midline and straight  Lungs -decreased breath sound no crackles or wheeze  Cardiovascular - Heart sounds are normal.  irregular rhythm normal rate without murmur, gallop or rub.  Abdomen - Soft, nontender, nondistended, no masses or organomegaly  Neurologic -unresponsive on the ventilator  Extremities - No cyanosis, clubbing or edema    Labs  - Old records and notes have been reviewed in [facility]   CBC     Lab Results   Component Value Date/Time    WBC 25.9 03/18/2024 04:03 PM    RBC 3.48 03/18/2024 04:03 PM    HGB 9.6 03/18/2024 04:03 PM    HCT 31.5 03/18/2024 04:03 PM     03/18/2024 04:03 PM    MCV 90.5 03/18/2024 04:03 PM    MCH 27.6 03/18/2024 04:03 PM    MCHC 30.5 03/18/2024 04:03 PM    RDW 15.4 03/18/2024 04:03 PM    NRBC 1 03/18/2024 04:03 PM    LYMPHOPCT 26 03/18/2024 04:03 PM    MONOPCT 2 03/18/2024 04:03 PM    BASOPCT 0 03/18/2024 04:03 PM    MONOSABS 0.52 03/18/2024 04:03 PM    LYMPHSABS 6.73 03/18/2024 04:03 PM    EOSABS 0.00 03/18/2024 04:03 PM    BASOSABS 0.00 03/18/2024 04:03 PM    DIFFTYPE NOT REPORTED 04/09/2013 05:05 PM     BMP   Lab Results   Component Value Date/Time     03/18/2024 04:03 PM    K 4.2 03/18/2024 04:03 PM     03/18/2024 04:03 PM    CO2 19 03/18/2024 04:03 PM    BUN 29 03/18/2024 04:03 PM    CREATININE 1.4 03/18/2024 04:09 PM    CREATININE 1.4 03/18/2024 04:03 PM    GLUCOSE 51 03/18/2024 04:03 PM    CALCIUM 8.6 03/18/2024 04:03 PM    MG 2.0 03/18/2024 04:03 PM     LFTS  Lab Results   Component Value Date/Time    ALKPHOS 199 03/18/2024 04:03 PM     03/18/2024 04:03 PM     03/18/2024 04:03 PM    PROT 6.3 03/18/2024 04:03 PM    BILITOT 0.8 03/18/2024 04:03 PM    BILIDIR 0.09 01/09/2020 11:10 AM    IBILI 0.34 01/09/2020 11:10 AM    LABALBU 2.6 03/18/2024 04:03 PM       INR   Lab Results   Component Value Date    INR 1.6 03/18/2024    PROTIME 18.6 (H) 03/18/2024       Radiology    CXR  1. Endotracheal tube tip terminates less than 
found for: \"EOSU\"  Urine Protein:  No results found for: \"TPU\"  Urinalysis:  U/A:   Lab Results   Component Value Date/Time    NITRU NEGATIVE 03/18/2024 07:19 PM    COLORU Red 03/18/2024 07:19 PM    PHUR >9.0 03/18/2024 07:19 PM    WBCUA TOO NUMEROUS TO COUNT 03/18/2024 07:19 PM    RBCUA 50  03/18/2024 07:19 PM    MUCUS NOT REPORTED 02/21/2020 09:56 PM    TRICHOMONAS NOT REPORTED 02/21/2020 09:56 PM    YEAST NOT REPORTED 02/21/2020 09:56 PM    BACTERIA MANY 03/18/2024 07:19 PM    CLARITYU clear 01/08/2016 03:05 PM    SPECGRAV 1.034 03/18/2024 07:19 PM    LEUKOCYTESUR LARGE 03/18/2024 07:19 PM    UROBILINOGEN Normal 03/18/2024 07:19 PM    BILIRUBINUR NEGATIVE 03/18/2024 07:19 PM    BILIRUBINUR neg 01/08/2016 03:05 PM    BLOODU neg 01/08/2016 03:05 PM    GLUCOSEU NEGATIVE 03/18/2024 07:19 PM    KETUA TRACE 03/18/2024 07:19 PM    AMORPHOUS 3+ 04/26/2022 08:41 PM         Radiology:  Reviewed as available.    Assessment:  1.  Acute kidney injury likely secondary to ischemic ATN in the setting of cardiac arrest with rising creatinine and very low urine output  2.  Mild hypokalemia  3.  Hypomagnesemia  4.  Out-of-hospital cardiac arrest with continued issues with abnormal cardiac rhythm requiring continued coding of the patient in the ER.  Patient had various rhythms including V-fib, V. tach and A-fib with RVR.  5.  Apparent cardiogenic shock, requiring vasopressor  6.  Ventilator dependent with ongoing evaluation of neurological status with neurology consulted       Plan:  1.  Discontinue maintenance IV fluid running at 75 mL an hour.  Total of other IV fluids and drips currently is just over 80 mL/hour   2.  Patient may require dialysis soon if renal function does not improve.  Patient would not be a good candidate for dialysis secondary to high risk of arrhythmia analysis secondary to current state of shock requiring vasopressor  3.  Agree with supplementation of magnesium  4.  Agree with supplementation of

## 2024-03-20 NOTE — CONSENT
Informed Consent for Blood Component Transfusion Note    I have discussed with the daughter the rationale for blood component transfusion; its benefits in treating or preventing fatigue, organ damage, or death; and its risk which includes mild transfusion reactions, rare risk of blood borne infection, or more serious but rare reactions. I have discussed the alternatives to transfusion, including the risk and consequences of not receiving transfusion. The daughter had an opportunity to ask questions and had agreed to proceed with transfusion of blood components.    Patient unable to give consent due to currently sedated and intubated.    Electronically signed by STACIA Mendoza NP on 3/19/24 at 11:08 PM EDT

## 2024-03-21 LAB
ALBUMIN SERPL-MCNC: 2.3 G/DL (ref 3.5–5.2)
ALP SERPL-CCNC: 211 U/L (ref 35–104)
ALT SERPL-CCNC: 86 U/L (ref 5–33)
ANION GAP SERPL CALCULATED.3IONS-SCNC: 13 MMOL/L (ref 9–17)
AST SERPL-CCNC: 67 U/L
BILIRUB DIRECT SERPL-MCNC: 0.3 MG/DL
BILIRUB INDIRECT SERPL-MCNC: 0.4 MG/DL (ref 0–1)
BILIRUB SERPL-MCNC: 0.7 MG/DL (ref 0.3–1.2)
BUN SERPL-MCNC: 37 MG/DL (ref 8–23)
BUN/CREAT SERPL: 17 (ref 9–20)
CALCIUM SERPL-MCNC: 7 MG/DL (ref 8.6–10.4)
CHLORIDE SERPL-SCNC: 110 MMOL/L (ref 98–107)
CO2 SERPL-SCNC: 21 MMOL/L (ref 20–31)
CREAT SERPL-MCNC: 2.2 MG/DL (ref 0.5–0.9)
ECHO AO ROOT DIAM: 3 CM
ECHO AO ROOT INDEX: 1.55 CM/M2
ECHO AV MEAN GRADIENT: 6 MMHG
ECHO AV MEAN VELOCITY: 1.2 M/S
ECHO AV PEAK GRADIENT: 11 MMHG
ECHO AV PEAK VELOCITY: 1.7 M/S
ECHO AV VELOCITY RATIO: 0.53
ECHO AV VTI: 30.1 CM
ECHO BSA: 1.96 M2
ECHO EST RA PRESSURE: 8 MMHG
ECHO LA DIAMETER INDEX: 1.66 CM/M2
ECHO LA DIAMETER: 3.2 CM
ECHO LA TO AORTIC ROOT RATIO: 1.07
ECHO LV FRACTIONAL SHORTENING: 27 % (ref 28–44)
ECHO LV INTERNAL DIMENSION DIASTOLE INDEX: 1.55 CM/M2
ECHO LV INTERNAL DIMENSION DIASTOLIC: 3 CM (ref 3.9–5.3)
ECHO LV INTERNAL DIMENSION SYSTOLIC INDEX: 1.14 CM/M2
ECHO LV INTERNAL DIMENSION SYSTOLIC: 2.2 CM
ECHO LV IVSD: 1.3 CM (ref 0.6–0.9)
ECHO LV MASS 2D: 124.2 G (ref 67–162)
ECHO LV MASS INDEX 2D: 64.4 G/M2 (ref 43–95)
ECHO LV POSTERIOR WALL DIASTOLIC: 1.3 CM (ref 0.6–0.9)
ECHO LV RELATIVE WALL THICKNESS RATIO: 0.87
ECHO LVOT PEAK GRADIENT: 3 MMHG
ECHO LVOT PEAK VELOCITY: 0.9 M/S
ECHO MV E DECELERATION TIME (DT): 391 MS
ECHO MV E VELOCITY: 0.71 M/S
ECHO RIGHT VENTRICULAR SYSTOLIC PRESSURE (RVSP): 46 MMHG
ECHO TV REGURGITANT MAX VELOCITY: 3.07 M/S
ECHO TV REGURGITANT PEAK GRADIENT: 38 MMHG
ERYTHROCYTE [DISTWIDTH] IN BLOOD BY AUTOMATED COUNT: 15.3 % (ref 11.8–14.4)
FIO2: 30
GFR SERPL CREATININE-BSD FRML MDRD: 21 ML/MIN/1.73M2
GLUCOSE BLD-MCNC: 115 MG/DL (ref 65–105)
GLUCOSE BLD-MCNC: 62 MG/DL (ref 65–105)
GLUCOSE BLD-MCNC: 63 MG/DL (ref 65–105)
GLUCOSE BLD-MCNC: 93 MG/DL (ref 65–105)
GLUCOSE BLD-MCNC: 93 MG/DL (ref 65–105)
GLUCOSE BLD-MCNC: 98 MG/DL (ref 65–105)
GLUCOSE SERPL-MCNC: 110 MG/DL (ref 70–99)
HCT VFR BLD AUTO: 19 % (ref 36.3–47.1)
HCT VFR BLD AUTO: 21.4 % (ref 36.3–47.1)
HCT VFR BLD AUTO: 25.9 % (ref 36.3–47.1)
HGB BLD-MCNC: 6.3 G/DL (ref 11.9–15.1)
HGB BLD-MCNC: 7.1 G/DL (ref 11.9–15.1)
HGB BLD-MCNC: 8.7 G/DL (ref 11.9–15.1)
MAGNESIUM SERPL-MCNC: 2.1 MG/DL (ref 1.6–2.6)
MCH RBC QN AUTO: 28.3 PG (ref 25.2–33.5)
MCHC RBC AUTO-ENTMCNC: 33.2 G/DL (ref 28.4–34.8)
MCV RBC AUTO: 85.3 FL (ref 82.6–102.9)
MODE: ABNORMAL
NEGATIVE BASE EXCESS, ART: 3.4 MMOL/L (ref 0–2)
NRBC BLD-RTO: 0 PER 100 WBC
O2 DELIVERY DEVICE: ABNORMAL
PARTIAL THROMBOPLASTIN TIME: 57.8 SEC (ref 23.9–33.8)
PARTIAL THROMBOPLASTIN TIME: 73.8 SEC (ref 23.9–33.8)
PARTIAL THROMBOPLASTIN TIME: 77.9 SEC (ref 23.9–33.8)
PATIENT TEMP: 35.9
PLATELET # BLD AUTO: 182 K/UL (ref 138–453)
PMV BLD AUTO: 11 FL (ref 8.1–13.5)
POC HCO3: 20.9 MMOL/L (ref 21–28)
POC O2 SATURATION: 99.3 % (ref 94–98)
POC PCO2: 33 MM HG (ref 35–48)
POC PH: 7.41 (ref 7.35–7.45)
POC PO2: 143.6 MM HG (ref 83–108)
POTASSIUM SERPL-SCNC: 3.4 MMOL/L (ref 3.7–5.3)
PROT SERPL-MCNC: 4.9 G/DL (ref 6.4–8.3)
RBC # BLD AUTO: 2.51 M/UL (ref 3.95–5.11)
SAMPLE SITE: ABNORMAL
SODIUM SERPL-SCNC: 144 MMOL/L (ref 135–144)
WBC OTHER # BLD: 20.3 K/UL (ref 3.5–11.3)

## 2024-03-21 PROCEDURE — 85730 THROMBOPLASTIN TIME PARTIAL: CPT

## 2024-03-21 PROCEDURE — 2580000003 HC RX 258: Performed by: NURSE PRACTITIONER

## 2024-03-21 PROCEDURE — 2700000000 HC OXYGEN THERAPY PER DAY

## 2024-03-21 PROCEDURE — 80076 HEPATIC FUNCTION PANEL: CPT

## 2024-03-21 PROCEDURE — 99231 SBSQ HOSP IP/OBS SF/LOW 25: CPT | Performed by: NURSE PRACTITIONER

## 2024-03-21 PROCEDURE — 95816 EEG AWAKE AND DROWSY: CPT | Performed by: PSYCHIATRY & NEUROLOGY

## 2024-03-21 PROCEDURE — 95819 EEG AWAKE AND ASLEEP: CPT

## 2024-03-21 PROCEDURE — 80048 BASIC METABOLIC PNL TOTAL CA: CPT

## 2024-03-21 PROCEDURE — 6360000002 HC RX W HCPCS: Performed by: INTERNAL MEDICINE

## 2024-03-21 PROCEDURE — 85014 HEMATOCRIT: CPT

## 2024-03-21 PROCEDURE — 83735 ASSAY OF MAGNESIUM: CPT

## 2024-03-21 PROCEDURE — 36430 TRANSFUSION BLD/BLD COMPNT: CPT

## 2024-03-21 PROCEDURE — 6360000002 HC RX W HCPCS: Performed by: NURSE PRACTITIONER

## 2024-03-21 PROCEDURE — 94761 N-INVAS EAR/PLS OXIMETRY MLT: CPT

## 2024-03-21 PROCEDURE — 2000000000 HC ICU R&B

## 2024-03-21 PROCEDURE — 82803 BLOOD GASES ANY COMBINATION: CPT

## 2024-03-21 PROCEDURE — 94003 VENT MGMT INPAT SUBQ DAY: CPT

## 2024-03-21 PROCEDURE — 85018 HEMOGLOBIN: CPT

## 2024-03-21 PROCEDURE — 99233 SBSQ HOSP IP/OBS HIGH 50: CPT | Performed by: PSYCHIATRY & NEUROLOGY

## 2024-03-21 PROCEDURE — 2580000003 HC RX 258: Performed by: INTERNAL MEDICINE

## 2024-03-21 PROCEDURE — 37799 UNLISTED PX VASCULAR SURGERY: CPT

## 2024-03-21 PROCEDURE — 82947 ASSAY GLUCOSE BLOOD QUANT: CPT

## 2024-03-21 PROCEDURE — C9113 INJ PANTOPRAZOLE SODIUM, VIA: HCPCS | Performed by: NURSE PRACTITIONER

## 2024-03-21 PROCEDURE — A4216 STERILE WATER/SALINE, 10 ML: HCPCS | Performed by: NURSE PRACTITIONER

## 2024-03-21 PROCEDURE — 85027 COMPLETE CBC AUTOMATED: CPT

## 2024-03-21 PROCEDURE — 2500000003 HC RX 250 WO HCPCS: Performed by: INTERNAL MEDICINE

## 2024-03-21 PROCEDURE — 99232 SBSQ HOSP IP/OBS MODERATE 35: CPT | Performed by: INTERNAL MEDICINE

## 2024-03-21 PROCEDURE — P9016 RBC LEUKOCYTES REDUCED: HCPCS

## 2024-03-21 RX ORDER — SODIUM CHLORIDE 9 MG/ML
INJECTION, SOLUTION INTRAVENOUS PRN
Status: DISCONTINUED | OUTPATIENT
Start: 2024-03-21 | End: 2024-03-22

## 2024-03-21 RX ORDER — POTASSIUM CHLORIDE 7.45 MG/ML
10 INJECTION INTRAVENOUS PRN
Status: DISCONTINUED | OUTPATIENT
Start: 2024-03-21 | End: 2024-03-21

## 2024-03-21 RX ORDER — AMIODARONE HYDROCHLORIDE 200 MG/1
100 TABLET ORAL DAILY
Status: DISCONTINUED | OUTPATIENT
Start: 2024-03-22 | End: 2024-03-22

## 2024-03-21 RX ORDER — POTASSIUM CHLORIDE 7.45 MG/ML
10 INJECTION INTRAVENOUS
Status: COMPLETED | OUTPATIENT
Start: 2024-03-21 | End: 2024-03-22

## 2024-03-21 RX ORDER — BUMETANIDE 0.25 MG/ML
2 INJECTION INTRAMUSCULAR; INTRAVENOUS ONCE
Status: COMPLETED | OUTPATIENT
Start: 2024-03-21 | End: 2024-03-21

## 2024-03-21 RX ADMIN — DEXTROSE MONOHYDRATE 125 ML: 100 INJECTION, SOLUTION INTRAVENOUS at 21:32

## 2024-03-21 RX ADMIN — POTASSIUM CHLORIDE 10 MEQ: 7.46 INJECTION, SOLUTION INTRAVENOUS at 21:09

## 2024-03-21 RX ADMIN — Medication 25 MCG/HR: at 08:30

## 2024-03-21 RX ADMIN — POTASSIUM CHLORIDE 10 MEQ: 7.46 INJECTION, SOLUTION INTRAVENOUS at 15:15

## 2024-03-21 RX ADMIN — SODIUM CHLORIDE, PRESERVATIVE FREE 10 ML: 5 INJECTION INTRAVENOUS at 09:40

## 2024-03-21 RX ADMIN — HEPARIN SODIUM 1980 UNITS: 1000 INJECTION INTRAVENOUS; SUBCUTANEOUS at 04:41

## 2024-03-21 RX ADMIN — PIPERACILLIN AND TAZOBACTAM 3375 MG: 3; .375 INJECTION, POWDER, FOR SOLUTION INTRAVENOUS at 04:37

## 2024-03-21 RX ADMIN — POTASSIUM CHLORIDE 10 MEQ: 7.46 INJECTION, SOLUTION INTRAVENOUS at 23:18

## 2024-03-21 RX ADMIN — PIPERACILLIN AND TAZOBACTAM 3375 MG: 3; .375 INJECTION, POWDER, FOR SOLUTION INTRAVENOUS at 17:13

## 2024-03-21 RX ADMIN — VASOPRESSIN 0.04 UNITS/MIN: 20 INJECTION INTRAVENOUS at 04:55

## 2024-03-21 RX ADMIN — PHENYLEPHRINE HYDROCHLORIDE 10 MCG/MIN: 50 INJECTION INTRAVENOUS at 04:56

## 2024-03-21 RX ADMIN — VASOPRESSIN 0.02 UNITS/MIN: 20 INJECTION INTRAVENOUS at 12:04

## 2024-03-21 RX ADMIN — BUMETANIDE 2 MG: 0.25 INJECTION INTRAMUSCULAR; INTRAVENOUS at 14:58

## 2024-03-21 RX ADMIN — PANTOPRAZOLE SODIUM 40 MG: 40 INJECTION, POWDER, FOR SOLUTION INTRAVENOUS at 09:39

## 2024-03-21 RX ADMIN — DEXTROSE MONOHYDRATE 125 ML: 100 INJECTION, SOLUTION INTRAVENOUS at 15:08

## 2024-03-21 ASSESSMENT — PULMONARY FUNCTION TESTS
PIF_VALUE: 22
PIF_VALUE: 24
PIF_VALUE: 21
PIF_VALUE: 21
PIF_VALUE: 23
PIF_VALUE: 23
PIF_VALUE: 21
PIF_VALUE: 20
PIF_VALUE: 21
PIF_VALUE: 20
PIF_VALUE: 21
PIF_VALUE: 29
PIF_VALUE: 21
PIF_VALUE: 22
PIF_VALUE: 22
PIF_VALUE: 20
PIF_VALUE: 21
PIF_VALUE: 24
PIF_VALUE: 23
PIF_VALUE: 31
PIF_VALUE: 28
PIF_VALUE: 22
PIF_VALUE: 21
PIF_VALUE: 23
PIF_VALUE: 21

## 2024-03-21 NOTE — CARE COORDINATION
Discharge planing    Family meeting scheduled for tomorrow. Patient intubated. Probable terminally extubate.

## 2024-03-21 NOTE — PROCEDURES
EEG REPORT       Patient: Corin Gilbert Age: 88 y.o.  MRN: [id number]      Referring Provider: No ref. provider found    History: This routine 30 minute scalp EEG was recorded with video- monitoring for a 88 y.o.. female who presented with encephalopathy. This EEG was performed to evaluate for focal and epileptiform abnormalities.     Corin Gilbert   Current Facility-Administered Medications   Medication Dose Route Frequency Provider Last Rate Last Admin    heparin (porcine) injection 3,960 Units  60 Units/kg IntraVENous PRN BloodFrancisco DO        heparin (porcine) injection 1,980 Units  30 Units/kg IntraVENous PRN Blood, Francisco TURK, DO   1,980 Units at 03/21/24 0441    glucose chewable tablet 16 g  4 tablet Oral PRN OrloJessee turk DO        dextrose bolus 10% 125 mL  125 mL IntraVENous PRN OrlopJessee DO        Or    dextrose bolus 10% 250 mL  250 mL IntraVENous PRN OrlopJessee DO        glucagon injection 1 mg  1 mg SubCUTAneous PRN OrlopJessee DO        dextrose 10 % infusion   IntraVENous Continuous PRN OrJessee wynn DO        pantoprazole (PROTONIX) 40 mg in sodium chloride (PF) 0.9 % 10 mL injection  40 mg IntraVENous Daily Ana Morgan APRN - NP   40 mg at 03/21/24 0939    insulin glargine (LANTUS) injection vial 10 Units  10 Units SubCUTAneous Nightly Francisco Colindres DO   10 Units at 03/20/24 2148    piperacillin-tazobactam (ZOSYN) 3,375 mg in sodium chloride 0.9 % 50 mL IVPB (mini-bag)  3,375 mg IntraVENous Q12H Mark Kent MD   Stopped at 03/21/24 0900    heparin 25,000 units in dextrose 5% 250 mL (premix) infusion  5-30 Units/kg/hr (Order-Specific) IntraVENous Continuous BloodFrancisco DO 5.7 mL/hr at 03/21/24 0547 7 Units/kg/hr at 03/21/24 0547    amiodarone (CORDARONE) tablet 200 mg  200 mg Oral Daily Karen Haynes MD        phenylephrine (MISTY-SYNEPHRINE) 50 mg in sodium chloride 0.9 % 250 mL infusion   mcg/min IntraVENous Continuous Mark Kent

## 2024-03-21 NOTE — ACP (ADVANCE CARE PLANNING)
Advance Care Planning     Advance Care Planning (ACP) Physician/NP/PA Conversation    Date of Conversation: 3/18/2024  Conducted with:  Healthcare Decision Maker: Next of Kin by law (only applies in absence of above) (name) Jessica Moore, son    Healthcare Decision Maker:  Patient has 5 children. 1 child is currently in nursing facility. 2 daughters (Raleigh and Jessica) and 2 sons. Patient's 1 son is flying in from [place].       Click here to complete Healthcare Decision Makers including selection of the Healthcare Decision Maker Relationship (ie \"Primary\")  Today we documented Decision Maker(s) consistent with Legal Next of Kin hierarchy.    Care Preferences:    Hospitalization:  \"If your health worsens and it becomes clear that your chance of recovery is unlikely, what would be your preference regarding hospitalization?\"  Patient currently hospitalized on vent.    Ventilation:  \"If you were unable to breath on your own and your chance of recovery was unlikely, what would be your preference about the use of a ventilator (breathing machine) if it was available to you?\"  Currently intubated    Resuscitation:  \"In the event your heart stopped as a result of an underlying serious health condition, would you want attempts made to restart your heart, or would you prefer a natural death?\"  No, do NOT attempt to resuscitate.    end of life care preferences (vegetative state/imminent death)    Conversation Outcomes / Follow-Up Plan:  Discussed with 3 children present. Plan to reassess plan once son is here from [place].  Reviewed DNR/DNI and patient confirms current DNR status - completed forms on file (place new order if needed)    Length of Voluntary ACP Conversation in minutes:  <16 minutes (Non-Billable)    Melania Allan, APRN - CNP

## 2024-03-22 LAB
ANION GAP SERPL CALCULATED.3IONS-SCNC: 10 MMOL/L (ref 9–17)
BUN SERPL-MCNC: 40 MG/DL (ref 8–23)
BUN/CREAT SERPL: 17 (ref 9–20)
CALCIUM SERPL-MCNC: 7.5 MG/DL (ref 8.6–10.4)
CHLORIDE SERPL-SCNC: 109 MMOL/L (ref 98–107)
CO2 SERPL-SCNC: 22 MMOL/L (ref 20–31)
CREAT SERPL-MCNC: 2.3 MG/DL (ref 0.5–0.9)
EKG ATRIAL RATE: 107 BPM
EKG ATRIAL RATE: 133 BPM
EKG ATRIAL RATE: 208 BPM
EKG Q-T INTERVAL: 348 MS
EKG Q-T INTERVAL: 362 MS
EKG Q-T INTERVAL: 384 MS
EKG QRS DURATION: 142 MS
EKG QRS DURATION: 146 MS
EKG QRS DURATION: 158 MS
EKG QTC CALCULATION (BAZETT): 500 MS
EKG QTC CALCULATION (BAZETT): 512 MS
EKG QTC CALCULATION (BAZETT): 567 MS
EKG R AXIS: -64 DEGREES
EKG R AXIS: -69 DEGREES
EKG R AXIS: -72 DEGREES
EKG T AXIS: 25 DEGREES
EKG T AXIS: 59 DEGREES
EKG T AXIS: 68 DEGREES
EKG VENTRICULAR RATE: 107 BPM
EKG VENTRICULAR RATE: 115 BPM
EKG VENTRICULAR RATE: 160 BPM
ERYTHROCYTE [DISTWIDTH] IN BLOOD BY AUTOMATED COUNT: 15.7 % (ref 11.8–14.4)
FIO2: 30
GFR SERPL CREATININE-BSD FRML MDRD: 20 ML/MIN/1.73M2
GLUCOSE BLD-MCNC: 71 MG/DL (ref 65–105)
GLUCOSE BLD-MCNC: 72 MG/DL (ref 65–105)
GLUCOSE BLD-MCNC: 74 MG/DL (ref 65–105)
GLUCOSE BLD-MCNC: 93 MG/DL (ref 65–105)
GLUCOSE SERPL-MCNC: 75 MG/DL (ref 70–99)
HCT VFR BLD AUTO: 25.4 % (ref 36.3–47.1)
HGB BLD-MCNC: 8.6 G/DL (ref 11.9–15.1)
MAGNESIUM SERPL-MCNC: 1.9 MG/DL (ref 1.6–2.6)
MCH RBC QN AUTO: 28.9 PG (ref 25.2–33.5)
MCHC RBC AUTO-ENTMCNC: 33.9 G/DL (ref 28.4–34.8)
MCV RBC AUTO: 85.2 FL (ref 82.6–102.9)
MICROORGANISM SPEC CULT: ABNORMAL
MICROORGANISM SPEC CULT: ABNORMAL
MODE: AC
NEGATIVE BASE EXCESS, ART: 2.5 MMOL/L (ref 0–2)
NRBC BLD-RTO: 0.1 PER 100 WBC
O2 DELIVERY DEVICE: ABNORMAL
PARTIAL THROMBOPLASTIN TIME: 47.8 SEC (ref 23.9–33.8)
PLATELET # BLD AUTO: 178 K/UL (ref 138–453)
PMV BLD AUTO: 11 FL (ref 8.1–13.5)
POC HCO3: 22.1 MMOL/L (ref 21–28)
POC O2 SATURATION: 98.5 % (ref 94–98)
POC PCO2: 36.3 MM HG (ref 35–48)
POC PH: 7.39 (ref 7.35–7.45)
POC PO2: 114.1 MM HG (ref 83–108)
POTASSIUM SERPL-SCNC: 3.5 MMOL/L (ref 3.7–5.3)
RBC # BLD AUTO: 2.98 M/UL (ref 3.95–5.11)
SODIUM SERPL-SCNC: 141 MMOL/L (ref 135–144)
SPECIMEN DESCRIPTION: ABNORMAL
WBC OTHER # BLD: 17.2 K/UL (ref 3.5–11.3)

## 2024-03-22 PROCEDURE — 2580000003 HC RX 258: Performed by: NURSE PRACTITIONER

## 2024-03-22 PROCEDURE — 2580000003 HC RX 258: Performed by: INTERNAL MEDICINE

## 2024-03-22 PROCEDURE — 94761 N-INVAS EAR/PLS OXIMETRY MLT: CPT

## 2024-03-22 PROCEDURE — 85027 COMPLETE CBC AUTOMATED: CPT

## 2024-03-22 PROCEDURE — 37799 UNLISTED PX VASCULAR SURGERY: CPT

## 2024-03-22 PROCEDURE — 80048 BASIC METABOLIC PNL TOTAL CA: CPT

## 2024-03-22 PROCEDURE — 2700000000 HC OXYGEN THERAPY PER DAY

## 2024-03-22 PROCEDURE — 6360000002 HC RX W HCPCS: Performed by: NURSE PRACTITIONER

## 2024-03-22 PROCEDURE — A4216 STERILE WATER/SALINE, 10 ML: HCPCS | Performed by: NURSE PRACTITIONER

## 2024-03-22 PROCEDURE — 83735 ASSAY OF MAGNESIUM: CPT

## 2024-03-22 PROCEDURE — 6360000002 HC RX W HCPCS: Performed by: INTERNAL MEDICINE

## 2024-03-22 PROCEDURE — 82803 BLOOD GASES ANY COMBINATION: CPT

## 2024-03-22 PROCEDURE — 99233 SBSQ HOSP IP/OBS HIGH 50: CPT | Performed by: PSYCHIATRY & NEUROLOGY

## 2024-03-22 PROCEDURE — 99232 SBSQ HOSP IP/OBS MODERATE 35: CPT | Performed by: INTERNAL MEDICINE

## 2024-03-22 PROCEDURE — 1200000000 HC SEMI PRIVATE

## 2024-03-22 PROCEDURE — 6370000000 HC RX 637 (ALT 250 FOR IP): Performed by: NURSE PRACTITIONER

## 2024-03-22 PROCEDURE — 94003 VENT MGMT INPAT SUBQ DAY: CPT

## 2024-03-22 PROCEDURE — 82947 ASSAY GLUCOSE BLOOD QUANT: CPT

## 2024-03-22 PROCEDURE — C9113 INJ PANTOPRAZOLE SODIUM, VIA: HCPCS | Performed by: NURSE PRACTITIONER

## 2024-03-22 PROCEDURE — 85730 THROMBOPLASTIN TIME PARTIAL: CPT

## 2024-03-22 PROCEDURE — 93010 ELECTROCARDIOGRAM REPORT: CPT | Performed by: INTERNAL MEDICINE

## 2024-03-22 RX ORDER — QUETIAPINE FUMARATE 25 MG/1
6.25 TABLET, FILM COATED ORAL DAILY PRN
Status: ON HOLD | COMMUNITY
End: 2024-03-24

## 2024-03-22 RX ORDER — ALBUTEROL SULFATE 1.25 MG/3ML
1 SOLUTION RESPIRATORY (INHALATION) EVERY 6 HOURS PRN
Status: ON HOLD | COMMUNITY
End: 2024-03-24

## 2024-03-22 RX ORDER — CITALOPRAM 20 MG/1
20 TABLET ORAL DAILY
Status: ON HOLD | COMMUNITY
End: 2024-03-24

## 2024-03-22 RX ORDER — MORPHINE SULFATE 2 MG/ML
2 INJECTION, SOLUTION INTRAMUSCULAR; INTRAVENOUS
Status: DISCONTINUED | OUTPATIENT
Start: 2024-03-22 | End: 2024-03-24 | Stop reason: HOSPADM

## 2024-03-22 RX ORDER — BUMETANIDE 0.25 MG/ML
2 INJECTION INTRAMUSCULAR; INTRAVENOUS ONCE
Status: COMPLETED | OUTPATIENT
Start: 2024-03-22 | End: 2024-03-22

## 2024-03-22 RX ORDER — POTASSIUM CHLORIDE 7.45 MG/ML
10 INJECTION INTRAVENOUS
Status: DISPENSED | OUTPATIENT
Start: 2024-03-22 | End: 2024-03-22

## 2024-03-22 RX ORDER — ALBUTEROL SULFATE 90 UG/1
2 AEROSOL, METERED RESPIRATORY (INHALATION) EVERY 6 HOURS PRN
Status: ON HOLD | COMMUNITY
End: 2024-03-24

## 2024-03-22 RX ORDER — MAGNESIUM SULFATE IN WATER 40 MG/ML
2000 INJECTION, SOLUTION INTRAVENOUS ONCE
Status: COMPLETED | OUTPATIENT
Start: 2024-03-22 | End: 2024-03-22

## 2024-03-22 RX ORDER — GLYCOPYRROLATE 0.2 MG/ML
0.2 INJECTION INTRAMUSCULAR; INTRAVENOUS EVERY 4 HOURS PRN
Status: DISCONTINUED | OUTPATIENT
Start: 2024-03-22 | End: 2024-03-24 | Stop reason: HOSPADM

## 2024-03-22 RX ORDER — LORAZEPAM 2 MG/ML
0.5 INJECTION INTRAMUSCULAR
Status: DISCONTINUED | OUTPATIENT
Start: 2024-03-22 | End: 2024-03-24 | Stop reason: HOSPADM

## 2024-03-22 RX ORDER — SCOLOPAMINE TRANSDERMAL SYSTEM 1 MG/1
1 PATCH, EXTENDED RELEASE TRANSDERMAL
Status: DISCONTINUED | OUTPATIENT
Start: 2024-03-22 | End: 2024-03-24 | Stop reason: HOSPADM

## 2024-03-22 RX ADMIN — SODIUM CHLORIDE, PRESERVATIVE FREE 10 ML: 5 INJECTION INTRAVENOUS at 21:54

## 2024-03-22 RX ADMIN — SODIUM CHLORIDE: 9 INJECTION, SOLUTION INTRAVENOUS at 11:04

## 2024-03-22 RX ADMIN — HEPARIN SODIUM AND DEXTROSE 7 UNITS/KG/HR: 10000; 5 INJECTION INTRAVENOUS at 02:37

## 2024-03-22 RX ADMIN — HEPARIN SODIUM 1980 UNITS: 1000 INJECTION INTRAVENOUS; SUBCUTANEOUS at 07:24

## 2024-03-22 RX ADMIN — BUMETANIDE 2 MG: 0.25 INJECTION INTRAMUSCULAR; INTRAVENOUS at 10:47

## 2024-03-22 RX ADMIN — POTASSIUM CHLORIDE 10 MEQ: 7.46 INJECTION, SOLUTION INTRAVENOUS at 00:30

## 2024-03-22 RX ADMIN — DEXTROSE MONOHYDRATE 125 ML: 100 INJECTION, SOLUTION INTRAVENOUS at 02:12

## 2024-03-22 RX ADMIN — MAGNESIUM SULFATE HEPTAHYDRATE 2000 MG: 40 INJECTION, SOLUTION INTRAVENOUS at 10:47

## 2024-03-22 RX ADMIN — SODIUM CHLORIDE, PRESERVATIVE FREE 10 ML: 5 INJECTION INTRAVENOUS at 11:06

## 2024-03-22 RX ADMIN — SODIUM CHLORIDE: 9 INJECTION, SOLUTION INTRAVENOUS at 02:35

## 2024-03-22 RX ADMIN — PIPERACILLIN AND TAZOBACTAM 3375 MG: 3; .375 INJECTION, POWDER, FOR SOLUTION INTRAVENOUS at 05:11

## 2024-03-22 RX ADMIN — PANTOPRAZOLE SODIUM 40 MG: 40 INJECTION, POWDER, FOR SOLUTION INTRAVENOUS at 10:47

## 2024-03-22 RX ADMIN — POTASSIUM CHLORIDE 10 MEQ: 7.46 INJECTION, SOLUTION INTRAVENOUS at 11:05

## 2024-03-22 RX ADMIN — MORPHINE SULFATE 2 MG: 2 INJECTION, SOLUTION INTRAMUSCULAR; INTRAVENOUS at 15:04

## 2024-03-22 ASSESSMENT — PULMONARY FUNCTION TESTS
PIF_VALUE: 21
PIF_VALUE: 20
PIF_VALUE: 21
PIF_VALUE: 20
PIF_VALUE: 21
PIF_VALUE: 20
PIF_VALUE: 20
PIF_VALUE: 23
PIF_VALUE: 21
PIF_VALUE: 20
PIF_VALUE: 21
PIF_VALUE: 21
PIF_VALUE: 20
PIF_VALUE: 20
PIF_VALUE: 21
PIF_VALUE: 20

## 2024-03-22 NOTE — FLOWSHEET NOTE
03/22/24 0758   Treatment Team Notification   Reason for Communication Evaluate   Name of Team Member Notified Dr Blood   Treatment Team Role Attending Provider   Method of Communication Secure Message   Response Waiting for response     Notified MD of patients potassium being 3.5 and Mg being 1.9.

## 2024-03-23 LAB
MICROORGANISM SPEC CULT: NORMAL
MICROORGANISM SPEC CULT: NORMAL
SERVICE CMNT-IMP: NORMAL
SERVICE CMNT-IMP: NORMAL
SPECIMEN DESCRIPTION: NORMAL
SPECIMEN DESCRIPTION: NORMAL

## 2024-03-23 PROCEDURE — 99231 SBSQ HOSP IP/OBS SF/LOW 25: CPT | Performed by: INTERNAL MEDICINE

## 2024-03-23 PROCEDURE — 2700000000 HC OXYGEN THERAPY PER DAY

## 2024-03-23 PROCEDURE — 1200000000 HC SEMI PRIVATE

## 2024-03-23 PROCEDURE — 2580000003 HC RX 258: Performed by: INTERNAL MEDICINE

## 2024-03-23 PROCEDURE — 94761 N-INVAS EAR/PLS OXIMETRY MLT: CPT

## 2024-03-23 RX ADMIN — SODIUM CHLORIDE, PRESERVATIVE FREE 10 ML: 5 INJECTION INTRAVENOUS at 09:30

## 2024-03-23 RX ADMIN — SODIUM CHLORIDE, PRESERVATIVE FREE 10 ML: 5 INJECTION INTRAVENOUS at 19:42

## 2024-03-23 NOTE — PLAN OF CARE
Problem: Chronic Conditions and Co-morbidities  Goal: Patient's chronic conditions and co-morbidity symptoms are monitored and maintained or improved  3/19/2024 0910 by Komal Alexander RN  Outcome: Progressing     Problem: Discharge Planning  Goal: Discharge to home or other facility with appropriate resources  3/19/2024 0910 by Komal Alexander RN  Outcome: Progressing     Problem: Respiratory - Adult  Goal: Achieves optimal ventilation and oxygenation  3/19/2024 0910 by Komal Alexander RN  Outcome: Progressing     Problem: Skin/Tissue Integrity  Goal: Absence of new skin breakdown  Description: 1.  Monitor for areas of redness and/or skin breakdown  2.  Assess vascular access sites hourly  3.  Every 4-6 hours minimum:  Change oxygen saturation probe site  4.  Every 4-6 hours:  If on nasal continuous positive airway pressure, respiratory therapy assess nares and determine need for appliance change or resting period.  3/19/2024 0910 by Komal Alexander, RN  Outcome: Progressing     Problem: Neurosensory - Adult  Goal: Achieves stable or improved neurological status  3/19/2024 0910 by Komal Alexander RN  Outcome: Progressing     Problem: Skin/Tissue Integrity - Adult  Goal: Incisions, wounds, or drain sites healing without S/S of infection  3/19/2024 0910 by Komal Alexander RN  Outcome: Progressing     Problem: Safety - Adult  Goal: Free from fall injury  Outcome: Progressing     
  Problem: Chronic Conditions and Co-morbidities  Goal: Patient's chronic conditions and co-morbidity symptoms are monitored and maintained or improved  3/22/2024 0054 by Tanesha Elmore RN  Outcome: Progressing  Flowsheets (Taken 3/21/2024 2000)  Care Plan - Patient's Chronic Conditions and Co-Morbidity Symptoms are Monitored and Maintained or Improved:   Monitor and assess patient's chronic conditions and comorbid symptoms for stability, deterioration, or improvement   Collaborate with multidisciplinary team to address chronic and comorbid conditions and prevent exacerbation or deterioration   Update acute care plan with appropriate goals if chronic or comorbid symptoms are exacerbated and prevent overall improvement and discharge  3/21/2024 1752 by Trell Quinonez RN  Outcome: Progressing     Problem: Discharge Planning  Goal: Discharge to home or other facility with appropriate resources  3/22/2024 0054 by Tanesha Elmore RN  Outcome: Progressing  Flowsheets (Taken 3/21/2024 2000)  Discharge to home or other facility with appropriate resources:   Identify barriers to discharge with patient and caregiver   Arrange for needed discharge resources and transportation as appropriate   Refer to discharge planning if patient needs post-hospital services based on physician order or complex needs related to functional status, cognitive ability or social support system  3/21/2024 1752 by Trell Quinonez RN  Outcome: Progressing     Problem: Respiratory - Adult  Goal: Achieves optimal ventilation and oxygenation  3/22/2024 0054 by Tanesha Elmore RN  Outcome: Progressing  3/21/2024 1752 by Trell Quinonez RN  Outcome: Progressing  3/21/2024 1106 by Abdiel Ellison RCP  Outcome: Progressing  Goal: Able to breathe comfortably  3/21/2024 1106 by Abdiel Ellison RCP  Outcome: Progressing  Goal: Adequate oxygenation  Description: Adequate oxygenation  3/21/2024 1106 by Abdiel Ellison RCP  Outcome: 
  Problem: Chronic Conditions and Co-morbidities  Goal: Patient's chronic conditions and co-morbidity symptoms are monitored and maintained or improved  3/22/2024 1404 by Zaina Branch RN  Outcome: Progressing  3/22/2024 0054 by Tanesha Elmore RN  Outcome: Progressing  Flowsheets (Taken 3/21/2024 2000)  Care Plan - Patient's Chronic Conditions and Co-Morbidity Symptoms are Monitored and Maintained or Improved:   Monitor and assess patient's chronic conditions and comorbid symptoms for stability, deterioration, or improvement   Collaborate with multidisciplinary team to address chronic and comorbid conditions and prevent exacerbation or deterioration   Update acute care plan with appropriate goals if chronic or comorbid symptoms are exacerbated and prevent overall improvement and discharge     Problem: Discharge Planning  Goal: Discharge to home or other facility with appropriate resources  3/22/2024 1404 by Zaina Branch RN  Outcome: Progressing  3/22/2024 0054 by Tanesha Elmore RN  Outcome: Progressing  Flowsheets (Taken 3/21/2024 2000)  Discharge to home or other facility with appropriate resources:   Identify barriers to discharge with patient and caregiver   Arrange for needed discharge resources and transportation as appropriate   Refer to discharge planning if patient needs post-hospital services based on physician order or complex needs related to functional status, cognitive ability or social support system     Problem: Respiratory - Adult  Goal: Achieves optimal ventilation and oxygenation  3/22/2024 1404 by Zaina Branch RN  Outcome: Progressing  3/22/2024 0722 by Celena Tran RCP  Outcome: Progressing  3/22/2024 0054 by Tanesha Elmroe RN  Outcome: Progressing  Goal: Able to breathe comfortably  3/22/2024 0722 by Celena Tran RCP  Outcome: Progressing  Goal: Adequate oxygenation  Description: Adequate oxygenation  3/22/2024 0722 by Celena Tran RCP  Outcome: 
  Problem: Chronic Conditions and Co-morbidities  Goal: Patient's chronic conditions and co-morbidity symptoms are monitored and maintained or improved  Outcome: Progressing     Problem: Discharge Planning  Goal: Discharge to home or other facility with appropriate resources  Outcome: Progressing     Problem: Respiratory - Adult  Goal: Achieves optimal ventilation and oxygenation  3/21/2024 1752 by Trell Quinonez RN  Outcome: Progressing  3/21/2024 1106 by Abdiel Ellison RCP  Outcome: Progressing  Goal: Able to breathe comfortably  3/21/2024 1106 by Abdiel Ellison RCP  Outcome: Progressing  Goal: Adequate oxygenation  Description: Adequate oxygenation  3/21/2024 1106 by Abdiel Ellison RCP  Outcome: Progressing  Goal: Will be able to breathe spontaneously, without ventilator support  Description: Will be able to breathe spontaneously, without ventilator support  3/21/2024 1106 by Abdiel Ellison RCP  Outcome: Progressing     Problem: Skin/Tissue Integrity  Goal: Absence of new skin breakdown  Description: 1.  Monitor for areas of redness and/or skin breakdown  2.  Assess vascular access sites hourly  3.  Every 4-6 hours minimum:  Change oxygen saturation probe site  4.  Every 4-6 hours:  If on nasal continuous positive airway pressure, respiratory therapy assess nares and determine need for appliance change or resting period.  Outcome: Progressing     Problem: Neurosensory - Adult  Goal: Achieves stable or improved neurological status  Outcome: Progressing     Problem: Skin/Tissue Integrity - Adult  Goal: Incisions, wounds, or drain sites healing without S/S of infection  Outcome: Progressing     Problem: Safety - Adult  Goal: Free from fall injury  Outcome: Progressing     Problem: Safety - Medical Restraint  Goal: Remains free of injury from restraints (Restraint for Interference with Medical Device)  Description: INTERVENTIONS:  1. Determine that other, less restrictive measures have been 
  Problem: Chronic Conditions and Co-morbidities  Goal: Patient's chronic conditions and co-morbidity symptoms are monitored and maintained or improved  Outcome: Progressing     Problem: Discharge Planning  Goal: Discharge to home or other facility with appropriate resources  Outcome: Progressing     Problem: Respiratory - Adult  Goal: Achieves optimal ventilation and oxygenation  Outcome: Progressing     Problem: Skin/Tissue Integrity  Goal: Absence of new skin breakdown  Description: 1.  Monitor for areas of redness and/or skin breakdown  2.  Assess vascular access sites hourly  3.  Every 4-6 hours minimum:  Change oxygen saturation probe site  4.  Every 4-6 hours:  If on nasal continuous positive airway pressure, respiratory therapy assess nares and determine need for appliance change or resting period.  Outcome: Progressing     Problem: Neurosensory - Adult  Goal: Achieves stable or improved neurological status  Outcome: Progressing     Problem: Skin/Tissue Integrity - Adult  Goal: Incisions, wounds, or drain sites healing without S/S of infection  Outcome: Progressing     Problem: Safety - Adult  Goal: Free from fall injury  Outcome: Progressing     Problem: Safety - Medical Restraint  Goal: Remains free of injury from restraints (Restraint for Interference with Medical Device)  Description: INTERVENTIONS:  1. Determine that other, less restrictive measures have been tried or would not be effective before applying the restraint  2. Evaluate the patient's condition at the time of restraint application  3. Inform patient/family regarding the reason for restraint  4. Q2H: Monitor safety, psychosocial status, comfort, nutrition and hydration  Outcome: Progressing     
  Problem: Chronic Conditions and Co-morbidities  Goal: Patient's chronic conditions and co-morbidity symptoms are monitored and maintained or improved  Outcome: Progressing  Flowsheets (Taken 3/18/2024 2000)  Care Plan - Patient's Chronic Conditions and Co-Morbidity Symptoms are Monitored and Maintained or Improved:   Monitor and assess patient's chronic conditions and comorbid symptoms for stability, deterioration, or improvement   Collaborate with multidisciplinary team to address chronic and comorbid conditions and prevent exacerbation or deterioration   Update acute care plan with appropriate goals if chronic or comorbid symptoms are exacerbated and prevent overall improvement and discharge     Problem: Discharge Planning  Goal: Discharge to home or other facility with appropriate resources  Outcome: Progressing  Flowsheets (Taken 3/18/2024 2000)  Discharge to home or other facility with appropriate resources:   Identify barriers to discharge with patient and caregiver   Arrange for needed discharge resources and transportation as appropriate   Refer to discharge planning if patient needs post-hospital services based on physician order or complex needs related to functional status, cognitive ability or social support system     Problem: Respiratory - Adult  Goal: Achieves optimal ventilation and oxygenation  3/19/2024 0425 by Tanesha Elmore RN  Outcome: Progressing  Flowsheets (Taken 3/18/2024 2000)  Achieves optimal ventilation and oxygenation:   Assess for changes in respiratory status   Assess for changes in mentation and behavior   Position to facilitate oxygenation and minimize respiratory effort   Respiratory therapy support as indicated  3/18/2024 1949 by Marli Huff RCP  Outcome: Progressing  Goal: Able to breathe comfortably  3/18/2024 1949 by Marli Huff RCP  Outcome: Progressing  Goal: Adequate oxygenation  Description: Adequate oxygenation  3/18/2024 1949 by Marli Huff 
  Problem: Chronic Conditions and Co-morbidities  Goal: Patient's chronic conditions and co-morbidity symptoms are monitored and maintained or improved  Outcome: Progressing  Flowsheets (Taken 3/20/2024 1100 by Igor Olvera)  Care Plan - Patient's Chronic Conditions and Co-Morbidity Symptoms are Monitored and Maintained or Improved: Monitor and assess patient's chronic conditions and comorbid symptoms for stability, deterioration, or improvement     Problem: Discharge Planning  Goal: Discharge to home or other facility with appropriate resources  Outcome: Progressing  Flowsheets (Taken 3/20/2024 1100 by Igor Olvera)  Discharge to home or other facility with appropriate resources: Identify barriers to discharge with patient and caregiver     Problem: Respiratory - Adult  Goal: Achieves optimal ventilation and oxygenation  Outcome: Progressing  Flowsheets (Taken 3/20/2024 0343 by Rohini Feliciano, NIKOS)  Achieves optimal ventilation and oxygenation:   Assess for changes in respiratory status   Oxygen supplementation based on oxygen saturation or arterial blood gases   Assess the need for suctioning and aspirate as needed   Respiratory therapy support as indicated     Problem: Skin/Tissue Integrity  Goal: Absence of new skin breakdown  Description: 1.  Monitor for areas of redness and/or skin breakdown  2.  Assess vascular access sites hourly  3.  Every 4-6 hours minimum:  Change oxygen saturation probe site  4.  Every 4-6 hours:  If on nasal continuous positive airway pressure, respiratory therapy assess nares and determine need for appliance change or resting period.  Outcome: Progressing     Problem: Neurosensory - Adult  Goal: Achieves stable or improved neurological status  Outcome: Progressing     Problem: Skin/Tissue Integrity - Adult  Goal: Incisions, wounds, or drain sites healing without S/S of infection  Outcome: Progressing  Flowsheets  Taken 3/20/2024 1100 by Igor Olvera  Incisions, Wounds, or 
  Problem: Dyspnea Due to End of Life  Goal: Demonstrate understanding of and ability to manage respiratory symptoms at end of life  Description: Patient  and or family/caregiver will verbalize recall of breathing strategies to maintain an effective breathing pattern during the inpatient hospice stay.        Outcome: Progressing     Problem: Fever Due to End of Life  Goal: Demonstrate understanding of and ability to manage fever at end of life  Description: Patient and/or family/caregiver will verbalize recall the ability to manage fever at end of life during the inpatient hospice stay.  Outcome: Progressing     Problem: Communication Deficit  Goal: Effectively communicate symptoms, needs, and concerns  Description: Patient  and/or family/caregiver will be able to communicate symptoms, needs, and concerns as evidenced by the use of language services during the inpatient hospice stay.    Outcome: Progressing     
  Problem: Respiratory - Adult  Goal: Achieves optimal ventilation and oxygenation  3/19/2024 2014 by Rohini Feliciano RCP  Outcome: Progressing     Problem: Respiratory - Adult  Goal: Able to breathe comfortably  Outcome: Progressing     Problem: Respiratory - Adult  Goal: Adequate oxygenation  Description: Adequate oxygenation  Outcome: Progressing     Problem: Respiratory - Adult  Goal: Will be able to breathe spontaneously, without ventilator support  Description: Will be able to breathe spontaneously, without ventilator support  Outcome: Progressing     
  Problem: Respiratory - Adult  Goal: Achieves optimal ventilation and oxygenation  3/21/2024 1106 by Abdiel Ellison, RCP  Outcome: Progressing     Problem: Respiratory - Adult  Goal: Able to breathe comfortably  Outcome: Progressing     Problem: Respiratory - Adult  Goal: Adequate oxygenation  Description: Adequate oxygenation  Outcome: Progressing     Problem: Respiratory - Adult  Goal: Will be able to breathe spontaneously, without ventilator support  Description: Will be able to breathe spontaneously, without ventilator support  Outcome: Progressing     
  Problem: Respiratory - Adult  Goal: Achieves optimal ventilation and oxygenation  3/22/2024 0722 by Celena Tran RCP  Outcome: Progressing  3/22/2024 0054 by Tanesha Elmore RN  Outcome: Progressing  3/21/2024 1752 by Trell Quinonez RN  Outcome: Progressing  Goal: Able to breathe comfortably  Outcome: Progressing  Goal: Adequate oxygenation  Description: Adequate oxygenation  Outcome: Progressing  Goal: Will be able to breathe spontaneously, without ventilator support  Description: Will be able to breathe spontaneously, without ventilator support  Outcome: Progressing     
  Problem: Respiratory - Adult  Goal: Achieves optimal ventilation and oxygenation  Outcome: Progressing     Problem: Respiratory - Adult  Goal: Able to breathe comfortably  Outcome: Progressing     Problem: Respiratory - Adult  Goal: Adequate oxygenation  Description: Adequate oxygenation  Outcome: Progressing     Problem: Respiratory - Adult  Goal: Will be able to breathe spontaneously, without ventilator support  Description: Will be able to breathe spontaneously, without ventilator support  Outcome: Progressing         PROVIDE ADEQUATE OXYGENATION WITH ACCEPTABLE SP02/ABG'S    [x]  IDENTIFY APPROPRIATE OXYGEN THERAPY  [x]   MONITOR SP02/ABG'S AS NEEDED   [x]   PATIENT EDUCATION AS NEEDED        MECHANICAL VENTILATION     [x]  PROVIDE OPTIMAL VENTILATION  [x]   ASSESS FOR EXTUBATION READINESS  [x]   ASSESS FOR WEANING READINESS  [x]  EXTUBATE AS TOLERATED  [x]  IMPLEMENT ADULT MECHANICAL VENTILATION PROTOCOL  [x]  MAINTAIN ADEQUATE OXYGENATION  [x]  PERFORM SPONTANEOUS WEANING TRIAL AS TOLERATED    
[facility]  Office: [phone]  Mandeep Mcdaniels DO, Antwan Angulo DO, Jessee Gray DO, Francisco Colindres, DO, Parris Ziegler MD, Gerri Echols MD, Katheryn Fletcher MD, Polly Linda MD,  Rod Beck MD, Meghana Townsend MD, Bentley Garcia MD,  Festus Waldron DO, Jennifer Leary MD, Dave Toth MD, Thien Mcdaniels DO, Joseline Pittman MD,  Martin Figueroa DO, Nafisa Merritt MD, Dayami Rene MD, Marcelina Gates MD, Rosalinda Goddard MD,  Srinivasa Alexis MD, Sal Chin MD, Roxann Desai MD, Titi Lyons MD, Ernst Zuniga MD, Pop Moreno MD, Gian Olmedo DO, Ten Garces DO, Christopher Reddy MD,  Vadim Goncalves MD, Shirley Waterhouse, CNP,  Meg Wong CNP, Juan Carlos Kemp, CNP,  Ivonne Hickman, DNP, Ana Morgan, CNP, Toya Jewell, CNP, Christiana Rouse CNP, Cheyanne Baptiste, CNP, Mireille Longoria, CNP, Charley Bass, PA-C, Rachel Banks, PA-C, Carleen Meehan, CNP, Ashley Mckeon, CNP, Maryuri Stokes, CNP, Radha Duenas, CNS, Marli Quintero, CNP, Christiana Stiles, CNP, Tracy Schwab, CNP         [facility]   IN-PATIENT SERVICE   [facility]    Second Visit Note  For more detailed information please refer to the progress note of the day      3/22/2024    12:27 PM    Name:   Corin Gilbert  MRN:     [id number]     Acct:      [id number]   Room:   2027/2027-01  IP Day:  4  Admit Date:  3/18/2024  3:40 PM    PCP:   Hui Tatum APRN - CNP  Code Status:  DNR-CCA      Pt vitals were reviewed   New labs were reviewed   Patient was seen    Updated plan :     D/w 5 of her 7 children-they all agree on dnrcc and terminal extubation.  They also do not want to even discuss organ procurement  Will order for terminal extubation and comfort meds  Ammy Mahajan witnessed conversation        Francisco Colindres DO  3/22/2024  12:27 PM     
medical device):   Determine that other, less restrictive measures have been tried or would not be effective before applying the restraint   Evaluate the patient's condition at the time of restraint application   Every 2 hours: Monitor safety, psychosocial status, comfort, nutrition and hydration   Inform patient/family regarding the reason for restraint   3/19/2024 2014 by Rohini Feliciano RCP  Outcome: Progressing  Flowsheets (Taken 3/19/2024 2000 by Deniz Meyer RN)  Achieves optimal ventilation and oxygenation:   Assess for changes in respiratory status   Assess for changes in mentation and behavior   Position to facilitate oxygenation and minimize respiratory effort   Oxygen supplementation based on oxygen saturation or arterial blood gases   Initiate smoking cessation protocol as indicated   Encourage broncho-pulmonary hygiene including cough, deep breathe, incentive spirometry   Assess the need for suctioning and aspirate as needed   Assess and instruct to report shortness of breath or any respiratory difficulty   Respiratory therapy support as indicated     
optimize cerebral perfusion and minimize risk of hemorrhage   Monitor temperature, glucose, and sodium. Initiate appropriate interventions as ordered  Note: EEG pending for today. Pt opens eyes and bites down on ETT tube with stimulation  3/20/2024 1613 by Bibiana Jaramillo RN  Outcome: Progressing     Problem: Skin/Tissue Integrity - Adult  Goal: Incisions, wounds, or drain sites healing without S/S of infection  3/21/2024 0316 by Rohini Martinez RN  Flowsheets (Taken 3/20/2024 2000)  Incisions, Wounds, or Drain Sites Healing Without Sign and Symptoms of Infection:   ADMISSION and DAILY: Assess and document risk factors for pressure ulcer development   TWICE DAILY: Assess and document skin integrity   TWICE DAILY: Assess and document dressing/incision, wound bed, drain sites and surrounding tissue   Implement wound care per orders   Initiate pressure ulcer prevention bundle as indicated  Note: Continuing wound care with frequent turns and triad cream  3/20/2024 1613 by Bibiana Jaramillo RN  Outcome: Progressing  Flowsheets  Taken 3/20/2024 1100 by Igor Olvera  Incisions, Wounds, or Drain Sites Healing Without Sign and Symptoms of Infection: ADMISSION and DAILY: Assess and document risk factors for pressure ulcer development  Taken 3/20/2024 0700 by Igor Olvera  Incisions, Wounds, or Drain Sites Healing Without Sign and Symptoms of Infection: ADMISSION and DAILY: Assess and document risk factors for pressure ulcer development     Problem: Safety - Adult  Goal: Free from fall injury  3/21/2024 0316 by Rohini Martinez RN  Flowsheets (Taken 3/19/2024 1900 by Deniz Meyer, RN)  Free From Fall Injury:   Instruct family/caregiver on patient safety   Based on caregiver fall risk screen, instruct family/caregiver to ask for assistance with transferring infant if caregiver noted to have fall risk factors  Note: Pt remains bedridden   3/20/2024 1613 by Bibiana Jaramillo RN  Outcome: Progressing     Problem:

## 2024-03-23 NOTE — CARE COORDINATION
Discharge planning    Call from [facility] RN and patient likely not going to meet in patient criteria for hospice but RN will review after 3 pm.  She will notify SS and see if can bring snf list to family to assist in coordinating care.

## 2024-03-23 NOTE — CARE COORDINATION
Social work; spoke to family daughter Raleigh [phone] and 3 other family members. [facility] if not yet appropriate for hospice in pt care. However, pt may or may not be able to do pt/ot in order to get precert. Left message for Help here at [facility] and with Rachna pleitez at [facility] also to see who is covering for Marli Rogers whose message indicates she will be out of the office for about a week.  Need medicaid application because with medicaid pt can be in snf with hospiice care also.   With Medicare products you can have snf or hospice not both at once.  Family indicated pt cannot pay for private pay rates.   [facility], [facility] and [facility] all sent referrals.   [facility] is the most helpful as pt's daughter is there on hospice and family could go just one place to see both family members. Not sure if snf is in [facility] network. Will fax referrals to find out.  Will need rosemary and Rx at discharge after precert is granted if snf is the final plan  Hospice does not require a rosemary.   Ivonne heredia    Social work: jair from [email or web address] called she can run an asset check to see if pt is likely to be ok for medicaid and get application going. Since help program may not have some one here this week.   Ivonne heredia

## 2024-03-23 NOTE — CARE COORDINATION
Discharge planning    Patient chart reviewed. Extubated yesterday. [facility] is following. Son Antwan is point of contact at [phone]    Will need to follow up with [facility] regarding transfer to inpatient facility.     Spoke to night shift SILVIA Keith regarding patient status. Patient has been mostly unresponsive to stimuli and sats  92% on RA> last morphine IV was at 1504 yesterday     0813  Call to hospice [phone] to inquire about plan of care but unable to get through to admissions. Will attempt again later this am.     0853  Called [facility] and spoke with Darcy. They do not have any patient listed by that name.  Will send as new referral. PS attending for hospice order.     Faxed over notes, face sheet, hospice order to hospic    1030  Call from Stephanie VEGA . [facility] was able to contact Antwan and will have  here at 1330. Once reviewed RN will remote review at 1500 to see if inpt appropriate.

## 2024-03-24 VITALS
DIASTOLIC BLOOD PRESSURE: 64 MMHG | WEIGHT: 148.3 LBS | HEART RATE: 65 BPM | OXYGEN SATURATION: 95 % | HEIGHT: 67 IN | RESPIRATION RATE: 16 BRPM | SYSTOLIC BLOOD PRESSURE: 177 MMHG | BODY MASS INDEX: 23.28 KG/M2 | TEMPERATURE: 97.3 F

## 2024-03-24 PROCEDURE — 99238 HOSP IP/OBS DSCHRG MGMT 30/<: CPT | Performed by: INTERNAL MEDICINE

## 2024-03-24 PROCEDURE — 2700000000 HC OXYGEN THERAPY PER DAY

## 2024-03-24 RX ORDER — ONDANSETRON 4 MG/1
4 TABLET, ORALLY DISINTEGRATING ORAL EVERY 8 HOURS PRN
DISCHARGE
Start: 2024-03-24

## 2024-03-24 RX ORDER — SCOLOPAMINE TRANSDERMAL SYSTEM 1 MG/1
1 PATCH, EXTENDED RELEASE TRANSDERMAL
DISCHARGE
Start: 2024-03-25

## 2024-03-24 NOTE — CARE COORDINATION
Discharge planning    O hospice reviewed patient and will be going to in patient hospice on [facility] this am at 1100.     Will have medical necessity sheet completed and brought to unit.     0700  Spoke with attending this am and updated on the plan to hospice. Requested dc order.

## 2024-03-24 NOTE — PROGRESS NOTES
Section of Cardiology  Progress Note      Date:  3/20/2024  Patient: Corin Gilbert  Admission:  3/18/2024  3:40 PM  Admit DX: Cardiac arrest (HCC) [I46.9]  Shock (HCC) [R57.9]  Chronic atrial fibrillation (HCC) [I48.20]  Cardiac arrest with ventricular fibrillation (HCC) [I46.9, I49.01]  Age:  88 y.o., 1935     LOS: 2 days     Reason for evaluation:   Cardiopulmonary arrest      SUBJECTIVE:     The patient was seen and examined. Notes and labs reviewed.    Patient remains intubated, we are switching IV amiodarone to p.o. now    OBJECTIVE:      EXAM:   Vitals:    VITALS:  BP (!) 105/44   Pulse (!) 49   Temp (!) 96.3 °F (35.7 °C) (Bladder)   Resp 16   Ht 1.702 m (5' 7\")   Wt 66 kg (145 lb 8 oz)   SpO2 100%   BMI 22.79 kg/m²    24HR INTAKE/OUTPUT:    Intake/Output Summary (Last 24 hours) at 3/20/2024 1435  Last data filed at 3/20/2024 1209  Gross per 24 hour   Intake 3626.38 ml   Output 320 ml   Net 3306.38 ml       Physical Exam  Constitutional:       Comments: Intubated   HENT:      Head:      Comments: Intubated  Cardiovascular:      Rate and Rhythm: Normal rate and rhythm      Heart sounds: Murmur heard.      Comments: S1, S2, 2/6 systolic murmur  Pulmonary:      Comments: Mechanical ventilation  Abdominal:      Palpations: Abdomen is soft.   Skin:     General: Skin is warm.     Current Inpatient Medications:   pantoprazole (PROTONIX) 40 mg in sodium chloride (PF) 0.9 % 10 mL injection  40 mg IntraVENous Daily    insulin glargine  10 Units SubCUTAneous Nightly    piperacillin-tazobactam  3,375 mg IntraVENous Q12H    amiodarone  200 mg Oral Daily    heparin (porcine)  60 Units/kg IntraVENous Once    insulin lispro  0-8 Units SubCUTAneous Q6H    sodium chloride flush  5-40 mL IntraVENous 2 times per day       IV Infusions (if any):   dextrose      heparin (PORCINE) Infusion 6 Units/kg/hr (03/20/24 1018)    sodium chloride      norepinephrine 2 mcg/min (03/20/24 1431)    EPINEPHrine Stopped 
      Section of Cardiology  Progress Note      Date:  3/22/2024  Patient: Corin Gilbert  Admission:  3/18/2024  3:40 PM  Admit DX: Cardiac arrest (HCC) [I46.9]  Shock (HCC) [R57.9]  Chronic atrial fibrillation (HCC) [I48.20]  Cardiac arrest with ventricular fibrillation (HCC) [I46.9, I49.01]  Age:  88 y.o., 1935     LOS: 4 days     Reason for evaluation:   Cardiopulmonary arrest      SUBJECTIVE:     The patient was seen and examined. Notes and labs reviewed.    Patient remains unresponsive and family meeting is expected later today    OBJECTIVE:      EXAM:   Vitals:    VITALS:  BP (!) 135/47   Pulse 52   Temp 98 °F (36.7 °C) (Temporal)   Resp 10   Ht 1.702 m (5' 7\")   Wt 72.6 kg (160 lb)   SpO2 99%   BMI 25.06 kg/m²    24HR INTAKE/OUTPUT:    Intake/Output Summary (Last 24 hours) at 3/22/2024 1334  Last data filed at 3/22/2024 0634  Gross per 24 hour   Intake 1403.52 ml   Output 800 ml   Net 603.52 ml       Physical Exam  Constitutional:       Comments: Intubated   HENT:      Head:      Comments: Intubated  Cardiovascular:      Rate and Rhythm: Normal rate and rhythm      Heart sounds: Murmur heard.      Comments: S1, S2, 2/6 systolic murmur  Pulmonary:      Comments: Mechanical ventilation  Abdominal:      Palpations: Abdomen is soft.   Skin:     General: Skin is warm.     Current Inpatient Medications:   amiodarone  100 mg Oral Daily    pantoprazole (PROTONIX) 40 mg in sodium chloride (PF) 0.9 % 10 mL injection  40 mg IntraVENous Daily    insulin glargine  10 Units SubCUTAneous Nightly    piperacillin-tazobactam  3,375 mg IntraVENous Q12H    insulin lispro  0-8 Units SubCUTAneous Q6H    sodium chloride flush  5-40 mL IntraVENous 2 times per day       IV Infusions (if any):   vasopressin 20 Units in sodium chloride 0.9 % 100 mL infusion Stopped (03/21/24 7446)    sodium chloride      dextrose      heparin (PORCINE) Infusion 9 Units/kg/hr (03/22/24 7347)    phenylephrine (MISTY-SYNEPHRINE) 50 mg in 
  PALLIATIVE CARE PROGRESS NOTE     NAME:  Corin Gilbert  MEDICAL RECORD NUMBER:  [id number]  AGE: 88 y.o.   GENDER: female  : 1935  TODAY'S DATE:  3/21/2024  Room:     Reason For Consult:  Goals of care evaluation  Distress management  Symptom Management  Guidance and support  Facilitate communications  Assistance in coordinating care  Recommendations for the above    Plan      Palliative Interaction:    I rounded on the patient this morning. No family present at this time. Patient's RN at bedside and provided update. Patient opening eyes more but not tracking. Planning to have family meeting tomorrow once son arrives from [place].    IMPRESSION/ PLAN  Symptom management/pain control    We feel the patient's symptoms are being controlled. Their current regimen has been reviewed by myself and discussed with the staff. We recommend adjusting their medications as follows:    Goals of care evaluation  The patient goals of care are support for family/caregiver  Long discussion to ensure the patient's and family's understanding of goals of care, and theconcept of palliative care.    Code Status:  DNR-CCA    Other Recommendations -       History of Present Illness     HISTORY OF PRESENT ILLNESS:   The patient is a 88 y.o. female who presented to [facility] as a witnessed cardiac arrest at home. Patient's family started CPR and was continued with EMS. Patient was in vfib/PEA with down time 10-30 minutes. Patient arrested with EMS in transport with an additional 10-30 minutes of downtime. Patient was in a-fib RVR after ROSC. Patient had CT head which showed chronic small vessel ischemia. Patient was recently admitted for pneumonia and pulmonary embolism. Neurology was consulted - planning for EEG. Patient has known history of dementia, CAD, COPD, DM, sleep apnea, pulmonary hypertension. Nephrology following for worsening kidney function.     Palliative care consulted for goals of care.    
 Latest Reference Range & Units 03/19/24 22:00   Hemoglobin Quant 11.9 - 15.1 g/dL 6.8 (LL)   M Winter KENNETH paged with results. See orders for interventions. Care ongoing.  
 Latest Reference Range & Units 03/20/24 00:51   Anti-XA Unfrac Heparin 0.30 - 0.70 IU/L >1.10 ()   Gtt changed per protocol. No need to call critical value at this time. See MAR for interventions    
 Latest Reference Range & Units 03/20/24 04:08   POC HCO3 21.0 - 28.0 mmol/L 22.0   POC O2 SAT 94.0 - 98.0 % 99.3 (H)   POC pCO2 35.0 - 48.0 mm Hg 34.8 (L)   POC pH 7.350 - 7.450  7.410   POC PO2 83.0 - 108.0 mm Hg 147.6 (H)   Gas given to writer. FiO2 decreased to 30. Care ongoing.   
 spoke with patient's daughter and numerous family members both in patient's room and in ICU family waiting area. Patient extubated about four hours ago but still has good vitals. Patient may need to go on Hospice. Patient appears comfortable and family sharing memories and coping well. Family has not chosen a  home yet but  has informed them that they need to be thinking about the decision. Family does not wish for any organs to be donated and discussed this topic earlier with Dr. Colindres.  will make weekend  staff aware of patient's status.     24 1838   Encounter Summary   Service Provided For: Patient   Referral/Consult From: Palliative Care   Support System Children;Family members;Friends/neighbors   Last Encounter  24   Complexity of Encounter Moderate   Begin Time 1810   End Time  1900   Total Time Calculated 50 min   Spiritual/Emotional needs   Type Spiritual Support   Grief, Loss, and Adjustments   Type Grief and loss;End of Life;Bereavement Care   Palliative Care   Type Palliative Care, Family Care   Assessment/Intervention/Outcome   Assessment Calm;Coping;Interrupted family processes   Intervention Active listening;Explored/Affirmed feelings, thoughts, concerns;Explored Coping Skills/Resources;Nurtured Hope;Sustaining Presence/Ministry of presence   Outcome Comfort;Connection/Belonging;Coping;Engaged in conversation;Expressed feelings, needs, and concerns;Grieving;Receptive       
4 Eyes Skin Assessment     NAME:  Corin Gilbert  YOB: 1935  MEDICAL RECORD NUMBER:  [id number]    The patient is being assessed for  Admission    I agree that at least one RN has performed a thorough Head to Toe Skin Assessment on the patient. ALL assessment sites listed below have been assessed.      Areas assessed by both nurses:    Head, Face, Ears, Shoulders, Back, Chest, Arms, Elbows, Hands, Sacrum. Buttock, Coccyx, Ischium, and Legs. Feet and Heels        Does the Patient have a Wound? Yes wound(s) were present on assessment. LDA wound assessment was Initiated and completed by RN       Miles Prevention initiated by RN: Yes  Wound Care Orders initiated by RN: Yes    Pressure Injury (Stage 3,4, Unstageable, DTI, NWPT, and Complex wounds) if present, place Wound referral order by RN under : No    New Ostomies, if present place, Ostomy referral order under : No     Nurse 1 eSignature: Electronically signed by Tanesha Elmore RN on 3/19/24 at 4:30 AM EDT    **SHARE this note so that the co-signing nurse can place an eSignature**    Nurse 2 eSignature: Electronically signed by Cheyanne Noonan RN on 3/19/24 at 4:31 AM EDT    
ADMISSION NOTE         Patient admitted to room: 2027     Time of admit: 1930     Admit from: er     Reason for admission: Cardiac arrest     Where patient has been residing for the last 24 hrs: home      Has the patient been admitted to any facility in the last 4 weeks, which one:  no      Family at bedside: Yes     Patient is currently: resting in bed comfortably, vitals obtained, telemetry placed on pt. No distress noted. Patient has been oriented to room, educated on how to use call light, and to call for assistance prior to getting up. Bed in lowest and locked position. 2 siderails up for safety. Call light within reach.         
Active problem Anoxic encephalopathy  . Cardiac arrest . Dementia . The condition is EEG severe diffuse slowing with generalized sharp wave discharges . She is comatose on ventilator with decreased muscle tone . Minimal oculocephalic response .No corneal reflex . Pupils 2 mm minor reaction to light . 89 yo lady with cardiac arrest . Patient had witnessed cardiac arrest at home with initial CPR started by family continued by EMS in Vfib /PEA with arrest time described from 10 to 30 minutes . Patient had another arrest on transit continued in ER for described 10 to 30 minutes . Patient post arrest in atrial fibrillation with RVR . Head CT with generalized atrophy with chronic periventricular small vessel ischemia . She has remained unresponsive on ventilator . Sodium on admission 149. BUN/creatinine 29/1.4 . There is aspiration pneumonia on zozyn and vancomcin . Patient has history of dementia on namenda .There had been recent admission with pneumonia and pulmonary embolus .  Testing Head CT with generalized atrophy with chronic periventricular small vessel ischemia . CT cervical spine advanced multilevel degenerative changes. EEG severe diffuse slowing with generalized sharp wave discharges      Past Medical History:   Diagnosis Date    Anxiety     Arthritis     Bronchitis     CAD (coronary artery disease)     Carotid arterial disease (HCC)     COPD (chronic obstructive pulmonary disease) (HCC)     Diabetes mellitus (HCC)     Hyperlipidemia     Hypertension     Mitral regurgitation     Myalgia     Pulmonary hypertension (HCC)     Shingles 2018    left facial area and involved eye    Sleep apnea     Syncope and collapse     UTI (urinary tract infection) 10/20/2022    Hospitalized.  d/c 10/24  [facility]       Past Surgical History:   Procedure Laterality Date    ABDOMEN SURGERY      CATARACT REMOVAL WITH IMPLANT Right      SECTION      COLECTOMY      COLONOSCOPY      HIP ARTHROPLASTY      3 on the 
Attempted ABG x3 without success, Dr Gomez notified  
BS per glucometer 63, hypoglycemia protocol followed  
Bilateral wrist restraints removed. Pt does not move extremities  
Comprehensive Nutrition Assessment    Type and Reason for Visit:  Initial, Positive Nutrition Screen    Nutrition Recommendations/Plan:   NPO      Malnutrition Assessment:  Malnutrition Status:  No malnutrition (03/22/24 1308)    Context:        Findings of the 6 clinical characteristics of malnutrition:  Energy Intake:     Weight Loss:        Body Fat Loss:        Muscle Mass Loss:       Fluid Accumulation:        Strength:       Nutrition Assessment:    Patient had witnessed cardiac arrest at home with initial CPR started by family continued by EMS in Vfib /PEA with arrest time described from 10 to 30 minutes . Patient had another arrest on transit continued in ER for described 10 to 30 minutes . Patient post arrest in atrial fibrillation with RVR, Seen by this writer for positive nutrition screen however, family decided earlier today she will be terminally extubated. Dietary to sign off.    Nutrition Related Findings:    none Wound Type: None       Current Nutrition Intake & Therapies:    Average Meal Intake: NPO  Average Supplements Intake: NPO  Diet NPO    Anthropometric Measures:  Height: 170.2 cm (5' 7\")  Ideal Body Weight (IBW): 135 lbs (61 kg)       Current Body Weight: 72.6 kg (160 lb), 118.5 % IBW. Weight Source: Bed Scale  Current BMI (kg/m2): 25.1        Weight Adjustment For: No Adjustment                 BMI Categories: Overweight (BMI 25.0-29.9)    Estimated Daily Nutrient Needs:  Energy Requirements Based On: Kcal/kg  Weight Used for Energy Requirements: Current  Energy (kcal/day): 8272-5008 kcals per day  Weight Used for Protein Requirements: Ideal  Protein (g/day): 74-80 grams per day  Method Used for Fluid Requirements: 1 ml/kcal  Fluid (ml/day): 7326-8441 ml per day    Nutrition Diagnosis:   No nutrition diagnosis at this time related to   as evidenced by      Nutrition Interventions:   Food and/or Nutrient Delivery: Continue NPO  Nutrition Education/Counseling: Education not 
Dr Kent at bedside, sedation turned off. Pt remains stable at this time.  
ELIJAH Bahena MD paged with HR maintaining in mid 40s. At this time message unread and awaiting response. Care ongoing.     0630 Update: Per Dr Bahena cont POC at this time.   
ET withdrawn 2cm to the 23cm marking at the lip after x-ray amd report viewed..  
End Of Shift Note  [facility]  Summary of shift:   No acute events overnight. Pt remained unresponsive besides some eye lid fluttering and jaw clenching. Bilateral wrist restraints removed. Remained on low dose vasopressors throughout shift. Minimal urine output. Family meeting planned with palliative tomorrow, 3/22.     Vitals:    Vitals:    03/21/24 0400 03/21/24 0500 03/21/24 0600 03/21/24 0612   BP: (!) 134/43      Pulse: (!) 45 (!) 44 (!) 44 (!) 42   Resp: 12 12 14 14   Temp: (!) 96.1 °F (35.6 °C)      TempSrc: Bladder      SpO2: 100% 100% 100% 100%   Weight:       Height:          I&O:   Intake/Output Summary (Last 24 hours) at 3/21/2024 0617  Last data filed at 3/21/2024 0547  Gross per 24 hour   Intake 1807.86 ml   Output 355 ml   Net 1452.86 ml     Resp Status: Resp even and unlabored on vent. Lungs CTA with diminished bases bilaterally    Ventilator Settings:  Vent Mode: AC/PRVC Resp Rate (Set): 14 bpm/Vt (Set, mL): 450 mL/ /FiO2 : 30 %    Critical Care IV infusions:   dextrose      heparin (PORCINE) Infusion 7 Units/kg/hr (03/21/24 0547)    phenylephrine (MISTY-SYNEPHRINE) 50 mg in sodium chloride 0.9 % 250 mL infusion Stopped (03/21/24 0502)    sodium chloride      norepinephrine Stopped (03/20/24 1911)    EPINEPHrine Stopped (03/18/24 2107)    sodium chloride 5 mL/hr at 03/20/24 0515    vasopressin 20 Units in sodium chloride 0.9 % 100 mL infusion 0.04 Units/min (03/21/24 0547)    midazolam Stopped (03/20/24 0515)    fentaNYL 25 mcg/hr (03/21/24 0547)      LDA:   CVC Triple Lumen 03/18/24 Right Femoral (Active)   Number of days: 2       Peripheral IV 03/18/24 Left;Proximal Forearm (Active)   Number of days: 3       Urinary Catheter 03/18/24 2 Way (Active)   Number of days: 3       ETT  (Active)   Number of days: 2       Arterial Line 03/18/24 Left Femoral (Active)   Number of days: 2       Wound 03/18/24 Buttocks (Active)   Number of days: 2      Rohini Martinez RN  
End Of Shift Note  [facility]  Summary of shift:   Patient code status was changed to DNR-CC.   Patient was extubated at 1504.   Patient was provided with prn morphine to control pain once.   Patient is resting comfortably at this time.   RN notified palliative on patients status, currently vital signs are stable.   RN will have to contact  in the morning to possibly set up hospice. RN updated family, they are very understanding and have some possible hospice centers they were wanting patient to go to if patient remains stable. Family in the room stated they would like patients son Antwan to be contacted regarding a meeting with hospice and if the patient starts to decline overnight. Antwan phone number to contact him is [phone].     Vitals:    Vitals:    03/22/24 1200 03/22/24 1305 03/22/24 1534 03/22/24 1600   BP: (!) 135/47   (!) 136/49   Pulse: 52   62   Resp: 14  16 16   Temp: 98 °F (36.7 °C)      TempSrc: Temporal      SpO2: 99%   92%   Weight:       Height:  1.702 m (5' 7\")          I&O:   Intake/Output Summary (Last 24 hours) at 3/22/2024 1829  Last data filed at 3/22/2024 1600  Gross per 24 hour   Intake 1403.52 ml   Output 1850 ml   Net -446.48 ml       Resp Status: Patient on room air.     Ventilator Settings:  Vent Mode: AC/PRVC Resp Rate (Set): 14 bpm/Vt (Set, mL): 450 mL/ /FiO2 : 30 %    Critical Care IV infusions:   dextrose      sodium chloride 10 mL/hr at 03/22/24 1104        LDA:   CVC Triple Lumen 03/18/24 Right Femoral (Active)   Number of days: 3       Peripheral IV 03/18/24 Left;Proximal Forearm (Active)   Number of days: 4       Rectal Tube (Active)   Number of days: 1       Urinary Catheter 03/18/24 2 Way (Active)   Number of days: 4       Arterial Line 03/18/24 Left Femoral (Active)   Number of days: 3       Wound 03/18/24 Buttocks (Active)   Number of days: 3          
End Of Shift Note  [facility]  Summary of shift: Patient had eventful shift. Neurology, wound care, palliative, and nephro consulted. Urine output low, not a candidate for diuretics at this time per neph, HD discussed with family and in agreement to not do at this time.    Neurology ordered EEG planning for tomorrow.     Per wound care, q2 turns with wedges, triad cream, and NO foams.     Patient had small cough and gag once this shift during OG/NG placement. Still no response to voice or pain. Pupils sluggish response. Does intermittently open eyes but no tracking.     Multiple attempts made to place NG/OG and xrays continued to come back to advance tube. Could not advance any further, so must be coiling somewhere.     Amio and versed off. Fentanyl restarted at shift change. K and mag replaced.    Heparin drip still running, not therapeutic and next ptt 2145.    Vitals:    Vitals:    03/20/24 1730 03/20/24 1745 03/20/24 1800 03/20/24 1900   BP:   (!) 143/43 (!) 155/39   Pulse: 57 57 57 58   Resp: 14 14 14 14   Temp:       TempSrc:       SpO2: 100% 100% 100% 100%   Weight:       Height:            I&O:   Intake/Output Summary (Last 24 hours) at 3/20/2024 1949  Last data filed at 3/20/2024 1947  Gross per 24 hour   Intake 3341.32 ml   Output 320 ml   Net 3021.32 ml       Resp Status: Vented    Ventilator Settings:  Vent Mode: AC/PRVC Resp Rate (Set): 14 bpm/Vt (Set, mL): 450 mL/ /FiO2 : 30 %    Critical Care IV infusions:   dextrose      heparin (PORCINE) Infusion 8 Units/kg/hr (03/20/24 1947)    phenylephrine (MISTY-SYNEPHRINE) 50 mg in sodium chloride 0.9 % 250 mL infusion 10 mcg/min (03/20/24 1948)    sodium chloride      norepinephrine Stopped (03/20/24 1911)    EPINEPHrine Stopped (03/18/24 2107)    sodium chloride 5 mL/hr at 03/20/24 0515    vasopressin 20 Units in sodium chloride 0.9 % 100 mL infusion 0.04 Units/min (03/20/24 1947)    midazolam Stopped (03/20/24 0515)    fentaNYL 25 mcg/hr (03/20/24 1947)    
End Of Shift Note  [facility]  Summary of shift: Pt had an uneventful night. Comfort care continues. No pain medication overnight. Adequate output. Pt comfortable - evidenced by stability of vitals, lack of grimacing, dyspnea, or guarding/movement. Plan for [facility] to [facility] at 11am.  Vitals:    Vitals:    03/24/24 0300 03/24/24 0400 03/24/24 0500 03/24/24 0600   BP:  (!) 162/60     Pulse: 59 66 62 58   Resp: 18 19 19 18   Temp:  97.1 °F (36.2 °C)     TempSrc:  Temporal     SpO2:  97%     Weight:  67.3 kg (148 lb 4.8 oz)     Height:            I&O:   Intake/Output Summary (Last 24 hours) at 3/24/2024 0615  Last data filed at 3/23/2024 0800  Gross per 24 hour   Intake --   Output 375 ml   Net -375 ml       Resp Status: 2L NC    Ventilator Settings:  Vent Mode: AC/PRVC Resp Rate (Set): 14 bpm/Vt (Set, mL): 450 mL/ /FiO2 : 30 %    Critical Care IV infusions:   dextrose      sodium chloride 10 mL/hr at 03/22/24 1104        LDA:   Peripheral IV 03/18/24 Left;Proximal Forearm (Active)   Number of days: 6       Rectal Tube (Active)   Number of days: 2       Urinary Catheter 03/18/24 2 Way (Active)   Number of days: 6       Wound 03/18/24 Buttocks (Active)   Number of days: 5          
End Of Shift Note  [facility]  Summary of shift: Pt had an uneventful night. Comfort care continues. No pain medication overnight. Adequate output. Pt comfortable - evidenced by stability of vitals, lack of grimacing, dyspnea, or guarding/movement. Plan for possible discharge to [facility] per family. Writer to notify day RN of these plans.    Vitals:    Vitals:    03/23/24 0200 03/23/24 0300 03/23/24 0311 03/23/24 0400   BP:       Pulse: 57 59 61 61   Resp: 16 14 16 15   Temp:       TempSrc:       SpO2: 94% 93% 92% 90%   Weight:       Height:            I&O:   Intake/Output Summary (Last 24 hours) at 3/23/2024 0601  Last data filed at 3/23/2024 0400  Gross per 24 hour   Intake 1055.19 ml   Output 2425 ml   Net -1369.81 ml       Resp Status: RA    Ventilator Settings:  Vent Mode: AC/PRVC Resp Rate (Set): 14 bpm/Vt (Set, mL): 450 mL/ /FiO2 : 30 %    Critical Care IV infusions:   dextrose      sodium chloride 10 mL/hr at 03/22/24 1104        LDA:   CVC Triple Lumen 03/18/24 Right Femoral (Active)   Number of days: 4       Peripheral IV 03/18/24 Left;Proximal Forearm (Active)   Number of days: 5       Rectal Tube (Active)   Number of days: 1       Urinary Catheter 03/18/24 2 Way (Active)   Number of days: 5       Arterial Line 03/18/24 Left Femoral (Active)   Number of days: 4       Wound 03/18/24 Buttocks (Active)   Number of days: 4          
End Of Shift Note  [facility]  Summary of shift: Pt night was uneventful, vaso has been weaned off. Pt still does not respond. I unit of PRC given, HBG 8.3 on recheck. Family meeting today around noon. Stooling has slowed down. Eye movements noted not to command, does not track.    Vitals:    Vitals:    03/22/24 0345 03/22/24 0400 03/22/24 0415 03/22/24 0519   BP:       Pulse: (!) 49 (!) 49 (!) 48 (!) 48   Resp: (!) 0 (!) 0 (!) 0 (!) 0   Temp:  97.7 °F (36.5 °C)     TempSrc:  Bladder     SpO2: 100% 100% 100% 100%   Weight:       Height:            I&O:   Intake/Output Summary (Last 24 hours) at 3/22/2024 0528  Last data filed at 3/22/2024 0519  Gross per 24 hour   Intake 650.26 ml   Output 800 ml   Net -149.74 ml       Resp Status: On vent, Lungs diminished    Ventilator Settings:  Vent Mode: AC/PRVC Resp Rate (Set): 14 bpm/Vt (Set, mL): 450 mL/ /FiO2 : 30 %    Critical Care IV infusions:   vasopressin 20 Units in sodium chloride 0.9 % 100 mL infusion Stopped (03/21/24 2355)    sodium chloride      dextrose      heparin (PORCINE) Infusion 7 Units/kg/hr (03/22/24 0237)    phenylephrine (MISTY-SYNEPHRINE) 50 mg in sodium chloride 0.9 % 250 mL infusion Stopped (03/21/24 0502)    sodium chloride      norepinephrine Stopped (03/20/24 1911)    EPINEPHrine Stopped (03/18/24 2107)    sodium chloride 10 mL/hr at 03/22/24 0235    midazolam Stopped (03/20/24 0515)    fentaNYL 25 mcg/hr (03/21/24 1201)        LDA:   CVC Triple Lumen 03/18/24 Right Femoral (Active)   Number of days: 3       Peripheral IV 03/18/24 Left;Proximal Forearm (Active)   Number of days: 4       Rectal Tube (Active)   Number of days: 0       Urinary Catheter 03/18/24 2 Way (Active)   Number of days: 4       ETT  (Active)   Number of days: 3       Arterial Line 03/18/24 Left Femoral (Active)   Number of days: 3       Wound 03/18/24 Buttocks (Active)   Number of days: 3          
End Of Shift Note  [facility]  Summary of shift: new admit, witnessed cardiac arrest, unresponsive, no cough, no gag, no response to pain, multiple loose stools, specimen sent, pt remains on amio, vaso, and levo. Pt received albumen x2 has CVC in rt groin and ART line in Left groin. Gray with lots of sediment and FMS placed. Lots of family initially. Pt is DNR-CCA    Vitals:    Vitals:    03/19/24 0555 03/19/24 0600 03/19/24 0615 03/19/24 0630   BP:  93/60     Pulse: 92 92 93 93   Resp: (!) 0 (!) 0 (!) 0 (!) 0   Temp:       TempSrc:       SpO2:       Weight:       Height:            I&O:   Intake/Output Summary (Last 24 hours) at 3/19/2024 0644  Last data filed at 3/19/2024 0642  Gross per 24 hour   Intake 2074.42 ml   Output 180 ml   Net 1894.42 ml       Resp Status: vent, Lung sounds Rhonchi    Ventilator Settings:  Vent Mode: AC/PRVC Resp Rate (Set): 14 bpm/Vt (Set, mL): 450 mL/ /FiO2 : 55 %    Critical Care IV infusions:   amiodarone (CORDARONE) 450 mg in dextrose 5 % 250 mL infusion 1 mg/min (03/19/24 0642)    norepinephrine 4 mcg/min (03/19/24 0642)    EPINEPHrine Stopped (03/18/24 2107)    sodium chloride Stopped (03/19/24 0632)    dextrose 5 % and 0.45 % NaCl 75 mL/hr at 03/19/24 0642    vasopressin 20 Units in sodium chloride 0.9 % 100 mL infusion 0.04 Units/min (03/19/24 0642)    midazolam 2 mg/hr (03/19/24 0642)    fentaNYL 25 mcg/hr (03/19/24 0642)        LDA:   CVC Triple Lumen 03/18/24 Right Femoral (Active)   Number of days: 0       Peripheral IV 03/18/24 Left;Proximal Forearm (Active)   Number of days: 1       Urinary Catheter 03/18/24 2 Way (Active)   Number of days: 1       Fecal Management System 03/18/24 (Active)   Number of days: 0       ETT  (Active)   Number of days: 0       Arterial Line 03/18/24 Left Femoral (Active)   Number of days: 0       Wound 03/18/24 Buttocks (Active)   Number of days: 0          
[facility] just called to notify that the patient has been accepted into the in-pt facility. Plan is to transport tomorrow at 1100 AM. Family is aware. Will notify discharge and social work team in the AM.   
I attended the patient during the first 15 minutes of the blood transfusion and did not recognize a potential blood reaction.    
In House KENNETH on unit rounding on patient. Address low HUOP from 10-15 and 2.5L postive fluid status. Per KENNETH, reassess with morning CXR to see if patient is fluid overloaded and notify critical care to see if Neph Consult would be beneficial. Care ongoing at this time.     
Informed by RN and RT the patient was unstable/had a run of V. tach so CT abdomen chest pelvis and brain were postponed patient arrived to ICU.  Patient now is A-fib RVR heart rate 120/min on pressors with epinephrine reduced to 10 from 15.      Mark Kent MD  Pulmonary critical care and sleep    
Lab called for T&S at this time. Care ongoing   
[facility]   Pharmacy Pharmacokinetic Monitoring Service - Vancomycin    Consulting Provider: Dr. Kent    Indication: PNA  Target Concentration: Goal AUC/BESSY 400-600 mg*hr/L  Day of Therapy: 3  Additional Antimicrobials: zosyn     Pertinent Laboratory Values:   Wt Readings from Last 1 Encounters:   03/20/24 66 kg (145 lb 8 oz)     Temp Readings from Last 1 Encounters:   03/20/24 (!) 96.4 °F (35.8 °C)     Estimated Creatinine Clearance: 19 mL/min (A) (based on SCr of 2.1 mg/dL (H)).  Recent Labs     03/19/24  0525 03/19/24  0925 03/20/24  0600   CREATININE 1.7*  --  2.1*   BUN 35*  --  35*   WBC 28.2* 26.8* 21.2*       Pertinent Cultures:  Culture Date Source Results   3/18 Blood x2 NGTD   3/18 Urine  Pending    MRSA Nasal Swab: was ordered by provider, awaiting results.    Recent vancomycin administrations                     vancomycin (VANCOCIN) 500 mg in sodium chloride 0.9 % 100 mL IVPB (mini-bag) (mg) 500 mg New Bag 03/19/24 2003    vancomycin (VANCOCIN) 2,000 mg in sodium chloride 0.9 % 500 mL IVPB (mg) 2,000 mg New Bag 03/18/24 1955                    Assessment:  Date/Time Current Dose Concentration Timing of Concentration (h) AUC   3/20 500 mg IV q24h 15.7 9h 57m 494   Note: Serum concentrations collected for AUC dosing may appear elevated if collected in close proximity to the dose administered, this is not necessarily an indication of toxicity    Plan:  Current dosing regimen is therapeutic  Continue current dose  Repeat vancomycin concentration will be ordered in ~1 week if therapy cont. or sooner if renal function changes.   Pharmacy will continue to monitor patient and adjust therapy as indicated    Thank you for the consult,  Teresa Rodríguez RPH  3/20/2024 6:54 AM    
[facility] Nurse  Consult Note       NAME:  Corin Gilbert  MEDICAL RECORD NUMBER:  [id number]  AGE: 88 y.o.   GENDER: female  : 1935  TODAY'S DATE:  3/20/2024    Subjective:      Corin Gilbert is a 88 y.o. female with inpatient referral to [facility] for:  \"Coccyx wound\"      Wound Identification:  Wound Type: pressure  Contributing Factors: chronic pressure and decreased mobility    Wound History: consult received for coccyx wound. Patient currently intubated, unable to meaningfully participate in history.   Current Wound Care Treatment:  Triad in use at time of [facility] nurse assessment    Patient Goal of Care:  [x] Wound Healing  [] Odor Control  [] Palliative Care  [] Pain Control   [] Other:         PAST MEDICAL HISTORY        Diagnosis Date    Anxiety     Arthritis     Bronchitis     CAD (coronary artery disease)     Carotid arterial disease (HCC)     COPD (chronic obstructive pulmonary disease) (HCC)     Diabetes mellitus (HCC)     Hyperlipidemia     Hypertension     Mitral regurgitation     Myalgia     Pulmonary hypertension (HCC)     Shingles 2018    left facial area and involved eye    Sleep apnea     Syncope and collapse     UTI (urinary tract infection) 10/20/2022    Hospitalized.  d/c 10/24  [facility]       PAST SURGICAL HISTORY    Past Surgical History:   Procedure Laterality Date    ABDOMEN SURGERY      CATARACT REMOVAL WITH IMPLANT Right      SECTION      COLECTOMY      COLONOSCOPY      HIP ARTHROPLASTY      3 on the left and 1 on the right    HYSTERECTOMY (CERVIX STATUS UNKNOWN)      INSERTABLE CARDIAC MONITOR  2018    [employer]    JOINT REPLACEMENT Right     TOTAL KNEE    JOINT REPLACEMENT Bilateral     right x2, left x3 hips    KNEE SURGERY      REVISION TOTAL HIP ARTHROPLASTY Left 2022    [facility]       FAMILY HISTORY    Family History   Problem Relation Age of Onset    Cancer Mother     Hypertension Maternal Aunt  
Nephrology Progress Note      SUBJECTIVE      Patient seen and examined.  Patient continues in sinus bradycardia with heart rate in the low 50s.  Systolic blood pressure 110s to 120s.  Patient is off all vasopressors.  Patient is intubated and on the ventilator with FiO2 of 30%.  Family meeting today  Creatinine is 2.3 today compared to 2.2 yesterday with urine output increased after 1 dose of IV Bumex.  With a total of 800 mL documented yesterday.  Potassium today was 3.5 and magnesium 1.9 with IV supplementation ordered   Unable to pass NG tube, per the notes and nursing staff.  Family meeting scheduled for later today regarding plan of care.    OBJECTIVE      CURRENT TEMPERATURE:  Temp: 97.8 °F (36.6 °C)  MAXIMUM TEMPERATURE OVER 24HRS:  Temp (24hrs), Av.7 °F (36.5 °C), Min:96.8 °F (36 °C), Max:98.6 °F (37 °C)    CURRENT RESPIRATORY RATE:  Respirations: 14  CURRENT PULSE:  Pulse: 52  CURRENT BLOOD PRESSURE:  BP: (!) 115/41  24HR BLOOD PRESSURE RANGE:  Systolic (24hrs), Av , Min:115 , Max:175   ; Diastolic (24hrs), Av, Min:40, Max:87    24HR INTAKE/OUTPUT:    Intake/Output Summary (Last 24 hours) at 3/22/2024 0922  Last data filed at 3/22/2024 0634  Gross per 24 hour   Intake 1562.62 ml   Output 800 ml   Net 762.62 ml     WEIGHT :Patient Vitals for the past 96 hrs (Last 3 readings):   Weight   24 0323 72.6 kg (160 lb)   24 0000 72.8 kg (160 lb 9.6 oz)   24 2304 65.8 kg (145 lb)     PHYSICAL EXAM      GENERAL APPEARANCE: Patient intubated, not following any commands.    SKIN: warm and dry, no rash or erythema  ENT: Oral endotracheal tube in place  NECK:   No JVD.  Trachea is midline and no accessory muscle use  PULMONARY: Bilateral air entry with equal breath sounds  CARDIOVASCULAR: Bradycardic, regular rate and rhythm with positive S1 and S2 and no rubs   ABDOMEN: soft and not distended with positive bowel sounds, no obvious ascites  EXTREMITIES: Mild peripheral edema    CURRENT 
Nephrology Progress Note      SUBJECTIVE      Patient seen and examined.  She is still on vasopressin, off Levophed.  Patient is intubated.  Neurology note reviewed, discussed with Dr. Olson in person as well.  Apparently poor prognosis from neurological standpoint pending EEG.  She did suffer an SPENCER from ischemic ATN.  Her urine output continues to be poor .  Serum creatinine yesterday was 2.1, creatinine was 2.2 today.  Potassium is 3.4 today.  In addition to the vasopressin patient has fentanyl and Versed and heparin.  Per nursing, family is leaning towards terminal wean possibly tomorrow, again pending EEG results.      OBJECTIVE      CURRENT TEMPERATURE:  Temp: (!) 96.1 °F (35.6 °C)  MAXIMUM TEMPERATURE OVER 24HRS:  Temp (24hrs), Av.9 °F (36.1 °C), Min:96.1 °F (35.6 °C), Max:97.9 °F (36.6 °C)    CURRENT RESPIRATORY RATE:  Respirations: 14  CURRENT PULSE:  Pulse: (!) 47  CURRENT BLOOD PRESSURE:  BP: (!) 114/99  24HR BLOOD PRESSURE RANGE:  Systolic (24hrs), Av , Min:80 , Max:155   ; Diastolic (24hrs), Av, Min:38, Max:99    24HR INTAKE/OUTPUT:    Intake/Output Summary (Last 24 hours) at 3/21/2024 1029  Last data filed at 3/21/2024 0547  Gross per 24 hour   Intake 765.13 ml   Output 330 ml   Net 435.13 ml     WEIGHT :Patient Vitals for the past 96 hrs (Last 3 readings):   Weight   24 0300 66 kg (145 lb 8 oz)   24 1051 81.6 kg (180 lb)   24 1619 81.6 kg (180 lb)     PHYSICAL EXAM      GENERAL APPEARANCE: Patient intubated, not following any commands.  On vasopressin.  SKIN: warm and dry, no rash or erythema  EYES: conjunctivae pale and sclera anicteric  ENT: no thrush no pharyngeal congestion   NECK:   No JVD. No carotid bruits and no carotid lymphadenopathy .  PULMONARY: lungs are clear to auscultation. No Wheezing, no ronchi .   CADRDIOVASCULAR: S1 and S2 normal NO S3 and NO S4 . No rubs , no murmur.   ABDOMEN: soft nontender, bowel sounds not heard, no organomegaly, no ascites.   
Notified In House KENNETH of morning CXR result and low HUOP. Fluid challenge ordered. Care ongoing at this time    
Nursing staff and family at bedside updated by NWO. Transport has been moved to 1430. Will continue to monitor.   
Occupational Therapy  DATE: 3/19/2024    NAME: Corin Gilbert  MRN: [id number]   : 1935    Patient not seen this date for Occupational Therapy due to:      [] Cancel by RN or physician due to:    [] Hemodialysis    [] Critical Lab Value Level     [] Blood transfusion in progress    [] Acute or unstable cardiovascular status   _MAP < 55 or more than >115  _HR < 40 or > 130    [] Acute or unstable pulmonary status   -FiO2 > 60%   _RR < 5 or >40    _O2 sats < 85%    [] Strict Bedrest    [] Off Unit for surgery or procedure    [] Off Unit for testing       [] Pending imaging to R/O fracture    [] Refusal by Patient      [x] Other-3/19 cx eval as pt is intubated and not appropriate for skilled OT; continue to follow      [] PT being discontinued at this time. Patient independent. No further needs.     [] PT being discontinued at this time as the patient has been transferred to hospice care. No further needs.      Kathy Lowe, OT   
Patient async with vent. Restarted sedation and pain control for patient comfort at this time. Care onging    
Patient extubated per physician order. Patient extubated in usual fashion. Patient placed on  3 liters/min via nasal cannula.     GARY GALINDO RCP   3:33 PM  
[facility]  Office: [phone]  Mandeep Mcdaniels DO, Antwan Angulo, DO, Jessee Gray DO, Francisco Colindres, DO, Parris Ziegler MD, Gerri Echols MD, Katheryn Fletcher MD, Polly Linda MD,  Rod Beck MD, Meghana Townsend MD, Bentley Garcia MD,  Festus Waldron DO, Jennifer Leary MD, Dave Toth MD, Thien Mcdaniels DO, Joseline Pittman MD,  Martin Figueroa DO, Nafisa Merritt MD, Dayami Rene MD, Marcelina Gates MD, Rosalinda Goddard MD,  Srinivasa Alexis MD, Sal Chin MD, Roxann Desai MD, Titi Lyons MD, Ernst Zuniga MD, Pop Moreno MD, Gian Olmedo DO, Ten Garces DO, Christopher Reddy MD,  Vadim Goncalves MD, Shirley Waterhouse, CNP,  Meg Wong CNP, Juan Calros Kemp, CNP,  Ivonne Hickman, MANUELA, Ana Morgan, CNP, Toya Jewell, CNP, Christiana Rouse CNP, Cheyanne Baptiste CNP, Mireille Longoria, CNP, Charley Bass, PA-C, Rachel Banks, PA-C, Carleen Meehan, CNP, Ashley Mckeon, CNP, Maryuri Stokes, CNP, Radha Duenas, CNS, Marli Quintero, CNP, Christiana Stiles CNP, Tracy Schwab, CNP         [facility]   IN-PATIENT SERVICE   [facility]    Progress Note    3/22/2024    11:21 AM    Name:   Corin Gilbert  MRN:     [id number]     Acct:      [id number]   Room:   2027/2027-01  IP Day:  4  Admit Date:  3/18/2024  3:40 PM    PCP:   Hui Tatum APRN - CNP  Code Status:  DNR-CCA    Subjective:     C/C:   Chief Complaint   Patient presents with    Cardiac Arrest     Arrives via EMS from home, witnessed arrest and family initiated CPR. Pt received 2 shocks, 2 rounds of EPI and 1 bicarb PTA with rosc. Approx 20 seconds PTA, CPR reinitiated and continued on arrival. Pt intubated with CPR in progress at this time     Interval History Status: not changed.     Remains on vent, pressors weaned off    Unresponsive except opens eyes, not following commands    Brief History:     Per my partner:  \"78-year-old female that presents to [facility] via EMS today after witnessed 
Physical Therapy  DATE: 3/19/2024    NAME: Corin Gilbert  MRN: [id number]   : 1935    Patient not seen this date for Physical Therapy due to:      [] Cancel by RN or physician due to:    [x] Pt intubated & sedated on vent      [] Critical Lab Value Level     [] Blood transfusion in progress    [] Acute or unstable cardiovascular status   _MAP < 55 or more than >115  _HR < 40 or > 130    [] Acute or unstable pulmonary status   -FiO2 > 60%   _RR < 5 or >40    _O2 sats < 85%    [] Strict Bedrest    [] Off Unit for surgery or procedure    [] Off Unit for testing       [] Pending imaging to R/O fracture    [] Refusal by Patient      [] Other      [] PT being discontinued at this time. Patient independent. No further needs.     [] PT being discontinued at this time as the patient has been transferred to hospice care. No further needs.      RUCHI STORM, PT    
Physical Therapy  DATE: 3/21/2024    NAME: Corin Gilbert  MRN: [id number]   : 1935    Patient not seen this date for Physical Therapy due to:      [] Cancel by RN or physician due to:    [x] Pt remains intubated & sedated on vent, & family meeting with Palliative team planned    [] Critical Lab Value Level     [] Blood transfusion in progress    [] Acute or unstable cardiovascular status   _MAP < 55 or more than >115  _HR < 40 or > 130    [] Acute or unstable pulmonary status   -FiO2 > 60%   _RR < 5 or >40    _O2 sats < 85%    [] Strict Bedrest    [] Off Unit for surgery or procedure    [] Off Unit for testing       [] Pending imaging to R/O fracture    [] Refusal by Patient      [] Other      [] PT being discontinued at this time. Patient independent. No further needs.     [] PT being discontinued at this time as the patient has been transferred to hospice care. No further needs.      RUCHI STORM, PT    
Physical Therapy  DATE: 3/22/2024    NAME: Corin Gilbert  MRN: [id number]   : 1935    Patient not seen this date for Physical Therapy due to:      [] Cancel by RN or physician due to:    [] Hemodialysis    [] Critical Lab Value Level     [] Blood transfusion in progress    [] Acute or unstable cardiovascular status   _MAP < 55 or more than >115  _HR < 40 or > 130    [] Acute or unstable pulmonary status   -FiO2 > 60%   _RR < 5 or >40    _O2 sats < 85%    [] Strict Bedrest    [] Off Unit for surgery or procedure    [] Off Unit for testing       [] Pending imaging to R/O fracture    [] Refusal by Patient      [] Other      [] PT being discontinued at this time. Patient independent. No further needs.     [x] PT being discontinued at this time as pllan is for family meeting & to likely terminally extubate, will D/C PT orders       RUCHI STORM, PT    
[facility]  Office: [phone]  Mandeep Mcdaniels DO, Antwan Angulo, DO, Jessee Gray DO, Francisco Colindres, DO, Parris Ziegler MD, Gerri Echols MD, Katheryn Fletcher MD, Polly Linda MD,  Rod Beck MD, Meghana Townsend MD, Bentley Garcia MD,  Festus Waldron DO, Jennifer Leary MD, Dave Toth MD, Thien Mcdaniels DO, Joseline Pittman MD,  Martin Figueroa DO, Nafisa Merritt MD, Dayami Rene MD, Marcelina Gates MD, Rosalinda Goddard MD,  Srinivasa Alexis MD, Sal Chin MD, Roxann Desai MD, Titi Lyons MD, Ernst Zuniga MD, Pop Moreno MD, Gian Olmedo DO, Ten Garces DO, Christopher Reddy MD,  Vadim Goncalves MD, Shirley Waterhouse, CNP,  Meg Wong CNP, Juan Carlos Kemp, CNP,  Ivonne Hickman, DNP, Ana Morgan, CNP, Toya Jewell, CNP, Christiana Rouse CNP, Cheyanne Baptiste CNP, Mireille Longoria, CNP, Charley Bass, PA-C, Rachel Banks, PA-C, Carleen Meehan, CNP, Ashley Mckeon, CNP, Maryuri Stokes, CNP, Radha Duenas, CNS, Maril Quintero, CNP, Christiana Stiles CNP, Tracy Schwab, CNP         [facility]   IN-PATIENT SERVICE   [facility]    Progress Note    3/19/2024    8:51 AM    Name:   Corin Gilbert  MRN:     [id number]     Acct:      [id number]   Room:   2027/2027-01  IP Day:  1  Admit Date:  3/18/2024  3:40 PM    PCP:   Hui Tatum APRN - CNP  Code Status:  DNR-CCA    Subjective:     C/C:   Chief Complaint   Patient presents with    Cardiac Arrest     Arrives via EMS from home, witnessed arrest and family initiated CPR. Pt received 2 shocks, 2 rounds of EPI and 1 bicarb PTA with rosc. Approx 20 seconds PTA, CPR reinitiated and continued on arrival. Pt intubated with CPR in progress at this time     Interval History Status: not changed.     Remains on vent, on pressors    Hgb dropped couple grams overnight but no bleeding seen    Continues to have diarrhea: has FMS    Brief History:     Per my partner:  \"78-year-old female that presents to [facility] 
Pt opens eyes spontaneously and blinks, pupils 2mm w minor reaction to light. Does not follow commands or have any movement. HR in mid 40s and BP is 150 to 170 systolic, notified Dr. Kent and asked if able to titrate Vasopressin gtt, response was yes and now titrating Vaso as needed to maintain adequate BP. Pt has been having frequent large liquid BM's, FMS was inserted at 1135 after pt wounds and vaginal area were cleansed w soap and water, CHG care for catheter, and triad cream applied to wounds. Q2hr turns, pt tolerating vent well  
Pulmonary Critical Care Progress Note    Patient seen for the follow up of Cardiac arrest with ventricular fibrillation (HCC)     Subjective:    She was extubated and is now obtunded off oxygen saturation 90%.  Blood pressure stable .    Examination:    Vitals: BP (!) 136/49   Pulse 61   Temp 97.8 °F (36.6 °C) (Temporal)   Resp 15   Ht 1.702 m (5' 7\")   Wt 72.6 kg (160 lb)   SpO2 90%   BMI 25.06 kg/m²   SpO2  Av %  Min: 90 %  Max: 95 %  General appearance: Obtunded   Neck: No JVD  Lungs: Mild decreased breath sound no crackles or wheeze  Heart: regular rate and rhythm, S1, S2 normal, no gallop ecchymosis and excoriations noted from Jacques nandaer on chest  Abdomen: Soft, non tender, + BS  Extremities: no cyanosis or clubbing. No significant edema    LABs:    CBC:   Recent Labs     24  0537 24  1530 24  2200 24  0620   WBC 20.3*  --   --  17.2*   HGB 7.1*   < > 8.7* 8.6*   HCT 21.4*   < > 25.9* 25.4*     --   --  178    < > = values in this interval not displayed.       BMP:   Recent Labs     24  0537 24  0620    141   K 3.4* 3.5*   CO2 21 22   BUN 37* 40*   CREATININE 2.2* 2.3*   LABGLOM 21* 20*   GLUCOSE 110* 75       PT/INR:   No results for input(s): \"PROTIME\", \"INR\" in the last 72 hours.    APTT:  Recent Labs     24  1530 24  0645   APTT 73.8* 47.8*       LIVER PROFILE:  Recent Labs     24  0537   AST 67*   ALT 86*   LABALBU 2.3*        Latest Reference Range & Units 24 05:51   POC HCO3 21.0 - 28.0 mmol/L 21.2   POC Hematocrit 36 - 46 % 23 (L)   POC O2 SAT 94.0 - 98.0 % 99.8 (H)   POC pCO2 35.0 - 48.0 mm Hg 21.6 (L)   POC pH 7.350 - 7.450  7.600 (H)   POC PO2 83.0 - 108.0 mm Hg 176.2 (H)   (L): Data is abnormally low  (H): Data is abnormally high    Urine culture    0 Result Notes      Component    Specimen Description .CLEAN CATCH URINE P   Culture MORGANELLA MORGANII >100,000 CFU/ML Abnormal  P    PROTEUS MIRABILIS >100,000 
Rohini RRT at bedside completing oral. Mild cough and gag noted at this time. Care ongoing.   
SPIRITUAL CARE DEPARTMENT - [facility]  PROGRESS NOTE    Room # 2027/2027-01   Name: Corin Gilbert            Nondenominational: Nondenominational     Reason for visit: Follow up    I visited the patient.    Admit Date & Time: 3/18/2024  3:40 PM    Assessment:  Corin Gilbert is a 88 y.o. female in the hospital because she suffered cardiac arrest earlier in the week. Upon entering the room, the patient is intubated and unresponsive.      Intervention:  I introduced myself and my title as  I offered space for ICU staff  to express feelings, needs, and concerns and provided a ministry presence. Family has palliative meeting scheduled tomorrow.  offers prayer of comfort at bedside.    Outcome:  Patient at rest, no family at bedside.    Plan:  Chaplains will remain available to offer spiritual and emotional support as needed.    Electronically signed by Chaplain Abilio, on 3/21/2024 at 7:01 PM.  Spiritual Care Department  [facility]      03/21/24 6969   Encounter Summary   Service Provided For: Patient   Referral/Consult From: Palliative Care   Support System Children;Family members   Last Encounter  03/21/24   Complexity of Encounter Low   Begin Time 1545   End Time  1555   Total Time Calculated 10 min   Spiritual/Emotional needs   Type Spiritual Support   Palliative Care   Type Palliative Care, Follow-up   Assessment/Intervention/Outcome   Assessment Compromised coping;Impaired resilience;Interrupted family processes   Intervention End of Life Care;Prayer (assurance of)/[place];Sustaining Presence/Ministry of presence   Outcome Connection/Belonging;Did not respond       
SPIRITUAL CARE DEPARTMENT [facility]  PROGRESS NOTE    Room # 2027/2027-01   Name: Corin Gilbert            Adventism: Alevism     Reason for visit: Follow up    I visited the family.    Admit Date & Time: 3/18/2024  3:40 PM    Assessment:  Corin Gilbert is a 88 y.o. female in the hospital because she has had cardiac arrest. Upon entering the room, the patient is in her new room in CVICU and two daughters have been escorted to the CVICU family waiting room. There are about twenty other extended family members in the ED lobby.      Intervention:  I introduced myself and my title as  I offered space for daughters  to express feelings, needs, and concerns and provided a ministry presence. Both daughters are tearful and have been in ED since patient arrived at 3:40pm. Their coping skills are compromised.  alerts incoming SILVIA Gallo that patient's daughters are sitting in CVICU family waiting area and that many others are downstairs.     Outcome:  Daughters are in CVICU family waiting area and patient is being supported by CVICU staff.    Plan:  Chaplains will remain available to offer spiritual and emotional support as needed.    Electronically signed by Chaplain Abilio, on 3/18/2024 at 9:07 PM.  Spiritual Care Department  [facility]     03/18/24 2104   Encounter Summary   Service Provided For: Family   Referral/Consult From: Multi-disciplinary team   Support System Children   Last Encounter  03/18/24   Complexity of Encounter High   Begin Time 2030   End Time  2050   Total Time Calculated 20 min   Spiritual/Emotional needs   Type Spiritual Support   Assessment/Intervention/Outcome   Assessment Anxious;Complicated grieving;Compromised coping;Fearful;Despair;Impaired resilience;Interrupted family processes;Powerlessness;Sad;Tearful   Intervention Active listening;Discussed belief system/Congregation practices/oscar;Explored/Affirmed feelings, thoughts, 
SPIRITUAL CARE DEPARTMENT [facility]  PROGRESS NOTE    Room # 2027/2027-01   Name: Corin Gilbert            Confucianism: Taoism     Reason for visit: Follow up    I visited the family.    Admit Date & Time: 3/18/2024  3:40 PM    Assessment:  Corin Gilbert is a 88 y.o. female in the hospital because she suffered cardiac arrest on Monday in ED. Upon entering the room, the patient is currently intubated and family is discussing extubation and end of life with Dr. Colindres.      Intervention:  I introduced myself and my title as  I offered space for family  to express feelings, needs, and concerns and provided a ministry presence. Family realistic and coping. Family wishes to wait for all family members to arrive prior to extubation.  offers active listening and encouragement for daughters, grand daughters and nephew.    Outcome:  Patient comfortable and family coping.    Plan:  Chaplains will remain available to offer spiritual and emotional support as needed.    Electronically signed by Chaplain Abilio, on 3/22/2024 at 2:53 PM.  Spiritual Care Department  [facility]      03/22/24 1451   Encounter Summary   Service Provided For: Family   Referral/Consult From: Palliative Care   Support System Children;Family members;Friends/neighbors   Last Encounter  03/22/24   Complexity of Encounter Moderate   Begin Time 1300   End Time  1330   Total Time Calculated 30 min   Spiritual/Emotional needs   Type Spiritual Support   Grief, Loss, and Adjustments   Type End of Life;Grief and loss;Bereavement Care   Palliative Care   Type Palliative Care, Follow-up   Assessment/Intervention/Outcome   Assessment Compromised coping;Impaired resilience;Interrupted family processes;Sad;Tearful   Intervention Active listening;Discussed illness injury and it’s impact;Explored/Affirmed feelings, thoughts, concerns;Explored Coping Skills/Resources;Grief Care;Nurtured Hope;Prayer (assurance 
SPIRITUAL CARE DEPARTMENT [facility]  PROGRESS NOTE    Room # 2027/2027-01   Name: Corin Gilbert            Scientologist: Church     Reason for visit: Follow up    I visited the family.    Admit Date & Time: 3/18/2024  3:40 PM    Assessment:  Corin Gilbert is a 88 y.o. female in the hospital because she arrived with cardiac arrest yesterday. Upon entering the room, patient at rest and intubated.      Intervention:  I introduced myself and my title as  I offered space for daughters  to express feelings, needs, and concerns and provided a ministry presence.  provided care for patient and family yesterday. They report that patient just moved here from [place] where she was living with her two sons. Patient's daughters are tearful and allow  to offer emotional support.     Outcome:  Patient at rest and family coping.    Plan:  Chaplains will remain available to offer spiritual and emotional support as needed.    Electronically signed by Chaplain Abilio, on 3/19/2024 at 5:15 PM.  Spiritual Care Department  [facility]     03/19/24 1713   Encounter Summary   Service Provided For: Family   Referral/Consult From: [employer] [facility] Children;Family members   Last Encounter  03/19/24   Complexity of Encounter Moderate   Begin Time 1630   End Time  1640   Total Time Calculated 10 min   Spiritual/Emotional needs   Type Spiritual Support   Grief, Loss, and Adjustments   Type Adjustment to illness   Assessment/Intervention/Outcome   Assessment Calm;Coping;Interrupted family processes   Intervention Active listening;Explored/Affirmed feelings, thoughts, concerns;Explored Coping Skills/Resources;Nurtured Hope;Sustaining Presence/Ministry of presence   Outcome Comfort;Connection/Belonging;Encouraged;Engaged in conversation;Expressed feelings, needs, and concerns;Receptive          
SPIRITUAL CARE DEPARTMENT [facility]  PROGRESS NOTE    Room # 2027/2027-01   Name: Corin Gilbert              Reason for visit: Routine    I visited the patient.    Admit Date & Time: 3/18/2024  3:40 PM    Assessment:  Corin Gilbert is a 88 y.o. female.  Upon entering the room family states well, states no major needs or prayers. Family states PT: to be going into Hospice.   Family kindly declines Spiritual Care visit.  leaves prayer card for patient for possible follow up as needed.      Intervention:   provided a ministry presence.    Outcome:  Family  grateful for the visit.    Plan:  Chaplains will remain available to offer spiritual and emotional support as needed.    Electronically signed by Chaplain Bernice, on 3/24/2024 at 11:29 AM.  Spiritual Care Department  [facility]      03/24/24 1122   Encounter Summary   Service Provided For: Patient and family together   Referral/Consult From: Palliative Care   Support System Family members   Last Encounter  03/24/24   Complexity of Encounter Low   Begin Time 1045   End Time  1047   Total Time Calculated 2 min   Spiritual/Emotional needs   Type Spiritual Support   Grief, Loss, and Adjustments   Type End of Life;Hospice   Palliative Care   Type Palliative Care, Family Care   Assessment/Intervention/Outcome   Assessment Coping   Intervention Active listening;Sustaining Presence/Ministry of presence   Outcome Refused/Declined       
SPIRITUAL CARE DEPARTMENT [facility]  PROGRESS NOTE    Room # 2027/2027-01   Name: Corin Gilbert            Baptist: Mandaen     Reason for visit: Follow up    I visited the family.    Admit Date & Time: 3/18/2024  3:40 PM    Assessment:  Corin Gilbert is a 88 y.o. female in the hospital because she arrived in cardiac arrest yesterday and is not intubated and in CVICU. Upon entering the room, the patient is still intubated and unable to respond.  met earlier in the day with daughters and grand children.      Intervention:  I introduced myself and my title as  I offered space for patient  to express feelings, needs, and concerns and provided a ministry presence. Daughters report that yesterday night they were hopeful as patient was moving her lips and trying to communicate with them. Today, they report that patient is no longer responding to them and that they are trying to decide what to do next. One more son is coming from [place] and they will meet on Friday with Palliative Care and Dr. Colindres to decide on further care.    Outcome:  Patient unresponsive, family tearful but coping.    Plan:  Chaplains will remain available to offer spiritual and emotional support as needed.    Electronically signed by Chaplain Abilio, on 3/20/2024 at 7:57 PM.  Spiritual Care Department  [facility]      03/20/24 1955   Encounter Summary   Service Provided For: Family   Referral/Consult From: [employer] [facility] Children;Family members   Last Encounter  03/20/24   Complexity of Encounter Moderate   Begin Time 1930   End Time  1945   Total Time Calculated 15 min   Spiritual/Emotional needs   Type Spiritual Support   Grief, Loss, and Adjustments   Type Life Adjustments;Anticipatory Grief   Assessment/Intervention/Outcome   Assessment Calm;Coping;Fearful;Interrupted family processes   Intervention Active listening;Discussed illness injury and it’s impact;Explored/Affirmed 
SPIRITUAL CARE DEPARTMENT [facility]  PROGRESS NOTE    Room # 2027/2027-01   Name: Corin Gilbert              Reason for visit: Routine    I visited the patient.    Admit Date & Time: 3/18/2024  3:40 PM    Assessment:  Corin Gilbert is a 88 y.o. female.  Upon entering the room patient sleeping. Family  states well, states no major needs or prayers.  Family  kindly declines Spiritual Care visit.  leaves prayer card for patient for possible follow up as needed.      Intervention:   provided a ministry presence.    Outcome:  Patient grateful for the visit.    Plan:  Chaplains will remain available to offer spiritual and emotional support as needed.    Electronically signed by Chaplain Bernice, on 3/23/2024 at 1:39 PM.  Spiritual Care Department  [facility]      03/23/24 1338   Encounter Summary   Service Provided For: Patient and family together   Referral/Consult From: Palliative Care   Support System Family members   Last Encounter  03/23/24   Complexity of Encounter Low   Begin Time 0100   End Time  0105   Total Time Calculated 5 min   Spiritual/Emotional needs   Type Spiritual Support   Palliative Care   Type Palliative Care, Follow-up   Assessment/Intervention/Outcome   Assessment Calm;Coping   Intervention Sustaining Presence/Ministry of presence   Outcome Engaged in conversation;Refused/Declined       
Transport arrived to take patient to [facility] in-pt facility. Family at bedside. Discharge packet and bedside report provided. Assisted sliding pt onto gurney. Wheeled off the floor at this time.   
Updated [facility] on pt's status. Referral #[id number].  
Updated cardiology: Dr. Haynes rounded this AM and discontinued Amio drip and ordered oral amio 200mg daily. Patient had NG tube placed at that time.     NG tube has since been dislodged and unable to replace. MARIZOL Sampson NP updated and said to start amiodarone at 0.5 milligram/minute if her QTc is less than 500    Patients current qtc 550. Amio not restarted at this time.   
[facility]  Office: [phone]  Mandeep Mcdaniels DO, Antwan Angulo, DO, Jessee Gray DO, Francisco Colindres, DO, Parris Ziegler MD, Gerri Echols MD, Katheryn Fletcher MD, Polly Linda MD,  Rod Beck MD, Meghana Townsend MD, Bentley Garcia MD,  Festus Waldron DO, Jennifer Leary MD, Dave Toth MD, Thien Mcdaniels DO, Joseline Pittman MD,  Martin Figueroa DO, Nafisa Merritt MD, Dayami Rene MD, Marcelina Gates MD, Rosalinda Goddard MD,  Srinivasa Alexis MD, Sal Chin MD, Roxann Desai MD, Titi Lyons MD, Ernst Zuniga MD, Pop Moreno MD, Gian Olmedo DO, Ten Garces DO, Christopher Reddy MD,  Vadim Goncalves MD, Shirley Waterhouse, CNP,  Meg Wong CNP, Juan Carlos Kemp, CNP,  Ivonne Hickman, MANUELA, Ana Morgan, CNP, Toya Jewell, CNP, Christiana Rouse CNP, Cheyanne Baptiste CNP, Mireille Longoria, CNP, Charley Bass, PA-C, Rachel Banks, PA-C, Carleen Meehan, CNP, Ashley Mckeon, CNP, Maryuri Stokes, CNP, Radha Duenas, CNS, Marli Quintero, CNP, Christiana Stiles CNP, Tracy Schwab, CNP         [facility]   IN-PATIENT SERVICE   [facility]    Progress Note    3/22/2024    11:21 AM    Name:   Corin Gilbert  MRN:     [id number]     Acct:      [id number]   Room:   2027/2027-01  IP Day:  4  Admit Date:  3/18/2024  3:40 PM    PCP:   Hui Tatum APRN - CNP  Code Status:  DNR-CCA    Subjective:     C/C:   Chief Complaint   Patient presents with    Cardiac Arrest     Arrives via EMS from home, witnessed arrest and family initiated CPR. Pt received 2 shocks, 2 rounds of EPI and 1 bicarb PTA with rosc. Approx 20 seconds PTA, CPR reinitiated and continued on arrival. Pt intubated with CPR in progress at this time     Interval History Status: not changed.     Was terminally extubated    Unresponsive, not following commands    Brief History:     Per my partner:  \"78-year-old female that presents to [facility] via EMS today after witnessed cardiac arrest at home. Family 
[facility]  Office: [phone]  Mandeep Mcdaniels DO, Antwan Angulo, DO, Jessee Gray DO, Francisco Colindres, DO, Parris Ziegler MD, Gerri Echols MD, Katheryn Fletcher MD, Polly Linda MD,  Rod Beck MD, Meghana Townsend MD, Bentley Garcia MD,  Festus Waldron DO, Jennifer Leary MD, Dave Toth MD, Thien Mcdaniels DO, Joseline Pittman MD,  Martin Figueroa DO, Nafisa Merritt MD, Dayami Rene MD, Marcelina Gates MD, Rosalinda Goddard MD,  Srinivasa Alexis MD, Sal Chin MD, Roxann Desai MD, Titi Lyons MD, Ernst Zuniga MD, Pop Moreno MD, Gian Olmedo DO, Ten Garces DO, Christopher Reddy MD,  Vadim Goncalves MD, Shirley Waterhouse, CNP,  Meg Wong CNP, Juan Carlos Kemp, CNP,  Ivonne Hickman, DNP, Ana Morgan, CNP, Toya Jewell, CNP, Christiana Rouse CNP, Cheyanne Baptiste CNP, Mireille Longoria, CNP, Charley Bass, PA-C, Rachel Banks, PA-C, Carleen Meehan, CNP, Ashley Mckeon, CNP, Maryuri Stokes, CNP, Radha Duenas, CNS, Marli Quintero, CNP, Christiana Stiles CNP, Tracy Schwab, CNP         [facility]   IN-PATIENT SERVICE   [facility]    Progress Note    3/20/2024    8:10 AM    Name:   Corin Gilbert  MRN:     [id number]     Acct:      [id number]   Room:   2027/2027-01  IP Day:  2  Admit Date:  3/18/2024  3:40 PM    PCP:   Hui Tatum APRN - CNP  Code Status:  DNR-CCA    Subjective:     C/C:   Chief Complaint   Patient presents with    Cardiac Arrest     Arrives via EMS from home, witnessed arrest and family initiated CPR. Pt received 2 shocks, 2 rounds of EPI and 1 bicarb PTA with rosc. Approx 20 seconds PTA, CPR reinitiated and continued on arrival. Pt intubated with CPR in progress at this time     Interval History Status: not changed.     Remains on vent, on pressors    Hgb dropped overnight and received 1u prbc but no bleeding seen    Continues to have diarrhea: has FMS    Unresponsive except opens eyes, not following commands    Brief History:     Per my 
[facility]  Office: [phone]  Mandeep Mcdaniels DO, Antwan Angulo, DO, Jessee Gray DO, Francisco Colindres, DO, Parris Ziegler MD, Gerri Echols MD, Katheryn Fletcher MD, Polly Linda MD,  Rod Beck MD, Meghana Townsend MD, Bentley Garcia MD,  Festus Waldron DO, Jennifer Leray MD, Dave Toth MD, Thien Mcdaniels DO, Joseline Pittman MD,  Martin Figueroa DO, Nafisa Merritt MD, Dayami Rene MD, Marcelina Gates MD, Rosalinda Goddard MD,  Srinivasa Alexis MD, Sal Chin MD, Roxann Desai MD, Titi Lyons MD, Ernst Zuniga MD, Pop Moreno MD, Gian Olmeod DO, Ten Garces DO, Christopher Reddy MD,  Vadim Goncalves MD, Shirley Waterhouse, CNP,  Meg Wong CNP, Juan Carlos Kemp, CNP,  Ivonne Hickman, DNP, Ana Morgan, CNP, Toya Jewell, CNP, Christiana Rouse CNP, Cheyanne Baptiste CNP, Mireille Longoria, CNP, Charley Bass, PA-C, Rachel Banks, PA-C, Carleen Meehan, CNP, Ashley Mckeon, CNP, Maryuri Stokes, CNP, Radha Duenas, CNS, Marli Quintero, CNP, Christiana Stiles CNP, Tracy Schwab, CNP         [facility]   IN-PATIENT SERVICE   [facility]    Progress Note    3/20/2024    8:10 AM    Name:   Corin Gilbert  MRN:     [id number]     Acct:      [id number]   Room:   2027/2027-01  IP Day:  2  Admit Date:  3/18/2024  3:40 PM    PCP:   Hui Tatum APRN - CNP  Code Status:  DNR-CCA    Subjective:     C/C:   Chief Complaint   Patient presents with    Cardiac Arrest     Arrives via EMS from home, witnessed arrest and family initiated CPR. Pt received 2 shocks, 2 rounds of EPI and 1 bicarb PTA with rosc. Approx 20 seconds PTA, CPR reinitiated and continued on arrival. Pt intubated with CPR in progress at this time     Interval History Status: not changed.     Remains on vent, on pressors    Continues to have diarrhea: but it has slowed down some    Unresponsive except opens eyes, not following commands    Brief History:     Per my partner:  \"78-year-old female that presents to 
Writer was informed patient was on DOAC therapy with apixaban at home.  There is a lab-drug interaction such that you cannot monitor heparin infusion using anti-Xa heparin for about 36 hours (will likely report a falsely elevated anti-Xa heparin).    Because of patient's SPENCER, recommend that we continue aPTT monitoring until about 3/22/24.      Original ordered anti-Xa monitoring has been changed to aPTT monitoring.  
  Number of days: 3       Rectal Tube (Active)   Number of days: 0       Urinary Catheter 03/18/24 2 Way (Active)   Number of days: 3       ETT  (Active)   Number of days: 3       Arterial Line 03/18/24 Left Femoral (Active)   Number of days: 2       Wound 03/18/24 Buttocks (Active)   Number of days: 2          
Right lower lobe atelectasis, likely due to endobronchial mucous  plugging/aspiration within proximal segmental airways. Patchy infiltrates are  also present in the dependent right upper lobe.  4. Trace right pleural effusion and mild pleural thickening noted.  5. Small pericardial effusion    CT abdomen    Mild intrahepatic and extrahepatic biliary ductal dilatation, new and  nonspecific. An MRCP/ERCP can be considered for further evaluation, no  visualized stones in the common bile duct visualized on the current study.     Moderate rectal stool burden, rectal tube in place.     Partially imaged anterior bilateral rib fractures.     Partially imaged right-sided pleural effusion, right basilar atelectasis.        CT brain and cervical  Chronic findings in the brain without acute CT abnormality identified.     No acute osseous abnormality identified in the cervical spine.    Impression/recommendations;    Acute hypoxic respiratory failure requiring mechanical ventilation  Assist-control ventilator/decreased respiratory rate and tidal volume follow-up ABG  Patient with fentanyl Versed RASS -2  ABG     Status post V-fib cardiac arrest/A-fib RVR sepsis/septic shock/rib and sternal fractures  Wean off Levophed to MAP above 65  Vasopressin 0.04 /min  Blood cultures times x2  Sputum culture  Vancomycin Zosyn IV   Amiodarone drip per cardiology  Heparin drip per cardiology  Troponin/echocardiogram         COPD/aspiration pneumonia   Xopenex by nebulizer  Zosyn and Vanco  Follow-up chest x-ray     likely anoxic brain injury  Sedation vacation today monitor mental status     Metabolic acidosis  Improved     UTI/hypernatremia/acute renal insufficiency  Monitor creatinine and sodium  Check urine cultures     Elevated liver function// shock liver   Monitor liver function     Diabetes-hypoglycemia  Monitor blood sugars         sleep apnea   DNR CCA     Discussed with RN  Discussed with granddaughter at bedside; family considering 
[facility]  [phone]      03/18/24 1925   Encounter Summary   Service Provided For: Patient and family together   Referral/Consult From: Multi-disciplinary team   Support System Children;Family members   Last Encounter  03/18/24   Complexity of Encounter High   Begin Time 1540   End Time  1900   Total Time Calculated 200 min   Crisis   Type Code Blue   Spiritual/Emotional needs   Type Spiritual Distress;Emotional Distress   Grief, Loss, and Adjustments   Type Adjustment to illness;Grief and loss   Assessment/Intervention/Outcome   Assessment Anxious;Compromised coping;Despair;Impaired resilience;Impaired social interaction;Interrupted family processes;Powerlessness;Sad;Shock;Stress overload;Tearful   Intervention Active listening;Confronted/Challenged;Discussed belief system/Holiness practices/oscar;Discussed death, afterlife;Discussed illness injury and it’s impact;Discussed meaning/purpose;Discussed relationship with God;Empowerment;End of Life Care;Explored/Affirmed feelings, thoughts, concerns;Explored Coping Skills/Resources;Facilitated forgiveness;Facilitated/Participated in Patient/Family Care Conference;Grief Care;Guided Imagery, Healing touch, etc.;Life review/Legacy;Nurtured Hope;Prayer (assurance of)/[place];Read/Provided Scripture;Sustaining Presence/Ministry of presence   Outcome Comfort;Connection/Belonging;Engaged in conversation;Expressed feelings, needs, and concerns;Expressed Gratitude;Grieving;Receptive;Venting emotion       
Wheezing or Shortness of Breath   Yes Richie Andrews MD   albuterol sulfate HFA (VENTOLIN HFA) 108 (90 Base) MCG/ACT inhaler Inhale 2 puffs into the lungs every 6 hours as needed for Wheezing or Shortness of Breath   Yes Richie Andrews MD   apixaban (ELIQUIS) 5 MG TABS tablet Take 1 tablet by mouth 2 times daily   Yes Richie Andrews MD   citalopram (CELEXA) 20 MG tablet Take 1 tablet by mouth daily   Yes Richie Andrews MD   QUEtiapine (SEROQUEL) 25 MG tablet Take 0.25 tablets by mouth daily as needed   Yes Richie Andrews MD   atorvastatin (LIPITOR) 40 MG tablet Take 1 tablet by mouth daily 10/25/21   Richie Andrews MD   ipratropium-albuterol (DUONEB) 0.5-2.5 (3) MG/3ML SOLN nebulizer solution Inhale 3 mLs into the lungs 2 times daily 9/16/21   Richie Andrews MD   memantine (NAMENDA) 5 MG tablet Take 1 tablet by mouth daily 4/13/21   Richie Andrews MD   levothyroxine (LEVOTHROID) 50 MCG tablet Take 1 tablet by mouth daily 4/14/16   Alexa Moses PA-C   Scooter MISC by Does not apply route Use daily dx arthritis obesity pulmonary htn,copd 4/14/16   Alexa Moses PA-C   aspirin 325 MG tablet Take 1 tablet by mouth daily    Richie Andrews MD            
vasopressin 20 Units in sodium chloride 0.9 % 100 mL infusion 0.04 Units/min (03/19/24 0642)    midazolam 2 mg/hr (03/19/24 0642)    fentaNYL 25 mcg/hr (03/19/24 0642)       Diagnostics:   Telemetry: Sinus rhythm    EKG  Atrial fibrillation rapid ventricular response, left anterior fascicular block, right bundle branch block    Echocardiogram March 19, 2024    Mildly reduced left ventricular systolic function with EF of 40 - 45%. Left ventricle size is normal. Moderately increased wall thickness. Unable to assess wall motion. Normal diastolic function.    Right ventricle size is normal. Normal systolic function.     Mild to moderate aortic regurgitation.    Mild mitral regurgitation.    Moderate to severe tricuspid regurgitation. The estimated RVSP is 46 mmHg.     Echocardiogram October 22, 2022    Left ventricle systolic function is hyperdynamic with an ejection fraction over 70%.    Right ventricular size appears normal. Systolic function is normal.     Left Atrium: Left atrium is mildly dilated.     There is trace-to-mild mitral regurgitation.     The aortic leaflets exhibit mildly reduced excursion. The leaflets are mildly calcified. There is no regurgitation. There is mild stenosis. The aortic valve peak gradient is   29.00 mmHg. The calculated aortic valve mean gradient is 14.00 mmHg.    There is trace tricuspid regurgitation, RVSP is not reported.     Nuclear stress test October 2020  Normal perfusion scan with EF 66%     Squarespace loop recorder device interrogation March 26, 2021 (May 2018)  2 bradycardia episodes longest 8 seconds with 30 bpm     Carotid ultrasound April 2018  Less than 50% stenosis right internal carotid artery  50-69% stenosis left internal carotid artery    Labs:   CBC:  Recent Labs     03/18/24  1603 03/19/24  0525   WBC 25.9* 28.2*   HGB 9.6* 7.4*   HCT 31.5* 22.6*    256     Magnesium:  Recent Labs     03/18/24  1603   MG 2.0     BMP:  Recent Labs     03/18/24  1603 
  Metabolic acidosis  Improved     UTI/hypernatremia/acute renal insufficiency         Elevated liver function// shock liver   Monitor liver function     Diabetes-hypoglycemia  Monitor blood sugars         sleep apnea   DNR CC/supportive care     Discussed with  family at bedside  Discharge to hospice today        Mark Kent MD, MD, [facility]  Pulmonary Critical Care and Sleep Medicine,  [facility]  Cell: [phone]  Office: [phone]      
arrest/A-fib RVR sepsis/septic shock/rib and sternal fractures/mild to moderate AR with systolic heart failure EF 40 to 45%/moderate pulmonary hypertension  Discontinue vasopressin    Zosyn IV   Amiodarone drip per cardiology  Heparin drip per cardiology         COPD/aspiration pneumonia   Xopenex by nebulizer  Zosyn empirically       likely anoxic brain injury  Sedation vacation today monitor mental status     Metabolic acidosis  Improved     UTI/hypernatremia/acute renal insufficiency  Monitor creatinine and sodium  Check urine cultures     Elevated liver function// shock liver   Monitor liver function     Diabetes-hypoglycemia  Monitor blood sugars         sleep apnea   DNR CCA     Discussed with RN  Awaiting on family decision to withdraw care  Peptic ulcer disease prophylaxis  DVT prophylaxis    Mark Kent MD, MD, [facility]  Pulmonary Critical Care and Sleep Medicine,  [facility]  Cell: [phone]  Office: [phone]      
come from outside will hold off neurology evaluation  Peptic ulcer disease prophylaxis  DVT prophylaxis    Mark Kent MD, MD, [facility]  Pulmonary Critical Care and Sleep Medicine,  [facility]  Cell: [phone]  Office: [phone]      
to moderate AR with systolic heart failure EF 40 to 45%/moderate pulmonary hypertension  Discontinue vasopressin    Zosyn IV   Amiodarone drip per cardiology  Heparin drip per cardiology         COPD/aspiration pneumonia   Xopenex by nebulizer  Zosyn empirically       likely anoxic brain injury  Sedation vacation today monitor mental status     Metabolic acidosis  Improved     UTI/hypernatremia/acute renal insufficiency  Monitor creatinine and sodium  Check urine cultures     Elevated liver function// shock liver   Monitor liver function     Diabetes-hypoglycemia  Monitor blood sugars         sleep apnea   DNR CCA     Discussed with  family; plan to withdraw care  Extubate discontinue meds/ supportive care/ hospice if survives extubation  Peptic ulcer disease prophylaxis  DVT prophylaxis    Mark Kent MD, MD, [facility]  Pulmonary Critical Care and Sleep Medicine,  [facility]  Cell: [phone]  Office: [phone]      
  4. Diabetes, pulmonary embolism, hypothyroidism, depression/anxiety, GERD and other medical issues as per medicine     Prognosis is guarded, may consider hospice care     The case is discussed with nursing staff, medicine team and family at the bedside    RONALD VASQUEZ MD    
midazolam (VERSED) injection 2 mg  2 mg IntraVENous Q1H PRN Mark Kent MD   2 mg at 03/19/24 2013    0.9 % sodium chloride infusion   IntraVENous PRN Ana Morgan APRN - NP        norepinephrine (LEVOPHED) 16 mg in sodium chloride 0.9 % 250 mL infusion  1-100 mcg/min IntraVENous Continuous Mark Kent MD   Stopped at 03/20/24 1911    EPINEPHrine 5 mg in sodium chloride 0.9 % 250 mL infusion  1-20 mcg/min IntraVENous Continuous Mark Kent MD   Stopped at 03/18/24 2107    insulin lispro (HUMALOG) injection vial 0-8 Units  0-8 Units SubCUTAneous Q6H Jessee Gray DO   2 Units at 03/19/24 1314    sodium chloride flush 0.9 % injection 5-40 mL  5-40 mL IntraVENous 2 times per day Jessee Gray DO   10 mL at 03/21/24 0940    sodium chloride flush 0.9 % injection 5-40 mL  5-40 mL IntraVENous PRN Jessee Gray DO        0.9 % sodium chloride infusion   IntraVENous PRN Jessee Gray DO   Stopped at 03/22/24 0511    ondansetron (ZOFRAN-ODT) disintegrating tablet 4 mg  4 mg Oral Q8H PRN Jessee Gray DO        Or    ondansetron (ZOFRAN) injection 4 mg  4 mg IntraVENous Q6H PRN Jessee Gray DO        polyethylene glycol (GLYCOLAX) packet 17 g  17 g Oral Daily PRN Jessee Gray DO        bisacodyl (DULCOLAX) suppository 10 mg  10 mg Rectal Daily PRN Jessee Gray DO        acetaminophen (TYLENOL) tablet 650 mg  650 mg Oral Q6H PRN Jessee Gray DO        Or    acetaminophen (TYLENOL) suppository 650 mg  650 mg Rectal Q6H PRN Jessee Gray DO        perflutren lipid microspheres (DEFINITY) injection 1.5 mL  1.5 mL IntraVENous ONCE PRN Jessee Gray DO        sodium chloride flush 0.9 % injection 10 mL  10 mL IntraVENous PRN Moshe Gomez MD   10 mL at 03/18/24 2207    midazolam (VERSED) 1 mg/mL in NS infusion  1-10 mg/hr IntraVENous Continuous VladitoMark recinos MD   Stopped at 03/20/24 0515    fentaNYL 10 mcg/ml in 0.9%  ml infusion   mcg/hr 
mellitus (HCC) (Chronic) 3/18/2024 Yes    Pulmonary hypertension (HCC) (Chronic) 3/18/2024 Yes    Essential hypertension 3/18/2024 Yes    Diabetic polyneuropathy associated with type 2 diabetes mellitus (HCC) 3/18/2024 Yes    History of pulmonary embolism (Chronic) 3/18/2024 Yes    Overview Signed 3/18/2024  5:16 PM by Jessee Gray DO     Diagnosed January 2024         On mechanically assisted ventilation ([facility]) 3/18/2024 Yes    Stage 3a chronic kidney disease (HCC) 3/18/2024 Yes    Hypernatremia 3/18/2024 Yes    Elevated LFTs 3/18/2024 Yes    Cardiogenic shock (HCC) 3/18/2024 Yes    Aspiration pneumonia of right lower lobe (HCC) 3/18/2024 Yes    NSTEMI (non-ST elevated myocardial infarction) ([facility]) 3/19/2024 Yes    Anoxic encephalopathy ([facility]) 3/20/2024 Yes    Late onset Alzheimer dementia ([facility]) 3/20/2024 Yes    ACP (advance care planning) 3/20/2024 Yes    Palliative care encounter 3/20/2024 Yes    Dementia ([facility]) 3/20/2024 Yes    Cardiac arrest ([facility]) 3/20/2024 Yes   Hypokalemia      Plan:        D/w multiple children yesterday-terminally extubated.  Remains unresponsive but hemodynamically stable.  Hospice evaluation  poor prognosis    Francisco Colindres DO  3/22/2024  11:21 AM

## 2024-03-24 NOTE — DISCHARGE SUMMARY
[facility]  Office: [phone]  Mandeep Mcdaniels DO, Antwan Angulo DO, Jessee Gray DO, Francisco Colindres DO, Parris Ziegler MD, Gerri Echols MD, Katheryn Fletcher MD, Polly Linda MD,  Rod Beck MD, Meghana Townsend MD, Bentley Garcia MD,  Festus Waldron DO, Jennifer Leary MD, Dave Toth MD, Thien Mcdaniels DO, Joseline Pittman MD,  Martin Figueroa DO, Nafisa Merritt MD, Dayami Rene MD, Marcelina Gates MD, Rosalinda Goddard MD,  Srinivasa Alexis MD, Sal Chin MD, Roxann Desai MD, Titi Lyons MD, Ernst Zuniga MD, Pop Moreno MD, Gian Olmedo DO, Ten Garces DO, Christopher Reddy MD,  Vadim Goncalves MD, Shirley Waterhouse, CNP,  Meg Wong CNP, Juan Carlos Kemp, CNP,  Ivonne Hickman, DNP, Ana Morgan, CNP, Toya Jewell, CNP, Christiana Rouse CNP, Cheyanne Baptiste CNP, Mireille Longoria, CNP, Charley Bass, PA-C, Rachel Banks, PA-C, Carleen Meehan, CNP, Ashley Mckeon, CNP, Maryuri Stokes, CNP, Radha Duenas, CNS, Marli Quintero, CNP, Christiana Stiles, CNP, Tracy Schwab, CNP         [facility]   IN-PATIENT SERVICE   [facility]    Discharge Summary     Patient ID: Corin Gilbert  :  1935   MRN: [id number]     ACCOUNT:  [id number]   Patient's PCP: Hui Tatum APRN - CNP  Admit Date: 3/18/2024   Discharge Date: 3/24/2024     Length of Stay: 6  Code Status:  DNR-CC  Admitting Physician: Jessee Gray DO  Discharge Physician: Francisco Colindres DO     Active Discharge Diagnoses:     Hospital Problem Lists:  Principal Problem:    Cardiac arrest with ventricular fibrillation (HCC)  Active Problems:    Hyperlipidemia    COPD without exacerbation (HCC)    Type 2 diabetes mellitus (HCC)    Pulmonary hypertension (HCC)    Essential hypertension    Diabetic polyneuropathy associated with type 2 diabetes mellitus (HCC)    History of pulmonary embolism    On mechanically assisted ventilation (HCC)    Stage 3a chronic kidney disease (HCC)    Hypernatremia

## 2024-03-25 LAB
ABO/RH: NORMAL
ANTIBODY SCREEN: NEGATIVE
ARM BAND NUMBER: NORMAL
BLOOD BANK DISPENSE STATUS: NORMAL
BLOOD BANK SAMPLE EXPIRATION: NORMAL
BPU ID: NORMAL
COMPONENT: NORMAL
CROSSMATCH RESULT: NORMAL
TRANSFUSION STATUS: NORMAL
UNIT DIVISION: 0